# Patient Record
Sex: FEMALE | Race: WHITE | NOT HISPANIC OR LATINO | Employment: OTHER | URBAN - METROPOLITAN AREA
[De-identification: names, ages, dates, MRNs, and addresses within clinical notes are randomized per-mention and may not be internally consistent; named-entity substitution may affect disease eponyms.]

---

## 2017-03-09 ENCOUNTER — OFFICE VISIT (OUTPATIENT)
Dept: ORTHOPEDIC SURGERY | Facility: CLINIC | Age: 72
End: 2017-03-09

## 2017-03-09 VITALS — HEIGHT: 67 IN | WEIGHT: 131.8 LBS | TEMPERATURE: 98.1 F | BODY MASS INDEX: 20.69 KG/M2

## 2017-03-09 DIAGNOSIS — M54.16 RIGHT LUMBAR RADICULOPATHY: ICD-10-CM

## 2017-03-09 DIAGNOSIS — M43.16 SPONDYLOLISTHESIS AT L4-L5 LEVEL: ICD-10-CM

## 2017-03-09 DIAGNOSIS — M54.50 LUMBAR SPINE PAIN: Primary | ICD-10-CM

## 2017-03-09 PROCEDURE — 99213 OFFICE O/P EST LOW 20 MIN: CPT | Performed by: ORTHOPAEDIC SURGERY

## 2017-03-09 PROCEDURE — 72110 X-RAY EXAM L-2 SPINE 4/>VWS: CPT | Performed by: ORTHOPAEDIC SURGERY

## 2017-03-09 NOTE — PROGRESS NOTES
Patient:  Davida Anderson is a 71 y.o. female    Chief Complaint/ Reason for Visit:    Chief Complaint   Patient presents with   • Lumbar Spine - Establish Care, Pain   • Right Hip - Establish Care, Pain       HPI:  The patient presents today complaining of a 5-6 week history of progressively worsening aching, burning, and stabbing pain of severe intensity beginning in her lower back on the right side, radiating around her right hip all the way down her right leg even into her right foot.  She occasionally has burning pain in this area as well.  She has not had an injury to her back recently, but does have a history of spine surgery in the past.  She has no left lower extremity symptoms.  She has not experienced any difficulty acutely with bowel or bladder control associated directly with the onset of these symptoms.  She does have a chronic difficulty with stress incontinence.    Her pain is exacerbated by sitting, but lately, she has had trouble maintaining any position, even laying supine, for a prolonged period.  It seems that positional changes are the thing that helps her discomfort the most.  She says her pain is probably the least intense when she is walking.      PMH:    Past Medical History   Diagnosis Date   • Acid reflux    • Arthritis    • Gtz esophagus    • Bipolar 2 disorder    • Diverticulosis    • Dizziness    • Frequent UTI    • H/O gastroesophageal reflux (GERD)    • H/O Infiltrating ductal carcinoma of left breast 2002     Stage IIA, Grade 3 ER/RI +, HER2 -, treated with 5 years of tamoxifen, 5 years of Femara, completed in 2012   • High cholesterol    • History of alcoholism      40 YRS AGO   • History of Clostridium difficile colitis    • History of gastric ulcer    • History of Holter monitoring      WEARING CURRENTLY   • Hypertension    • Irregular heartbeat    • Mass of right breast on mammogram 03/17/2016     10 o'clock position, 4 cm from the nipple,   • Neuropathy    • PONV  (postoperative nausea and vomiting)    • Raynaud disease        PSH:    Past Surgical History   Procedure Laterality Date   • Ankle open reduction internal fixation Right 08/14/2015     Dr. Dandre Camacho, [facility]   • Cystoscopy N/A 05/07/2010     with TVT takedown, Dr. Simon Wall, [facility]   • Tension free vaginal taping with mini arc sling N/A 10/26/2009     Dr. Gina Castillo, [facility]   • Mastectomy Left 04/11/2002     Left Total Mastectomy and Left Axillary [place] Node Biobsy, Dr. Indu Akins, [facility]   • Breast reconstruction, breast tissue expander insertion Left 04/11/2002     [place] Contour Profile expander was placed 550 cc size, filled with 200 cc of saline, Dr. Herbert Napoles, [facility]   • Mammo us breast biopsy additional w wo device Left 02/21/2002     2:00 position left breast, Infiltrating Ductal Carcinoma, [facility]   • Colonoscopy N/A 09/16/2014     Dr. Darci Kerns, [facility]; torts, tics, stool, polyp   • Upper gastrointestinal endoscopy  09/16/2014     z-line irreg, grade a reflux, gastritis   • Back surgery       LUMBAR   • Knee surgery Left      BROKEN   • Mandible fracture surgery     • Endoscopy N/A 9/27/2016     Procedure: ESOPHAGOGASTRODUODENOSCOPY with biopsy;  Surgeon: Darci Kerns MD;  Location: [facility] ENDOSCOPY;  Service:    • Colonoscopy N/A 9/27/2016     Procedure: COLONOSCOPY into cecum and ti WITH BIOPSIES AND COLD BIOPSY polypectomIES ;  Surgeon: Darci Kerns MD;  Location: [facility] ENDOSCOPY;  Service:        Social Hx:    Social History     Social History   • Marital status:      Spouse name: N/A   • Number of children: N/A   • Years of education: N/A     Occupational History   • Not on file.     Social History Main Topics   • Smoking status: Former Smoker     Packs/day: 3.00     Types: Cigarettes     Quit date: 4/14/1983   • Smokeless tobacco: Never Used   • Alcohol use No   • Drug use: No   • Sexual activity: Defer     Other Topics Concern   • Not on file     Social History Narrative        Family Hx:    Family History   Problem Relation Age of Onset   • Breast cancer Mother 35   • Colon cancer Mother 64   • Heart disease Father    • Cancer Father      throat and lung   • Colon polyps Father        Meds:    Current Outpatient Prescriptions:   •  acetaminophen (TYLENOL) 500 MG tablet, Take 1,000 mg by mouth every night., Disp: , Rfl:   •  ARIPiprazole (ABILIFY) 10 MG tablet, Take 10 mg by mouth daily., Disp: , Rfl:   •  aspirin 81 MG tablet, Take 81 mg by mouth daily., Disp: , Rfl:   •  atorvastatin (LIPITOR) 20 MG tablet, TAKE 1 TABLET EVERY DAY, Disp: , Rfl:   •  B Complex Vitamins (VITAMIN B COMPLEX) tablet, Take 1 tablet by mouth daily., Disp: , Rfl:   •  BIOTIN PO, Take 1 tablet by mouth daily., Disp: , Rfl:   •  Calcium Carb-Cholecalciferol (CALCIUM 600 + D) 600-200 MG-UNIT tablet, Take 2 tablets by mouth daily., Disp: , Rfl:   •  celecoxib (CeleBREX) 200 MG capsule, Take 200 mg by mouth daily., Disp: , Rfl:   •  diphenhydrAMINE (BENADRYL) 12.5 MG/5ML elixir, Take 50 mg by mouth every night., Disp: , Rfl:   •  Glucosamine-Chondroitin 5359-9517 MG/30ML liquid, Take 2 tablets by mouth daily., Disp: , Rfl:   •  hydrALAZINE (APRESOLINE) 25 MG tablet, Take 25 mg by mouth 3 (three) times a day., Disp: , Rfl:   •  Hydrazine Sulfate crystals, 25 mg 3 (three) times a day., Disp: , Rfl:   •  lamoTRIgine (LaMICtal) 100 MG tablet, Take 100 mg by mouth 2 (two) times a day., Disp: , Rfl:   •  lithium carbonate 300 MG capsule, Take 300 mg by mouth daily., Disp: , Rfl:   •  LYRICA 75 MG capsule, Take 75 mg by mouth daily., Disp: , Rfl:   •  magnesium oxide (MAGOX) 400 (241.3 MG) MG tablet tablet, Take 400 mg by mouth daily., Disp: , Rfl:   •  MYRBETRIQ 50 MG tablet sustained-release 24 hour, Take 50 mg by mouth daily., Disp: , Rfl:   •  omeprazole (PriLOSEC) 20 MG capsule, Take 20 mg by mouth 2 (two) times a day., Disp: , Rfl:   •  Probiotic Product (SOLUBLE FIBER/PROBIOTICS PO), Take 1 tablet by mouth 3  "(three) times a day., Disp: , Rfl:   •  Coenzyme Q10 (CO Q-10) 400 MG capsule, Take 400 mg by mouth daily., Disp: , Rfl:   •  eszopiclone (LUNESTA) tablet, Take 3 mg by mouth at night as needed., Disp: , Rfl:     Allergies:    Allergies   Allergen Reactions   • Morphine Hives, Itching and Rash       ROS:  Review of Systems   Constitutional: Negative for chills, fever and unexpected weight change.   HENT: Positive for tinnitus. Negative for trouble swallowing and voice change.    Eyes: Positive for pain. Negative for visual disturbance.   Respiratory: Negative for cough and shortness of breath.    Cardiovascular: Negative for chest pain and leg swelling.   Gastrointestinal: Positive for nausea. Negative for abdominal pain and vomiting.   Endocrine: Negative for cold intolerance and heat intolerance.   Genitourinary: Negative for difficulty urinating, frequency and urgency.   Skin: Negative for rash and wound.   Allergic/Immunologic: Negative for immunocompromised state.   Neurological: Negative for weakness and numbness.   Hematological: Does not bruise/bleed easily.   Psychiatric/Behavioral: Positive for sleep disturbance. Negative for dysphoric mood. The patient is nervous/anxious (depression).        Vitals:    03/09/17 0907   Temp: 98.1 °F (36.7 °C)   TempSrc: Temporal Artery    Weight: 131 lb 12.8 oz (59.8 kg)   Height: 66.5\" (168.9 cm)       Physical Exam    The patient is awake, alert, and oriented ×3.  The patient is in no acute distress.  Breathing is regular and unlabored with a respiratory rate of 14/m.  Extraocular movements and pupillary responses are symmetrically intact. Sclerae are anicteric.   Hearing is within normal limits.  Speech is within normal limits.  There is no jugular venous distention.    The patient seems to have a limp antalgic from the right.  She has a positive straight leg raise on the right for reproduction of right hip, thigh, and back pain, and straight leg raising on the left " lower extremity reproduces contralateral right lower extremity pain also.  She has 1+ symmetrical patellar reflexes bilaterally, but I could not elicit Achilles reflexes.  Babinski sign is downgoing bilaterally.  She has no spasticity, cogwheeling, or clonus in either lower extremity.  Sensory exam is intact to light touch in both lower extremities.  Proprioception was intact as well.  She has brisk capillary filling all toes.  Pedal pulses were intact, regular, and symmetrically present in both feet with a current heart rate of about 78 bpm.  She has no popliteal or inguinal lymphadenopathy in either lower extremity.  She has no acute skin changes in either lower extremity.    Logroll and Stinchfield test are negative in both hips.  She has full, smooth, symmetrical ranges of motion of both hips well within a functional normal range.        Radiology: X-rays: A 4 view lumbar spine series was ordered and reviewed today to assess patient's complaints of back, right hip, and right lower extremity pain.  I was able to compare these images to previous lumbar spine images dating back to 2013.  In comparison, the patient has developed a mild L4 on L5 spondylolisthesis.  She has minor degenerative changes that may have progressed slightly.  The patient also had old MRIs that I reviewed the revealed disc bulges.          Assessment:  1. Lumbar spine pain    - XR Spine Lumbar 4+ View  - MRI Lumbar Spine With Contrast; Future  - Ambulatory Referral to Orthopedic Surgery  - Ambulatory Referral to Physical Therapy Evaluate and treat, Ortho    2. Spondylolisthesis at L4-L5 level    - MRI Lumbar Spine With Contrast; Future  - Ambulatory Referral to Orthopedic Surgery  - Ambulatory Referral to Physical Therapy Evaluate and treat, Ortho    3. Right lumbar radiculopathy    - MRI Lumbar Spine With Contrast; Future  - Ambulatory Referral to Orthopedic Surgery  - Ambulatory Referral to Physical Therapy Evaluate and treat,  Ortho        Plan:  I discussed everything with the patient at length.  Given her history of prior spine surgery, the changes on her plain x-rays, and the severity of her pain, I think an MRI of her lumbar spine is appropriate.  We will need contrast given her history of prior surgery.  We're going to start some him.  Physical therapy, but I think she would be best served to be seen by our spine specialists.  She says the physician who performed her original surgery has retired.    Neurologic precautions were reviewed with the patient in detail and she voiced understanding.  She knows that she needs to get immediate medical attention for any says deterioration.      Orders Placed This Encounter   Procedures   • XR Spine Lumbar 4+ View     Order Specific Question:   Reason for Exam:     Answer:   OPNC Lumbar Spine Pain   • MRI Lumbar Spine With Contrast     Standing Status:   Future     Standing Expiration Date:   3/9/2018     Order Specific Question:   Reason for Exam:     Answer:   The patient has severe radicular pain down the right lower extremity.  History of prior spine surgery.  Progression of L4 on L5 spondylolisthesis listhesis noted   • Ambulatory Referral to Orthopedic Surgery     Referral Priority:   Routine     Referral Type:   Consultation     Referral Reason:   Specialty Services Required     Referred to Provider:   Austin Keith MD     Requested Specialty:   Orthopedic Surgery     Number of Visits Requested:   1   • Ambulatory Referral to Physical Therapy Evaluate and treat, Ortho     Referral Priority:   Routine     Referral Type:   Therapy     Referral Reason:   Specialty Services Required     Requested Specialty:   Physical Therapy     Number of Visits Requested:   1

## 2017-03-15 ENCOUNTER — HOSPITAL ENCOUNTER (OUTPATIENT)
Dept: MRI IMAGING | Facility: HOSPITAL | Age: 72
Discharge: HOME | End: 2017-03-15
Attending: ORTHOPAEDIC SURGERY | Admitting: ORTHOPAEDIC SURGERY

## 2017-03-15 DIAGNOSIS — M54.50 LUMBAR SPINE PAIN: ICD-10-CM

## 2017-03-15 DIAGNOSIS — M54.16 RIGHT LUMBAR RADICULOPATHY: ICD-10-CM

## 2017-03-15 DIAGNOSIS — M43.16 SPONDYLOLISTHESIS AT L4-L5 LEVEL: ICD-10-CM

## 2017-03-15 PROCEDURE — 0 GADOBENATE DIMEGLUMINE 529 MG/ML SOLUTION: Performed by: ORTHOPAEDIC SURGERY

## 2017-03-15 PROCEDURE — 72158 MRI LUMBAR SPINE W/O & W/DYE: CPT

## 2017-03-15 PROCEDURE — 82565 ASSAY OF CREATININE: CPT

## 2017-03-15 PROCEDURE — A9577 INJ MULTIHANCE: HCPCS | Performed by: ORTHOPAEDIC SURGERY

## 2017-03-15 RX ADMIN — GADOBENATE DIMEGLUMINE 11 ML: 529 INJECTION, SOLUTION INTRAVENOUS at 15:00

## 2017-03-16 LAB — CREAT BLDA-MCNC: 0.7 MG/DL (ref 0.6–1.3)

## 2017-03-24 ENCOUNTER — CONSULT (OUTPATIENT)
Dept: ORTHOPEDIC SURGERY | Facility: CLINIC | Age: 72
End: 2017-03-24

## 2017-03-24 VITALS — TEMPERATURE: 98.1 F | HEIGHT: 67 IN | BODY MASS INDEX: 20.4 KG/M2 | WEIGHT: 130 LBS

## 2017-03-24 DIAGNOSIS — M54.50 LUMBAR SPINE PAIN: Primary | ICD-10-CM

## 2017-03-24 DIAGNOSIS — M43.16 SPONDYLOLISTHESIS OF LUMBAR REGION: Primary | ICD-10-CM

## 2017-03-24 DIAGNOSIS — M54.16 RADICULOPATHY OF LUMBAR REGION: ICD-10-CM

## 2017-03-24 DIAGNOSIS — M48.061 SPINAL STENOSIS OF LUMBAR REGION: ICD-10-CM

## 2017-03-24 PROCEDURE — 99214 OFFICE O/P EST MOD 30 MIN: CPT | Performed by: ORTHOPAEDIC SURGERY

## 2017-03-24 RX ORDER — CHOLECALCIFEROL (VITAMIN D3) 125 MCG
5 CAPSULE ORAL NIGHTLY PRN
COMMUNITY

## 2017-03-24 RX ORDER — CEPHALEXIN 250 MG/1
CAPSULE ORAL
COMMUNITY
Start: 2017-03-09 | End: 2018-04-17

## 2017-03-24 NOTE — PROGRESS NOTES
New patient or new problem visit    Chief Complaint   Patient presents with   • Lumbar Spine - Establish Care       HPI: She complains of back pain radiating into the hips and legs and into the left calf, but worse at night and improving his a day goes on.  Seen today request Dr. Camacho.  No numbness tingling balance difficulties.  Underwent lumbar laminectomy some years ago with good result.  It's intermittent clunking sensation in the low back    PFSH: See chart- reviewed    Review of Systems   Constitutional: Negative for chills, fever and unexpected weight change.   HENT: Positive for trouble swallowing.    Eyes: Negative.  Negative for visual disturbance.   Respiratory: Positive for cough and shortness of breath.    Cardiovascular: Negative.  Negative for chest pain and leg swelling.   Gastrointestinal: Positive for nausea.   Endocrine: Negative.  Negative for cold intolerance and heat intolerance.   Genitourinary: Positive for urgency.   Musculoskeletal: Positive for back pain and gait problem.   Skin: Negative.  Negative for rash and wound.   Allergic/Immunologic: Negative.  Negative for immunocompromised state.   Neurological: Positive for weakness, light-headedness, numbness and headaches.   Hematological: Negative.  Does not bruise/bleed easily.   Psychiatric/Behavioral: Positive for dysphoric mood. The patient is nervous/anxious.        PE:Constitutional: Vital signs above-noted.  Awake, alert and oriented    Psychiatric: Affect and insight do not appear grossly disturbed.    Pulmonary: Breathing is unlabored, color is good.    Skin: Warm, dry and normal turgor    Cardiac:  pedal pulses intact.  No edema.    Eyesight and hearing appear adequate for examination purposes      Musculoskeletal:  There is minimal tenderness to percussion and palpation of the spine. Motion appears undisturbed.  Posture is unremarkable to coronal and sagittal inspection.    The skin about the area is audible for well-healed  incision..  There is no palpable or visible deformity.  There is no local spasm.       Neurologic:   Reflexes are 2+ and symmetrical in the patellae absent in the Achilles.   Motor function is undisturbed in quadriceps, EHL, and gastrocnemius      Sensation appears symmetrically intact to light touch   .  In the bilateral lower extremities there is no evidence of atrophy.   Clonus is absent..  Gait appears undisturbed.  SLR test negative      MEDICAL DECISION MAKING    XRAY: Plain film x-rays of the lumbar spine show L4 5 degenerative spondylolisthesis grade 1 about 8 mm.  No comparison views are available.  MRI scan shows moderate to severe spinal stenosis at L4 5 and looks good otherwise.    Other: n/a    Impression: L4-5 degenerative spondylolisthesis with spinal stenosis.  Very unusual pain presentation for common problem, we need to remain vigilant for masquerading problems.    Plan: For now epidural injections which will likely get symptomatic relief as well as confirm the lumbar radicular nature of her pain.  Ultimately if she fails to improve she is  Candidate for surgery

## 2017-04-06 ENCOUNTER — TRANSCRIBE ORDERS (OUTPATIENT)
Dept: PAIN MEDICINE | Facility: HOSPITAL | Age: 72
End: 2017-04-06

## 2017-04-06 ENCOUNTER — ANESTHESIA (OUTPATIENT)
Dept: PAIN MEDICINE | Facility: HOSPITAL | Age: 72
End: 2017-04-06

## 2017-04-06 ENCOUNTER — HOSPITAL ENCOUNTER (OUTPATIENT)
Dept: PAIN MEDICINE | Facility: HOSPITAL | Age: 72
Discharge: HOME | End: 2017-04-06
Attending: ORTHOPAEDIC SURGERY | Admitting: ORTHOPAEDIC SURGERY

## 2017-04-06 ENCOUNTER — HOSPITAL ENCOUNTER (OUTPATIENT)
Dept: GENERAL RADIOLOGY | Facility: HOSPITAL | Age: 72
Discharge: HOME | End: 2017-04-06

## 2017-04-06 ENCOUNTER — ANESTHESIA EVENT (OUTPATIENT)
Dept: PAIN MEDICINE | Facility: HOSPITAL | Age: 72
End: 2017-04-06

## 2017-04-06 VITALS
WEIGHT: 130 LBS | RESPIRATION RATE: 16 BRPM | HEIGHT: 66 IN | HEART RATE: 71 BPM | DIASTOLIC BLOOD PRESSURE: 81 MMHG | SYSTOLIC BLOOD PRESSURE: 139 MMHG | OXYGEN SATURATION: 100 % | BODY MASS INDEX: 20.89 KG/M2

## 2017-04-06 DIAGNOSIS — M54.16 RADICULOPATHY OF LUMBAR REGION: ICD-10-CM

## 2017-04-06 DIAGNOSIS — M54.50 LUMBAR SPINE PAIN: Primary | ICD-10-CM

## 2017-04-06 DIAGNOSIS — M54.50 LUMBAR SPINE PAIN: ICD-10-CM

## 2017-04-06 DIAGNOSIS — R52 PAIN: ICD-10-CM

## 2017-04-06 PROCEDURE — 25010000002 METHYLPREDNISOLONE PER 80 MG: Performed by: ANESTHESIOLOGY

## 2017-04-06 PROCEDURE — 0 IOPAMIDOL 41 % SOLUTION: Performed by: ANESTHESIOLOGY

## 2017-04-06 PROCEDURE — C1755 CATHETER, INTRASPINAL: HCPCS

## 2017-04-06 PROCEDURE — 25010000002 MIDAZOLAM PER 1 MG: Performed by: ANESTHESIOLOGY

## 2017-04-06 PROCEDURE — 77003 FLUOROGUIDE FOR SPINE INJECT: CPT

## 2017-04-06 RX ORDER — SODIUM CHLORIDE 0.9 % (FLUSH) 0.9 %
1-10 SYRINGE (ML) INJECTION AS NEEDED
Status: DISCONTINUED | OUTPATIENT
Start: 2017-04-06 | End: 2017-04-07 | Stop reason: HOSPADM

## 2017-04-06 RX ORDER — LIDOCAINE HYDROCHLORIDE 10 MG/ML
1 INJECTION, SOLUTION INFILTRATION; PERINEURAL ONCE
Status: DISCONTINUED | OUTPATIENT
Start: 2017-04-06 | End: 2017-04-07 | Stop reason: HOSPADM

## 2017-04-06 RX ORDER — MIDAZOLAM HYDROCHLORIDE 1 MG/ML
1 INJECTION INTRAMUSCULAR; INTRAVENOUS
Status: DISCONTINUED | OUTPATIENT
Start: 2017-04-06 | End: 2017-04-07 | Stop reason: HOSPADM

## 2017-04-06 RX ORDER — METHYLPREDNISOLONE ACETATE 80 MG/ML
80 INJECTION, SUSPENSION INTRA-ARTICULAR; INTRALESIONAL; INTRAMUSCULAR; SOFT TISSUE ONCE
Status: COMPLETED | OUTPATIENT
Start: 2017-04-06 | End: 2017-04-06

## 2017-04-06 RX ORDER — FENTANYL CITRATE 50 UG/ML
50 INJECTION, SOLUTION INTRAMUSCULAR; INTRAVENOUS
Status: DISCONTINUED | OUTPATIENT
Start: 2017-04-06 | End: 2017-04-07 | Stop reason: HOSPADM

## 2017-04-06 RX ORDER — ACETAMINOPHEN,DIPHENHYDRAMINE HCL 500; 25 MG/1; MG/1
2 TABLET, FILM COATED ORAL NIGHTLY PRN
COMMUNITY
End: 2018-06-18

## 2017-04-06 RX ADMIN — MIDAZOLAM 1 MG: 1 INJECTION INTRAMUSCULAR; INTRAVENOUS at 08:52

## 2017-04-06 RX ADMIN — METHYLPREDNISOLONE ACETATE 80 MG: 80 INJECTION, SUSPENSION INTRA-ARTICULAR; INTRALESIONAL; INTRAMUSCULAR; SOFT TISSUE at 08:57

## 2017-04-06 RX ADMIN — IOPAMIDOL 10 ML: 408 INJECTION, SOLUTION INTRATHECAL at 08:56

## 2017-04-06 NOTE — ANESTHESIA PROCEDURE NOTES
PAIN Epidural block    Patient location during procedure: pain clinic  Indication:procedure for pain  Performed By  Anesthesiologist: NIRANJAN ANTHONY  Preanesthetic Checklist  Completed: patient identified and risks and benefits discussed  Additional Notes  Diagnosis:     Lumbar spinal stenosis without neurogenic claudication  Lumbar neuritis    Sedation:  IV Versed 1 mg    A lumbar epidural steroid injection with fluoroscopic guidance and an Isovue dye epidurogram was performed.  Under fluoroscopic guidance, the epidural space was identified and accessed, confirmed by loss of resistance to saline followed by injection of Isovue dye, which shows a good epidurogram.  The above medications were injected uneventfully.    Epidural Block Prep:  Pt Position:prone  Sterile Tech:cap, gloves, mask and sterile barrier  Monitoring:blood pressure monitoring, continuous pulse oximetry and EKG  Epidural Block Procedure:  Approach:left paramedian  Guidance: fluoroscopy  Location:lumbar  Level:4-5  Needle Type:Tuohy  Needle Gauge:20  Aspiration:negative  Medications:  Depomedrol:80  Preservative Free Saline:3mL  Isovue:2mL    Post Assessment:  Pt Tolerance:patient tolerated the procedure well with no apparent complications  Complications:no

## 2017-04-06 NOTE — H&P
CHIEF COMPLAINT: back and bilateral lower extremity pain      HISTORY OF PRESENT ILLNESS:  For several months without any known initiating event she began to experience low lumbar back pain which radiates posteriorly down bilateral lower extremities.  It's intermittent in timing, occurring after laying down or sitting for a while and then trying to stand up.  It is relieved by walking or remaining active.  While she is lying down she does not have pain it is when she tries to move after being inactive.  It is intermittent in timing.  Between a 3 and 8 out of 10 on the pain scale.  Described as aching deep pressure radiating associated with some weakness.  It affects her exercise mood and emotions and sleep.  Her MRI shows L4 5 spinal stenosis.  She has had laminectomy in the past    PAST MEDICAL HISTORY:  Allergies to morphine  Medications include Tylenol Abilify Lipitor Celebrex Benadryl hydralazine lithium Lyrica melatoninPrilosec  History of hypertension GERD bipolar Gtz's esophagus Reinard's alcoholism    SOCIAL HISTORY:  former smoker    REVIEW OF SYSTEMS:  No hematologic infectious or constitutional symptoms    PHYSICAL EXAM:  Height 5 foot 6 weight 130 pounds BMI 21  Well-developed well-nourished no acute distress  Extra ocular movements intact  Mallampati class II airway  Cardiac:  Regular rate and rhythm  Lungs:  Clear to auscultation bilaterally with good effort  Alert and oriented ×3  Deep tendon reflexes normal in the bilateral patella  Negative straight leg raise bilaterally  5 out of 5 strength bilateral upper and lower extremities  Lumbar spine without obvious deformities ecchymoses  Lumbar spine nontender to palpation      DIAGNOSIS:  Lumbar spinal stenosis without neurogenic claudication  Lumbar neuritis    PLAN:  1.  Lumbar epidural steroid injections, up to 3, spaced 1-2 weeks apart.  If pain control is acceptable after 1 or 2 injections, it was discussed with the patient that they may return  for the subsequent injections if and when their pain returns.  The risks were discussed with the patient including failure of relief, worsening pain, Headache (post dural puncture headache), bleeding (epidural hematoma) and infection (epidural abscess or skin infection).  2.  Physical therapy exercises at home as prescribed by physical therapy or from the pain clinic handout (given to the patient).  Continuation of these exercises every day, or multiple times per week, even when the patient has good pain relief, was stressed to the patient as a preventative measure to decrease the frequency and severity of future pain episodes.  3.  Continue pain medicines as already prescribed.  If patient not currently taking any, it is recommended to begin Acetaminophen 1000 mg po q 8 hours.  If other medicines containing Acetaminophen are currently prescribed, maintain daily dose at 3000 mg.    4.  If they can tolerate NSAIDS, it is recommended to take Ibuprofen 600 mg po q 6 hours for 7 days during pain exacerbations.  Alternatively, they may substitute an NSAID of their choice (e.g. Aleve).  This may be taken at the same time as Acetaminophen.  5.  Heat and ice to the affected area as tolerated for pain control.  It was discussed that heating pads can cause burns.  6.  Daily low impact exercise such as walking or water exercise was recommended to maintain overall health and aid in weight control.   7.  Follow up as needed for subsequent injections.  8.  Patient was counseled to abstain from tobacco products.

## 2017-04-17 ENCOUNTER — TELEPHONE (OUTPATIENT)
Dept: ORTHOPEDIC SURGERY | Facility: CLINIC | Age: 72
End: 2017-04-17

## 2017-05-04 ENCOUNTER — HOSPITAL ENCOUNTER (OUTPATIENT)
Dept: PAIN MEDICINE | Facility: HOSPITAL | Age: 72
Discharge: HOME | End: 2017-05-04
Admitting: ORTHOPAEDIC SURGERY

## 2017-05-04 ENCOUNTER — ANESTHESIA (OUTPATIENT)
Dept: PAIN MEDICINE | Facility: HOSPITAL | Age: 72
End: 2017-05-04

## 2017-05-04 ENCOUNTER — ANESTHESIA EVENT (OUTPATIENT)
Dept: PAIN MEDICINE | Facility: HOSPITAL | Age: 72
End: 2017-05-04

## 2017-05-04 ENCOUNTER — HOSPITAL ENCOUNTER (OUTPATIENT)
Dept: GENERAL RADIOLOGY | Facility: HOSPITAL | Age: 72
Discharge: HOME | End: 2017-05-04

## 2017-05-04 VITALS
SYSTOLIC BLOOD PRESSURE: 144 MMHG | TEMPERATURE: 98 F | RESPIRATION RATE: 16 BRPM | OXYGEN SATURATION: 99 % | HEART RATE: 71 BPM | DIASTOLIC BLOOD PRESSURE: 103 MMHG

## 2017-05-04 DIAGNOSIS — M54.50 LUMBAR SPINE PAIN: ICD-10-CM

## 2017-05-04 DIAGNOSIS — M54.16 RADICULOPATHY OF LUMBAR REGION: ICD-10-CM

## 2017-05-04 DIAGNOSIS — R52 PAIN: ICD-10-CM

## 2017-05-04 PROCEDURE — 25010000002 METHYLPREDNISOLONE PER 80 MG: Performed by: ANESTHESIOLOGY

## 2017-05-04 PROCEDURE — 77003 FLUOROGUIDE FOR SPINE INJECT: CPT

## 2017-05-04 PROCEDURE — C1755 CATHETER, INTRASPINAL: HCPCS

## 2017-05-04 PROCEDURE — 25010000002 MIDAZOLAM PER 1 MG: Performed by: ANESTHESIOLOGY

## 2017-05-04 PROCEDURE — 0 IOPAMIDOL 41 % SOLUTION: Performed by: ANESTHESIOLOGY

## 2017-05-04 RX ORDER — SODIUM CHLORIDE 0.9 % (FLUSH) 0.9 %
1-10 SYRINGE (ML) INJECTION AS NEEDED
Status: DISCONTINUED | OUTPATIENT
Start: 2017-05-04 | End: 2017-05-05 | Stop reason: HOSPADM

## 2017-05-04 RX ORDER — METHYLPREDNISOLONE ACETATE 80 MG/ML
80 INJECTION, SUSPENSION INTRA-ARTICULAR; INTRALESIONAL; INTRAMUSCULAR; SOFT TISSUE ONCE
Status: COMPLETED | OUTPATIENT
Start: 2017-05-04 | End: 2017-05-04

## 2017-05-04 RX ORDER — LIDOCAINE HYDROCHLORIDE 10 MG/ML
1 INJECTION, SOLUTION INFILTRATION; PERINEURAL ONCE
Status: DISCONTINUED | OUTPATIENT
Start: 2017-05-04 | End: 2017-05-05 | Stop reason: HOSPADM

## 2017-05-04 RX ORDER — MIDAZOLAM HYDROCHLORIDE 1 MG/ML
1 INJECTION INTRAMUSCULAR; INTRAVENOUS
Status: DISCONTINUED | OUTPATIENT
Start: 2017-05-04 | End: 2017-05-05 | Stop reason: HOSPADM

## 2017-05-04 RX ORDER — FENTANYL CITRATE 50 UG/ML
50 INJECTION, SOLUTION INTRAMUSCULAR; INTRAVENOUS
Status: DISCONTINUED | OUTPATIENT
Start: 2017-05-04 | End: 2017-05-05 | Stop reason: HOSPADM

## 2017-05-04 RX ORDER — LIDOCAINE HYDROCHLORIDE 10 MG/ML
0.5 INJECTION, SOLUTION INFILTRATION; PERINEURAL ONCE AS NEEDED
Status: CANCELLED | OUTPATIENT
Start: 2017-05-04

## 2017-05-04 RX ADMIN — MIDAZOLAM 1 MG: 1 INJECTION INTRAMUSCULAR; INTRAVENOUS at 11:11

## 2017-05-04 RX ADMIN — IOPAMIDOL 10 ML: 408 INJECTION, SOLUTION INTRATHECAL at 11:18

## 2017-05-04 RX ADMIN — METHYLPREDNISOLONE ACETATE 80 MG: 80 INJECTION, SUSPENSION INTRA-ARTICULAR; INTRALESIONAL; INTRAMUSCULAR; SOFT TISSUE at 11:18

## 2017-06-14 ENCOUNTER — HOSPITAL ENCOUNTER (OUTPATIENT)
Dept: GENERAL RADIOLOGY | Facility: HOSPITAL | Age: 72
Discharge: HOME | End: 2017-06-14

## 2017-06-14 ENCOUNTER — ANESTHESIA (OUTPATIENT)
Dept: PAIN MEDICINE | Facility: HOSPITAL | Age: 72
End: 2017-06-14

## 2017-06-14 ENCOUNTER — HOSPITAL ENCOUNTER (OUTPATIENT)
Dept: PAIN MEDICINE | Facility: HOSPITAL | Age: 72
Discharge: HOME | End: 2017-06-14
Admitting: ORTHOPAEDIC SURGERY

## 2017-06-14 ENCOUNTER — ANESTHESIA EVENT (OUTPATIENT)
Dept: PAIN MEDICINE | Facility: HOSPITAL | Age: 72
End: 2017-06-14

## 2017-06-14 VITALS
DIASTOLIC BLOOD PRESSURE: 78 MMHG | RESPIRATION RATE: 16 BRPM | TEMPERATURE: 98 F | SYSTOLIC BLOOD PRESSURE: 142 MMHG | HEART RATE: 69 BPM | OXYGEN SATURATION: 100 %

## 2017-06-14 DIAGNOSIS — R52 PAIN: ICD-10-CM

## 2017-06-14 DIAGNOSIS — M54.16 RADICULOPATHY OF LUMBAR REGION: ICD-10-CM

## 2017-06-14 DIAGNOSIS — M54.50 LUMBAR SPINE PAIN: ICD-10-CM

## 2017-06-14 PROCEDURE — C1755 CATHETER, INTRASPINAL: HCPCS

## 2017-06-14 PROCEDURE — 25010000002 MIDAZOLAM PER 1 MG: Performed by: ANESTHESIOLOGY

## 2017-06-14 PROCEDURE — 25010000002 METHYLPREDNISOLONE PER 80 MG: Performed by: ANESTHESIOLOGY

## 2017-06-14 PROCEDURE — 77003 FLUOROGUIDE FOR SPINE INJECT: CPT

## 2017-06-14 PROCEDURE — 0 IOPAMIDOL 41 % SOLUTION: Performed by: ANESTHESIOLOGY

## 2017-06-14 RX ORDER — METHYLPREDNISOLONE ACETATE 80 MG/ML
80 INJECTION, SUSPENSION INTRA-ARTICULAR; INTRALESIONAL; INTRAMUSCULAR; SOFT TISSUE ONCE
Status: COMPLETED | OUTPATIENT
Start: 2017-06-14 | End: 2017-06-14

## 2017-06-14 RX ORDER — LIDOCAINE HYDROCHLORIDE 10 MG/ML
0.5 INJECTION, SOLUTION INFILTRATION; PERINEURAL ONCE AS NEEDED
Status: CANCELLED | OUTPATIENT
Start: 2017-06-14

## 2017-06-14 RX ORDER — SODIUM CHLORIDE 0.9 % (FLUSH) 0.9 %
1-10 SYRINGE (ML) INJECTION AS NEEDED
Status: DISCONTINUED | OUTPATIENT
Start: 2017-06-14 | End: 2017-06-15 | Stop reason: HOSPADM

## 2017-06-14 RX ORDER — MIDAZOLAM HYDROCHLORIDE 1 MG/ML
1 INJECTION INTRAMUSCULAR; INTRAVENOUS
Status: DISCONTINUED | OUTPATIENT
Start: 2017-06-14 | End: 2017-06-15 | Stop reason: HOSPADM

## 2017-06-14 RX ORDER — FENTANYL CITRATE 50 UG/ML
50 INJECTION, SOLUTION INTRAMUSCULAR; INTRAVENOUS
Status: DISCONTINUED | OUTPATIENT
Start: 2017-06-14 | End: 2017-06-15 | Stop reason: HOSPADM

## 2017-06-14 RX ORDER — LIDOCAINE HYDROCHLORIDE 10 MG/ML
1 INJECTION, SOLUTION INFILTRATION; PERINEURAL ONCE
Status: DISCONTINUED | OUTPATIENT
Start: 2017-06-14 | End: 2017-06-15 | Stop reason: HOSPADM

## 2017-06-14 RX ADMIN — IOPAMIDOL 10 ML: 408 INJECTION, SOLUTION INTRATHECAL at 11:09

## 2017-06-14 RX ADMIN — MIDAZOLAM 1 MG: 1 INJECTION INTRAMUSCULAR; INTRAVENOUS at 11:07

## 2017-06-14 RX ADMIN — METHYLPREDNISOLONE ACETATE 80 MG: 80 INJECTION, SUSPENSION INTRA-ARTICULAR; INTRALESIONAL; INTRAMUSCULAR; SOFT TISSUE at 11:10

## 2017-06-14 NOTE — PLAN OF CARE
Problem: Pain, Chronic (Adult)  Goal: Identify Related Risk Factors and Signs and Symptoms  Outcome: Unable to achieve outcome(s) by discharge Date Met:  06/14/17

## 2017-06-14 NOTE — H&P
INTERVAL HISTORY:    The patient returns for another Lumbar epidural steroid injection today.  They have received 50% improvement since their last injection with a pain level of /10 at its worst recently.    Conservative measures tried in the interim     Exam:  Airway Mallampatti 2  Alert and oriented      Diagnosis:  Lumbar spinal stenosis [M48.06]         Lumbar neuritis [M54.16]         Plan:  Lumbar epidural steroid injection under fluoroscopic guidance    I have encouraged them to continue:  1.  Physical therapy exercises at home as prescribed by physical therapy or from the pain clinic handout (given to the patient).  Continuation of these exercises every day, or multiple times per week, even when the patient has good pain relief, was stressed to the patient as a preventative measure to decrease the frequency and severity of future pain episodes.  2.  Continue pain medicines as already prescribed.  If patient not currently taking any, it is recommended to begin Acetaminophen 1000 mg po q 8 hours.  If other medicines containing Acetaminophen are currently prescribed, maintain daily dose at 3000mg.    3.  If they can tolerate NSAIDS, it is recommended to take Ibuprofen 600 mg po q 6 hours for 7 days during pain exacerbations.   Alternatively, they may substitute an NSAID of their choice (e.g. Aleve)  4.  Heat and ice to the affected area as tolerated for pain control.  It was discussed that heating pads can cause burns.  5.  Low impact exercise such as walking or water exercise was recommended to maintain overall health and aid in weight control.   6.  Follow up as needed for subsequent injections.  7.  Patient was counseled to abstain from tobacco products.

## 2017-06-14 NOTE — ANESTHESIA PROCEDURE NOTES
PAIN Epidural block    Patient location during procedure: pain clinic  Indication:procedure for pain  Performed By  Anesthesiologist: MARQUES DUFFY  Preanesthetic Checklist  Completed: patient identified, surgical consent, pre-op evaluation, timeout performed, IV checked, risks and benefits discussed and monitors and equipment checked  Additional Notes  Diagnosis:  Post-Op Diagnosis Codes:     * Lumbar spinal stenosis (M48.06)     * Lumbar neuritis (M54.16)    Plan includes:  1. Epidural steroid injections, up to 3, spaced 1-2 weeks apart. If pain control is acceptable after 1 or 2 injections, it was discussed with the patient that they may return for the subsequent injections if and when their pain returns. The risks were discussed with the patient including failure of relief, worsening pain, Headache (post dural puncture headache), bleeding (epidural hematoma) and infection (epidural abscess or skin infection).  2. Physical therapy exercises at home as prescribed by physical therapy or from the pain clinic handout (given to the patient). Continuation of these exercises every day, or multiple times per week, even when the patient has good pain relief, was stressed to the patient as a preventative measure to decrease the frequency and severity of future pain episodes.  3. Continue pain medicines as already prescribed. If patient not currently taking any, it is recommended to begin Acetaminophen 1000 mg po q 8 hours. If other medicines containing Acetaminophen are currently prescribed, maintain daily dose at 3000 mg.   4. If they can tolerate NSAIDS, it is recommended to take Ibuprofen 600 mg po q 6 hours for 7 days during pain exacerbations. Alternatively, they may substitute an NSAID of their choice (e.g. Aleve). This may be taken at the same time as Acetaminophen.  5. Heat and ice to the affected area as tolerated for pain control. It was discussed that heating pads can cause burns.  6. Daily low impact  exercise such as walking or water exercise was recommended to maintain overall health and aid in weight control.   7. Follow up as needed for subsequent injections.  8. Patient was counseled to abstain from tobacco products.   Epidural Block Prep:  Pt Position:prone  Sterile Tech:gloves, mask and sterile barrier  Prep:chlorhexidine gluconate and isopropyl alcohol  Monitoring:blood pressure monitoring, continuous pulse oximetry and EKG  Epidural Block Procedure:  Approach:right paramedian  Guidance: fluoroscopy  Location:lumbar  Level:4-5  Needle Type:Tuohy  Needle Gauge:20  Aspiration:negative  Test Dose:negative  Medications:  Depomedrol:80 mg  Preservative Free Saline:3mL  Isovue:3mL    Post Assessment:  Dressing:occlusive dressing applied  Pt Tolerance:patient tolerated the procedure well with no apparent complications  Complications:no

## 2017-06-14 NOTE — PLAN OF CARE
Problem: Pain, Chronic (Adult)  Goal: Acceptable Pain Control/Comfort Level  Outcome: Unable to achieve outcome(s) by discharge Date Met:  06/14/17

## 2017-09-29 ENCOUNTER — TELEPHONE (OUTPATIENT)
Dept: GASTROENTEROLOGY | Facility: CLINIC | Age: 72
End: 2017-09-29

## 2017-09-29 NOTE — TELEPHONE ENCOUNTER
Received voice message from patient that she needs to schedule an EGD.  Attempted to return call.  Left voice message for patient to call back to speak to one of Dr Kerns's nurses regarding her current symptoms(so that they can speak to Dr Kerns for treatment).

## 2017-09-29 NOTE — TELEPHONE ENCOUNTER
Called pt and made appt for Monday at 9am with Zo Np for difficulty swallowing and chest discomfort.

## 2017-10-02 ENCOUNTER — OFFICE VISIT (OUTPATIENT)
Dept: GASTROENTEROLOGY | Facility: CLINIC | Age: 72
End: 2017-10-02

## 2017-10-02 VITALS
BODY MASS INDEX: 20.57 KG/M2 | SYSTOLIC BLOOD PRESSURE: 128 MMHG | DIASTOLIC BLOOD PRESSURE: 84 MMHG | HEIGHT: 66 IN | TEMPERATURE: 97.9 F | WEIGHT: 128 LBS

## 2017-10-02 DIAGNOSIS — R13.19 OTHER DYSPHAGIA: Primary | ICD-10-CM

## 2017-10-02 DIAGNOSIS — K63.5 POLYP OF COLON, UNSPECIFIED PART OF COLON, UNSPECIFIED TYPE: ICD-10-CM

## 2017-10-02 PROCEDURE — 99213 OFFICE O/P EST LOW 20 MIN: CPT | Performed by: NURSE PRACTITIONER

## 2017-10-02 RX ORDER — TRAMADOL HYDROCHLORIDE 50 MG/1
50 TABLET ORAL AS NEEDED
COMMUNITY
End: 2018-11-27

## 2017-10-02 NOTE — PROGRESS NOTES
Chief Complaint   Patient presents with   • Difficulty Swallowing   • Chest Pain     HPI    71-year-old female patient of Dr. Kerns's last evaluated in the office in August 2016.  Followed for history of GERD, Gtz's esophagus, personal history of colon polyps and family history of colon cancer affecting her mother.  At the time of her last office visit patient was complaining of a change in her bowel habits with looser stools.  She underwent both an EGD and colonoscopy in follow-up to that visit in September 2016.  EGD was negative with the exception of mild gastritis, no evidence of Gtz's and negative pathology.  Colonoscopy with findings of external hemorrhoids, diverticulosis, tortuous colon and polyps with pathology consistent with tubular adenoma.  She is in recall for a follow-up colonoscopy per protocol.    Patient presents today with complaints of dysphagia that have been ongoing for approximately 6 months.  Symptoms have not been progressive.  She describes a sensation of feeling like food sits in her throat, this occurs primarily with breads and meats, although can occur occasionally with liquids.  She describes midsternal chest pressure.  She also reports that the midsternal chest pressure is associated with shortness of air, dizziness and episodes of diaphoresis.  The chest discomfort is not reproduceable and does not worsen with movement or deep breathing. She does have a cardiac history to include atrial fib.  Patient wears a loop recorder and has been notified that she has been having some events.  She reports she is due for a follow-up appointment sometime this month with her cardiologist, Dr. Rai, although she has not been contacted at this time with an appointment date or time. The symptoms of midsternal chest pain and pressure occur when she is at rest and she has multiple episodes that occur in the absence of eating.  Her weight has been stable.  She denies any  odynophagia.      Past Medical History:   Diagnosis Date   • Acid reflux    • Arthritis    • Gtz esophagus    • Bipolar 2 disorder    • Diverticulosis    • Dizziness    • Frequent UTI    • H/O gastroesophageal reflux (GERD)    • H/O Infiltrating ductal carcinoma of left breast 2002    Stage IIA, Grade 3 ER/UT +, HER2 -, treated with 5 years of tamoxifen, 5 years of Femara, completed in 2012   • High cholesterol    • History of alcoholism     40 YRS AGO   • History of Clostridium difficile colitis    • History of gastric ulcer    • History of Holter monitoring     WEARING CURRENTLY   • Hypertension    • Irregular heartbeat    • Mass of right breast on mammogram 03/17/2016    10 o'clock position, 4 cm from the nipple,   • Neuropathy    • PONV (postoperative nausea and vomiting)    • Raynaud disease      Past Surgical History:   Procedure Laterality Date   • ANKLE OPEN REDUCTION INTERNAL FIXATION Right 08/14/2015    Dr. Dandre Camacho, [facility]   • BACK SURGERY      LUMBAR   • BREAST RECONSTRUCTION, BREAST TISSUE EXPANDER INSERTION Left 04/11/2002    [place] Contour Profile expander was placed 550 cc size, filled with 200 cc of saline, Dr. Herbert Napoles, [facility]   • COLONOSCOPY N/A 09/16/2014    Dr. Darci Kerns, [facility]; torts, tics, stool, polyp   • COLONOSCOPY N/A 9/27/2016    NTEH, diverticulosis, tortuous colon, Two 3-5mm polyps in the descending colon and the transverse colon, IH.  PATH: Tubular adenoma   • CYSTOSCOPY N/A 05/07/2010    with TVT takedown, Dr. Simon Wall, [facility]   • ENDOSCOPY N/A 9/27/2016    z line irreg, bilious gastric fluid- fluid aspiration preformed, gastritis.  PATH: Squamous and glandular mucosa with minimal superficial acute inflammation.    • KNEE SURGERY Left     BROKEN   • MAMMO US BREAST BIOPSY ADDITIONAL W WO DEVICE Left 02/21/2002    2:00 position left breast, Infiltrating Ductal Carcinoma, [facility]   • MANDIBLE FRACTURE SURGERY     • MASTECTOMY Left 04/11/2002    Left Total Mastectomy and Left  Axillary [place] Node Biobsy, Dr. Indu Akins, [facility]   • TENSION FREE VAGINAL TAPING WITH MINI ARC SLING N/A 10/26/2009    Dr. Gina Castillo, [facility]   • UPPER GASTROINTESTINAL ENDOSCOPY  09/16/2014    z-line irreg, grade a reflux, gastritis       Current Outpatient Prescriptions   Medication Sig Dispense Refill   • acetaminophen (TYLENOL) 500 MG tablet Take 1,000 mg by mouth every night.     • ARIPiprazole (ABILIFY) 10 MG tablet Take 10 mg by mouth daily.     • aspirin 81 MG tablet Take 81 mg by mouth daily.     • atorvastatin (LIPITOR) 20 MG tablet TAKE 1 TABLET EVERY DAY     • B Complex Vitamins (VITAMIN B COMPLEX) tablet Take 1 tablet by mouth daily.     • BIOTIN PO Take 1 tablet by mouth daily.     • Calcium Carb-Cholecalciferol (CALCIUM 600 + D) 600-200 MG-UNIT tablet Take 2 tablets by mouth daily.     • celecoxib (CeleBREX) 200 MG capsule Take 200 mg by mouth daily.     • cephalexin (KEFLEX) 250 MG capsule      • Coenzyme Q10 (CO Q-10) 400 MG capsule Take 400 mg by mouth daily.     • diphenhydrAMINE (BENADRYL) 12.5 MG/5ML elixir Take 50 mg by mouth every night.     • diphenhydrAMINE-acetaminophen (TYLENOL PM EXTRA STRENGTH)  MG tablet per tablet Take 2 tablets by mouth At Night As Needed for Sleep.     • Glucosamine-Chondroitin 3469-8269 MG/30ML liquid Take 2 tablets by mouth daily.     • hydrALAZINE (APRESOLINE) 25 MG tablet Take 25 mg by mouth 3 (three) times a day.     • Hydrazine Sulfate crystals 25 mg 3 (three) times a day.     • lamoTRIgine (LaMICtal) 100 MG tablet Take 100 mg by mouth 2 (two) times a day.     • lithium carbonate 300 MG capsule Take 300 mg by mouth daily.     • LYRICA 75 MG capsule Take 75 mg by mouth daily.     • magnesium oxide (MAGOX) 400 (241.3 MG) MG tablet tablet Take 400 mg by mouth daily.     • melatonin 5 MG tablet tablet Take 5 mg by mouth At Night As Needed.     • MYRBETRIQ 50 MG tablet sustained-release 24 hour Take 50 mg by mouth daily.     • omeprazole (PriLOSEC) 20 MG  "capsule Take 20 mg by mouth 2 (two) times a day.     • Probiotic Product (SOLUBLE FIBER/PROBIOTICS PO) Take 1 tablet by mouth 3 (three) times a day.     • traMADol (ULTRAM) 50 MG tablet Take 50 mg by mouth As Needed.       No current facility-administered medications for this visit.        PRN Meds:.    Allergies   Allergen Reactions   • Morphine Hives, Itching and Rash       Social History     Social History   • Marital status:      Spouse name: N/A   • Number of children: N/A   • Years of education: N/A     Occupational History   • Not on file.     Social History Main Topics   • Smoking status: Former Smoker     Packs/day: 3.00     Types: Cigarettes     Quit date: 4/14/1983   • Smokeless tobacco: Never Used   • Alcohol use No   • Drug use: No   • Sexual activity: Defer     Other Topics Concern   • Not on file     Social History Narrative       Family History   Problem Relation Age of Onset   • Breast cancer Mother 35   • Colon cancer Mother 64   • Heart disease Father    • Cancer Father      throat and lung   • Colon polyps Father        Review of Systems   Constitutional: Negative for activity change, appetite change and unexpected weight change.   HENT: Positive for trouble swallowing. Negative for sore throat.    Respiratory: Positive for chest tightness and shortness of breath.    Cardiovascular: Positive for chest pain and palpitations.        Intermittent diaphoresis associated with the chest pressure, soa and dizziness   Gastrointestinal: Negative for abdominal distention, abdominal pain, blood in stool, constipation, diarrhea, nausea and vomiting.   Neurological: Positive for dizziness.       Vitals:    10/02/17 0843   BP: 128/84   Temp: 97.9 °F (36.6 °C)     /84 (BP Location: Right arm, Patient Position: Sitting)  Temp 97.9 °F (36.6 °C) (Oral)   Ht 66\" (167.6 cm)  Wt 128 lb (58.1 kg)  BMI 20.66 kg/m2  Physical Exam   Constitutional: She is oriented to person, place, and time. She appears " well-developed and well-nourished. No distress.   HENT:   Head: Normocephalic and atraumatic.   Mouth/Throat: Oropharynx is clear and moist.   Eyes: No scleral icterus.   Neck: No thyromegaly present.   Cardiovascular: Normal rate and regular rhythm.    Pulmonary/Chest: Effort normal and breath sounds normal.   Abdominal: Soft. Bowel sounds are normal. She exhibits no distension. There is no tenderness.   Neurological: She is alert and oriented to person, place, and time.   Skin: Skin is warm and dry.   Psychiatric: She has a normal mood and affect.   Vitals reviewed.      ASSESSMENT AND PLAN    Davida was seen today for difficulty swallowing and chest pain.    Diagnoses and all orders for this visit:    Other dysphagia  Comments:  Onset x 6 months, no worse.  EGD 9/2016 gastritis, neg path.  No evidence of Gtz's last 2 EGD's.    Polyp of colon, unspecified part of colon, unspecified type  Comments:  c-scope 9/2016 with EH, tics, tortuous colong polyps path c/w TA.  Family hx colon cancer in mother.  In recall for scope 9/2019    Although the patient does have complaints of dysphagia they have been ongoing for 6 months and are no worse.  In light of the fact that she is describing additional chest pressure associated with shortness of air, dizziness and diaphoresis I have strongly encouraged her to contact her cardiologist today to get a follow-up appointment scheduled for evaluation of notification's of abnormal loop recorder readings as well as current symptoms.  Once she has been evaluated and cleared by cardiology she can contact our office and we will then schedule her for an EGD or esophagram pending discussion with Dr. Kerns.  Prior EGD in September 2016 was negative with the exception of mild gastritis, no evidence of Gtz's and negative pathology.  She will continue her PPI at her current dose.         Criss Gamez, DIANA   [facility], [facility]  [address] [place] [address]  [address]  [place] [address]  Office: [phone]

## 2017-10-04 ENCOUNTER — HOSPITAL ENCOUNTER (EMERGENCY)
Facility: HOSPITAL | Age: 72
Discharge: HOME | End: 2017-10-04
Attending: EMERGENCY MEDICINE | Admitting: EMERGENCY MEDICINE

## 2017-10-04 ENCOUNTER — APPOINTMENT (OUTPATIENT)
Dept: GENERAL RADIOLOGY | Facility: HOSPITAL | Age: 72
End: 2017-10-04

## 2017-10-04 VITALS
OXYGEN SATURATION: 95 % | HEART RATE: 81 BPM | BODY MASS INDEX: 20.89 KG/M2 | WEIGHT: 130 LBS | TEMPERATURE: 97.7 F | DIASTOLIC BLOOD PRESSURE: 84 MMHG | SYSTOLIC BLOOD PRESSURE: 147 MMHG | HEIGHT: 66 IN | RESPIRATION RATE: 16 BRPM

## 2017-10-04 DIAGNOSIS — K22.4 ESOPHAGEAL SPASM: Primary | ICD-10-CM

## 2017-10-04 LAB
ALBUMIN SERPL-MCNC: 4.8 G/DL (ref 3.5–5.2)
ALBUMIN/GLOB SERPL: 1.7 G/DL
ALP SERPL-CCNC: 55 U/L (ref 39–117)
ALT SERPL W P-5'-P-CCNC: 23 U/L (ref 1–33)
ANION GAP SERPL CALCULATED.3IONS-SCNC: 13.3 MMOL/L
AST SERPL-CCNC: 30 U/L (ref 1–32)
BASOPHILS # BLD AUTO: 0.05 10*3/MM3 (ref 0–0.2)
BASOPHILS NFR BLD AUTO: 0.7 % (ref 0–1.5)
BILIRUB SERPL-MCNC: 0.4 MG/DL (ref 0.1–1.2)
BUN BLD-MCNC: 14 MG/DL (ref 8–23)
BUN/CREAT SERPL: 18.7 (ref 7–25)
CALCIUM SPEC-SCNC: 11 MG/DL (ref 8.6–10.5)
CHLORIDE SERPL-SCNC: 101 MMOL/L (ref 98–107)
CO2 SERPL-SCNC: 23.7 MMOL/L (ref 22–29)
CREAT BLD-MCNC: 0.75 MG/DL (ref 0.57–1)
DEPRECATED RDW RBC AUTO: 43.3 FL (ref 37–54)
EOSINOPHIL # BLD AUTO: 0.33 10*3/MM3 (ref 0–0.7)
EOSINOPHIL NFR BLD AUTO: 4.4 % (ref 0.3–6.2)
ERYTHROCYTE [DISTWIDTH] IN BLOOD BY AUTOMATED COUNT: 13 % (ref 11.7–13)
GFR SERPL CREATININE-BSD FRML MDRD: 77 ML/MIN/1.73
GLOBULIN UR ELPH-MCNC: 2.9 GM/DL
GLUCOSE BLD-MCNC: 114 MG/DL (ref 65–99)
HCT VFR BLD AUTO: 41.3 % (ref 35.6–45.5)
HGB BLD-MCNC: 12.9 G/DL (ref 11.9–15.5)
IMM GRANULOCYTES # BLD: 0 10*3/MM3 (ref 0–0.03)
IMM GRANULOCYTES NFR BLD: 0 % (ref 0–0.5)
LYMPHOCYTES # BLD AUTO: 2.27 10*3/MM3 (ref 0.9–4.8)
LYMPHOCYTES NFR BLD AUTO: 30.2 % (ref 19.6–45.3)
MCH RBC QN AUTO: 28.5 PG (ref 26.9–32)
MCHC RBC AUTO-ENTMCNC: 31.2 G/DL (ref 32.4–36.3)
MCV RBC AUTO: 91.4 FL (ref 80.5–98.2)
MONOCYTES # BLD AUTO: 0.47 10*3/MM3 (ref 0.2–1.2)
MONOCYTES NFR BLD AUTO: 6.3 % (ref 5–12)
NEUTROPHILS # BLD AUTO: 4.4 10*3/MM3 (ref 1.9–8.1)
NEUTROPHILS NFR BLD AUTO: 58.4 % (ref 42.7–76)
PLATELET # BLD AUTO: 208 10*3/MM3 (ref 140–500)
PMV BLD AUTO: 10.1 FL (ref 6–12)
POTASSIUM BLD-SCNC: 4 MMOL/L (ref 3.5–5.2)
PROT SERPL-MCNC: 7.7 G/DL (ref 6–8.5)
RBC # BLD AUTO: 4.52 10*6/MM3 (ref 3.9–5.2)
SODIUM BLD-SCNC: 138 MMOL/L (ref 136–145)
TROPONIN T SERPL-MCNC: <0.01 NG/ML (ref 0–0.03)
WBC NRBC COR # BLD: 7.52 10*3/MM3 (ref 4.5–10.7)

## 2017-10-04 PROCEDURE — 96361 HYDRATE IV INFUSION ADD-ON: CPT

## 2017-10-04 PROCEDURE — 96375 TX/PRO/DX INJ NEW DRUG ADDON: CPT

## 2017-10-04 PROCEDURE — 96374 THER/PROPH/DIAG INJ IV PUSH: CPT

## 2017-10-04 PROCEDURE — 25010000002 METHYLPREDNISOLONE PER 125 MG: Performed by: PHYSICIAN ASSISTANT

## 2017-10-04 PROCEDURE — 84484 ASSAY OF TROPONIN QUANT: CPT | Performed by: PHYSICIAN ASSISTANT

## 2017-10-04 PROCEDURE — 80053 COMPREHEN METABOLIC PANEL: CPT | Performed by: PHYSICIAN ASSISTANT

## 2017-10-04 PROCEDURE — 71020 HC CHEST PA AND LATERAL: CPT

## 2017-10-04 PROCEDURE — 99284 EMERGENCY DEPT VISIT MOD MDM: CPT

## 2017-10-04 PROCEDURE — 93005 ELECTROCARDIOGRAM TRACING: CPT | Performed by: PHYSICIAN ASSISTANT

## 2017-10-04 PROCEDURE — 93010 ELECTROCARDIOGRAM REPORT: CPT | Performed by: INTERNAL MEDICINE

## 2017-10-04 PROCEDURE — 25010000002 DIPHENHYDRAMINE PER 50 MG: Performed by: PHYSICIAN ASSISTANT

## 2017-10-04 PROCEDURE — 85025 COMPLETE CBC W/AUTO DIFF WBC: CPT | Performed by: PHYSICIAN ASSISTANT

## 2017-10-04 RX ORDER — METHYLPREDNISOLONE SODIUM SUCCINATE 125 MG/2ML
125 INJECTION, POWDER, LYOPHILIZED, FOR SOLUTION INTRAMUSCULAR; INTRAVENOUS ONCE
Status: COMPLETED | OUTPATIENT
Start: 2017-10-04 | End: 2017-10-04

## 2017-10-04 RX ORDER — DIPHENHYDRAMINE HYDROCHLORIDE 50 MG/ML
25 INJECTION INTRAMUSCULAR; INTRAVENOUS EVERY 6 HOURS PRN
Status: DISCONTINUED | OUTPATIENT
Start: 2017-10-04 | End: 2017-10-04 | Stop reason: HOSPADM

## 2017-10-04 RX ORDER — FAMOTIDINE 10 MG/ML
20 INJECTION, SOLUTION INTRAVENOUS ONCE
Status: COMPLETED | OUTPATIENT
Start: 2017-10-04 | End: 2017-10-04

## 2017-10-04 RX ADMIN — DIPHENHYDRAMINE HYDROCHLORIDE 25 MG: 50 INJECTION, SOLUTION INTRAMUSCULAR; INTRAVENOUS at 15:08

## 2017-10-04 RX ADMIN — METHYLPREDNISOLONE SODIUM SUCCINATE 125 MG: 125 INJECTION, POWDER, FOR SOLUTION INTRAMUSCULAR; INTRAVENOUS at 15:07

## 2017-10-04 RX ADMIN — FAMOTIDINE 20 MG: 10 INJECTION, SOLUTION INTRAVENOUS at 15:08

## 2017-10-04 RX ADMIN — SODIUM CHLORIDE 1000 ML: 9 INJECTION, SOLUTION INTRAVENOUS at 15:08

## 2017-10-04 NOTE — DISCHARGE INSTRUCTIONS
PLEASE READ AND REVIEW ALL DISCHARGE INSTRUCTIONS.     Please follow up with your primary care physician for any further evaluation/treatment and further management of your blood pressure.     Recheck in emergency department for any worsening and/or concerning symptoms.     Take all prescribed medicine as written and continue chronic medication.    IF you continue to have esophageal spasm, please follow up with your GI physician.

## 2017-10-04 NOTE — ED PROVIDER NOTES
EMERGENCY DEPARTMENT ENCOUNTER    CHIEF COMPLAINT  Chief Complaint: possible allergic reaction  History given by: patient, family  History limited by: N/A  Room Number: 36/36  PMD: Marek Nguyễn MD  Cardiologist- Dr Austin Short    HPI:  Pt is a 71 y.o. female who presents with possible allergic reaction. She states that she received flu vaccine today at about 1030. About 10 minutes later, she developed central chest pain that radiated to the bilateral jaw and neck. It lasted for about 45 minutes and then resolved after she took antacid. Pt denies nausea, vomiting, sweating, trouble breathing, sore throat, trouble swallowing, facial/intraoral swelling, skin rash, weakness, dizziness, abd pain, pain and difficulty with urination, fevers, and hx of allergy to eggs/previous flu vaccines. Per family, pt's episode was characteristically similar to previous esophageal spasms but lasted longer than usual. She also has hx of atrial fibrillation (on 81mg ASA). Pt has no other complaints at this time.     Duration: started today at about 1040  Timing: constant  Location: central chest  Radiation: neck, bilateral jaw  Quality: pain  Intensity/Severity: moderate  Progression: resolved  Associated Symptoms: central chest pain  Aggravating Factors: none   Alleviating Factors: taking antacid  Previous Episodes:  Per family, pt's episode was characteristically similar to previous esophageal spasms but lasted longer than usual.   Treatment before arrival: Pt states that she has taken antacid for sx with relief.     MEDICAL RECORD REVIEW  Pt has hx of Gtz esophagus, GERD, HTN, and atrial fibrillation.    PAST MEDICAL HISTORY  Active Ambulatory Problems     Diagnosis Date Noted   • BP (high blood pressure) 04/14/2016   • History of left breast infiltrating ductal cancer 2cm s/p mastectomy with reconstruction 2002 s/p chemo 05/03/2016   • H/O ETOH abuse 05/03/2016   • Gtz's esophagus 05/03/2016   • Colon polyps  05/03/2016   • Bipolar 1 disorder 05/03/2016   • Arthritis 05/03/2016   • Raynaud's disease 05/03/2016   • Neuropathy 05/03/2016   • Lumbar spine pain 03/09/2017   • Spondylolisthesis at L4-L5 level 03/09/2017   • Right lumbar radiculopathy 03/09/2017     Resolved Ambulatory Problems     Diagnosis Date Noted   • No Resolved Ambulatory Problems     Past Medical History:   Diagnosis Date   • Acid reflux    • Arthritis    • Gtz esophagus    • Bipolar 2 disorder    • Diverticulosis    • Dizziness    • Frequent UTI    • H/O gastroesophageal reflux (GERD)    • H/O Infiltrating ductal carcinoma of left breast 2002   • High cholesterol    • History of alcoholism    • History of Clostridium difficile colitis    • History of gastric ulcer    • History of Holter monitoring    • Hypertension    • Irregular heartbeat    • Mass of right breast on mammogram 03/17/2016   • Neuropathy    • PONV (postoperative nausea and vomiting)    • Raynaud disease        PAST SURGICAL HISTORY  Past Surgical History:   Procedure Laterality Date   • ANKLE OPEN REDUCTION INTERNAL FIXATION Right 08/14/2015    Dr. Dandre Camacho, [facility]   • BACK SURGERY      LUMBAR   • BREAST RECONSTRUCTION, BREAST TISSUE EXPANDER INSERTION Left 04/11/2002    [place] Contour Profile expander was placed 550 cc size, filled with 200 cc of saline, Dr. Herbret Napoles, [facility]   • COLONOSCOPY N/A 09/16/2014    Dr. Darci Kerns, [facility]; torts, tics, stool, polyp   • COLONOSCOPY N/A 9/27/2016    NTEH, diverticulosis, tortuous colon, Two 3-5mm polyps in the descending colon and the transverse colon, [facility].  PATH: Tubular adenoma   • CYSTOSCOPY N/A 05/07/2010    with TVT takedown, Dr. Simon Wall, [facility]   • ENDOSCOPY N/A 9/27/2016    z line irreg, bilious gastric fluid- fluid aspiration preformed, gastritis.  PATH: Squamous and glandular mucosa with minimal superficial acute inflammation.    • KNEE SURGERY Left     BROKEN   • MAMMO US BREAST BIOPSY ADDITIONAL W WO DEVICE Left 02/21/2002     2:00 position left breast, Infiltrating Ductal Carcinoma, [facility]   • MANDIBLE FRACTURE SURGERY     • MASTECTOMY Left 04/11/2002    Left Total Mastectomy and Left Axillary [place] Node Biobsy, Dr. Indu Akins, [facility]   • TENSION FREE VAGINAL TAPING WITH MINI ARC SLING N/A 10/26/2009    Dr. Gina Castillo, [facility]   • UPPER GASTROINTESTINAL ENDOSCOPY  09/16/2014    z-line irreg, grade a reflux, gastritis       FAMILY HISTORY  Family History   Problem Relation Age of Onset   • Breast cancer Mother 35   • Colon cancer Mother 64   • Heart disease Father    • Cancer Father      throat and lung   • Colon polyps Father        SOCIAL HISTORY  Social History     Social History   • Marital status:      Spouse name: N/A   • Number of children: N/A   • Years of education: N/A     Occupational History   • Not on file.     Social History Main Topics   • Smoking status: Former Smoker     Packs/day: 3.00     Types: Cigarettes     Quit date: 4/14/1983   • Smokeless tobacco: Never Used   • Alcohol use No   • Drug use: No   • Sexual activity: Defer     Other Topics Concern   • Not on file     Social History Narrative       ALLERGIES  Morphine    REVIEW OF SYSTEMS  Review of Systems   Constitutional: Negative for chills and fever.   HENT: Negative for congestion, facial swelling, rhinorrhea, sore throat and trouble swallowing.    Eyes: Negative for pain.   Respiratory: Negative for cough and shortness of breath.    Cardiovascular: Positive for chest pain (central chest pain, resolved). Negative for palpitations.   Gastrointestinal: Negative for abdominal pain, diarrhea, nausea and vomiting.   Endocrine: Negative.    Genitourinary: Negative for difficulty urinating.   Musculoskeletal: Positive for neck pain (chest pain radiated to neck (resolved)). Negative for myalgias.        Chest pain radiated to jaw (resolved)   Skin: Negative.    Neurological: Negative for dizziness, speech difficulty, weakness, numbness and headaches.    Psychiatric/Behavioral: Negative.    All other systems reviewed and are negative.      PHYSICAL EXAM  ED Triage Vitals   Temp Heart Rate Resp BP SpO2   10/04/17 1140 10/04/17 1140 10/04/17 1140 10/04/17 1140 10/04/17 1140   97.7 °F (36.5 °C) 68 20 140/95 100 % WNL      Temp src Heart Rate Source Patient Position BP Location FiO2 (%)   10/04/17 1140 10/04/17 1412 -- -- --   Oral Monitor          Physical Exam   Constitutional: She is oriented to person, place, and time and well-developed, well-nourished, and in no distress. No distress.   HENT:   Head: Normocephalic and atraumatic.   Mouth/Throat: Oropharynx is clear and moist.   Eyes: EOM are normal. Pupils are equal, round, and reactive to light.   Neck: Normal range of motion. Neck supple.   Cardiovascular: Normal rate, regular rhythm and normal heart sounds.    Pulmonary/Chest: Effort normal and breath sounds normal. No stridor. No respiratory distress. She has no wheezes. She exhibits no tenderness.   Abdominal: Soft. She exhibits no distension. There is no tenderness. There is no rebound.   Musculoskeletal: Normal range of motion. She exhibits no edema.   Lymphadenopathy:     She has no cervical adenopathy.   Neurological: She is alert and oriented to person, place, and time. She has normal motor skills and normal sensation.   Skin: Skin is warm and dry. No rash noted. No pallor.   Psychiatric: Mood, memory, affect and judgment normal.   Nursing note and vitals reviewed.      LAB RESULTS  Recent Results (from the past 24 hour(s))   Comprehensive Metabolic Panel    Collection Time: 10/04/17 12:21 PM   Result Value Ref Range    Glucose 114 (H) 65 - 99 mg/dL    BUN 14 8 - 23 mg/dL    Creatinine 0.75 0.57 - 1.00 mg/dL    Sodium 138 136 - 145 mmol/L    Potassium 4.0 3.5 - 5.2 mmol/L    Chloride 101 98 - 107 mmol/L    CO2 23.7 22.0 - 29.0 mmol/L    Calcium 11.0 (H) 8.6 - 10.5 mg/dL    Total Protein 7.7 6.0 - 8.5 g/dL    Albumin 4.80 3.50 - 5.20 g/dL    ALT (SGPT)  23 1 - 33 U/L    AST (SGOT) 30 1 - 32 U/L    Alkaline Phosphatase 55 39 - 117 U/L    Total Bilirubin 0.4 0.1 - 1.2 mg/dL    eGFR Non African Amer 77 >60 mL/min/1.73    Globulin 2.9 gm/dL    A/G Ratio 1.7 g/dL    BUN/Creatinine Ratio 18.7 7.0 - 25.0    Anion Gap 13.3 mmol/L   Troponin    Collection Time: 10/04/17 12:21 PM   Result Value Ref Range    Troponin T <0.010 0.000 - 0.030 ng/mL   CBC Auto Differential    Collection Time: 10/04/17 12:21 PM   Result Value Ref Range    WBC 7.52 4.50 - 10.70 10*3/mm3    RBC 4.52 3.90 - 5.20 10*6/mm3    Hemoglobin 12.9 11.9 - 15.5 g/dL    Hematocrit 41.3 35.6 - 45.5 %    MCV 91.4 80.5 - 98.2 fL    MCH 28.5 26.9 - 32.0 pg    MCHC 31.2 (L) 32.4 - 36.3 g/dL    RDW 13.0 11.7 - 13.0 %    RDW-SD 43.3 37.0 - 54.0 fl    MPV 10.1 6.0 - 12.0 fL    Platelets 208 140 - 500 10*3/mm3    Neutrophil % 58.4 42.7 - 76.0 %    Lymphocyte % 30.2 19.6 - 45.3 %    Monocyte % 6.3 5.0 - 12.0 %    Eosinophil % 4.4 0.3 - 6.2 %    Basophil % 0.7 0.0 - 1.5 %    Immature Grans % 0.0 0.0 - 0.5 %    Neutrophils, Absolute 4.40 1.90 - 8.10 10*3/mm3    Lymphocytes, Absolute 2.27 0.90 - 4.80 10*3/mm3    Monocytes, Absolute 0.47 0.20 - 1.20 10*3/mm3    Eosinophils, Absolute 0.33 0.00 - 0.70 10*3/mm3    Basophils, Absolute 0.05 0.00 - 0.20 10*3/mm3    Immature Grans, Absolute 0.00 0.00 - 0.03 10*3/mm3       I ordered the above labs and reviewed the results      RADIOLOGY  XR Chest 2 View    (Results Pending)     CXR- no acute process    I ordered the above noted radiological studies and reviewed the images on the PACS system.       EKG    EKG           EKG time: 1158  Rhythm/Rate: sinus rhythm, rate= 70  P waves and MA: normal, normal  QRS, axis: Q waves in V1 and V2   ST and T waves: normal     EKG was interpreted by Dr. Anderson, independently viewed  No previous EKG for comparison        PROCEDURES      COURSE & MEDICAL DECISION MAKING  Pertinent Labs and Imaging studies that were ordered and reviewed are noted  above.  Results were reviewed/discussed with the patient and they were also made aware of online assess.  Pt also made aware that some labs, such as cultures, will not be resulted during ER visit and follow up with PMD is necessary.       PROGRESS AND CONSULTS    Progress Notes:    1520- Prior to my evaluation of pt, RN informed me that pt's spouse was wondering why nothing had been started yet for pt's possible allergic reaction. I consequently ordered appropriate allergic reaction medications including solumedrol, pepcid, benadryl, and IV fluids. However, on exam, pt's sx appear more similar to esophageal spasms.     1558- Ordered blood work, troponin, CXR, and EKG for further evaluation.     1630- Reviewed pt's history and workup with Dr. Anderson.      1635- Rechecked pt. She is resting comfortably and is in no acute distress. Her sx remain resolved. Discussed with pt and family about all pertinent results including nothing acute indicated on CXR, negative troponin, and about EKG findings. Informed pt of dx and plan for discharge. Instructed to f/u with PMD for recheck. RTER warnings given. Pt understands and agrees with plan. Addressed all questions.    The patient's history, physical exam, and lab findings were discussed with the physician, who also performed a face to face history and physical exam.  I discussed all results and noted any abnormalities with patient.  Discussed absoute need to recheck abnormalities with their family physician.  I answered any of the patient's questions.  Discussed plan for discharge, as there is no emergent indication for admission.  Pt is agreeable and understands need for follow up and repeat testing.  Pt is aware that discharge does not mean that nothing is wrong but it indicates no emergency is present and they must continue care with their family physician.  Pt is discharged with instructions to follow up with primary care doctor to have their blood pressure rechecked.  "      MEDICATIONS GIVEN IN ER  Medications   diphenhydrAMINE (BENADRYL) injection 25 mg (25 mg Intravenous Given 10/4/17 1508)   methylPREDNISolone sodium succinate (SOLU-Medrol) injection 125 mg (125 mg Intravenous Given 10/4/17 1507)   famotidine (PEPCID) injection 20 mg (20 mg Intravenous Given 10/4/17 1508)   sodium chloride 0.9 % bolus 1,000 mL (1,000 mL Intravenous New Bag 10/4/17 1508)       /84  Pulse 64  Temp 97.7 °F (36.5 °C) (Oral)   Resp 16  Ht 66\" (167.6 cm)  Wt 130 lb (59 kg)  SpO2 100%  BMI 20.98 kg/m2      DIAGNOSIS  Final diagnoses:   Esophageal spasm       FOLLOW UP   Marek Nguyễn MD  [address] [place], [place] [id number]  [phone]              Documentation assistance provided by hannah Vyas for Lynne Gutierrez PA-C.  Information recorded by the scribe was done at my direction and has been verified and validated by me.  Electronically signed by Junior Vyas on 10/4/2017 at time 4:58 PM       Junior Vyas  10/04/17 3713       Lynne Gutierrez PA-C  10/04/17 [id number]    "

## 2017-10-04 NOTE — ED TRIAGE NOTES
Pt received a Flu vaccination at 10:30 this morning. While walking out to the car started to develop right jaw pain with lightheadedness. Pt denies pain,symptoms at this time.

## 2017-10-06 NOTE — TELEPHONE ENCOUNTER
Spoke with patient and she said that she is having chest pain that is not cardiac related.  Patient has a history of Gtz's Esophagus and just had an EGD last year.  Informed patient that rather than filling out OA paperwork she probably needs to visit Dr Kerns in the office.  Message sent to Cody to schedule office visit.   negative

## 2017-11-09 ENCOUNTER — TRANSCRIBE ORDERS (OUTPATIENT)
Dept: GASTROENTEROLOGY | Facility: CLINIC | Age: 72
End: 2017-11-09

## 2017-11-09 DIAGNOSIS — K22.70 BARRETT'S ESOPHAGUS WITHOUT DYSPLASIA: Primary | ICD-10-CM

## 2017-11-09 DIAGNOSIS — Z86.010 HX OF ADENOMATOUS COLONIC POLYPS: ICD-10-CM

## 2017-11-09 DIAGNOSIS — R19.7 DIARRHEA, UNSPECIFIED TYPE: ICD-10-CM

## 2017-11-09 DIAGNOSIS — R13.10 DYSPHAGIA, UNSPECIFIED TYPE: ICD-10-CM

## 2017-11-29 PROBLEM — Z86.010 HX OF ADENOMATOUS COLONIC POLYPS: Status: ACTIVE | Noted: 2017-11-29

## 2017-11-29 PROBLEM — Z86.0101 HX OF ADENOMATOUS COLONIC POLYPS: Status: ACTIVE | Noted: 2017-11-29

## 2017-11-29 PROBLEM — R19.7 DIARRHEA: Status: ACTIVE | Noted: 2017-11-29

## 2017-11-29 PROBLEM — K22.70 BARRETT'S ESOPHAGUS WITHOUT DYSPLASIA: Status: ACTIVE | Noted: 2017-11-29

## 2017-11-29 PROBLEM — R13.10 DYSPHAGIA: Status: ACTIVE | Noted: 2017-11-29

## 2017-12-12 ENCOUNTER — HOSPITAL ENCOUNTER (OUTPATIENT)
Facility: HOSPITAL | Age: 72
Setting detail: HOSPITAL OUTPATIENT SURGERY
Discharge: HOME | End: 2017-12-12
Attending: INTERNAL MEDICINE | Admitting: INTERNAL MEDICINE

## 2017-12-12 ENCOUNTER — ANESTHESIA (OUTPATIENT)
Dept: GASTROENTEROLOGY | Facility: HOSPITAL | Age: 72
End: 2017-12-12

## 2017-12-12 ENCOUNTER — ANESTHESIA EVENT (OUTPATIENT)
Dept: GASTROENTEROLOGY | Facility: HOSPITAL | Age: 72
End: 2017-12-12

## 2017-12-12 ENCOUNTER — TELEPHONE (OUTPATIENT)
Dept: GASTROENTEROLOGY | Facility: CLINIC | Age: 72
End: 2017-12-12

## 2017-12-12 VITALS
DIASTOLIC BLOOD PRESSURE: 72 MMHG | TEMPERATURE: 97.6 F | HEART RATE: 74 BPM | HEIGHT: 64 IN | SYSTOLIC BLOOD PRESSURE: 134 MMHG | WEIGHT: 131.69 LBS | BODY MASS INDEX: 22.48 KG/M2 | RESPIRATION RATE: 14 BRPM | OXYGEN SATURATION: 99 %

## 2017-12-12 DIAGNOSIS — R19.7 DIARRHEA, UNSPECIFIED TYPE: ICD-10-CM

## 2017-12-12 DIAGNOSIS — K22.70 BARRETT'S ESOPHAGUS WITHOUT DYSPLASIA: ICD-10-CM

## 2017-12-12 DIAGNOSIS — R13.10 DYSPHAGIA, UNSPECIFIED TYPE: ICD-10-CM

## 2017-12-12 DIAGNOSIS — Z86.010 HX OF ADENOMATOUS COLONIC POLYPS: ICD-10-CM

## 2017-12-12 PROCEDURE — 45380 COLONOSCOPY AND BIOPSY: CPT | Performed by: INTERNAL MEDICINE

## 2017-12-12 PROCEDURE — S0260 H&P FOR SURGERY: HCPCS | Performed by: INTERNAL MEDICINE

## 2017-12-12 PROCEDURE — 88305 TISSUE EXAM BY PATHOLOGIST: CPT | Performed by: INTERNAL MEDICINE

## 2017-12-12 PROCEDURE — 25010000002 PROPOFOL 10 MG/ML EMULSION: Performed by: ANESTHESIOLOGY

## 2017-12-12 PROCEDURE — 87081 CULTURE SCREEN ONLY: CPT | Performed by: INTERNAL MEDICINE

## 2017-12-12 PROCEDURE — 88312 SPECIAL STAINS GROUP 1: CPT | Performed by: INTERNAL MEDICINE

## 2017-12-12 PROCEDURE — 43239 EGD BIOPSY SINGLE/MULTIPLE: CPT | Performed by: INTERNAL MEDICINE

## 2017-12-12 RX ORDER — PROPOFOL 10 MG/ML
VIAL (ML) INTRAVENOUS AS NEEDED
Status: DISCONTINUED | OUTPATIENT
Start: 2017-12-12 | End: 2017-12-12 | Stop reason: SURG

## 2017-12-12 RX ORDER — PROPOFOL 10 MG/ML
VIAL (ML) INTRAVENOUS CONTINUOUS PRN
Status: DISCONTINUED | OUTPATIENT
Start: 2017-12-12 | End: 2017-12-12 | Stop reason: SURG

## 2017-12-12 RX ORDER — SODIUM CHLORIDE, SODIUM LACTATE, POTASSIUM CHLORIDE, CALCIUM CHLORIDE 600; 310; 30; 20 MG/100ML; MG/100ML; MG/100ML; MG/100ML
30 INJECTION, SOLUTION INTRAVENOUS CONTINUOUS PRN
Status: DISCONTINUED | OUTPATIENT
Start: 2017-12-12 | End: 2017-12-12 | Stop reason: HOSPADM

## 2017-12-12 RX ORDER — SODIUM CHLORIDE 0.9 % (FLUSH) 0.9 %
1-10 SYRINGE (ML) INJECTION AS NEEDED
Status: DISCONTINUED | OUTPATIENT
Start: 2017-12-12 | End: 2017-12-12 | Stop reason: HOSPADM

## 2017-12-12 RX ORDER — LIDOCAINE HYDROCHLORIDE 20 MG/ML
INJECTION, SOLUTION INFILTRATION; PERINEURAL AS NEEDED
Status: DISCONTINUED | OUTPATIENT
Start: 2017-12-12 | End: 2017-12-12 | Stop reason: SURG

## 2017-12-12 RX ADMIN — PROPOFOL 150 MG: 10 INJECTION, EMULSION INTRAVENOUS at 15:38

## 2017-12-12 RX ADMIN — LIDOCAINE HYDROCHLORIDE 100 MG: 20 INJECTION, SOLUTION INFILTRATION; PERINEURAL at 15:37

## 2017-12-12 RX ADMIN — SODIUM CHLORIDE, POTASSIUM CHLORIDE, SODIUM LACTATE AND CALCIUM CHLORIDE: 600; 310; 30; 20 INJECTION, SOLUTION INTRAVENOUS at 15:33

## 2017-12-12 RX ADMIN — PROPOFOL 100 MCG/KG/MIN: 10 INJECTION, EMULSION INTRAVENOUS at 15:35

## 2017-12-12 RX ADMIN — SODIUM CHLORIDE, POTASSIUM CHLORIDE, SODIUM LACTATE AND CALCIUM CHLORIDE 30 ML/HR: 600; 310; 30; 20 INJECTION, SOLUTION INTRAVENOUS at 15:12

## 2017-12-12 NOTE — TELEPHONE ENCOUNTER
Call from pt.  States is scheduled for c/s today @ 0677.  Will need to leave house at 1400 - concerned because still on toilet.  Had episode of stool incontinence - asking what to do.  Advise to heavily pad for transport.  Call ASAP if unable to keep appt.      Pt verb understanding and states will keep appt.    Update to DR Kerns.

## 2017-12-12 NOTE — ANESTHESIA PREPROCEDURE EVALUATION
Anesthesia Evaluation     Patient summary reviewed and Nursing notes reviewed   history of anesthetic complications: PONV  NPO Solid Status: > 8 hours  NPO Liquid Status: > 2 hours     Airway   Mallampati: II  Neck ROM: full  no difficulty expected  Dental - normal exam     Pulmonary     breath sounds clear to auscultation  Cardiovascular     Rhythm: regular    (+) hypertension, dysrhythmias (currently wearing a holter monitor), PVD, hyperlipidemia      Neuro/Psych  (+) dizziness/light headedness, numbness, psychiatric history,    GI/Hepatic/Renal/Endo    (+)  GERD,     Musculoskeletal     Abdominal    Substance History      OB/GYN          Other   (+) arthritis   history of cancer                                    Anesthesia Plan    ASA 3     MAC     intravenous induction   Anesthetic plan and risks discussed with patient.

## 2017-12-12 NOTE — PLAN OF CARE
Problem: Patient Care Overview (Adult)  Goal: Plan of Care Review  Outcome: Ongoing (interventions implemented as appropriate)    12/12/17 1440   Coping/Psychosocial Response Interventions   Plan Of Care Reviewed With patient   Patient Care Overview   Progress no change       Goal: Adult Individualization and Mutuality  Outcome: Ongoing (interventions implemented as appropriate)  Goal: Discharge Needs Assessment  Outcome: Ongoing (interventions implemented as appropriate)    12/12/17 1440   Discharge Needs Assessment   Concerns To Be Addressed no discharge needs identified   Discharge Disposition home or self-care   Living Environment   Transportation Available car;family or friend will provide         Problem: GI Endoscopy (Adult)  Goal: Signs and Symptoms of Listed Potential Problems Will be Absent or Manageable (GI Endoscopy)  Outcome: Ongoing (interventions implemented as appropriate)    12/12/17 1440   GI Endoscopy   Problems Assessed (GI Endoscopy) pain   Problems Present (GI Endoscopy) none

## 2017-12-12 NOTE — H&P
[facility]  Pre Procedure History & Physical    Chief Complaint:   GERD, dysphagia, history of Gtz's, polyps, family history    Subjective     HPI:   This 72-year-old female presents to the endoscopy suite for upper and lower endoscopic evaluation.  She has a history of reflux and prior history of Gtz's esophagus as well as dysphagia.  She also has a personal history of polyps and a family history of colon cancer.  Last upper endoscopy performed September 2014 last colonoscopy performed September 2014 with adenomatous polyp.    Past Medical History:   Past Medical History:   Diagnosis Date   • Acid reflux    • Arthritis    • Gtz esophagus    • Bipolar 2 disorder    • Diverticulosis    • Dizziness    • Frequent UTI    • H/O gastroesophageal reflux (GERD)    • H/O Infiltrating ductal carcinoma of left breast 2002    Stage IIA, Grade 3 ER/VT +, HER2 -, treated with 5 years of tamoxifen, 5 years of Femara, completed in 2012   • High cholesterol    • History of alcoholism     40 YRS AGO   • History of Clostridium difficile colitis 2014   • History of gastric ulcer    • History of Holter monitoring     WEARING CURRENTLY   • Hypertension    • Irregular heartbeat    • Mass of right breast on mammogram 03/17/2016    10 o'clock position, 4 cm from the nipple,   • Neuropathy    • PONV (postoperative nausea and vomiting)    • Raynaud disease        Past Surgical History:  Past Surgical History:   Procedure Laterality Date   • ANKLE OPEN REDUCTION INTERNAL FIXATION Right 08/14/2015    Dr. Dandre Camacho, [facility]   • BACK SURGERY      LUMBAR   • BREAST RECONSTRUCTION, BREAST TISSUE EXPANDER INSERTION Left 04/11/2002    [place] Contour Profile expander was placed 550 cc size, filled with 200 cc of saline, Dr. Herbert Napoles, [facility]   • COLONOSCOPY N/A 09/16/2014    Dr. Darci Kerns, [facility]; torts, tics, stool, polyp   • COLONOSCOPY N/A 9/27/2016    NTEH, diverticulosis, tortuous colon, Two 3-5mm polyps in  the descending colon and the transverse colon, IH.  PATH: Tubular adenoma   • CYSTOSCOPY N/A 05/07/2010    with TVT takedown, Dr. Simon Wall, [facility]   • ENDOSCOPY N/A 9/27/2016    z line irreg, bilious gastric fluid- fluid aspiration preformed, gastritis.  PATH: Squamous and glandular mucosa with minimal superficial acute inflammation.    • KNEE SURGERY Left     BROKEN   • MAMMO US BREAST BIOPSY ADDITIONAL W WO DEVICE Left 02/21/2002    2:00 position left breast, Infiltrating Ductal Carcinoma, [facility]   • MANDIBLE FRACTURE SURGERY     • MASTECTOMY Left 04/11/2002    Left Total Mastectomy and Left Axillary [place] Node Biobsy, Dr. Indu Akins, [facility]   • TENSION FREE VAGINAL TAPING WITH MINI ARC SLING N/A 10/26/2009    Dr. Gina Castillo, [facility]   • UPPER GASTROINTESTINAL ENDOSCOPY  09/16/2014    z-line irreg, grade a reflux, gastritis       Family History:  Family History   Problem Relation Age of Onset   • Breast cancer Mother 35   • Colon cancer Mother 64   • Heart disease Father    • Cancer Father      throat and lung   • Colon polyps Father        Social History:   reports that she quit smoking about 34 years ago. Her smoking use included Cigarettes. She smoked 3.00 packs per day. She has never used smokeless tobacco. She reports that she does not drink alcohol or use illicit drugs.    Medications:   Prescriptions Prior to Admission   Medication Sig Dispense Refill Last Dose   • acetaminophen (TYLENOL) 500 MG tablet Take 1,000 mg by mouth every night.   12/11/2017 at Unknown time   • ARIPiprazole (ABILIFY) 10 MG tablet Take 10 mg by mouth daily.   12/12/2017 at Unknown time   • atorvastatin (LIPITOR) 20 MG tablet TAKE 1 TABLET EVERY DAY   12/11/2017 at Unknown time   • B Complex Vitamins (VITAMIN B COMPLEX) tablet Take 1 tablet by mouth daily.   Past Week at Unknown time   • BIOTIN PO Take 1 tablet by mouth daily.   Past Week at Unknown time   • Calcium Carb-Cholecalciferol (CALCIUM 600 + D) 600-200 MG-UNIT tablet Take 2  tablets by mouth daily.   Past Week at Unknown time   • celecoxib (CeleBREX) 200 MG capsule Take 200 mg by mouth daily.   12/5/2017 at Unknown time   • cephalexin (KEFLEX) 250 MG capsule    12/11/2017 at Unknown time   • Coenzyme Q10 (CO Q-10) 400 MG capsule Take 400 mg by mouth daily.   Past Week at Unknown time   • diphenhydrAMINE (BENADRYL) 12.5 MG/5ML elixir Take 50 mg by mouth every night.   12/11/2017 at Unknown time   • diphenhydrAMINE-acetaminophen (TYLENOL PM EXTRA STRENGTH)  MG tablet per tablet Take 2 tablets by mouth At Night As Needed for Sleep.   Past Month at Unknown time   • Glucosamine-Chondroitin 6972-1611 MG/30ML liquid Take 2 tablets by mouth daily.   Past Week at Unknown time   • hydrALAZINE (APRESOLINE) 25 MG tablet Take 25 mg by mouth 3 (three) times a day.   12/12/2017 at Unknown time   • Hydrazine Sulfate crystals 25 mg 3 (three) times a day.   12/11/2017 at Unknown time   • lamoTRIgine (LaMICtal) 100 MG tablet Take 100 mg by mouth 2 (two) times a day.   12/12/2017 at Unknown time   • lithium carbonate 300 MG capsule Take 300 mg by mouth daily.   12/12/2017 at Unknown time   • LYRICA 75 MG capsule Take 75 mg by mouth daily.   12/11/2017 at Unknown time   • magnesium oxide (MAGOX) 400 (241.3 MG) MG tablet tablet Take 400 mg by mouth daily.   Past Week at Unknown time   • melatonin 5 MG tablet tablet Take 5 mg by mouth At Night As Needed.   12/11/2017 at Unknown time   • MYRBETRIQ 50 MG tablet sustained-release 24 hour Take 50 mg by mouth daily.   12/11/2017 at Unknown time   • omeprazole (PriLOSEC) 20 MG capsule Take 20 mg by mouth 2 (two) times a day.   12/11/2017 at Unknown time   • Probiotic Product (SOLUBLE FIBER/PROBIOTICS PO) Take 1 tablet by mouth 3 (three) times a day.   12/11/2017 at Unknown time   • traMADol (ULTRAM) 50 MG tablet Take 50 mg by mouth As Needed.   Past Month at Unknown time   • aspirin 81 MG tablet Take 81 mg by mouth daily.   12/5/2017  "      Allergies:  Morphine    ROS:    Pertinent items are noted in HPI, all other systems reviewed and negative     Objective     Blood pressure 142/80, pulse 76, temperature 97.8 °F (36.6 °C), temperature source Oral, resp. rate 16, height 162.6 cm (64\"), weight 131 kg (289 lb 11 oz), SpO2 97 %.    Physical Exam   Constitutional: Pt is oriented to person, place, and time and well-developed, well-nourished, and in no distress.   Mouth/Throat: Oropharynx is clear and moist.   Neck: Normal range of motion.   Cardiovascular: Normal rate, regular rhythm and normal heart sounds.    Pulmonary/Chest: Effort normal and breath sounds normal.   Abdominal: Soft. Nontender  Skin: Skin is warm and dry.   Psychiatric: Mood, memory, affect and judgment normal.     Assessment/Plan     Diagnosis:  GERD  Gtz's esophagus  Dysphagia  Polyps  Family History    Anticipated Surgical Procedure:  EGD, colonoscopy    The risks, benefits, and alternatives of this procedure have been discussed with the patient or the responsible party- the patient understands and agrees to proceed.                                                          "

## 2017-12-12 NOTE — ANESTHESIA POSTPROCEDURE EVALUATION
"Patient: Davida Anderson    Procedure Summary     Date Anesthesia Start Anesthesia Stop Room / Location    12/12/17 [id number]  [facility] /  [facility]       Procedure Diagnosis Surgeon Provider    COLONOSCOPY TO CECUM AND INTO TERMINAL ILEUM WITH COLD BIOPSY POLYPECTOMY (N/A ); ESOPHAGOGASTRODUODENOSCOPY WITH COLD BIOPSIES (N/A Esophagus) Esophagitis; Gastritis; Duodenitis; Diverticulosis; Tortuous colon; Hemorrhoids  (Gtz's esophagus without dysplasia [K22.70]; Hx of adenomatous colonic polyps [Z86.010]; Dysphagia, unspecified type [R13.10]; Diarrhea, unspecified type [R19.7]) MD Dequan Vincent MD          Anesthesia Type: MAC  Last vitals  BP   134/72 (12/12/17 1643)   Temp   36.4 °C (97.6 °F) (12/12/17 1643)   Pulse   74 (12/12/17 1643)   Resp   14 (12/12/17 1643)     SpO2   99 % (12/12/17 1643)     Post Anesthesia Care and Evaluation    Patient location during evaluation: bedside  Patient participation: complete - patient participated  Level of consciousness: awake and alert  Pain management: adequate  Airway patency: patent  Anesthetic complications: No anesthetic complications    Cardiovascular status: acceptable  Respiratory status: acceptable  Hydration status: acceptable    Comments: /72 (BP Location: Right arm, Patient Position: Sitting)  Pulse 74  Temp 36.4 °C (97.6 °F) (Oral)   Resp 14  Ht 162.6 cm (64\")  Wt 59.7 kg (131 lb 11 oz)  SpO2 99%  BMI 22.6 kg/m2          "

## 2017-12-13 LAB — UREASE TISS QL: NEGATIVE

## 2017-12-14 LAB
CYTO UR: NORMAL
LAB AP CASE REPORT: NORMAL
Lab: NORMAL
PATH REPORT.FINAL DX SPEC: NORMAL
PATH REPORT.GROSS SPEC: NORMAL

## 2017-12-19 ENCOUNTER — TELEPHONE (OUTPATIENT)
Dept: GASTROENTEROLOGY | Facility: CLINIC | Age: 72
End: 2017-12-19

## 2017-12-19 NOTE — TELEPHONE ENCOUNTER
----- Message from Asha Kruse sent at 12/19/2017  9:54 AM EST -----  Regarding: PT CALLED - PATH RESULTS  Contact: [phone]  PLEASE CALL.

## 2017-12-19 NOTE — TELEPHONE ENCOUNTER
----- Message from Darci ANAYA MD sent at 12/14/2017  2:54 PM EST -----  Regarding: Biopsy results  Okay to call results, recommend follow-up colonoscopy in 3-5 years, office follow-up annually or call sooner as needed.  ----- Message -----     From: Lab, Background User     Sent: 12/13/2017   8:04 PM       To: Darci ANAYA MD

## 2017-12-19 NOTE — TELEPHONE ENCOUNTER
Call to pt.  Advise per path report that duodenal bx unremarkable, with no evidence of celiac sprue.  Stomach body with minimal chronic inflammation, no H pylori.  GE junction with mild chronic inflammation.  Cecal polyp that was removed was not cancerous, but precancerous.    Advise per DR Kerns that recommend f/u c/s in 3-5 yrs, office f/u annually or call sooner as needed.  Pt verb understanding.    C/s for 12/12/20, and o/v for 12/12/18 placed in recall.

## 2018-01-23 ENCOUNTER — OFFICE VISIT (OUTPATIENT)
Dept: ORTHOPEDIC SURGERY | Facility: CLINIC | Age: 73
End: 2018-01-23

## 2018-01-23 DIAGNOSIS — M48.062 SPINAL STENOSIS OF LUMBAR REGION WITH NEUROGENIC CLAUDICATION: Primary | ICD-10-CM

## 2018-01-23 PROCEDURE — 99213 OFFICE O/P EST LOW 20 MIN: CPT | Performed by: ORTHOPAEDIC SURGERY

## 2018-01-23 NOTE — PROGRESS NOTES
She complains of moderate back pain which radiates into the lower extremities predominantly the latter.  I saw her 10 months ago at which time she went for epidural injections which had excellent relief for many months.  Some imbalance.  She has a history of intermittent bowel and bladder incontinence but this is been traced to pelvic origin rather than neurologic.  On exam good strength and intact patellar reflexes in the lower extremities.  Mild lumbar tenderness.  Reviewed her MRI she has severe stenosis at 47 but she did very well with epidurals.  We'll repeat these and I will see her back as needed.  I warned her to contact me for worsening bowel or bladder symptoms, balance difficulties or simply intractable pain

## 2018-02-05 ENCOUNTER — OFFICE VISIT (OUTPATIENT)
Dept: ORTHOPEDIC SURGERY | Facility: CLINIC | Age: 73
End: 2018-02-05

## 2018-02-05 VITALS — TEMPERATURE: 97.9 F | WEIGHT: 132 LBS | BODY MASS INDEX: 22.53 KG/M2 | HEIGHT: 64 IN

## 2018-02-05 DIAGNOSIS — M17.12 PRIMARY OSTEOARTHRITIS OF LEFT KNEE: Primary | ICD-10-CM

## 2018-02-05 DIAGNOSIS — G89.29 CHRONIC PAIN OF LEFT KNEE: ICD-10-CM

## 2018-02-05 DIAGNOSIS — M17.11 PRIMARY LOCALIZED OSTEOARTHROSIS OF THE KNEE, RIGHT: ICD-10-CM

## 2018-02-05 DIAGNOSIS — M25.562 CHRONIC PAIN OF LEFT KNEE: ICD-10-CM

## 2018-02-05 PROCEDURE — 73564 X-RAY EXAM KNEE 4 OR MORE: CPT | Performed by: ORTHOPAEDIC SURGERY

## 2018-02-05 PROCEDURE — 99214 OFFICE O/P EST MOD 30 MIN: CPT | Performed by: ORTHOPAEDIC SURGERY
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
## 2018-02-08 ENCOUNTER — HOSPITAL ENCOUNTER (OUTPATIENT)
Dept: PAIN MEDICINE | Facility: HOSPITAL | Age: 73
Discharge: HOME | End: 2018-02-08
Attending: ORTHOPAEDIC SURGERY | Admitting: ORTHOPAEDIC SURGERY

## 2018-02-08 ENCOUNTER — ANESTHESIA EVENT (OUTPATIENT)
Dept: PAIN MEDICINE | Facility: HOSPITAL | Age: 73
End: 2018-02-08

## 2018-02-08 ENCOUNTER — HOSPITAL ENCOUNTER (OUTPATIENT)
Dept: GENERAL RADIOLOGY | Facility: HOSPITAL | Age: 73
Discharge: HOME | End: 2018-02-08

## 2018-02-08 ENCOUNTER — ANESTHESIA (OUTPATIENT)
Dept: PAIN MEDICINE | Facility: HOSPITAL | Age: 73
End: 2018-02-08

## 2018-02-08 VITALS
TEMPERATURE: 98 F | SYSTOLIC BLOOD PRESSURE: 146 MMHG | DIASTOLIC BLOOD PRESSURE: 83 MMHG | HEART RATE: 76 BPM | OXYGEN SATURATION: 100 % | RESPIRATION RATE: 16 BRPM

## 2018-02-08 DIAGNOSIS — M48.062 SPINAL STENOSIS OF LUMBAR REGION WITH NEUROGENIC CLAUDICATION: ICD-10-CM

## 2018-02-08 DIAGNOSIS — R52 PAIN: ICD-10-CM

## 2018-02-08 PROCEDURE — 77003 FLUOROGUIDE FOR SPINE INJECT: CPT

## 2018-02-08 PROCEDURE — 25010000002 MIDAZOLAM PER 1 MG: Performed by: ANESTHESIOLOGY

## 2018-02-08 PROCEDURE — 0 IOPAMIDOL 41 % SOLUTION: Performed by: ANESTHESIOLOGY

## 2018-02-08 PROCEDURE — C1755 CATHETER, INTRASPINAL: HCPCS

## 2018-02-08 PROCEDURE — 25010000002 METHYLPREDNISOLONE PER 80 MG: Performed by: ANESTHESIOLOGY

## 2018-02-08 RX ORDER — MIDAZOLAM HYDROCHLORIDE 1 MG/ML
1 INJECTION INTRAMUSCULAR; INTRAVENOUS AS NEEDED
Status: DISCONTINUED | OUTPATIENT
Start: 2018-02-08 | End: 2018-02-09 | Stop reason: HOSPADM

## 2018-02-08 RX ORDER — SODIUM CHLORIDE 0.9 % (FLUSH) 0.9 %
1-10 SYRINGE (ML) INJECTION AS NEEDED
Status: DISCONTINUED | OUTPATIENT
Start: 2018-02-08 | End: 2018-02-09 | Stop reason: HOSPADM

## 2018-02-08 RX ORDER — FENTANYL CITRATE 50 UG/ML
50 INJECTION, SOLUTION INTRAMUSCULAR; INTRAVENOUS AS NEEDED
Status: DISCONTINUED | OUTPATIENT
Start: 2018-02-08 | End: 2018-02-09 | Stop reason: HOSPADM

## 2018-02-08 RX ORDER — METHYLPREDNISOLONE ACETATE 80 MG/ML
80 INJECTION, SUSPENSION INTRA-ARTICULAR; INTRALESIONAL; INTRAMUSCULAR; SOFT TISSUE ONCE
Status: COMPLETED | OUTPATIENT
Start: 2018-02-08 | End: 2018-02-08

## 2018-02-08 RX ORDER — LIDOCAINE HYDROCHLORIDE 10 MG/ML
1 INJECTION, SOLUTION INFILTRATION; PERINEURAL ONCE AS NEEDED
Status: DISCONTINUED | OUTPATIENT
Start: 2018-02-08 | End: 2018-02-09 | Stop reason: HOSPADM

## 2018-02-08 RX ADMIN — METHYLPREDNISOLONE ACETATE 80 MG: 80 INJECTION, SUSPENSION INTRA-ARTICULAR; INTRALESIONAL; INTRAMUSCULAR; SOFT TISSUE at 10:48

## 2018-02-08 RX ADMIN — MIDAZOLAM 1 MG: 1 INJECTION INTRAMUSCULAR; INTRAVENOUS at 10:42

## 2018-02-08 RX ADMIN — IOPAMIDOL 10 ML: 408 INJECTION, SOLUTION INTRATHECAL at 10:47

## 2018-02-08 NOTE — H&P
[facility]    History and Physical    Patient Name: Davida Anderson  :  1945  MRN:  [id number]  Date of Admission: 2018    Subjective     Patient is a 72 y.o. female presents with chief complaint of chronic low back, hips: bilateral and buttock pain.  Onset of symptoms was gradual starting several years ago.  Symptoms are associated/aggravated by nothing in particular. Symptoms improve with injection  She had a previous epidural steroid injection done in 2017.  She reported excellent relief of her discomfort.  He was done at L4 5 at that time.  She currently is having low back pain with bilateral radicular symptoms with the right being worse than the left.  He denies any bowel or bladder incontinence associated with this.  She has no pain with Valsalva.    She has an MRI report which shows multiple levels of degeneration as well as lumbar spinal stenosis at L4-5.    He reports a history of Gtz's esophagitis which has been problematic for her today however that is improving      The following portions of the patients history were reviewed and updated as appropriate: current medications, allergies, past medical history, past surgical history, past family history, past social history and problem list                Objective     Past Medical History:   Past Medical History:   Diagnosis Date   • Acid reflux    • Arthritis    • Gtz esophagus    • Bipolar 2 disorder    • Diverticulosis    • Dizziness    • Frequent UTI    • H/O gastroesophageal reflux (GERD)    • H/O Infiltrating ductal carcinoma of left breast     Stage IIA, Grade 3 ER/[place] +, HER2 -, treated with 5 years of tamoxifen, 5 years of Femara, completed in    • High cholesterol    • History of alcoholism     40 YRS AGO   • History of Clostridium difficile colitis    • History of gastric ulcer    • History of Holter monitoring     WEARING CURRENTLY   • Hypertension    • Irregular heartbeat    • Mass of right breast on  mammogram 03/17/2016    10 o'clock position, 4 cm from the nipple,   • Neuropathy    • PONV (postoperative nausea and vomiting)    • Raynaud disease      Past Surgical History:   Past Surgical History:   Procedure Laterality Date   • ANKLE OPEN REDUCTION INTERNAL FIXATION Right 08/14/2015    Dr. Dandre Camacho, [facility]   • BACK SURGERY      LUMBAR   • BREAST RECONSTRUCTION, BREAST TISSUE EXPANDER INSERTION Left 04/11/2002    Sapphire Contour Profile expander was placed 550 cc size, filled with 200 cc of saline, Dr. Herbert Napoles, [facility]   • COLONOSCOPY N/A 09/16/2014    Dr. Darci Kerns, [facility]; torts, tics, stool, polyp   • COLONOSCOPY N/A 9/27/2016    NTEH, diverticulosis, tortuous colon, Two 3-5mm polyps in the descending colon and the transverse colon, IH.  PATH: Tubular adenoma   • COLONOSCOPY N/A 12/12/2017    Procedure: COLONOSCOPY TO CECUM AND INTO TERMINAL ILEUM WITH COLD BIOPSY POLYPECTOMY;  Surgeon: Darci ANAYA MD;  Location: [facility] ENDOSCOPY;  Service:    • CYSTOSCOPY N/A 05/07/2010    with TVT takedown, Dr. Simon Wall, [facility]   • ENDOSCOPY N/A 9/27/2016    z line irreg, bilious gastric fluid- fluid aspiration preformed, gastritis.  PATH: Squamous and glandular mucosa with minimal superficial acute inflammation.    • ENDOSCOPY N/A 12/12/2017    Procedure: ESOPHAGOGASTRODUODENOSCOPY WITH COLD BIOPSIES;  Surgeon: Darci ANAYA MD;  Location:  [facility];  Service:    • KNEE SURGERY Left     BROKEN   • MAMMO US BREAST BIOPSY ADDITIONAL W WO DEVICE Left 02/21/2002    2:00 position left breast, Infiltrating Ductal Carcinoma, [facility]   • MANDIBLE FRACTURE SURGERY     • MASTECTOMY Left 04/11/2002    Left Total Mastectomy and Left Axillary [place] Node Biobsy, Dr. Indu Akins, [facility]   • TENSION FREE VAGINAL TAPING WITH MINI ARC SLING N/A 10/26/2009    Dr. Gina Castillo, [facility]   • UPPER GASTROINTESTINAL ENDOSCOPY  09/16/2014    z-line irreg, grade a reflux, gastritis     Family History:   Family History    Problem Relation Age of Onset   • Breast cancer Mother 35   • Colon cancer Mother 64   • Heart disease Father    • Cancer Father      throat and lung   • Colon polyps Father      Social History:   Social History   Substance Use Topics   • Smoking status: Former Smoker     Packs/day: 3.00     Types: Cigarettes     Quit date: 4/14/1983   • Smokeless tobacco: Never Used   • Alcohol use No       Vital Signs Range for the last 24 hours  Temperature: Temp:  [36.7 °C (98 °F)] 36.7 °C (98 °F)   Temp Source: Temp src: Oral   BP: BP: (156)/(110) 156/110   Pulse: Heart Rate:  [73] 73   Respirations: Resp:  [16] 16   SPO2: SpO2:  [98 %] 98 %   O2 Amount (l/min):     O2 Devices O2 Device: room air   Weight:           --------------------------------------------------------------------------------    Current Outpatient Prescriptions   Medication Sig Dispense Refill   • acetaminophen (TYLENOL) 500 MG tablet Take 1,000 mg by mouth every night.     • ARIPiprazole (ABILIFY) 10 MG tablet Take 10 mg by mouth daily.     • atorvastatin (LIPITOR) 20 MG tablet TAKE 1 TABLET EVERY DAY     • B Complex Vitamins (VITAMIN B COMPLEX) tablet Take 1 tablet by mouth daily.     • BIOTIN PO Take 1 tablet by mouth daily.     • Calcium Carb-Cholecalciferol (CALCIUM 600 + D) 600-200 MG-UNIT tablet Take 2 tablets by mouth daily.     • cephalexin (KEFLEX) 250 MG capsule      • Coenzyme Q10 (CO Q-10) 400 MG capsule Take 400 mg by mouth daily.     • diphenhydrAMINE (BENADRYL) 12.5 MG/5ML elixir Take 50 mg by mouth every night.     • diphenhydrAMINE-acetaminophen (TYLENOL PM EXTRA STRENGTH)  MG tablet per tablet Take 2 tablets by mouth At Night As Needed for Sleep.     • Glucosamine-Chondroitin 5336-0605 MG/30ML liquid Take 2 tablets by mouth daily.     • hydrALAZINE (APRESOLINE) 25 MG tablet Take 25 mg by mouth 4 (Four) Times a Day.     • lamoTRIgine (LaMICtal) 100 MG tablet Take 100 mg by mouth 2 (two) times a day.     • lithium carbonate 300 MG  capsule Take 300 mg by mouth daily.     • LYRICA 75 MG capsule Take 75 mg by mouth daily.     • magnesium oxide (MAGOX) 400 (241.3 MG) MG tablet tablet Take 400 mg by mouth daily.     • melatonin 5 MG tablet tablet Take 5 mg by mouth At Night As Needed.     • MYRBETRIQ 50 MG tablet sustained-release 24 hour Take 50 mg by mouth daily.     • omeprazole (PriLOSEC) 20 MG capsule Take 20 mg by mouth 2 (two) times a day.     • Probiotic Product (SOLUBLE FIBER/PROBIOTICS PO) Take 1 tablet by mouth 3 (three) times a day.     • traMADol (ULTRAM) 50 MG tablet Take 50 mg by mouth As Needed.     • aspirin 81 MG tablet Take 81 mg by mouth daily.     • celecoxib (CeleBREX) 200 MG capsule Take 200 mg by mouth daily.       Current Facility-Administered Medications   Medication Dose Route Frequency Provider Last Rate Last Dose   • fentaNYL citrate (PF) (SUBLIMAZE) injection 50 mcg  50 mcg Intravenous PRN Phan Cristina MD       • iopamidol (ISOVUE-M 200) injection 41%  12 mL Epidural Once PRN Phan Cristina MD       • lidocaine (XYLOCAINE) 1 % injection 1 mL  1 mL Intradermal Once PRN Phan Cristina MD       • methylPREDNISolone acetate (DEPO-medrol) injection 80 mg  80 mg Intra-articular Once Phan Cristina MD       • midazolam (VERSED) injection 1 mg  1 mg Intravenous PRN Phan Cristina MD       • sodium chloride 0.9 % flush 1-10 mL  1-10 mL Intravenous PRN Austin Keith MD       • sodium chloride 0.9 % flush 1-10 mL  1-10 mL Intravenous PRN Phan Cristina MD           --------------------------------------------------------------------------------  Assessment/Plan      Anesthesia Evaluation     history of anesthetic complications: PONV               Airway   Mallampati: II  TM distance: >3 FB  Dental      Pulmonary - negative pulmonary ROS and normal exam   Cardiovascular     Rhythm: regular    (+) hypertension, hyperlipidemia  (-) murmur, carotid bruits      Neuro/Psych  (+)   right straight leg raise test,     GI/Hepatic/Renal/Endo    (+)  GERD,     Musculoskeletal   normal range of motion    (-)  LYRIC test  Abdominal    Substance History      OB/GYN          Other                   Diagnosis and Plan    Treatment Plan  ASA 3   Patient has had previous injection/procedure with 25-50% improvement.   Procedures: Lumbar Epidural Steroid Injection(LESI), With fluoroscopy,               Diagnosis     * Lumbar neuritis [M54.16]     * Lumbar degenerative disc disease [M51.36]     * Lumbar spinal stenosis [M48.061]     * Lumbago [M54.5]

## 2018-02-08 NOTE — ANESTHESIA PROCEDURE NOTES
PAIN Epidural block    Patient location during procedure: pain clinic  Start Time: 2/8/2018 10:39 AM  Stop Time: 2/8/2018 10:50 AM  Indication:at surgeon's request and procedure for pain  Performed By  Anesthesiologist: SUE, BUDDY RAY  Preanesthetic Checklist  Completed: patient identified, surgical consent, pre-op evaluation, timeout performed, risks and benefits discussed and monitors and equipment checked  Additional Notes  Post-Op Diagnosis Codes:     * Lumbar neuritis (M54.16)     * Lumbar degenerative disc disease (M51.36)     * Lumbar spinal stenosis (M48.061)     * Lumbago (M54.5)    Prep:  Pt Position:prone  Sterile Tech:sterile barrier, mask and gloves  Prep:chlorhexidine gluconate and isopropyl alcohol  Monitoring:blood pressure monitoring, continuous pulse oximetry and EKG  Procedure:  Sedation: yes   Approach:right paramedian  Guidance: fluoroscopy  Location:lumbar  Level:4-5  Needle Gauge:20 G  Aspiration:negative  Test Dose:negative  Medications:  Depomedrol:80 mg  Preservative Free Saline:1mL  Isovue:2mL  Comments:Lumbar epidural steroid injections performed at the L4 5 on the right.  Right paramedian approach was utilized.  Initially some difficulty entering the epidural space at the L4 5 level.  C-arm images were moved such sciatic craniocaudal tilt as well as a slight right tilt of the C-arm.  She did experience a rather abrupt electrical paresthesia in the right leg upon entering the epidural space.  The needle tip was noted to be in the midline at this time.  2 mL of Isovue were injected demonstrate cranial caudal spread without paresthesia.  There was no CSF noted in the needle hub nor with aspiration.  80 mg of Depo-Medrol were then slowly injected without exacerbation of her pain.  The needle was withdrawn and the pain did subside.  Post Assessment:  Pt Tolerance:patient tolerated the procedure well with no apparent complications  Complications:no

## 2018-02-14 ENCOUNTER — TELEPHONE (OUTPATIENT)
Dept: ORTHOPEDIC SURGERY | Facility: CLINIC | Age: 73
End: 2018-02-14

## 2018-02-14 DIAGNOSIS — M48.062 SPINAL STENOSIS OF LUMBAR REGION WITH NEUROGENIC CLAUDICATION: Primary | ICD-10-CM

## 2018-02-16 ENCOUNTER — TELEPHONE (OUTPATIENT)
Dept: ORTHOPEDIC SURGERY | Facility: CLINIC | Age: 73
End: 2018-02-16

## 2018-03-01 ENCOUNTER — HOSPITAL ENCOUNTER (OUTPATIENT)
Dept: PAIN MEDICINE | Facility: HOSPITAL | Age: 73
End: 2018-03-01

## 2018-03-27 ENCOUNTER — OFFICE VISIT (OUTPATIENT)
Dept: GASTROENTEROLOGY | Facility: CLINIC | Age: 73
End: 2018-03-27

## 2018-03-27 VITALS
HEIGHT: 66 IN | TEMPERATURE: 98.2 F | WEIGHT: 136.4 LBS | SYSTOLIC BLOOD PRESSURE: 128 MMHG | DIASTOLIC BLOOD PRESSURE: 74 MMHG | BODY MASS INDEX: 21.92 KG/M2

## 2018-03-27 DIAGNOSIS — K22.70 BARRETT'S ESOPHAGUS WITHOUT DYSPLASIA: Primary | ICD-10-CM

## 2018-03-27 DIAGNOSIS — R13.10 DYSPHAGIA, UNSPECIFIED TYPE: ICD-10-CM

## 2018-03-27 DIAGNOSIS — K21.9 GASTROESOPHAGEAL REFLUX DISEASE, ESOPHAGITIS PRESENCE NOT SPECIFIED: ICD-10-CM

## 2018-03-27 DIAGNOSIS — K58.2 IRRITABLE BOWEL SYNDROME WITH BOTH CONSTIPATION AND DIARRHEA: ICD-10-CM

## 2018-03-27 PROCEDURE — 99214 OFFICE O/P EST MOD 30 MIN: CPT | Performed by: NURSE PRACTITIONER

## 2018-03-27 RX ORDER — PANTOPRAZOLE SODIUM 40 MG/1
40 TABLET, DELAYED RELEASE ORAL 2 TIMES DAILY
Qty: 30 TABLET | Refills: 0 | Status: SHIPPED | OUTPATIENT
Start: 2018-03-27 | End: 2018-04-17 | Stop reason: SDUPTHER

## 2018-04-04 ENCOUNTER — OFFICE VISIT (OUTPATIENT)
Dept: ORTHOPEDIC SURGERY | Facility: CLINIC | Age: 73
End: 2018-04-04

## 2018-04-04 VITALS — WEIGHT: 137.2 LBS | TEMPERATURE: 95.9 F | HEIGHT: 66 IN | BODY MASS INDEX: 22.05 KG/M2

## 2018-04-04 DIAGNOSIS — M25.562 CHRONIC PAIN OF LEFT KNEE: ICD-10-CM

## 2018-04-04 DIAGNOSIS — M17.12 PRIMARY OSTEOARTHRITIS OF LEFT KNEE: Primary | ICD-10-CM

## 2018-04-04 DIAGNOSIS — G89.29 CHRONIC PAIN OF LEFT KNEE: ICD-10-CM

## 2018-04-04 PROCEDURE — 99213 OFFICE O/P EST LOW 20 MIN: CPT | Performed by: ORTHOPAEDIC SURGERY

## 2018-04-04 PROCEDURE — 73564 X-RAY EXAM KNEE 4 OR MORE: CPT | Performed by: ORTHOPAEDIC SURGERY

## 2018-04-04 RX ORDER — FESOTERODINE FUMARATE 8 MG/1
TABLET, EXTENDED RELEASE ORAL
COMMUNITY
End: 2018-11-27

## 2018-04-04 RX ORDER — PREGABALIN 50 MG/1
150 CAPSULE ORAL NIGHTLY
COMMUNITY

## 2018-04-04 NOTE — PROGRESS NOTES
Patient:  Davida Anderson is a 72 y.o. female    Chief Complaint/ Reason for Visit:    Chief Complaint   Patient presents with   • Left Knee - Follow-up, Pain       HPI:  This pleasant lady returns today and is having mild to moderate activity related aching pain primarily in the anterior but also in the medial and occasionally a posterior aspects of her left knee.  She sustained a patellar fracture couple of years ago that we treated nonsurgically.  It healed, but I think that it has accelerated the arthritis in her knee, some of which was present prior to the injury.    She has been doing physical therapy, we have used soft bracing, and she has used oral anti-inflammatories in the past, and more recently, I prescribed a topical analgesics/anti-inflammatory ointment that she has used.  Unfortunately, the relief achieved from this is starting to diminish.    She says that she can only walk about a quarter mile at any one time now and has discomfort during the entire walk.  It sounds like in discussing with her that she has pain virtually with every step whenever she is up on her left knee.  She has exacerbation of the pain when going up and down stairs.  Rest and elevation are the only things that seem to consistently reduce the discomfort.  She does say that she ices her knee 3 times a day nearly every day, but that is seeming to become less and less effective as time goes on The patient is a very active person and wants to maintain high activity level.  The knee is really compromising her ability to do that lately.      PMH:    Past Medical History:   Diagnosis Date   • Acid reflux    • Arthritis    • Gtz esophagus    • Bipolar 2 disorder    • Diverticulosis    • Dizziness    • Frequent UTI    • H/O gastroesophageal reflux (GERD)    • H/O Infiltrating ductal carcinoma of left breast 2002    Stage IIA, Grade 3 ER/[place] +, HER2 -, treated with 5 years of tamoxifen, 5 years of Femara, completed in 2012   • High  cholesterol    • History of alcoholism     40 YRS AGO   • History of Clostridium difficile colitis 2014   • History of gastric ulcer    • History of Holter monitoring     WEARING CURRENTLY   • Hypertension    • Irregular heartbeat    • Mass of right breast on mammogram 03/17/2016    10 o'clock position, 4 cm from the nipple,   • Neuropathy    • PONV (postoperative nausea and vomiting)    • Raynaud disease        PSH:    Past Surgical History:   Procedure Laterality Date   • ANKLE OPEN REDUCTION INTERNAL FIXATION Right 08/14/2015    Dr. Dandre Camacho, [facility]   • BACK SURGERY      LUMBAR   • BREAST RECONSTRUCTION, BREAST TISSUE EXPANDER INSERTION Left 04/11/2002    [place] Contour Profile expander was placed 550 cc size, filled with 200 cc of saline, Dr. Herbert Napoles, [facility]   • COLONOSCOPY N/A 09/16/2014    Dr. Darci Kerns, [facility]; torts, tics, stool, polyp   • COLONOSCOPY N/A 9/27/2016    NTEH, diverticulosis, tortuous colon, Two 3-5mm polyps in the descending colon and the transverse colon, IH.  PATH: Tubular adenoma   • COLONOSCOPY N/A 12/12/2017    NTEH, tics, torts, IH, TA w/low grade dysplasia   • CYSTOSCOPY N/A 05/07/2010    with TVT takedown, Dr. Simon Wall, [facility]   • ENDOSCOPY N/A 9/27/2016    z line irreg, bilious gastric fluid- fluid aspiration preformed, gastritis.  PATH: Squamous and glandular mucosa with minimal superficial acute inflammation.    • ENDOSCOPY N/A 12/12/2017    Z line irregular, gastritis, duodenitis, chronic inflammation   • KNEE SURGERY Left     BROKEN   • MAMMO US BREAST BIOPSY ADDITIONAL W WO DEVICE Left 02/21/2002    2:00 position left breast, Infiltrating Ductal Carcinoma, [facility]   • MANDIBLE FRACTURE SURGERY     • MASTECTOMY Left 04/11/2002    Left Total Mastectomy and Left Axillary [place] Node Biobsy, Dr. Indu Akins, [facility]   • TENSION FREE VAGINAL TAPING WITH MINI ARC SLING N/A 10/26/2009    Dr. Gina Castillo, [facility]   • UPPER GASTROINTESTINAL ENDOSCOPY  09/16/2014    z-line irreg, grade a  reflux, gastritis       Social Hx:    Social History     Social History   • Marital status:      Spouse name: N/A   • Number of children: N/A   • Years of education: N/A     Occupational History   • Not on file.     Social History Main Topics   • Smoking status: Former Smoker     Packs/day: 3.00     Types: Cigarettes     Quit date: 4/14/1983   • Smokeless tobacco: Never Used   • Alcohol use No   • Drug use: No   • Sexual activity: Defer     Other Topics Concern   • Not on file     Social History Narrative   • No narrative on file       Family Hx:    Family History   Problem Relation Age of Onset   • Breast cancer Mother 35   • Colon cancer Mother 64   • Heart disease Father    • Cancer Father      throat and lung   • Colon polyps Father        Meds:    Current Outpatient Prescriptions:   •  acetaminophen (TYLENOL) 500 MG tablet, Take 1,000 mg by mouth every night., Disp: , Rfl:   •  ARIPiprazole (ABILIFY) 10 MG tablet, Take 10 mg by mouth daily., Disp: , Rfl:   •  aspirin 81 MG tablet, Take 81 mg by mouth daily., Disp: , Rfl:   •  atorvastatin (LIPITOR) 20 MG tablet, TAKE 1 TABLET EVERY DAY, Disp: , Rfl:   •  B Complex Vitamins (VITAMIN B COMPLEX) tablet, Take 1 tablet by mouth daily., Disp: , Rfl:   •  BIOTIN PO, Take 1 tablet by mouth daily., Disp: , Rfl:   •  Calcium Carb-Cholecalciferol (CALCIUM 600 + D) 600-200 MG-UNIT tablet, Take 2 tablets by mouth daily., Disp: , Rfl:   •  celecoxib (CeleBREX) 200 MG capsule, Take 200 mg by mouth daily., Disp: , Rfl:   •  cephalexin (KEFLEX) 250 MG capsule, , Disp: , Rfl:   •  Coenzyme Q10 (CO Q-10) 400 MG capsule, Take 400 mg by mouth daily., Disp: , Rfl:   •  diphenhydrAMINE (BENADRYL) 12.5 MG/5ML elixir, Take 50 mg by mouth every night., Disp: , Rfl:   •  diphenhydrAMINE-acetaminophen (TYLENOL PM EXTRA STRENGTH)  MG tablet per tablet, Take 2 tablets by mouth At Night As Needed for Sleep., Disp: , Rfl:   •  fesoterodine fumarate (TOVIAZ ER) 8 MG tablet  "sustained-release 24 hour tablet, Take  by mouth., Disp: , Rfl:   •  Glucosamine-Chondroitin 7190-5707 MG/30ML liquid, Take 2 tablets by mouth daily., Disp: , Rfl:   •  hydrALAZINE (APRESOLINE) 25 MG tablet, Take 25 mg by mouth 4 (Four) Times a Day., Disp: , Rfl:   •  lamoTRIgine (LaMICtal) 100 MG tablet, Take 100 mg by mouth 2 (two) times a day., Disp: , Rfl:   •  lithium carbonate 300 MG capsule, Take 300 mg by mouth daily., Disp: , Rfl:   •  magnesium oxide (MAGOX) 400 (241.3 MG) MG tablet tablet, Take 400 mg by mouth daily., Disp: , Rfl:   •  melatonin 5 MG tablet tablet, Take 5 mg by mouth At Night As Needed., Disp: , Rfl:   •  pantoprazole (PROTONIX) 40 MG EC tablet, Take 1 tablet by mouth 2 (Two) Times a Day., Disp: 30 tablet, Rfl: 0  •  pregabalin (LYRICA) 50 MG capsule, Take 50 mg by mouth 2 (Two) Times a Day., Disp: , Rfl:   •  Probiotic Product (SOLUBLE FIBER/PROBIOTICS PO), Take 1 tablet by mouth 3 (three) times a day., Disp: , Rfl:   •  Sofosbuvir 400 MG tablet, Take  by mouth., Disp: , Rfl:   •  traMADol (ULTRAM) 50 MG tablet, Take 50 mg by mouth As Needed., Disp: , Rfl:     Allergies:    Allergies   Allergen Reactions   • Morphine Hives, Itching and Rash       ROS:  Review of Systems   Musculoskeletal: Positive for arthralgias, gait problem and joint swelling.   All other systems reviewed and are negative.      Vitals:    04/04/18 1304   Temp: 95.9 °F (35.5 °C)   TempSrc: Temporal Artery    Weight: 62.2 kg (137 lb 3.2 oz)   Height: 167.6 cm (66\")     Body mass index is 22.14 kg/m².    Physical Exam    The patient is awake, alert, and oriented ×3.  The patient is in no acute distress.  Breathing is regular and unlabored with a respiratory rate of 12/m.  Extraocular movements and pupillary responses are symmetrically intact. Sclerae are anicteric.   Hearing is within normal limits.  Speech is within normal limits.  There is no jugular venous distention.    The patient has a limp antalgic from the left.  "     Left knee:  She has a mild effusion on the left knee, but no sign of infection.  Range of motion is 12° to 123° of flexion.  She has medial instability on valgus stressing.  She has tenderness along the medial joint line, and has palpable osteophyte ridging to at least a mild degree around all 3 compartments.  There is palpable crepitus on active and passive ranges of motion.  There is no redness, bruising, or abnormal warmth.  There is a mild effusion.  Her left calf is soft and nontender with no venous cord.    Distally, the patient has palpable regular pedal pulses with normal amplitude and a current heart rate of about 72 bpm.  Sensory exams intact light touch.  Skin and nails are unremarkable.          Radiology: X-rays: A 4 view arthritis series of the left knee, with incidental views the right knee, was ordered and reviewed today to assess patient's complaints of worsening right knee pain and stiffness.  I did review these, and I did compare them to images we had here in the office from 2015.  Today's images reveal the patient has advanced to end-stage osteoarthritis of the left knee with varus malalignment and bone-on-bone changes in the medial compartment.  She has osteophyte formation around all 3 compartments indicating tricompartmental disease.  In comparison to the previous images, the patient's medial joint line narrowing has progressed, and the degenerative changes in all compartments can be seen to progress as well.          Assessment:     Diagnosis Plan   1. Primary osteoarthritis of left knee     2. Chronic pain of left knee  XR Knee 4+ View Left           Plan:  The patient had discussed everything at length.  She says that she feels like she's going to schedule a total knee replacement later this year.  However, and the short-term, she and I discussed other options.  She's going to continue her home physical therapy, continue her icing.  She has tried soft bracing but it doesn't really seem  to help.    We discussed an injection.  We are going to hold off on that for a couple of weeks, as she has an upcoming trip.  She would like to come in a few days before the trip to have the injection so that at least there will be a fairly high likelihood she will have a comfortable time while vacationing.  We did discuss and she does understand that she may not have a knee replacement until 3 months have passed after the last knee injection.    She's been continue her home physical therapy and I'll see her back in a couple weeks as discussed.    Orders Placed This Encounter   Procedures   • XR Knee 4+ View Left     Order Specific Question:   Reason for Exam:     Answer:   KNEE

## 2018-04-05 ENCOUNTER — HOSPITAL ENCOUNTER (OUTPATIENT)
Dept: GENERAL RADIOLOGY | Facility: HOSPITAL | Age: 73
Discharge: HOME | End: 2018-04-05
Admitting: NURSE PRACTITIONER

## 2018-04-05 DIAGNOSIS — K21.9 GASTROESOPHAGEAL REFLUX DISEASE, ESOPHAGITIS PRESENCE NOT SPECIFIED: ICD-10-CM

## 2018-04-05 DIAGNOSIS — R13.10 DYSPHAGIA, UNSPECIFIED TYPE: ICD-10-CM

## 2018-04-05 DIAGNOSIS — K22.70 BARRETT'S ESOPHAGUS WITHOUT DYSPLASIA: ICD-10-CM

## 2018-04-05 PROCEDURE — 92611 MOTION FLUOROSCOPY/SWALLOW: CPT

## 2018-04-05 PROCEDURE — 74230 X-RAY XM SWLNG FUNCJ C+: CPT

## 2018-04-05 PROCEDURE — G8998 SWALLOW D/C STATUS: HCPCS

## 2018-04-05 PROCEDURE — A9270 NON-COVERED ITEM OR SERVICE: HCPCS | Performed by: NURSE PRACTITIONER

## 2018-04-05 PROCEDURE — G8997 SWALLOW GOAL STATUS: HCPCS

## 2018-04-05 PROCEDURE — 63710000001 BARIUM SULFATE 40 % SUSPENSION: Performed by: NURSE PRACTITIONER

## 2018-04-05 PROCEDURE — 63710000001 BARIUM SULFATE 96 % RECONSTITUTED SUSPENSION: Performed by: NURSE PRACTITIONER

## 2018-04-05 PROCEDURE — 63710000001 BARIUM SULFATE 98 % RECONSTITUTED SUSPENSION: Performed by: NURSE PRACTITIONER

## 2018-04-05 PROCEDURE — G8996 SWALLOW CURRENT STATUS: HCPCS

## 2018-04-05 RX ADMIN — BARIUM SULFATE 65 ML: 960 POWDER, FOR SUSPENSION ORAL at 13:45

## 2018-04-05 RX ADMIN — BARIUM SULFATE 8 ML: 980 POWDER, FOR SUSPENSION ORAL at 13:46

## 2018-04-05 RX ADMIN — BARIUM SULFATE 50 ML: 400 SUSPENSION ORAL at 13:46

## 2018-04-05 NOTE — MBS/VFSS/FEES
Outpatient Speech Language Pathology   Adult Swallow Initial Evaluation  [facility]     Patient Name: Davida Anderson  : 1945  MRN: [id number]  Today's Date: 2018         Visit Date: 2018   Patient Active Problem List   Diagnosis   • BP (high blood pressure)   • History of left breast infiltrating ductal cancer 2cm s/p mastectomy with reconstruction  s/p chemo   • H/O ETOH abuse   • Gtz's esophagus   • Colon polyps   • Bipolar 1 disorder   • Arthritis   • Raynaud's disease   • Neuropathy   • Lumbar spine pain   • Spondylolisthesis at L4-L5 level   • Right lumbar radiculopathy   • Gtz's esophagus without dysplasia   • Hx of adenomatous colonic polyps   • Dysphagia   • Diarrhea   • Primary osteoarthritis of left knee   • Primary localized osteoarthrosis of the knee, right   • Chronic pain of left knee        Past Medical History:   Diagnosis Date   • Acid reflux    • Arthritis    • Gtz esophagus    • Bipolar 2 disorder    • Diverticulosis    • Dizziness    • Frequent UTI    • H/O gastroesophageal reflux (GERD)    • H/O Infiltrating ductal carcinoma of left breast     Stage IIA, Grade 3 ER/[place] +, HER2 -, treated with 5 years of tamoxifen, 5 years of Femara, completed in    • High cholesterol    • History of alcoholism     40 YRS AGO   • History of Clostridium difficile colitis    • History of gastric ulcer    • History of Holter monitoring     WEARING CURRENTLY   • Hypertension    • Irregular heartbeat    • Mass of right breast on mammogram 2016    10 o'clock position, 4 cm from the nipple,   • Neuropathy    • PONV (postoperative nausea and vomiting)    • Raynaud disease         Past Surgical History:   Procedure Laterality Date   • ANKLE OPEN REDUCTION INTERNAL FIXATION Right 2015    Dr. Dandre Camacho, [facility]   • BACK SURGERY      LUMBAR   • BREAST RECONSTRUCTION, BREAST TISSUE EXPANDER INSERTION Left 2002    [place] Contour Profile expander was placed 550 cc  size, filled with 200 cc of saline, Dr. Herbert Napoles, [facility]   • COLONOSCOPY N/A 09/16/2014    Dr. Darci Kerns, [facility]; torts, tics, stool, polyp   • COLONOSCOPY N/A 9/27/2016    NTEH, diverticulosis, tortuous colon, Two 3-5mm polyps in the descending colon and the transverse colon, IH.  PATH: Tubular adenoma   • COLONOSCOPY N/A 12/12/2017    NTEH, tics, torts, IH, TA w/low grade dysplasia   • CYSTOSCOPY N/A 05/07/2010    with TVT takedown, Dr. Simon Wall, [facility]   • ENDOSCOPY N/A 9/27/2016    z line irreg, bilious gastric fluid- fluid aspiration preformed, gastritis.  PATH: Squamous and glandular mucosa with minimal superficial acute inflammation.    • ENDOSCOPY N/A 12/12/2017    Z line irregular, gastritis, duodenitis, chronic inflammation   • KNEE SURGERY Left     BROKEN   • MAMMO US BREAST BIOPSY ADDITIONAL W WO DEVICE Left 02/21/2002    2:00 position left breast, Infiltrating Ductal Carcinoma, [facility]   • MANDIBLE FRACTURE SURGERY     • MASTECTOMY Left 04/11/2002    Left Total Mastectomy and Left Axillary [place] Node Biobsy, Dr. Indu Akins, [facility]   • TENSION FREE VAGINAL TAPING WITH MINI ARC SLING N/A 10/26/2009    Dr. Gina Castillo, [facility]   • UPPER GASTROINTESTINAL ENDOSCOPY  09/16/2014    z-line irreg, grade a reflux, gastritis         Visit Dx:     ICD-10-CM ICD-9-CM   1. Gtz's esophagus without dysplasia K22.70 530.85   2. Dysphagia, unspecified type R13.10 787.20   3. Gastroesophageal reflux disease, esophagitis presence not specified K21.9 530.81       SPEECH-LANGUAGE PATHOLOGY EVALUTION - VFSS  Subjective: The patient was seen on this date for a VFSS(Videofluoroscopic Swallowing Study).  Patient was alert and cooperative.    Objective: Risks/benefits were reviewed with the patient, and consent was obtained. The study was completed with SLP present and Radiologist review. The patient was seen in lateral view(s). Textures given included thin liquid, nectar thick liquid, puree consistency, mechanical soft  consistency and regular consistency.  Assessment: Pt presents with functional oropharyngeal swallow. Pt with slow upper esophageal clearance puree- liquid wash aided in clearance. Pt with inconsistent mistiming and transient posterior penetration thin and nectar thick liquids.  No penetration or aspiraiton thin, nectar thick, puree, mech soft, and regular. Esophageal scan with nectar thick liquid appeared WFL.   SLP Findings: Patient presents with functional swallow.   Recommendations: Diet Textures: thin liquid, regular consistency food. Medications should be taken whole with thin liquids. Recommended Strategies: Upright for PO, small bites and sips, may use straw and alternate liquids and solids. Oral care before breakfast, after all meals and PRN.  Other Recommended Evaluations:     Dysphagia therapy is not recommended. Rationale: Not warranted at this time.                SLP Adult Swallow Evaluation - 04/05/18 1415        Rehab Evaluation    Document Type evaluation  -OC    Subjective Information complains of   food refluxing to oral cavity  -OC    Patient Observations alert;cooperative;agree to therapy  -OC    Patient Effort good  -OC    Symptoms Noted During/After Treatment none  -OC       General Information    Patient Profile Reviewed yes  -OC    Pertinent History Of Current Problem barretts esophagus  -OC    Current Method of Nutrition regular textures;thin liquids  -OC    Precautions/Limitations, Vision WFL  -OC    Precautions/Limitations, Hearing WFL  -OC    Prior Level of Function-Communication WFL  -OC    Prior Level of Function-Swallowing no diet consistency restrictions  -OC    Plans/Goals Discussed with patient;agreed upon  -OC    Barriers to Rehab none identified  -OC       Pain Assessment    Additional Documentation Pain Scale: Numbers Pre/Post-Treatment (Group)  -OC       Pain Scale: Numbers Pre/Post-Treatment    Pain Scale: Numbers, Pretreatment 0/10 - no pain  -OC    Pain Scale: Numbers,  Post-Treatment 0/10 - no pain  -OC       MBS/VFSS    Utensils Used spoon;cup;straw  -OC    Consistencies Trialed regular textures;soft textures;pureed;thin liquids;nectar/syrup-thick liquids  -OC       MBS/VFSS Interpretation    Oral Prep Phase WFL  -OC    Oral Transit Phase WFL  -OC    Oral Residue WFL  -OC    VFSS Summary Pt presents with functional oropharyngeal swallow. Pt with slow upper esophageal clearance puree. Pt with inconsistent mistiming and transient posterior penetration thin and nectar thick liquids.  No penetration or aspiraiton thin, nectar thick, puree, mech soft, and regular. Esophageal scan with nectar thick liquid appeared WFL.  -OC       Initiation of Pharyngeal Swallow    Initiation of Pharyngeal Swallow WFL  -OC       Esophageal Phase    Esophageal Phase other (see comments)   slow clearance puree  -OC       SLP Communication to Radiology    Summary Statement Pt presents with functional oropharyngeal swallow. Pt with slow upper esophageal clearance puree. Pt with inconsistent mistiming and transient posterior penetration thin and nectar thick liquids.  No penetration or aspiraiton thin, nectar thick, puree, mech soft, and regular. Esophageal scan with nectar thick liquid appeared WFL.  -OC      User Key  (r) = Recorded By, (t) = Taken By, (c) = Cosigned By    Initials Name Provider Type    OC La Obregon MA,CCC-SLP Speech and Language Pathologist                  OP SLP Education     Row Name 04/05/18 1538       Education    Barriers to Learning No barriers identified  -OC    Education Provided Described results of evaluation;Patient expressed understanding of evaluation  -OC    Assessed Learning motivation;Learning needs;Learning preferences;Learning readiness  -OC    Learning Motivation Strong  -OC    Learning Method Explanation  -OC    Teaching Response Verbalized understanding  -OC    Education Comments Alternate liquids and solids.  -OC      User Key  (r) = Recorded By, (t) = Taken By,  (c) = Cosigned By    Initials Name Effective Dates    OC La Obregon MA,SHERIN-SLP 04/05/17 -                     OP SLP Assessment/Plan - 04/05/18 1415        SLP Assessment    Functional Problems Swallowing  -OC    Impact on Function: Swallowing Risk of aspiration;Risk of pneumonia  -OC    Clinical Impression: Swallowing WNL  -OC    Please refer to paper survey for additional self-reported information No  -OC    Please refer to items scanned into chart for additional diagnostic informaiton and handouts as provided by clinician No  -OC    Prognosis Good (comment)  -OC    Patient/caregiver participated in establishment of treatment plan and goals Yes  -OC    Patient would benefit from skilled therapy intervention No  -OC       SLP Plan    Frequency N/A  -OC    Duration N/A  -OC    Planned CPT's? SLP MBS: 65469  -OC    Expected Duration Therapy Session - minutes 45-60 minutes  -OC    Plan Comments Disucssed alternating liquids and solids. Pt reports no liquids throughout meals currently. SLP provided education on need for liquid wash throughout meal.   -OC      User Key  (r) = Recorded By, (t) = Taken By, (c) = Cosigned By    Initials Name Provider Type    OC La Obregon MA,SHERIN-SLP Speech and Language Pathologist              SLP Outcome Measures (last 72 hours)      SLP Outcome Measures     Row Name 04/05/18 1415             SLP Outcome Measures    Outcome Measure Used? Adult NOMS  -OC         FCM Scores    FCM Chosen Swallowing  -OC      Swallowing FCM Score 6  -OC        User Key  (r) = Recorded By, (t) = Taken By, (c) = Cosigned By    Initials Name Effective Dates    OC La Obregon MA, CCC-SLP 04/05/17 -                Time Calculation:   SLP Start Time: 1315  SLP Stop Time: 1415  SLP Time Calculation (min): 60 min    Therapy Charges for Today     Code Description Service Date Service Provider Modifiers Qty    90184547949 HC ST SWALLOWING CURRENT STATUS 4/5/2018 La Obregon MA,SHERIN-SLP GN, CI 1    [id number]   ST SWALLOWING PROJECTED 4/5/2018 La Obregon MA,CCC-SLP GN, CI 1    88556315102 HC ST SWALLOWING DISCHARGE 4/5/2018 La Obregon MA,CCC-SLP GN, CI 1    92954534112 HC ST MOTION FLUORO EVAL SWALLOW 4 4/5/2018 La Obregon MA,CCC-SLP GN 1          SLP G-Codes  SLP NOMS Used?: Yes  Functional Limitations: Swallowing  Swallow Current Status (): At least 1 percent but less than 20 percent impaired, limited or restricted  Swallow Goal Status (): At least 1 percent but less than 20 percent impaired, limited or restricted  Swallow Discharge Status (): At least 1 percent but less than 20 percent impaired, limited or restricted        La Obregon MA,SHERIN-SLP  4/5/2018

## 2018-04-06 ENCOUNTER — TELEPHONE (OUTPATIENT)
Dept: ORTHOPEDIC SURGERY | Facility: CLINIC | Age: 73
End: 2018-04-06

## 2018-04-09 ENCOUNTER — HOSPITAL ENCOUNTER (OUTPATIENT)
Dept: GENERAL RADIOLOGY | Facility: HOSPITAL | Age: 73
Discharge: HOME | End: 2018-04-09

## 2018-04-09 ENCOUNTER — HOSPITAL ENCOUNTER (OUTPATIENT)
Dept: PAIN MEDICINE | Facility: HOSPITAL | Age: 73
Discharge: HOME | End: 2018-04-09
Admitting: ORTHOPAEDIC SURGERY

## 2018-04-09 ENCOUNTER — ANESTHESIA (OUTPATIENT)
Dept: PAIN MEDICINE | Facility: HOSPITAL | Age: 73
End: 2018-04-09

## 2018-04-09 ENCOUNTER — ANESTHESIA EVENT (OUTPATIENT)
Dept: PAIN MEDICINE | Facility: HOSPITAL | Age: 73
End: 2018-04-09

## 2018-04-09 ENCOUNTER — TELEPHONE (OUTPATIENT)
Dept: GASTROENTEROLOGY | Facility: CLINIC | Age: 73
End: 2018-04-09

## 2018-04-09 ENCOUNTER — HOSPITAL ENCOUNTER (OUTPATIENT)
Dept: PAIN MEDICINE | Facility: HOSPITAL | Age: 73
Discharge: HOME | End: 2018-04-09

## 2018-04-09 ENCOUNTER — TELEPHONE (OUTPATIENT)
Dept: ORTHOPEDIC SURGERY | Facility: CLINIC | Age: 73
End: 2018-04-09

## 2018-04-09 ENCOUNTER — TRANSCRIBE ORDERS (OUTPATIENT)
Dept: PAIN MEDICINE | Facility: HOSPITAL | Age: 73
End: 2018-04-09

## 2018-04-09 VITALS
RESPIRATION RATE: 16 BRPM | OXYGEN SATURATION: 98 % | SYSTOLIC BLOOD PRESSURE: 141 MMHG | DIASTOLIC BLOOD PRESSURE: 88 MMHG | HEART RATE: 80 BPM | TEMPERATURE: 97.7 F

## 2018-04-09 DIAGNOSIS — M48.061 SPINAL STENOSIS OF LUMBAR REGION, UNSPECIFIED WHETHER NEUROGENIC CLAUDICATION PRESENT: Primary | ICD-10-CM

## 2018-04-09 DIAGNOSIS — R52 PAIN: ICD-10-CM

## 2018-04-09 PROCEDURE — 77003 FLUOROGUIDE FOR SPINE INJECT: CPT

## 2018-04-09 PROCEDURE — 0 IOPAMIDOL 41 % SOLUTION: Performed by: ANESTHESIOLOGY

## 2018-04-09 PROCEDURE — 25010000002 MIDAZOLAM PER 1 MG: Performed by: ANESTHESIOLOGY

## 2018-04-09 PROCEDURE — 25010000002 METHYLPREDNISOLONE PER 80 MG: Performed by: ANESTHESIOLOGY

## 2018-04-09 PROCEDURE — C1755 CATHETER, INTRASPINAL: HCPCS

## 2018-04-09 RX ORDER — MIDAZOLAM HYDROCHLORIDE 1 MG/ML
1 INJECTION INTRAMUSCULAR; INTRAVENOUS AS NEEDED
Status: DISCONTINUED | OUTPATIENT
Start: 2018-04-09 | End: 2018-04-10 | Stop reason: HOSPADM

## 2018-04-09 RX ORDER — METHYLPREDNISOLONE ACETATE 80 MG/ML
80 INJECTION, SUSPENSION INTRA-ARTICULAR; INTRALESIONAL; INTRAMUSCULAR; SOFT TISSUE ONCE
Status: COMPLETED | OUTPATIENT
Start: 2018-04-09 | End: 2018-04-09

## 2018-04-09 RX ORDER — LIDOCAINE HYDROCHLORIDE 10 MG/ML
0.5 INJECTION, SOLUTION INFILTRATION; PERINEURAL ONCE AS NEEDED
Status: CANCELLED | OUTPATIENT
Start: 2018-05-01

## 2018-04-09 RX ORDER — SODIUM CHLORIDE 0.9 % (FLUSH) 0.9 %
1-10 SYRINGE (ML) INJECTION AS NEEDED
Status: DISCONTINUED | OUTPATIENT
Start: 2018-04-09 | End: 2018-04-10 | Stop reason: HOSPADM

## 2018-04-09 RX ORDER — LIDOCAINE HYDROCHLORIDE 10 MG/ML
1 INJECTION, SOLUTION INFILTRATION; PERINEURAL ONCE AS NEEDED
Status: DISCONTINUED | OUTPATIENT
Start: 2018-04-09 | End: 2018-04-10 | Stop reason: HOSPADM

## 2018-04-09 RX ADMIN — MIDAZOLAM 1 MG: 1 INJECTION INTRAMUSCULAR; INTRAVENOUS at 10:28

## 2018-04-09 RX ADMIN — IOPAMIDOL 10 ML: 408 INJECTION, SOLUTION INTRATHECAL at 10:32

## 2018-04-09 RX ADMIN — METHYLPREDNISOLONE ACETATE 80 MG: 80 INJECTION, SUSPENSION INTRA-ARTICULAR; INTRALESIONAL; INTRAMUSCULAR; SOFT TISSUE at 10:32

## 2018-04-09 NOTE — H&P
[facility]    History and Physical    Patient Name: Davida Anderson  :  1945  MRN:  [id number]  Date of Admission: 2018    Subjective     Patient is a 72 y.o. female presents with chief complaint of chronic low back pain.  Onset of symptoms was several years ago.  Symptoms are associated/aggravated by activity. Symptoms improve with injection. Pain worse on the right, 5/10.  Previous epidurals helped >50% for several weeks at a time.    The following portions of the patients history were reviewed and updated as appropriate: current medications, allergies, past medical history, past surgical history, past family history, past social history and problem list                Objective     Past Medical History:   Past Medical History:   Diagnosis Date   • Acid reflux    • Arthritis    • Gtz esophagus    • Bipolar 2 disorder    • Diverticulosis    • Dizziness    • Frequent UTI    • H/O gastroesophageal reflux (GERD)    • H/O Infiltrating ductal carcinoma of left breast     Stage IIA, Grade 3 ER/OK +, HER2 -, treated with 5 years of tamoxifen, 5 years of Femara, completed in    • High cholesterol    • History of alcoholism     40 YRS AGO   • History of Clostridium difficile colitis    • History of gastric ulcer    • History of Holter monitoring     WEARING CURRENTLY   • Hypertension    • Irregular heartbeat    • Low back pain    • Mass of right breast on mammogram 2016    10 o'clock position, 4 cm from the nipple,   • Neuropathy    • PONV (postoperative nausea and vomiting)    • Raynaud disease      Past Surgical History:   Past Surgical History:   Procedure Laterality Date   • ANKLE OPEN REDUCTION INTERNAL FIXATION Right 2015    Dr. Dandre Camacho, [facility]   • BACK SURGERY      LUMBAR   • BREAST RECONSTRUCTION, BREAST TISSUE EXPANDER INSERTION Left 2002    [place] Contour Profile expander was placed 550 cc size, filled with 200 cc of saline, Dr. Herbert Napoles, [facility]   • COLONOSCOPY  N/A 09/16/2014    Dr. Darci Kerns, [facility]; torts, tics, stool, polyp   • COLONOSCOPY N/A 9/27/2016    NTEH, diverticulosis, tortuous colon, Two 3-5mm polyps in the descending colon and the transverse colon, IH.  PATH: Tubular adenoma   • COLONOSCOPY N/A 12/12/2017    NTEH, tics, torts, IH, TA w/low grade dysplasia   • CYSTOSCOPY N/A 05/07/2010    with TVT takedown, Dr. Simon Wall, [facility]   • ENDOSCOPY N/A 9/27/2016    z line irreg, bilious gastric fluid- fluid aspiration preformed, gastritis.  PATH: Squamous and glandular mucosa with minimal superficial acute inflammation.    • ENDOSCOPY N/A 12/12/2017    Z line irregular, gastritis, duodenitis, chronic inflammation   • EPIDURAL BLOCK     • KNEE SURGERY Left     BROKEN   • MAMMO US BREAST BIOPSY ADDITIONAL W WO DEVICE Left 02/21/2002    2:00 position left breast, Infiltrating Ductal Carcinoma, [facility]   • MANDIBLE FRACTURE SURGERY     • MASTECTOMY Left 04/11/2002    Left Total Mastectomy and Left Axillary [place] Node Biobsy, Dr. Indu Akins, [facility]   • TENSION FREE VAGINAL TAPING WITH MINI ARC SLING N/A 10/26/2009    Dr. Gina Castillo, [facility]   • UPPER GASTROINTESTINAL ENDOSCOPY  09/16/2014    z-line irreg, grade a reflux, gastritis     Family History:   Family History   Problem Relation Age of Onset   • Breast cancer Mother 35   • Colon cancer Mother 64   • Heart disease Father    • Cancer Father      throat and lung   • Colon polyps Father      Social History:   Social History   Substance Use Topics   • Smoking status: Former Smoker     Packs/day: 3.00     Types: Cigarettes     Quit date: 4/14/1983   • Smokeless tobacco: Never Used   • Alcohol use No       Vital Signs Range for the last 24 hours  Temperature: Temp:  [36.5 °C (97.7 °F)] 36.5 °C (97.7 °F)   Temp Source: Temp src: Oral   BP: BP: (126)/(81) 126/81   Pulse: Heart Rate:  [84] 84   Respirations: Resp:  [16] 16   SPO2: SpO2:  [93 %] 93 %   O2 Amount (l/min):     O2 Devices     Weight:            --------------------------------------------------------------------------------    Current Outpatient Prescriptions   Medication Sig Dispense Refill   • acetaminophen (TYLENOL) 500 MG tablet Take 1,000 mg by mouth every night.     • ARIPiprazole (ABILIFY) 10 MG tablet Take 10 mg by mouth daily.     • atorvastatin (LIPITOR) 20 MG tablet TAKE 1 TABLET EVERY DAY     • B Complex Vitamins (VITAMIN B COMPLEX) tablet Take 1 tablet by mouth daily.     • BIOTIN PO Take 1 tablet by mouth daily.     • Calcium Carb-Cholecalciferol (CALCIUM 600 + D) 600-200 MG-UNIT tablet Take 2 tablets by mouth daily.     • celecoxib (CeleBREX) 200 MG capsule Take 200 mg by mouth daily.     • cephalexin (KEFLEX) 250 MG capsule      • Coenzyme Q10 (CO Q-10) 400 MG capsule Take 400 mg by mouth daily.     • diphenhydrAMINE (BENADRYL) 12.5 MG/5ML elixir Take 50 mg by mouth every night.     • diphenhydrAMINE-acetaminophen (TYLENOL PM EXTRA STRENGTH)  MG tablet per tablet Take 2 tablets by mouth At Night As Needed for Sleep.     • fesoterodine fumarate (TOVIAZ ER) 8 MG tablet sustained-release 24 hour tablet Take  by mouth.     • Glucosamine-Chondroitin 3360-8920 MG/30ML liquid Take 2 tablets by mouth daily.     • hydrALAZINE (APRESOLINE) 25 MG tablet Take 25 mg by mouth 4 (Four) Times a Day.     • lamoTRIgine (LaMICtal) 100 MG tablet Take 100 mg by mouth 2 (two) times a day.     • lithium carbonate 300 MG capsule Take 300 mg by mouth daily.     • magnesium oxide (MAGOX) 400 (241.3 MG) MG tablet tablet Take 400 mg by mouth daily.     • melatonin 5 MG tablet tablet Take 5 mg by mouth At Night As Needed.     • pantoprazole (PROTONIX) 40 MG EC tablet Take 1 tablet by mouth 2 (Two) Times a Day. 30 tablet 0   • pregabalin (LYRICA) 50 MG capsule Take 50 mg by mouth 2 (Two) Times a Day.     • Probiotic Product (SOLUBLE FIBER/PROBIOTICS PO) Take 1 tablet by mouth 3 (three) times a day.     • Sofosbuvir 400 MG tablet Take  by mouth.     •  traMADol (ULTRAM) 50 MG tablet Take 50 mg by mouth As Needed.     • aspirin 81 MG tablet Take 81 mg by mouth daily.       Current Facility-Administered Medications   Medication Dose Route Frequency Provider Last Rate Last Dose   • sodium chloride 0.9 % flush 1-10 mL  1-10 mL Intravenous PRN Janna Jama MD           --------------------------------------------------------------------------------  Assessment/Plan      Anesthesia Evaluation     Patient summary reviewed and Nursing notes reviewed   history of anesthetic complications: PONV               Airway   Mallampati: II  TM distance: >3 FB  Neck ROM: full  no difficulty expected  Dental - normal exam     Pulmonary - negative pulmonary ROS    breath sounds clear to auscultation  Cardiovascular - normal exam  Exercise tolerance: good (4-7 METS)    Rhythm: regular  Rate: normal    (+) hypertension, PVD, hyperlipidemia,       Neuro/Psych- neuro exam normal  (+) dizziness/light headedness, numbness, psychiatric history Bipolar,     GI/Hepatic/Renal/Endo    (+)  GERD,      Musculoskeletal (-) normal exam    (+) back pain,   Abdominal  - normal exam   Substance History - negative use     OB/GYN          Other   (+) arthritis   history of cancer               Diagnosis and Plan    Treatment Plan  ASA 3   Patient has had previous injection/procedure with 50-75% improvement.   Procedures: Lumbar Epidural Steroid Injection(LESI), With fluoroscopy,       Anesthetic plan and risks discussed with patient.          Diagnosis     * Lumbar spinal stenosis [M48.061]     * Lumbar disc disease with radiculopathy [M51.16]

## 2018-04-09 NOTE — TELEPHONE ENCOUNTER
----- Message from Mary Kate Armstrong sent at 4/9/2018  1:22 PM EDT -----  Regarding: update   Contact: [phone]  Pt called with an update on the medication Protonix. Pt thinks the meds are working but she is still sick to her stomach

## 2018-04-09 NOTE — TELEPHONE ENCOUNTER
What symptoms are still a problem? Reflux, dysphagia, constipation?? If its reflux or dysphagia I would consider having her add carafate 1 gram QID to see if that helps. Thanks.

## 2018-04-09 NOTE — TELEPHONE ENCOUNTER
"Call to pt  States completed VFSS today and was advised that \"everything fine\".  States has been taking Pantoprazole as instructed with o/v of 3/27.  Not seeing any improvement.  Continues with am nausea.  Asking what else she can do to manage this symptoms.    Question to FERNIE Santos.  "

## 2018-04-09 NOTE — ANESTHESIA PROCEDURE NOTES
PAIN Epidural block    Patient location during procedure: pain clinic  Start Time: 4/9/2018 10:26 AM  Stop Time: 4/9/2018 10:33 AM  Indication:procedure for pain  Performed By  Anesthesiologist: TAMERA GARZA  Preanesthetic Checklist  Completed: patient identified, site marked, surgical consent, pre-op evaluation, timeout performed, IV checked, risks and benefits discussed and monitors and equipment checked  Additional Notes  LSS/LDD  Prep:  Pt Position:prone  Sterile Tech:cap, gloves, mask and sterile barrier  Prep:chlorhexidine gluconate and isopropyl alcohol  Monitoring:blood pressure monitoring, continuous pulse oximetry and EKG  Procedure:  Sedation: yes   Approach:right paramedian  Guidance: fluoroscopy  Location:lumbar  Level:4-5  Needle Type:Tuohy  Needle Gauge:20  Aspiration:negative  Medications:  Depomedrol:80  Preservative Free Saline:3mL  Isovue:2mL    Post Assessment:  Dressing:secured with tape  Pt Tolerance:patient tolerated the procedure well with no apparent complications  Complications:no

## 2018-04-10 RX ORDER — SUCRALFATE 1 G/1
1 TABLET ORAL 4 TIMES DAILY
Qty: 120 TABLET | Refills: 3 | Status: SHIPPED | OUTPATIENT
Start: 2018-04-10 | End: 2018-06-18

## 2018-04-10 NOTE — TELEPHONE ENCOUNTER
Call from pt.  States does continue to experience reflux; dysphagia with sweets/meats/bread; constipation. Goes about every 2-3 days - difficult to pass.    Advise per M Danielle that is reflux or dysphagia still a problem, would consider adding Carafate to see if that helps.  Pt verb understanding.    [facility] pharmacy verified and escribe completed for Carafate 1 gm - 1 tab po four times a day ac meals and hs, @120, R3.    Update to M Danielle re: constipation.

## 2018-04-10 NOTE — TELEPHONE ENCOUNTER
I would have patient take 1-2 stool softeners daily to help with her constipation. If that does not work then I would add miralax daily or every other day to her routine. Have her call in 2 weeks with update. Thanks.

## 2018-04-10 NOTE — TELEPHONE ENCOUNTER
Call to pt.  Advise per M Danielle to take 1-2 stool softeners daily to help with constipation.  If that does not work, then would add miralax daily or every other day to routine.  Call in 2 weeks with update.    Pt verb understanding.  States taking 2 stool softeners daily.  Will add miralax and update with response.

## 2018-04-17 ENCOUNTER — TELEPHONE (OUTPATIENT)
Dept: GASTROENTEROLOGY | Facility: CLINIC | Age: 73
End: 2018-04-17

## 2018-04-17 ENCOUNTER — CLINICAL SUPPORT (OUTPATIENT)
Dept: ORTHOPEDIC SURGERY | Facility: CLINIC | Age: 73
End: 2018-04-17

## 2018-04-17 VITALS — WEIGHT: 134.2 LBS | TEMPERATURE: 98.5 F | HEIGHT: 66 IN | BODY MASS INDEX: 21.57 KG/M2

## 2018-04-17 DIAGNOSIS — M17.12 PRIMARY OSTEOARTHRITIS OF LEFT KNEE: Primary | ICD-10-CM

## 2018-04-17 DIAGNOSIS — K21.9 GASTROESOPHAGEAL REFLUX DISEASE, ESOPHAGITIS PRESENCE NOT SPECIFIED: ICD-10-CM

## 2018-04-17 DIAGNOSIS — K22.70 BARRETT'S ESOPHAGUS WITHOUT DYSPLASIA: ICD-10-CM

## 2018-04-17 DIAGNOSIS — G89.29 CHRONIC PAIN OF LEFT KNEE: ICD-10-CM

## 2018-04-17 DIAGNOSIS — M25.562 CHRONIC PAIN OF LEFT KNEE: ICD-10-CM

## 2018-04-17 DIAGNOSIS — M17.11 PRIMARY LOCALIZED OSTEOARTHROSIS OF THE KNEE, RIGHT: ICD-10-CM

## 2018-04-17 PROCEDURE — 20610 DRAIN/INJ JOINT/BURSA W/O US: CPT | Performed by: ORTHOPAEDIC SURGERY

## 2018-04-17 RX ORDER — LIDOCAINE HYDROCHLORIDE 20 MG/ML
2 INJECTION, SOLUTION EPIDURAL; INFILTRATION; INTRACAUDAL; PERINEURAL
Status: COMPLETED | OUTPATIENT
Start: 2018-04-17 | End: 2018-04-17

## 2018-04-17 RX ORDER — METHYLPREDNISOLONE ACETATE 80 MG/ML
80 INJECTION, SUSPENSION INTRA-ARTICULAR; INTRALESIONAL; INTRAMUSCULAR; SOFT TISSUE
Status: COMPLETED | OUTPATIENT
Start: 2018-04-17 | End: 2018-04-17

## 2018-04-17 RX ADMIN — LIDOCAINE HYDROCHLORIDE 2 ML: 20 INJECTION, SOLUTION EPIDURAL; INFILTRATION; INTRACAUDAL; PERINEURAL at 08:20

## 2018-04-17 RX ADMIN — METHYLPREDNISOLONE ACETATE 80 MG: 80 INJECTION, SUSPENSION INTRA-ARTICULAR; INTRALESIONAL; INTRAMUSCULAR; SOFT TISSUE at 08:20

## 2018-04-17 NOTE — TELEPHONE ENCOUNTER
Nausea could be from the continued constipation. I would have her try Linzess 72 daily or amitza 8 BID along with the other meds. She can take some zofran 4 mg ODT Q8PRN or Phenergan 25 mg Q4PRN. Thanks.

## 2018-04-17 NOTE — PROGRESS NOTES
Davida Anderson  1945    Chief Complaint   Patient presents with   • Left Knee - Follow-up, Pain, Injections       HPI:  The patient presents today for left knee injection.  The visit was scheduled specifically for this purpose.  The patient has essentially end-stage osteoarthritis, but is not quite ready to schedule her left total knee replacement, though she believe she's going to want to do it later this year.  I did advise her that she could not do it within 3 months of today if she wanted to proceed with an injection.  We discussed that I felt that was the safe window such that we would not have an elevated risk of infection posed by the injection.  She understands and agrees.    Past Medical History:   Diagnosis Date   • Acid reflux    • Arthritis    • Gtz esophagus    • Bipolar 2 disorder    • Diverticulosis    • Dizziness    • Frequent UTI    • H/O gastroesophageal reflux (GERD)    • H/O Infiltrating ductal carcinoma of left breast 2002    Stage IIA, Grade 3 ER/TN +, HER2 -, treated with 5 years of tamoxifen, 5 years of Femara, completed in 2012   • High cholesterol    • History of alcoholism     40 YRS AGO   • History of Clostridium difficile colitis 2014   • History of gastric ulcer    • History of Holter monitoring     WEARING CURRENTLY   • Hypertension    • Irregular heartbeat    • Low back pain    • Mass of right breast on mammogram 03/17/2016    10 o'clock position, 4 cm from the nipple,   • Neuropathy    • PONV (postoperative nausea and vomiting)    • Raynaud disease        Past Surgical History:   Procedure Laterality Date   • ANKLE OPEN REDUCTION INTERNAL FIXATION Right 08/14/2015    Dr. Dandre Camacho, [facility]   • BACK SURGERY      LUMBAR   • BREAST RECONSTRUCTION, BREAST TISSUE EXPANDER INSERTION Left 04/11/2002    [place] Contour Profile expander was placed 550 cc size, filled with 200 cc of saline, Dr. Herbert Napoles, [facility]   • COLONOSCOPY N/A 09/16/2014    Dr. Darci Kerns, [facility]; melanie summers,  stool, polyp   • COLONOSCOPY N/A 9/27/2016    NTEH, diverticulosis, tortuous colon, Two 3-5mm polyps in the descending colon and the transverse colon, IH.  PATH: Tubular adenoma   • COLONOSCOPY N/A 12/12/2017    NTEH, tics, torts, IH, TA w/low grade dysplasia   • CYSTOSCOPY N/A 05/07/2010    with TVT takedown, Dr. Simon Wall, [facility]   • ENDOSCOPY N/A 9/27/2016    z line irreg, bilious gastric fluid- fluid aspiration preformed, gastritis.  PATH: Squamous and glandular mucosa with minimal superficial acute inflammation.    • ENDOSCOPY N/A 12/12/2017    Z line irregular, gastritis, duodenitis, chronic inflammation   • EPIDURAL BLOCK     • KNEE SURGERY Left     BROKEN   • MAMMO US BREAST BIOPSY ADDITIONAL W WO DEVICE Left 02/21/2002    2:00 position left breast, Infiltrating Ductal Carcinoma, [facility]   • MANDIBLE FRACTURE SURGERY     • MASTECTOMY Left 04/11/2002    Left Total Mastectomy and Left Axillary [place] Node Biobsy, Dr. Indu Akins, [facility]   • TENSION FREE VAGINAL TAPING WITH MINI ARC SLING N/A 10/26/2009    Dr. Gina Castillo, [facility]   • UPPER GASTROINTESTINAL ENDOSCOPY  09/16/2014    z-line irreg, grade a reflux, gastritis         Current Outpatient Prescriptions:   •  ampicillin (PRINCIPEN) 250 MG capsule, Take 250 mg by mouth 4 (Four) Times a Day., Disp: , Rfl:   •  ARIPiprazole (ABILIFY) 10 MG tablet, Take 10 mg by mouth daily., Disp: , Rfl:   •  aspirin 81 MG tablet, Take 81 mg by mouth daily., Disp: , Rfl:   •  atorvastatin (LIPITOR) 20 MG tablet, TAKE 1 TABLET EVERY DAY, Disp: , Rfl:   •  B Complex Vitamins (VITAMIN B COMPLEX) tablet, Take 1 tablet by mouth daily., Disp: , Rfl:   •  BIOTIN PO, Take 1 tablet by mouth daily., Disp: , Rfl:   •  Calcium Carb-Cholecalciferol (CALCIUM 600 + D) 600-200 MG-UNIT tablet, Take 2 tablets by mouth daily., Disp: , Rfl:   •  celecoxib (CeleBREX) 200 MG capsule, Take 200 mg by mouth daily., Disp: , Rfl:   •  Coenzyme Q10 (CO Q-10) 400 MG capsule, Take 400 mg by mouth daily.,  Disp: , Rfl:   •  fesoterodine fumarate (TOVIAZ ER) 8 MG tablet sustained-release 24 hour tablet, Take  by mouth., Disp: , Rfl:   •  hydrALAZINE (APRESOLINE) 25 MG tablet, Take 25 mg by mouth 4 (Four) Times a Day., Disp: , Rfl:   •  lamoTRIgine (LaMICtal) 100 MG tablet, Take 100 mg by mouth 2 (two) times a day., Disp: , Rfl:   •  lithium carbonate 300 MG capsule, Take 300 mg by mouth daily., Disp: , Rfl:   •  magnesium oxide (MAGOX) 400 (241.3 MG) MG tablet tablet, Take 400 mg by mouth daily., Disp: , Rfl:   •  melatonin 5 MG tablet tablet, Take 5 mg by mouth At Night As Needed., Disp: , Rfl:   •  pantoprazole (PROTONIX) 40 MG EC tablet, Take 1 tablet by mouth 2 (Two) Times a Day., Disp: 30 tablet, Rfl: 0  •  pregabalin (LYRICA) 50 MG capsule, Take 50 mg by mouth 2 (Two) Times a Day., Disp: , Rfl:   •  Sofosbuvir 400 MG tablet, Take  by mouth., Disp: , Rfl:   •  sucralfate (CARAFATE) 1 g tablet, Take 1 tablet by mouth 4 (Four) Times a Day. May crush and mix with 10 ml water, Disp: 120 tablet, Rfl: 3  •  traMADol (ULTRAM) 50 MG tablet, Take 50 mg by mouth As Needed., Disp: , Rfl:   •  acetaminophen (TYLENOL) 500 MG tablet, Take 1,000 mg by mouth every night., Disp: , Rfl:   •  diphenhydrAMINE-acetaminophen (TYLENOL PM EXTRA STRENGTH)  MG tablet per tablet, Take 2 tablets by mouth At Night As Needed for Sleep., Disp: , Rfl:   •  Probiotic Product (SOLUBLE FIBER/PROBIOTICS PO), Take 1 tablet by mouth 3 (three) times a day., Disp: , Rfl:     Allergies   Allergen Reactions   • Morphine Hives, Itching and Rash       Physical exam: The patient is awake, alert, and oriented ×3.  The patient is in no acute distress.  Breathing is regular and unlabored.    The patient's knees are inspected.  There is no sign of infection.  Neurovascular exam is intact and stable distally.    Assessment:     Diagnosis Plan   1. Primary osteoarthritis of left knee  Large Joint Arthrocentesis   2. Chronic pain of left knee         Plan:   Injection was performed as documented below after informed consent was obtained from the patient.    Large Joint Arthrocentesis  Date/Time: 4/17/2018 8:20 AM  Consent given by: patient  Site marked: site marked  Timeout: Immediately prior to procedure a time out was called to verify the correct patient, procedure, equipment, support staff and site/side marked as required   Supporting Documentation  Indications: pain   Procedure Details  Location: knee - L knee  Preparation: Patient was prepped and draped in the usual sterile fashion  Needle size: 22 G  Approach: anterolateral  Medications administered: 2 mL lidocaine PF 2% 2 %; 80 mg methylPREDNISolone acetate 80 MG/ML  Patient tolerance: patient tolerated the procedure well with no immediate complications            Dandre Camacho MD  4/17/2018

## 2018-04-17 NOTE — TELEPHONE ENCOUNTER
----- Message from Asha Kruse sent at 4/17/2018 11:58 AM EDT -----  Regarding: PT CALLED - SULCRAFATE IS NOT WORKING  Contact: [phone]  PT STATED KEVIN STARTED HER ON THIS MED. IT 'S NOT HELPING. WHAT ELSE CAN SHE TRY?

## 2018-04-17 NOTE — TELEPHONE ENCOUNTER
Call to pt.  States is taking Pantoprazole, Sucralfate, stool softener and Miralax as instructed with call of 4/9.  Having BM about every 3 days - somewhat difficult to pass.    Main concern is that continues to experience nausea in am lasting until around lunch time.  Dissipates during afternoon, and then worsens again in the evening.  Denies abd pain or cramping.  Not skipping meals.      Asking what else she may try to manage nausea.    Question to FERNIE Santos.

## 2018-04-18 NOTE — TELEPHONE ENCOUNTER
Call from pt.  Advise per M Ter that nausea could be from the continued constipation.  Would try Linzess daily or Amitiza twice a day along with the other meds.  Can take Zofran or Phenergan as needed.    Pt verb understanding.  States would like to start with Linzess daily, and then if rx needed for nausea - will call back.    Jamaal completed for Linzess 72 mcg 1 tab po daily, #30, R3.

## 2018-04-20 RX ORDER — PANTOPRAZOLE SODIUM 40 MG/1
TABLET, DELAYED RELEASE ORAL
Qty: 180 TABLET | Refills: 0 | Status: SHIPPED | OUTPATIENT
Start: 2018-04-20 | End: 2018-11-27

## 2018-04-30 ENCOUNTER — TRANSCRIBE ORDERS (OUTPATIENT)
Dept: PAIN MEDICINE | Facility: HOSPITAL | Age: 73
End: 2018-04-30

## 2018-04-30 DIAGNOSIS — M48.061 SPINAL STENOSIS OF LUMBAR REGION, UNSPECIFIED WHETHER NEUROGENIC CLAUDICATION PRESENT: Primary | ICD-10-CM

## 2018-05-01 ENCOUNTER — HOSPITAL ENCOUNTER (OUTPATIENT)
Dept: PHYSICAL THERAPY | Facility: HOSPITAL | Age: 73
Setting detail: THERAPIES SERIES
Discharge: HOME | End: 2018-05-01
Attending: ORTHOPAEDIC SURGERY

## 2018-05-01 DIAGNOSIS — G89.29 CHRONIC PAIN OF LEFT KNEE: Primary | ICD-10-CM

## 2018-05-01 DIAGNOSIS — M17.12 OSTEOARTHRITIS OF LEFT KNEE, UNSPECIFIED OSTEOARTHRITIS TYPE: ICD-10-CM

## 2018-05-01 DIAGNOSIS — M25.562 CHRONIC PAIN OF LEFT KNEE: Primary | ICD-10-CM

## 2018-05-01 PROCEDURE — G8979 MOBILITY GOAL STATUS: HCPCS

## 2018-05-01 PROCEDURE — G8978 MOBILITY CURRENT STATUS: HCPCS

## 2018-05-01 PROCEDURE — 97110 THERAPEUTIC EXERCISES: CPT

## 2018-05-01 PROCEDURE — 97161 PT EVAL LOW COMPLEX 20 MIN: CPT

## 2018-05-01 NOTE — THERAPY EVALUATION
Outpatient Physical Therapy Ortho Initial Evaluation   [facility]     Patient Name: Davida Anderson  : 1945  MRN: [id number]  Today's Date: 2018      Visit Date: 2018    Patient Active Problem List   Diagnosis   • BP (high blood pressure)   • History of left breast infiltrating ductal cancer 2cm s/p mastectomy with reconstruction  s/p chemo   • H/O ETOH abuse   • Gtz's esophagus   • Colon polyps   • Bipolar 1 disorder   • Arthritis   • Raynaud's disease   • Neuropathy   • Lumbar spine pain   • Spondylolisthesis at L4-L5 level   • Right lumbar radiculopathy   • Gtz's esophagus without dysplasia   • Hx of adenomatous colonic polyps   • Dysphagia   • Diarrhea   • Primary osteoarthritis of left knee   • Primary localized osteoarthrosis of the knee, right   • Chronic pain of left knee        Past Medical History:   Diagnosis Date   • Acid reflux    • Arthritis    • Gtz esophagus    • Bipolar 2 disorder    • Diverticulosis    • Dizziness    • Frequent UTI    • H/O gastroesophageal reflux (GERD)    • H/O Infiltrating ductal carcinoma of left breast     Stage IIA, Grade 3 ER/UT +, HER2 -, treated with 5 years of tamoxifen, 5 years of Femara, completed in    • High cholesterol    • History of alcoholism     40 YRS AGO   • History of Clostridium difficile colitis    • History of gastric ulcer    • History of Holter monitoring     WEARING CURRENTLY   • Hypertension    • Irregular heartbeat    • Low back pain    • Mass of right breast on mammogram 2016    10 o'clock position, 4 cm from the nipple,   • Neuropathy    • PONV (postoperative nausea and vomiting)    • Raynaud disease         Past Surgical History:   Procedure Laterality Date   • ANKLE OPEN REDUCTION INTERNAL FIXATION Right 2015    Dr. Dandre Camacho, [facility]   • BACK SURGERY      LUMBAR   • BREAST RECONSTRUCTION, BREAST TISSUE EXPANDER INSERTION Left 2002    [place] Contour Profile expander was placed 550  cc size, filled with 200 cc of saline, Dr. Herbert Napoles, [facility]   • COLONOSCOPY N/A 09/16/2014    Dr. Darci Kerns, [facility]; torts, tics, stool, polyp   • COLONOSCOPY N/A 9/27/2016    NTEH, diverticulosis, tortuous colon, Two 3-5mm polyps in the descending colon and the transverse colon, IH.  PATH: Tubular adenoma   • COLONOSCOPY N/A 12/12/2017    NTEH, tics, torts, IH, TA w/low grade dysplasia   • CYSTOSCOPY N/A 05/07/2010    with TVT takedown, Dr. Simon Wall, [facility]   • ENDOSCOPY N/A 9/27/2016    z line irreg, bilious gastric fluid- fluid aspiration preformed, gastritis.  PATH: Squamous and glandular mucosa with minimal superficial acute inflammation.    • ENDOSCOPY N/A 12/12/2017    Z line irregular, gastritis, duodenitis, chronic inflammation   • EPIDURAL BLOCK     • KNEE SURGERY Left     BROKEN   • MAMMO US BREAST BIOPSY ADDITIONAL W WO DEVICE Left 02/21/2002    2:00 position left breast, Infiltrating Ductal Carcinoma, [facility]   • MANDIBLE FRACTURE SURGERY     • MASTECTOMY Left 04/11/2002    Left Total Mastectomy and Left Axillary [place] Node Biobsy, Dr. Indu Akins, [facility]   • TENSION FREE VAGINAL TAPING WITH MINI ARC SLING N/A 10/26/2009    Dr. Gina Castillo, [facility]   • UPPER GASTROINTESTINAL ENDOSCOPY  09/16/2014    z-line irreg, grade a reflux, gastritis       Visit Dx:     ICD-10-CM ICD-9-CM   1. Chronic pain of left knee M25.562 719.46    G89.29 338.29   2. Osteoarthritis of left knee, unspecified osteoarthritis type M17.12 715.96             Patient History     Row Name 05/01/18 1100             History    Chief Complaint Pain;Difficulty Walking;Difficulty with daily activities  -CN      Type of Pain Knee pain  -CN      Date Current Problem(s) Began 05/01/17  -CN      Brief Description of Current Complaint Pt reports L knee pain due to OA which has been worsening over time. Pt reports history of fracture to L knee 50 years ago as well as fall last spring resulting in fractured patella. Pt states that she began PT  several months ago, however was experiencing symptoms such as dizziness, nausea, flushing and sweating with exercise and returned to primary MD for evaluation due to symptoms. Pt has appointment with cardiologist next week to follow up on symptoms. Pt had injection in L knee at last appointment and reports slight improvement in pain in posterior knee, however no significant change overall. Pt planning to have TKA later this year if needed. Would like to prolong as long as possible.  -CN      Previous treatment for THIS PROBLEM Injections  -CN      Patient/Caregiver Goals Relieve pain;Return to prior level of function;Know what to do to help the symptoms  -CN      Occupation/sports/leisure activities Walks 1 mile daily  -CN      Patient seeing anyone else for problem(s)? Yes  -CN      What clinical tests have you had for this problem? X-ray  -CN         Pain     Pain Location Knee  -CN      Pain at Present 3  -CN      Pain at Best 0  -CN      Pain at Worst 8  -CN      Pain Frequency Constant/continuous;Intermittent  -CN      Pain Description Sharp  -CN      What Performance Factors Make the Current Problem(s) WORSE? Descending stairs, gives out, squatting, sit to stand, initiating walking  -CN      What Performance Factors Make the Current Problem(s) BETTER? Ice, tramadol  -CN      Is your sleep disturbed? Yes   occasionally wakes up  -CN      Difficulties at work? Retired  -CN      Difficulties with ADL's? Yes, descending stairs, putting out food for cats, changing litter box, getting in tub, standing on L LE to shave R LE, leaning forward to put on makeup  -CN      Difficulties with recreational activities? Yes, walking every day at least a mile (used to walk 3), gardening (squatting)  -CN         Fall Risk Assessment    Any falls in the past year: Yes  -CN      Number of falls reported in the last 12 months 2  -CN      Factors that contributed to the fall: Tripped   descending stairs  -CN         Daily Activities     Primary Language English  -CN      Are you able to read Yes  -CN      Are you able to write Yes  -CN      How does patient learn best? Listening;Reading  -CN      Teaching needs identified Home Exercise Program;Management of Condition  -CN      Pt Participated in POC and Goals Yes  -CN         Safety    Are you being hurt, hit, or frightened by anyone at home or in your life? No  -CN      Are you being neglected by a caregiver No  -CN        User Key  (r) = Recorded By, (t) = Taken By, (c) = Cosigned By    Initials Name Provider Type    MARYSE Steele, PT Physical Therapist                PT Ortho     Row Name 05/01/18 1100       Posture/Observations    Alignment Options Genu varus  -CN    Genu varus Left:;Mild  -CN       Knee Palpation    Knee Palpation? --   No gross tenderness about the knee  -CN       Right Lower Ext    Rt Knee Extension/Flexion AROM +4/150  -CN    Rt Knee Extension/Flexion PROM +6/157  -CN       Left Lower Ext    Lt Knee Extension/Flexion AROM -5/128  -CN    Lt Knee Extension/Flexion PROM +2/135  -CN       Left Hip (Manual Muscle Testing)    MMT, Left Hip: Manual Muscle Testing (MMT) flexion=4+  -CN       Right Hip (Manual Muscle Testing)    MMT, Right Hip: Manual Muscle Testing (MMT) flexion=4  -CN       Left Knee (Manual Muscle Testing)    Comment, Left Knee: Manual Muscle Testing (MMT) flex=4+, ext=4+  -CN       Right Knee (Manual Muscle Testing)    Comment, Right Knee: Manual Muscle Testing (MMT) flex=5, ext=4+  -CN       Sensation    Additional Comments Slight sensory deficits noted in L LE due to history of LBP per pt  -CN       Lower Extremity Flexibility    Hamstrings Bilateral:;Mildly limited;Moderately limited  -CN       Gait/Stairs Assessment/Training    Comment (Gait/Stairs) Good step length, mild quad whip on L, good hip extension and trunk rotation  -CN      User Key  (r) = Recorded By, (t) = Taken By, (c) = Cosigned By    Initials Name Provider Type    MARYSE Matos  Rita Steele, PT Physical Therapist                      Therapy Education  Education Details: Anatomy, goals of PT, eval findings  Given: HEP, Symptoms/condition management, Pain management, Posture/body mechanics  Program: New  How Provided: Verbal, Demonstration, Written  Provided to: Patient  Level of Understanding: Teach back education performed, Verbalized, Demonstrated           PT OP Goals     Row Name 05/01/18 1600          PT Short Term Goals    STG Date to Achieve 05/22/18  -CN     STG 1 Pt will report subjective pain at 3/10 or less to demonstrate symptom management with ADLs and all household mobility.   -CN     STG 1 Progress New  -CN     STG 2 Pt will be independent and compliant with HEP, symptom management and joint protection in order to minimize stress on affected tissues.    -CN     STG 2 Progress New  -CN        Long Term Goals    LTG Date to Achieve 06/12/18  -CN     LTG 1 Pt will improve gross knee strength to 5/5 on left side in order to improve functional mobility.   -CN     LTG 1 Progress New  -CN     LTG 2 Pt will improve score on Knee Outcome Survey  to 80% for improved functional mobility.   -CN     LTG 2 Progress New  -CN     LTG 3 Pt will report 50% improvement in symptoms with squatting tasks in order to decrease pain with ADLs.   -CN     LTG 3 Progress New  -CN        Time Calculation    PT Goal Re-Cert Due Date 06/01/18  -CN       User Key  (r) = Recorded By, (t) = Taken By, (c) = Cosigned By    Initials Name Provider Type    CN Shawna Steele, STEPHEN Physical Therapist                PT Assessment/Plan     Row Name 05/01/18 1205          PT Assessment    Functional Limitations Limitations in functional capacity and performance;Impaired gait;Performance in leisure activities;Limitation in home management  -CN     Impairments Range of motion;Gait;Muscle strength;Pain  -CN     Assessment Comments Pt is a 72 year old female in stable clinical condition with c/o L knee pain  secondary to OA. Pt went to MD on 4/17 and had injection which decreased posterior knee pain slightly, however most symptoms remain. Pt has history of patellar fracture on L as well as low back pain with radiation into R LE. Pt had xray showing mild varus malalignment as well as tricompartmental OA and discussed possible TKA with MD. Pt would like to prolong TKA as long as possible with injections and exercises as able. Pt demonstrates slight decreased ROM into L extension compared to R and mild strength deficits noted. Pt with mild genu varus and no tenderness about the knee with palpation. Disucssed findings with pt and educated on strengthening and flexibility prior to TKA for optimal outcome. Pt scored 64% on the KOS where 100% is no disability and is currently limited in ability to descend stairs, squat to clean cat litter box and getting in/out of bathtub. Pt would benefit from skilled PT to address the deficits and prepare for possible TKA.   -CN     Please refer to paper survey for additional self-reported information Yes  -CN     Rehab Potential Good  -CN     Patient/caregiver participated in establishment of treatment plan and goals Yes  -CN     Patient would benefit from skilled therapy intervention Yes  -CN        PT Plan    PT Frequency 2x/week  -CN     Predicted Duration of Therapy Intervention (OT Eval) 6 weeks  -CN     Planned CPT's? PT EVAL LOW COMPLEXITY: 32868;PT RE-EVAL: 72092;PT THER PROC EA 15 MIN: 97391;PT THER ACT EA 15 MIN: 02018;PT MANUAL THERAPY EA 15 MIN: 58076;PT NEUROMUSC RE-EDUCATION EA 15 MIN: 11495;PT GAIT TRAINING EA 15 MIN: 99704;PT AQUATIC THERAPY EA 15 MIN: 51706;PT HOT OR COLD PACK TREAT MCARE;PT ELECTRICAL STIM UNATTEND: ;PT ULTRASOUND EA 15 MIN: 43328  -CN     Physical Therapy Interventions (Optional Details) manual therapy techniques;modalities;ROM (Range of Motion);stair training;strengthening;stretching;home exercise program;patient/family education;gait  training;neuromuscular re-education  -CN     PT Plan Comments PT to treat 2x/week for 6 weeks consisting of ROM, strengthening and flexibility exercises. Consider recumbent bike warm up, SAQ, calf stretch and standing HS curls next visit. Light weights until cleared by cardiology and monitor symptoms closely.   -CN       User Key  (r) = Recorded By, (t) = Taken By, (c) = Cosigned By    Initials Name Provider Type    MARYSE Steele PT Physical Therapist                  Exercises     Row Name 05/01/18 1200             Exercise 1    Exercise Name 1 Quad set  -CN      Cueing 1 Verbal;Tactile  -CN      Reps 1 5  -CN      Time 1 5 sec  -CN         Exercise 2    Exercise Name 2 SLR with quad set  -CN      Cueing 2 Verbal  -CN      Reps 2 5  -CN         Exercise 3    Exercise Name 3 SL abduction  -CN      Cueing 3 Verbal  -CN      Reps 3 5  -CN         Exercise 4    Exercise Name 4 Supine HS 90/90 stretch  -CN      Cueing 4 Verbal  -CN      Reps 4 3  -CN      Time 4 20 sec  -CN        User Key  (r) = Recorded By, (t) = Taken By, (c) = Cosigned By    Initials Name Provider Type    MARYSE Steele PT Physical Therapist                        Outcome Measure Options: Knee Outcome Score- ADL  Knee Outcome Score  Knee Outcome Score Comments: 64% where 100% is no disability      Time Calculation:   Start Time: 1130  Stop Time: 1215  Time Calculation (min): 45 min  Total Timed Code Minutes- PT: 15 minute(s)     Therapy Charges for Today     Code Description Service Date Service Provider Modifiers Qty    97748426771 HC PT MOBILITY CURRENT 5/1/2018 Shawna Steele, PT GP, CJ 1    51898932912 HC PT MOBILITY PROJECTED 5/1/2018 Shawna Steele, PT GP, CI 1    46147347361 HC PT THER PROC EA 15 MIN 5/1/2018 Shawna Steele, PT GP 1    33924946493 HC PT EVAL LOW COMPLEXITY 2 5/1/2018 Shawna Steele, PT GP 1          PT G-Codes  PT Professional Judgement Used?: Yes  Outcome Measure  Options: Knee Outcome Score- ADL  Score: 64% where 100% is no disability  Functional Limitation: Mobility: Walking and moving around  Mobility: Walking and Moving Around Current Status (): At least 20 percent but less than 40 percent impaired, limited or restricted  Mobility: Walking and Moving Around Goal Status (): At least 1 percent but less than 20 percent impaired, limited or restricted         Shawna Steele, PT  5/1/2018

## 2018-05-03 ENCOUNTER — APPOINTMENT (OUTPATIENT)
Dept: PAIN MEDICINE | Facility: HOSPITAL | Age: 73
End: 2018-05-03

## 2018-05-09 ENCOUNTER — APPOINTMENT (OUTPATIENT)
Dept: PHYSICAL THERAPY | Facility: HOSPITAL | Age: 73
End: 2018-05-09

## 2018-05-11 ENCOUNTER — HOSPITAL ENCOUNTER (OUTPATIENT)
Dept: PHYSICAL THERAPY | Facility: HOSPITAL | Age: 73
Setting detail: THERAPIES SERIES
Discharge: HOME | End: 2018-05-11

## 2018-05-11 DIAGNOSIS — G89.29 CHRONIC PAIN OF LEFT KNEE: Primary | ICD-10-CM

## 2018-05-11 DIAGNOSIS — M17.12 OSTEOARTHRITIS OF LEFT KNEE, UNSPECIFIED OSTEOARTHRITIS TYPE: ICD-10-CM

## 2018-05-11 DIAGNOSIS — M25.562 CHRONIC PAIN OF LEFT KNEE: Primary | ICD-10-CM

## 2018-05-11 PROCEDURE — 97110 THERAPEUTIC EXERCISES: CPT

## 2018-05-11 NOTE — THERAPY TREATMENT NOTE
Outpatient Physical Therapy Ortho Treatment Note   [facility]     Patient Name: Davida Anderson  : 1945  MRN: [id number]  Today's Date: 2018      Visit Date: 2018    Visit Dx:    ICD-10-CM ICD-9-CM   1. Chronic pain of left knee M25.562 719.46    G89.29 338.29   2. Osteoarthritis of left knee, unspecified osteoarthritis type M17.12 715.96       Patient Active Problem List   Diagnosis   • BP (high blood pressure)   • History of left breast infiltrating ductal cancer 2cm s/p mastectomy with reconstruction  s/p chemo   • H/O ETOH abuse   • Gtz's esophagus   • Colon polyps   • Bipolar 1 disorder   • Arthritis   • Raynaud's disease   • Neuropathy   • Lumbar spine pain   • Spondylolisthesis at L4-L5 level   • Right lumbar radiculopathy   • Gtz's esophagus without dysplasia   • Hx of adenomatous colonic polyps   • Dysphagia   • Diarrhea   • Primary osteoarthritis of left knee   • Primary localized osteoarthrosis of the knee, right   • Chronic pain of left knee        Past Medical History:   Diagnosis Date   • Acid reflux    • Arthritis    • Gtz esophagus    • Bipolar 2 disorder    • Diverticulosis    • Dizziness    • Frequent UTI    • H/O gastroesophageal reflux (GERD)    • H/O Infiltrating ductal carcinoma of left breast     Stage IIA, Grade 3 ER/HI +, HER2 -, treated with 5 years of tamoxifen, 5 years of Femara, completed in    • High cholesterol    • History of alcoholism     40 YRS AGO   • History of Clostridium difficile colitis    • History of gastric ulcer    • History of Holter monitoring     WEARING CURRENTLY   • Hypertension    • Irregular heartbeat    • Low back pain    • Mass of right breast on mammogram 2016    10 o'clock position, 4 cm from the nipple,   • Neuropathy    • PONV (postoperative nausea and vomiting)    • Raynaud disease         Past Surgical History:   Procedure Laterality Date   • ANKLE OPEN REDUCTION INTERNAL FIXATION Right 2015     Dr. Dandre Camacho, [facility]   • BACK SURGERY      LUMBAR   • BREAST RECONSTRUCTION, BREAST TISSUE EXPANDER INSERTION Left 04/11/2002    [place] Contour Profile expander was placed 550 cc size, filled with 200 cc of saline, Dr. Herbert Napoles, [facility]   • COLONOSCOPY N/A 09/16/2014    Dr. Darci Kerns, [facility]; torts, tics, stool, polyp   • COLONOSCOPY N/A 9/27/2016    NTEH, diverticulosis, tortuous colon, Two 3-5mm polyps in the descending colon and the transverse colon, IH.  PATH: Tubular adenoma   • COLONOSCOPY N/A 12/12/2017    NTEH, tics, torts, IH, TA w/low grade dysplasia   • CYSTOSCOPY N/A 05/07/2010    with TVT takedown, Dr. Simon Wall, [facility]   • ENDOSCOPY N/A 9/27/2016    z line irreg, bilious gastric fluid- fluid aspiration preformed, gastritis.  PATH: Squamous and glandular mucosa with minimal superficial acute inflammation.    • ENDOSCOPY N/A 12/12/2017    Z line irregular, gastritis, duodenitis, chronic inflammation   • EPIDURAL BLOCK     • KNEE SURGERY Left     BROKEN   • MAMMO US BREAST BIOPSY ADDITIONAL W WO DEVICE Left 02/21/2002    2:00 position left breast, Infiltrating Ductal Carcinoma, [facility]   • MANDIBLE FRACTURE SURGERY     • MASTECTOMY Left 04/11/2002    Left Total Mastectomy and Left Axillary [place] Node Biobsy, Dr. Indu Akins, [facility]   • TENSION FREE VAGINAL TAPING WITH MINI ARC SLING N/A 10/26/2009    Dr. Gina Castillo, [facility]   • UPPER GASTROINTESTINAL ENDOSCOPY  09/16/2014    z-line irreg, grade a reflux, gastritis                             PT Assessment/Plan     Row Name 05/11/18 6200          PT Assessment    Assessment Comments Verbal and tactie cues needed for proper form with S/L hip abd. D/C'd SLR as patient has back issues. Consider adding weight to several exercises next visit  -       User Key  (r) = Recorded By, (t) = Taken By, (c) = Cosigned By    Initials Name Provider Type    WS Panda Peterson PTA Physical Therapy Assistant                    Exercises     Row Name 05/11/18 4448              Subjective Comments    Subjective Comments @ of the exercises hurt my back  -WS         Subjective Pain    Able to rate subjective pain? yes  -WS      Pre-Treatment Pain Level 4  -WS      Subjective Pain Comment Use ice at home for knee pain  -WS         Exercise 1    Exercise Name 1 Quad set  -WS      Cueing 1 Verbal;Tactile  -WS      Reps 1 15  -WS      Time 1 5 sec  -WS         Exercise 2    Exercise Name 2 calf raise  -WS      Reps 2 15  -WS         Exercise 3    Exercise Name 3 SL abduction  -WS      Reps 3 15  -WS         Exercise 4    Exercise Name 4 Supine HS 90/90 stretch  -WS      Reps 4 3  -WS      Time 4 20 sec  -WS         Exercise 5    Exercise Name 5 Bike seat 3  -WS      Time 5 5 min  -WS         Exercise 6    Exercise Name 6 HS curl L  -WS      Reps 6 15  -WS         Exercise 7    Exercise Name 7 SAQ L  -WS      Reps 7 15  -WS         Exercise 8    Exercise Name 8 PPT  -WS      Reps 8 15  -WS        User Key  (r) = Recorded By, (t) = Taken By, (c) = Cosigned By    Initials Name Provider Type    MELE Peterson PTA Physical Therapy Assistant                               PT OP Goals     Row Name 05/11/18 1500          PT Short Term Goals    STG Date to Achieve 05/22/18  -     STG 1 Pt will report subjective pain at 3/10 or less to demonstrate symptom management with ADLs and all household mobility.   -     STG 1 Progress Ongoing  -     STG 2 Pt will be independent and compliant with HEP, symptom management and joint protection in order to minimize stress on affected tissues.    -     STG 2 Progress Ongoing  -        Long Term Goals    LTG Date to Achieve 06/12/18  -     LTG 1 Pt will improve gross knee strength to 5/5 on left side in order to improve functional mobility.   -     LTG 1 Progress Ongoing  -     LTG 2 Pt will improve score on Knee Outcome Survey  to 80% for improved functional mobility.   -     LTG 2 Progress New  -     LTG 3 Pt will report 50%  improvement in symptoms with squatting tasks in order to decrease pain with ADLs.   -WS     LTG 3 Progress New  -WS       User Key  (r) = Recorded By, (t) = Taken By, (c) = Cosigned By    Initials Name Provider Type    MELE Peterson PTA Physical Therapy Assistant          Therapy Education  Given: HEP, Symptoms/condition management, Pain management, Posture/body mechanics  Program: Reinforced  How Provided: Verbal  Provided to: Patient              Time Calculation:   Start Time: 1510  Stop Time: 1540  Time Calculation (min): 30 min    Therapy Charges for Today     Code Description Service Date Service Provider Modifiers Qty    17963873998 HC PT THER PROC EA 15 MIN 5/11/2018 Panda Peterson PTA GP 2                    Panda Peterson PTA  5/11/2018

## 2018-05-14 ENCOUNTER — HOSPITAL ENCOUNTER (OUTPATIENT)
Dept: PHYSICAL THERAPY | Facility: HOSPITAL | Age: 73
Setting detail: THERAPIES SERIES
Discharge: HOME | End: 2018-05-14

## 2018-05-14 DIAGNOSIS — G89.29 CHRONIC PAIN OF LEFT KNEE: Primary | ICD-10-CM

## 2018-05-14 DIAGNOSIS — M25.562 CHRONIC PAIN OF LEFT KNEE: Primary | ICD-10-CM

## 2018-05-14 DIAGNOSIS — M17.12 OSTEOARTHRITIS OF LEFT KNEE, UNSPECIFIED OSTEOARTHRITIS TYPE: ICD-10-CM

## 2018-05-14 PROCEDURE — 97110 THERAPEUTIC EXERCISES: CPT

## 2018-05-14 NOTE — THERAPY TREATMENT NOTE
Outpatient Physical Therapy Ortho Treatment Note   [facility]     Patient Name: Davida Anderson  : 1945  MRN: [id number]  Today's Date: 2018      Visit Date: 2018    Visit Dx:    ICD-10-CM ICD-9-CM   1. Chronic pain of left knee M25.562 719.46    G89.29 338.29   2. Osteoarthritis of left knee, unspecified osteoarthritis type M17.12 715.96       Patient Active Problem List   Diagnosis   • BP (high blood pressure)   • History of left breast infiltrating ductal cancer 2cm s/p mastectomy with reconstruction  s/p chemo   • H/O ETOH abuse   • Gtz's esophagus   • Colon polyps   • Bipolar 1 disorder   • Arthritis   • Raynaud's disease   • Neuropathy   • Lumbar spine pain   • Spondylolisthesis at L4-L5 level   • Right lumbar radiculopathy   • Gtz's esophagus without dysplasia   • Hx of adenomatous colonic polyps   • Dysphagia   • Diarrhea   • Primary osteoarthritis of left knee   • Primary localized osteoarthrosis of the knee, right   • Chronic pain of left knee        Past Medical History:   Diagnosis Date   • Acid reflux    • Arthritis    • Gtz esophagus    • Bipolar 2 disorder    • Diverticulosis    • Dizziness    • Frequent UTI    • H/O gastroesophageal reflux (GERD)    • H/O Infiltrating ductal carcinoma of left breast     Stage IIA, Grade 3 ER/PA +, HER2 -, treated with 5 years of tamoxifen, 5 years of Femara, completed in    • High cholesterol    • History of alcoholism     40 YRS AGO   • History of Clostridium difficile colitis    • History of gastric ulcer    • History of Holter monitoring     WEARING CURRENTLY   • Hypertension    • Irregular heartbeat    • Low back pain    • Mass of right breast on mammogram 2016    10 o'clock position, 4 cm from the nipple,   • Neuropathy    • PONV (postoperative nausea and vomiting)    • Raynaud disease         Past Surgical History:   Procedure Laterality Date   • ANKLE OPEN REDUCTION INTERNAL FIXATION Right 2015     Dr. Dandre Camacho, [facility]   • BACK SURGERY      LUMBAR   • BREAST RECONSTRUCTION, BREAST TISSUE EXPANDER INSERTION Left 04/11/2002    [place] Contour Profile expander was placed 550 cc size, filled with 200 cc of saline, Dr. Herbert Napoles, [facility]   • COLONOSCOPY N/A 09/16/2014    Dr. Darci Kerns, [facility]; torts, tics, stool, polyp   • COLONOSCOPY N/A 9/27/2016    NTEH, diverticulosis, tortuous colon, Two 3-5mm polyps in the descending colon and the transverse colon, IH.  PATH: Tubular adenoma   • COLONOSCOPY N/A 12/12/2017    NTEH, tics, torts, IH, TA w/low grade dysplasia   • CYSTOSCOPY N/A 05/07/2010    with TVT takedown, Dr. Simon Wall, [facility]   • ENDOSCOPY N/A 9/27/2016    z line irreg, bilious gastric fluid- fluid aspiration preformed, gastritis.  PATH: Squamous and glandular mucosa with minimal superficial acute inflammation.    • ENDOSCOPY N/A 12/12/2017    Z line irregular, gastritis, duodenitis, chronic inflammation   • EPIDURAL BLOCK     • KNEE SURGERY Left     BROKEN   • MAMMO US BREAST BIOPSY ADDITIONAL W WO DEVICE Left 02/21/2002    2:00 position left breast, Infiltrating Ductal Carcinoma, [facility]   • MANDIBLE FRACTURE SURGERY     • MASTECTOMY Left 04/11/2002    Left Total Mastectomy and Left Axillary [place] Node Biobsy, Dr. Indu Akins, [facility]   • TENSION FREE VAGINAL TAPING WITH MINI ARC SLING N/A 10/26/2009    Dr. Gina Castillo, [facility]   • UPPER GASTROINTESTINAL ENDOSCOPY  09/16/2014    z-line irreg, grade a reflux, gastritis                             PT Assessment/Plan     Row Name 05/14/18 8316          PT Assessment    Assessment Comments Pt reports improved low back pain with exercises today and added weights to several exercises. Pt able to tolerate exercises well, however pain with standing abduction and sidestepping.   -CN        PT Plan    PT Plan Comments Assess response to added weights and progress as tolerated.   -CN       User Key  (r) = Recorded By, (t) = Taken By, (c) = Cosigned By    Initials  Name Provider Type    CN Shawna Steele, PT Physical Therapist                    Exercises     Row Name 05/14/18 1400             Subjective Comments    Subjective Comments It had been feeling pretty good, but then I overdid it yesterday walking.   -CN         Subjective Pain    Able to rate subjective pain? yes  -CN      Pre-Treatment Pain Level 5  -CN      Subjective Pain Comment Was a 7/10 this morning  -CN         Exercise 1    Exercise Name 1 Quad set  -CN      Cueing 1 Verbal;Tactile  -CN      Reps 1 15  -CN      Time 1 5 sec  -CN         Exercise 2    Exercise Name 2 calf raise  -CN      Reps 2 15  -CN         Exercise 3    Exercise Name 3 SL abduction  -CN      Sets 3 2  -CN      Reps 3 10  -CN      Additional Comments 2#  -CN         Exercise 4    Exercise Name 4 Supine HS 90/90 stretch  -CN      Reps 4 3  -CN      Time 4 20 sec  -CN         Exercise 5    Exercise Name 5 Bike seat 3  -CN      Time 5 5 min  -CN         Exercise 6    Exercise Name 6 HS curl B  -CN      Sets 6 2  -CN      Reps 6 10  -CN      Additional Comments 2#  -CN         Exercise 7    Exercise Name 7 SAQ L  -CN      Sets 7 2  -CN      Reps 7 10  -CN      Additional Comments 2#  -CN         Exercise 8    Exercise Name 8 PPT  -CN      Reps 8 15  -CN      Time 8 5 sec  -CN         Exercise 9    Exercise Name 9 Stair calf stretch  -CN      Cueing 9 Demo  -CN      Reps 9 3  -CN      Time 9 20 sec  -CN         Exercise 10    Exercise Name 10 Bridges with PPT  -CN      Cueing 10 Verbal  -CN      Sets 10 2  -CN      Reps 10 10  -CN         Exercise 11    Exercise Name 11 LAQ  -CN      Cueing 11 Demo  -CN      Sets 11 2  -CN      Reps 11 10  -CN      Additional Comments 2#  -CN         Exercise 12    Exercise Name 12 Standing hip abduction  -CN      Cueing 12 Demo  -CN      Additional Comments Stopped due to low back pain  -CN         Exercise 13    Exercise Name 13 Sidestepping   -CN      Additional Comments stopped due to low back pain   -CN         Exercise 14    Exercise Name 14 MS  -CN      Cueing 14 Demo  -CN      Sets 14 2  -CN      Reps 14 10  -CN        User Key  (r) = Recorded By, (t) = Taken By, (c) = Cosigned By    Initials Name Provider Type    MARYSE Steele, PT Physical Therapist                               PT OP Goals     Row Name 05/14/18 1400          PT Short Term Goals    STG Date to Achieve 05/22/18  -CN     STG 1 Pt will report subjective pain at 3/10 or less to demonstrate symptom management with ADLs and all household mobility.   -CN     STG 1 Progress Ongoing  -CN     STG 1 Progress Comments Pt reports increased pain today due to much ambulation yesterday.   -CN     STG 2 Pt will be independent and compliant with HEP, symptom management and joint protection in order to minimize stress on affected tissues.    -CN     STG 2 Progress Progressing  -CN     STG 2 Progress Comments Pt progressing well with exercises and incresed weights today.   -CN        Long Term Goals    LTG Date to Achieve 06/12/18  -CN     LTG 1 Pt will improve gross knee strength to 5/5 on left side in order to improve functional mobility.   -CN     LTG 1 Progress Ongoing  -CN     LTG 2 Pt will improve score on Knee Outcome Survey  to 80% for improved functional mobility.   -CN     LTG 2 Progress Ongoing  -CN     LTG 3 Pt will report 50% improvement in symptoms with squatting tasks in order to decrease pain with ADLs.   -CN     LTG 3 Progress Ongoing  -CN       User Key  (r) = Recorded By, (t) = Taken By, (c) = Cosigned By    Initials Name Provider Type    MARYSE Steele, STEPHEN Physical Therapist          Therapy Education  Given: HEP, Symptoms/condition management, Pain management, Posture/body mechanics  Program: Reinforced  How Provided: Verbal  Provided to: Patient  Level of Understanding: Teach back education performed, Verbalized, Demonstrated              Time Calculation:   Start Time: 1415  Stop Time: 1449  Time Calculation  (min): 34 min  Total Timed Code Minutes- PT: 34 minute(s)    Therapy Charges for Today     Code Description Service Date Service Provider Modifiers Qty    34145848402 HC PT THER PROC EA 15 MIN 5/14/2018 Shawna Steele, PT GP 2                    Shawna Steele, PT  5/14/2018

## 2018-05-16 ENCOUNTER — HOSPITAL ENCOUNTER (OUTPATIENT)
Dept: PHYSICAL THERAPY | Facility: HOSPITAL | Age: 73
Setting detail: THERAPIES SERIES
Discharge: HOME | End: 2018-05-16

## 2018-05-16 DIAGNOSIS — M17.12 OSTEOARTHRITIS OF LEFT KNEE, UNSPECIFIED OSTEOARTHRITIS TYPE: ICD-10-CM

## 2018-05-16 DIAGNOSIS — M25.562 CHRONIC PAIN OF LEFT KNEE: Primary | ICD-10-CM

## 2018-05-16 DIAGNOSIS — G89.29 CHRONIC PAIN OF LEFT KNEE: Primary | ICD-10-CM

## 2018-05-16 PROCEDURE — 97110 THERAPEUTIC EXERCISES: CPT

## 2018-05-16 NOTE — THERAPY TREATMENT NOTE
Outpatient Physical Therapy Ortho Treatment Note   [facility]     Patient Name: Davida Anderson  : 1945  MRN: [id number]  Today's Date: 2018      Visit Date: 2018    Visit Dx:    ICD-10-CM ICD-9-CM   1. Chronic pain of left knee M25.562 719.46    G89.29 338.29   2. Osteoarthritis of left knee, unspecified osteoarthritis type M17.12 715.96       Patient Active Problem List   Diagnosis   • BP (high blood pressure)   • History of left breast infiltrating ductal cancer 2cm s/p mastectomy with reconstruction  s/p chemo   • H/O ETOH abuse   • Gtz's esophagus   • Colon polyps   • Bipolar 1 disorder   • Arthritis   • Raynaud's disease   • Neuropathy   • Lumbar spine pain   • Spondylolisthesis at L4-L5 level   • Right lumbar radiculopathy   • Gtz's esophagus without dysplasia   • Hx of adenomatous colonic polyps   • Dysphagia   • Diarrhea   • Primary osteoarthritis of left knee   • Primary localized osteoarthrosis of the knee, right   • Chronic pain of left knee        Past Medical History:   Diagnosis Date   • Acid reflux    • Arthritis    • Gtz esophagus    • Bipolar 2 disorder    • Diverticulosis    • Dizziness    • Frequent UTI    • H/O gastroesophageal reflux (GERD)    • H/O Infiltrating ductal carcinoma of left breast     Stage IIA, Grade 3 ER/AR +, HER2 -, treated with 5 years of tamoxifen, 5 years of Femara, completed in    • High cholesterol    • History of alcoholism     40 YRS AGO   • History of Clostridium difficile colitis    • History of gastric ulcer    • History of Holter monitoring     WEARING CURRENTLY   • Hypertension    • Irregular heartbeat    • Low back pain    • Mass of right breast on mammogram 2016    10 o'clock position, 4 cm from the nipple,   • Neuropathy    • PONV (postoperative nausea and vomiting)    • Raynaud disease         Past Surgical History:   Procedure Laterality Date   • ANKLE OPEN REDUCTION INTERNAL FIXATION Right 2015     Dr. Dandre Camacho, [facility]   • BACK SURGERY      LUMBAR   • BREAST RECONSTRUCTION, BREAST TISSUE EXPANDER INSERTION Left 04/11/2002    [place] Contour Profile expander was placed 550 cc size, filled with 200 cc of saline, Dr. Herbert Napoles, [facility]   • COLONOSCOPY N/A 09/16/2014    Dr. Darci Kerns, [facility]; torts, tics, stool, polyp   • COLONOSCOPY N/A 9/27/2016    NTEH, diverticulosis, tortuous colon, Two 3-5mm polyps in the descending colon and the transverse colon, IH.  PATH: Tubular adenoma   • COLONOSCOPY N/A 12/12/2017    NTEH, tics, torts, IH, TA w/low grade dysplasia   • CYSTOSCOPY N/A 05/07/2010    with TVT takedown, Dr. Simon Wall, [facility]   • ENDOSCOPY N/A 9/27/2016    z line irreg, bilious gastric fluid- fluid aspiration preformed, gastritis.  PATH: Squamous and glandular mucosa with minimal superficial acute inflammation.    • ENDOSCOPY N/A 12/12/2017    Z line irregular, gastritis, duodenitis, chronic inflammation   • EPIDURAL BLOCK     • KNEE SURGERY Left     BROKEN   • MAMMO US BREAST BIOPSY ADDITIONAL W WO DEVICE Left 02/21/2002    2:00 position left breast, Infiltrating Ductal Carcinoma, [facility]   • MANDIBLE FRACTURE SURGERY     • MASTECTOMY Left 04/11/2002    Left Total Mastectomy and Left Axillary [place] Node Biobsy, Dr. Indu Akins, [facility]   • TENSION FREE VAGINAL TAPING WITH MINI ARC SLING N/A 10/26/2009    Dr. Gina Castillo, [facility]   • UPPER GASTROINTESTINAL ENDOSCOPY  09/16/2014    z-line irreg, grade a reflux, gastritis                             PT Assessment/Plan     Row Name 05/16/18 7443          PT Assessment    Assessment Comments Pt reports no increased pain after last visit and continued with hip and knee strengthening exercises. Added HL hip abduction and adduction to HEP and will likley progress weights next visit.   -CN        PT Plan    PT Plan Comments Assess response to added exercises and progress weights as tolerated. Consider supine TA with marches and swiss ball DKTC next visit.  -CN        User Key  (r) = Recorded By, (t) = Taken By, (c) = Cosigned By    Initials Name Provider Type    CN Shawna Steele PT Physical Therapist                    Exercises     Row Name 05/16/18 1300             Subjective Comments    Subjective Comments It is good today. It felt pretty good yesterday, too.   -CN         Subjective Pain    Able to rate subjective pain? yes  -CN      Pre-Treatment Pain Level 4  -CN         Exercise 1    Exercise Name 1 Quad set  -CN      Cueing 1 Verbal;Tactile  -CN      Reps 1 15  -CN      Time 1 5 sec  -CN         Exercise 2    Exercise Name 2 calf raise  -CN      Reps 2 15  -CN         Exercise 3    Exercise Name 3 SL abduction  -CN      Sets 3 2  -CN      Reps 3 10  -CN      Additional Comments 2#  -CN         Exercise 4    Exercise Name 4 Supine HS 90/90 stretch  -CN      Reps 4 3  -CN      Time 4 20 sec  -CN         Exercise 5    Exercise Name 5 Nustep, L5  -CN      Time 5 5 min  -CN         Exercise 6    Exercise Name 6 HS curl B  -CN      Sets 6 2  -CN      Reps 6 10  -CN      Additional Comments 2#, slight pain standing on L  -CN         Exercise 7    Exercise Name 7 SAQ L  -CN      Sets 7 2  -CN      Reps 7 10  -CN      Additional Comments 2#  -CN         Exercise 8    Exercise Name 8 PPT  -CN      Reps 8 15  -CN      Time 8 5 sec  -CN         Exercise 9    Exercise Name 9 Stair calf stretch  -CN      Cueing 9 Demo  -CN      Reps 9 3  -CN      Time 9 20 sec  -CN         Exercise 10    Exercise Name 10 Bridges with PPT  -CN      Cueing 10 Verbal  -CN      Sets 10 2  -CN      Reps 10 10  -CN         Exercise 11    Exercise Name 11 LAQ  -CN      Cueing 11 Demo  -CN      Sets 11 2  -CN      Reps 11 10  -CN      Additional Comments 2#  -CN         Exercise 14    Exercise Name 14 MS  -CN      Cueing 14 Demo  -CN      Sets 14 2  -CN      Reps 14 10  -CN         Exercise 15    Exercise Name 15 Supine adduction with TA  -CN      Cueing 15 Verbal  -CN      Reps 15 10  -CN       Time 15 5  -CN         Exercise 16    Exercise Name 16 HL hip abduction  -CN      Cueing 16 Verbal  -CN      Sets 16 2  -CN      Reps 16 10  -CN      Additional Comments RTB, B and alt  -CN        User Key  (r) = Recorded By, (t) = Taken By, (c) = Cosigned By    Initials Name Provider Type    CN Shawna Steele, PT Physical Therapist                             Therapy Education  Given: HEP, Symptoms/condition management, Pain management, Posture/body mechanics  Program: Reinforced  How Provided: Verbal  Provided to: Patient  Level of Understanding: Teach back education performed, Verbalized, Demonstrated              Time Calculation:   Start Time: 1330  Stop Time: 1415  Time Calculation (min): 45 min  Total Timed Code Minutes- PT: 45 minute(s)    Therapy Charges for Today     Code Description Service Date Service Provider Modifiers Qty    07214014454 HC PT THER PROC EA 15 MIN 5/16/2018 Shawna Steele, PT GP 3                    Shawna Steele, PT  5/16/2018

## 2018-05-21 ENCOUNTER — HOSPITAL ENCOUNTER (OUTPATIENT)
Dept: PHYSICAL THERAPY | Facility: HOSPITAL | Age: 73
Setting detail: THERAPIES SERIES
Discharge: HOME | End: 2018-05-21

## 2018-05-21 ENCOUNTER — TELEPHONE (OUTPATIENT)
Dept: GASTROENTEROLOGY | Facility: CLINIC | Age: 73
End: 2018-05-21

## 2018-05-21 DIAGNOSIS — M25.562 CHRONIC PAIN OF LEFT KNEE: Primary | ICD-10-CM

## 2018-05-21 DIAGNOSIS — M17.12 OSTEOARTHRITIS OF LEFT KNEE, UNSPECIFIED OSTEOARTHRITIS TYPE: ICD-10-CM

## 2018-05-21 DIAGNOSIS — G89.29 CHRONIC PAIN OF LEFT KNEE: Primary | ICD-10-CM

## 2018-05-21 PROCEDURE — 97110 THERAPEUTIC EXERCISES: CPT

## 2018-05-21 NOTE — TELEPHONE ENCOUNTER
----- Message from Asha Kruse sent at 5/21/2018 11:32 AM EDT -----  Regarding: PT CALLED - REFILL ON [address]  Contact: [phone]  PT ASK IF THIS COULD BE SENT TO [facility]

## 2018-05-21 NOTE — THERAPY TREATMENT NOTE
Outpatient Physical Therapy Ortho Treatment Note   [facility]     Patient Name: Davida Anderson  : 1945  MRN: [id number]  Today's Date: 2018      Visit Date: 2018    Visit Dx:    ICD-10-CM ICD-9-CM   1. Chronic pain of left knee M25.562 719.46    G89.29 338.29   2. Osteoarthritis of left knee, unspecified osteoarthritis type M17.12 715.96       Patient Active Problem List   Diagnosis   • BP (high blood pressure)   • History of left breast infiltrating ductal cancer 2cm s/p mastectomy with reconstruction  s/p chemo   • H/O ETOH abuse   • Gtz's esophagus   • Colon polyps   • Bipolar 1 disorder   • Arthritis   • Raynaud's disease   • Neuropathy   • Lumbar spine pain   • Spondylolisthesis at L4-L5 level   • Right lumbar radiculopathy   • Gtz's esophagus without dysplasia   • Hx of adenomatous colonic polyps   • Dysphagia   • Diarrhea   • Primary osteoarthritis of left knee   • Primary localized osteoarthrosis of the knee, right   • Chronic pain of left knee        Past Medical History:   Diagnosis Date   • Acid reflux    • Arthritis    • Gtz esophagus    • Bipolar 2 disorder    • Diverticulosis    • Dizziness    • Frequent UTI    • H/O gastroesophageal reflux (GERD)    • H/O Infiltrating ductal carcinoma of left breast     Stage IIA, Grade 3 ER/PA +, HER2 -, treated with 5 years of tamoxifen, 5 years of Femara, completed in    • High cholesterol    • History of alcoholism     40 YRS AGO   • History of Clostridium difficile colitis    • History of gastric ulcer    • History of Holter monitoring     WEARING CURRENTLY   • Hypertension    • Irregular heartbeat    • Low back pain    • Mass of right breast on mammogram 2016    10 o'clock position, 4 cm from the nipple,   • Neuropathy    • PONV (postoperative nausea and vomiting)    • Raynaud disease         Past Surgical History:   Procedure Laterality Date   • ANKLE OPEN REDUCTION INTERNAL FIXATION Right 2015     Dr. Dandre Camacho, [facility]   • BACK SURGERY      LUMBAR   • BREAST RECONSTRUCTION, BREAST TISSUE EXPANDER INSERTION Left 04/11/2002    [place] Contour Profile expander was placed 550 cc size, filled with 200 cc of saline, Dr. Herbert Napoles, [facility]   • COLONOSCOPY N/A 09/16/2014    Dr. Darci Kerns, [facility]; torts, tics, stool, polyp   • COLONOSCOPY N/A 9/27/2016    NTEH, diverticulosis, tortuous colon, Two 3-5mm polyps in the descending colon and the transverse colon, IH.  PATH: Tubular adenoma   • COLONOSCOPY N/A 12/12/2017    NTEH, tics, torts, IH, TA w/low grade dysplasia   • CYSTOSCOPY N/A 05/07/2010    with TVT takedown, Dr. Simon Wall, [facility]   • ENDOSCOPY N/A 9/27/2016    z line irreg, bilious gastric fluid- fluid aspiration preformed, gastritis.  PATH: Squamous and glandular mucosa with minimal superficial acute inflammation.    • ENDOSCOPY N/A 12/12/2017    Z line irregular, gastritis, duodenitis, chronic inflammation   • EPIDURAL BLOCK     • KNEE SURGERY Left     BROKEN   • MAMMO US BREAST BIOPSY ADDITIONAL W WO DEVICE Left 02/21/2002    2:00 position left breast, Infiltrating Ductal Carcinoma, [facility]   • MANDIBLE FRACTURE SURGERY     • MASTECTOMY Left 04/11/2002    Left Total Mastectomy and Left Axillary [place] Node Biobsy, Dr. Indu Akins, [facility]   • TENSION FREE VAGINAL TAPING WITH MINI ARC SLING N/A 10/26/2009    Dr. Gina Castillo, [facility]   • UPPER GASTROINTESTINAL ENDOSCOPY  09/16/2014    z-line irreg, grade a reflux, gastritis                             PT Assessment/Plan     Row Name 05/21/18 1522          PT Assessment    Assessment Comments Pt reports slight increase in pain last night due to yard work and exercises, however low pain levels today. Progressed weights with exercises and added TKE and leg press with only slight report of pain with first two reps on leg press.   -CN        PT Plan    PT Plan Comments Assess response to added exercises and continue to progress as tolerated.   -CN       User Key   (r) = Recorded By, (t) = Taken By, (c) = Cosigned By    Initials Name Provider Type    CN Shawna Steele, STEPHEN Physical Therapist                    Exercises     Row Name 05/21/18 1400             Subjective Comments    Subjective Comments It is a little sore today. I did exercises yesterday and worked in the yard. It was hurting a little bit after last time, but it didn't last very long.   -CN         Subjective Pain    Able to rate subjective pain? yes  -CN      Pre-Treatment Pain Level 3  -CN         Exercise 1    Exercise Name 1 Quad set  -CN      Cueing 1 Verbal;Tactile  -CN      Reps 1 15  -CN      Time 1 5 sec  -CN         Exercise 2    Exercise Name 2 calf raise  -CN      Reps 2 15  -CN         Exercise 3    Exercise Name 3 SL abduction  -CN      Sets 3 2  -CN      Reps 3 10  -CN      Additional Comments 3#  -CN         Exercise 4    Exercise Name 4 Supine HS 90/90 stretch  -CN      Reps 4 3  -CN      Time 4 20 sec  -CN         Exercise 5    Exercise Name 5 Recumbent bike  -CN      Time 5 5 min  -CN         Exercise 6    Exercise Name 6 HS curl B  -CN      Sets 6 2  -CN      Reps 6 10  -CN      Additional Comments 3#  -CN         Exercise 7    Exercise Name 7 SAQ B  -CN      Sets 7 2  -CN      Reps 7 10  -CN      Additional Comments 3#  -CN         Exercise 8    Exercise Name 8 PPT  -CN      Reps 8 15  -CN      Time 8 5 sec  -CN         Exercise 9    Exercise Name 9 Stair calf stretch  -CN      Cueing 9 Demo  -CN      Reps 9 3  -CN      Time 9 20 sec  -CN         Exercise 10    Exercise Name 10 Bridges with PPT  -CN      Cueing 10 Verbal  -CN      Sets 10 2  -CN      Reps 10 10  -CN         Exercise 11    Exercise Name 11 LAQ  -CN      Cueing 11 Demo  -CN      Sets 11 2  -CN      Reps 11 10  -CN      Additional Comments 3#  -CN         Exercise 14    Exercise Name 14 MS  -CN      Cueing 14 Demo  -CN      Sets 14 2  -CN      Reps 14 10  -CN         Exercise 15    Exercise Name 15 Supine adduction with  TA  -CN      Cueing 15 Verbal  -CN      Reps 15 15  -CN      Time 15 5  -CN         Exercise 16    Exercise Name 16 HL hip abduction  -CN      Cueing 16 Verbal  -CN      Sets 16 2  -CN      Reps 16 10  -CN      Additional Comments RTB, B and alt  -CN         Exercise 17    Exercise Name 17 TKE  -CN      Cueing 17 Demo  -CN      Reps 17 15  -CN      Additional Comments GTB  -CN         Exercise 18    Exercise Name 18 SL leg press  -CN      Cueing 18 Verbal  -CN      Reps 18 10  -CN      Additional Comments 40#  -CN        User Key  (r) = Recorded By, (t) = Taken By, (c) = Cosigned By    Initials Name Provider Type    MARYSE Steele PT Physical Therapist                               PT OP Goals     Row Name 05/21/18 1500          PT Short Term Goals    STG Date to Achieve 05/22/18  -CN     STG 1 Pt will report subjective pain at 3/10 or less to demonstrate symptom management with ADLs and all household mobility.   -CN     STG 1 Progress Ongoing  -CN     STG 2 Pt will be independent and compliant with HEP, symptom management and joint protection in order to minimize stress on affected tissues.    -CN     STG 2 Progress Met  -CN        Long Term Goals    LTG Date to Achieve 06/12/18  -CN     LTG 1 Pt will improve gross knee strength to 5/5 on left side in order to improve functional mobility.   -CN     LTG 1 Progress Ongoing  -CN     LTG 1 Progress Comments Pt able to tolerate increased weight today due to improved LE strength.   -CN     LTG 2 Pt will improve score on Knee Outcome Survey  to 80% for improved functional mobility.   -CN     LTG 2 Progress Ongoing  -CN     LTG 3 Pt will report 50% improvement in symptoms with squatting tasks in order to decrease pain with ADLs.   -CN     LTG 3 Progress Ongoing  -CN       User Key  (r) = Recorded By, (t) = Taken By, (c) = Cosigned By    Initials Name Provider Type    MARYSE Steele PT Physical Therapist          Therapy Education  Given: HEP,  Symptoms/condition management, Pain management, Posture/body mechanics  Program: Reinforced  How Provided: Verbal  Provided to: Patient  Level of Understanding: Teach back education performed, Verbalized, Demonstrated              Time Calculation:   Start Time: 1415  Stop Time: 1500  Time Calculation (min): 45 min  Total Timed Code Minutes- PT: 45 minute(s)    Therapy Charges for Today     Code Description Service Date Service Provider Modifiers Qty    70409786406 HC PT THER PROC EA 15 MIN 5/21/2018 Shawna Steele, PT GP 3                    Shawna Steele, PT  5/21/2018

## 2018-05-21 NOTE — TELEPHONE ENCOUNTER
Called pt and advised that we have refilled her linzess 72mcg po daily thru [employer] mail order.  Pt verb understanding.

## 2018-05-22 ENCOUNTER — TELEPHONE (OUTPATIENT)
Dept: GASTROENTEROLOGY | Facility: CLINIC | Age: 73
End: 2018-05-22

## 2018-05-22 RX ORDER — ONDANSETRON 4 MG/1
4 TABLET, FILM COATED ORAL EVERY 8 HOURS PRN
Qty: 30 TABLET | Refills: 0 | Status: SHIPPED | OUTPATIENT
Start: 2018-05-22 | End: 2019-03-08

## 2018-05-22 NOTE — TELEPHONE ENCOUNTER
----- Message from Abhijeet Aldridge sent at 5/22/2018 11:29 AM EDT -----  Regarding: NOT FEELING WELL   Contact: [phone]  PT CALLED COMPLAIM OF STOMACH PAIN, AND N/V

## 2018-05-22 NOTE — TELEPHONE ENCOUNTER
Called pt and reports nausea for months.  Today she developed vomiting.  Pt states she does not have anything medication for nausea.  Pt denies a fever and chills.  Pt reports she is slightly constipated but overall she is doing better with her bowels.  She is asking if she can have something for her nausea .  Advised pt would send message to Dr Kerns and in the meantime if symptoms worsen to seek medical attn. Pt verb understanding.

## 2018-05-22 NOTE — TELEPHONE ENCOUNTER
Called pt and advised per Dr Kerns that we have escribed zofran 4mg every 8 hrs prn n and v #30 with no refills to her [facility].  Advised pt f/u as scheduled. Pt verb understanding.

## 2018-05-22 NOTE — TELEPHONE ENCOUNTER
Okay to try Zofran 4 mg every 8 hours as needed for nausea, #30 with no refill.  Needs office follow-up as scheduled

## 2018-05-23 ENCOUNTER — APPOINTMENT (OUTPATIENT)
Dept: PHYSICAL THERAPY | Facility: HOSPITAL | Age: 73
End: 2018-05-23

## 2018-05-29 ENCOUNTER — TELEPHONE (OUTPATIENT)
Dept: GASTROENTEROLOGY | Facility: CLINIC | Age: 73
End: 2018-05-29

## 2018-05-29 NOTE — TELEPHONE ENCOUNTER
Call from pt.  States continues with N&V as discussed with call of 5/22.  Zofran manages vomiting, but requesting f/u appt 2nd persistence of symptoms.     Scheduled with Dr Kerns for 6/18 @ 2pm.  Advise if problems in the meantime - contact office.  Verb understanding.

## 2018-05-29 NOTE — TELEPHONE ENCOUNTER
----- Message from Mary Kate Armstrong sent at 5/29/2018  2:06 PM EDT -----  Regarding: pt calling   Contact: [phone]  Pt is calling stating her nausea has turned into her vomiting

## 2018-05-30 ENCOUNTER — HOSPITAL ENCOUNTER (OUTPATIENT)
Dept: PHYSICAL THERAPY | Facility: HOSPITAL | Age: 73
Setting detail: THERAPIES SERIES
Discharge: HOME | End: 2018-05-30

## 2018-05-30 DIAGNOSIS — G89.29 CHRONIC PAIN OF LEFT KNEE: Primary | ICD-10-CM

## 2018-05-30 DIAGNOSIS — M25.562 CHRONIC PAIN OF LEFT KNEE: Primary | ICD-10-CM

## 2018-05-30 DIAGNOSIS — M17.12 OSTEOARTHRITIS OF LEFT KNEE, UNSPECIFIED OSTEOARTHRITIS TYPE: ICD-10-CM

## 2018-05-30 PROCEDURE — G8979 MOBILITY GOAL STATUS: HCPCS | Performed by: PHYSICAL THERAPIST

## 2018-05-30 PROCEDURE — 97110 THERAPEUTIC EXERCISES: CPT | Performed by: PHYSICAL THERAPIST

## 2018-05-30 PROCEDURE — G8978 MOBILITY CURRENT STATUS: HCPCS | Performed by: PHYSICAL THERAPIST

## 2018-05-30 NOTE — THERAPY RE-EVALUATION
Outpatient Physical Therapy Ortho Re-Assessment   [facility]     Patient Name: Davida Anderson  : 1945  MRN: [id number]  Today's Date: 2018      Visit Date: 2018    Patient Active Problem List   Diagnosis   • BP (high blood pressure)   • History of left breast infiltrating ductal cancer 2cm s/p mastectomy with reconstruction  s/p chemo   • H/O ETOH abuse   • Gtz's esophagus   • Colon polyps   • Bipolar 1 disorder   • Arthritis   • Raynaud's disease   • Neuropathy   • Lumbar spine pain   • Spondylolisthesis at L4-L5 level   • Right lumbar radiculopathy   • Gtz's esophagus without dysplasia   • Hx of adenomatous colonic polyps   • Dysphagia   • Diarrhea   • Primary osteoarthritis of left knee   • Primary localized osteoarthrosis of the knee, right   • Chronic pain of left knee        Past Medical History:   Diagnosis Date   • Acid reflux    • Arthritis    • Gtz esophagus    • Bipolar 2 disorder    • Diverticulosis    • Dizziness    • Frequent UTI    • H/O gastroesophageal reflux (GERD)    • H/O Infiltrating ductal carcinoma of left breast     Stage IIA, Grade 3 ER/MD +, HER2 -, treated with 5 years of tamoxifen, 5 years of Femara, completed in    • High cholesterol    • History of alcoholism     40 YRS AGO   • History of Clostridium difficile colitis    • History of gastric ulcer    • History of Holter monitoring     WEARING CURRENTLY   • Hypertension    • Irregular heartbeat    • Low back pain    • Mass of right breast on mammogram 2016    10 o'clock position, 4 cm from the nipple,   • Neuropathy    • PONV (postoperative nausea and vomiting)    • Raynaud disease         Past Surgical History:   Procedure Laterality Date   • ANKLE OPEN REDUCTION INTERNAL FIXATION Right 2015    Dr. Dandre Camacho, [facility]   • BACK SURGERY      LUMBAR   • BREAST RECONSTRUCTION, BREAST TISSUE EXPANDER INSERTION Left 2002    Iuka Contour Profile expander was placed 550 cc  size, filled with 200 cc of saline, Dr. Herbert Napoles, [facility]   • COLONOSCOPY N/A 09/16/2014    Dr. Darci Kerns, [facility]; torts, tics, stool, polyp   • COLONOSCOPY N/A 9/27/2016    NTEH, diverticulosis, tortuous colon, Two 3-5mm polyps in the descending colon and the transverse colon, IH.  PATH: Tubular adenoma   • COLONOSCOPY N/A 12/12/2017    NTEH, tics, torts, IH, TA w/low grade dysplasia   • CYSTOSCOPY N/A 05/07/2010    with TVT takedown, Dr. Simon Wall, [facility]   • ENDOSCOPY N/A 9/27/2016    z line irreg, bilious gastric fluid- fluid aspiration preformed, gastritis.  PATH: Squamous and glandular mucosa with minimal superficial acute inflammation.    • ENDOSCOPY N/A 12/12/2017    Z line irregular, gastritis, duodenitis, chronic inflammation   • EPIDURAL BLOCK     • KNEE SURGERY Left     BROKEN   • MAMMO US BREAST BIOPSY ADDITIONAL W WO DEVICE Left 02/21/2002    2:00 position left breast, Infiltrating Ductal Carcinoma, [facility]   • MANDIBLE FRACTURE SURGERY     • MASTECTOMY Left 04/11/2002    Left Total Mastectomy and Left Axillary Cantil Node Biobsy, Dr. Indu kAins, [facility]   • TENSION FREE VAGINAL TAPING WITH MINI ARC SLING N/A 10/26/2009    Dr. Gina Castillo, [facility]   • UPPER GASTROINTESTINAL ENDOSCOPY  09/16/2014    z-line irreg, grade a reflux, gastritis       Visit Dx:     ICD-10-CM ICD-9-CM   1. Chronic pain of left knee M25.562 719.46    G89.29 338.29   2. Osteoarthritis of left knee, unspecified osteoarthritis type M17.12 715.96                 PT Ortho     Row Name 05/30/18 1300       Left Lower Ext    Lt Knee Extension/Flexion AROM 0/140  -GR       Left Hip (Manual Muscle Testing)    MMT, Left Hip: Manual Muscle Testing (MMT) hip flex 5, hip abd 4-  -GR       Right Hip (Manual Muscle Testing)    MMT, Right Hip: Manual Muscle Testing (MMT) hip flex 5, hip abd 4 -   -GR       Left Knee (Manual Muscle Testing)    Comment, Left Knee: Manual Muscle Testing (MMT) flex/ext 5  -GR       Right Knee (Manual Muscle Testing)     Comment, Right Knee: Manual Muscle Testing (MMT) flex/ext 5  -GR      User Key  (r) = Recorded By, (t) = Taken By, (c) = Cosigned By    Initials Name Provider Type    ABHIJEET Goodwin PT Physical Therapist                      Therapy Education  Education Details: POC - follow up in 2 weeks PRN with evaluating PT  Given: HEP  Program: Reinforced  How Provided: Verbal  Provided to: Patient  Level of Understanding: Teach back education performed, Verbalized, Demonstrated           PT OP Goals     Row Name 05/30/18 1300          PT Short Term Goals    STG Date to Achieve 05/22/18  -GR     STG 1 Pt will report subjective pain at 3/10 or less to demonstrate symptom management with ADLs and all household mobility.   -GR     STG 1 Progress Ongoing  -GR     STG 1 Progress Comments 6/10 with exacerbation  -GR     STG 2 Pt will be independent and compliant with HEP, symptom management and joint protection in order to minimize stress on affected tissues.    -GR     STG 2 Progress Met  -GR        Long Term Goals    LTG Date to Achieve 06/12/18  -GR     LTG 1 Pt will improve gross knee strength to 5/5 on left side in order to improve functional mobility.   -GR     LTG 1 Progress Ongoing  -GR     LTG 2 Pt will improve score on Knee Outcome Survey  to 80% for improved functional mobility.   -GR     LTG 2 Progress Ongoing  -GR     LTG 3 Pt will report 50% improvement in symptoms with squatting tasks in order to decrease pain with ADLs.   -GR     LTG 3 Progress Met  -GR     LTG 3 Progress Comments states she cannot kneel but she can squat  -GR       User Key  (r) = Recorded By, (t) = Taken By, (c) = Cosigned By    Initials Name Provider Type    ABHIJEET Goodwin PT Physical Therapist                PT Assessment/Plan     Row Name 05/30/18 1359          PT Assessment    Assessment Comments Ms. Anderson has attended 5 sessions of skilled PT for treatment of L knee pain. She demonstrates improved knee AROM 0-0-140. Quad and  hamstring strength are now 5/5 per MMT, weakness of glut med remains 4 to 4-/5 bilaterally.  Perceived disability as per the KOS-ADL has yet to improve, now 52% ability where 100% = no disability.  Patient is nearing independence with home program.  Plan is to return in 2 weeks for any needed adjustments to her current program; if managing well on her own she may cancel.  Thank you for this referral.  -GR        PT Plan    PT Plan Comments Return in 2 weeks for any modifications to HEP; trial independently until that time per her gains.  -GR       User Key  (r) = Recorded By, (t) = Taken By, (c) = Cosigned By    Initials Name Provider Type    GR Jaswinder BLACK Christa, PT Physical Therapist                  Exercises     Row Name 05/30/18 1300             Subjective Comments    Subjective Comments Reports she thinks she is stronger since starting PT; still stiff and sore in the AM. Pain at worst is 6/10.  -GR         Subjective Pain    Able to rate subjective pain? yes  -GR      Pre-Treatment Pain Level 0  -GR         Exercise 1    Exercise Name 1 Quad set  -GR      Cueing 1 Verbal;Tactile  -GR      Reps 1 15  -GR      Time 1 5 sec  -GR         Exercise 2    Exercise Name 2 calf raise  -GR      Reps 2 20  -GR      Additional Comments as part of PF MMT   -GR         Exercise 3    Exercise Name 3 SL abduction  -GR      Sets 3 2  -GR      Reps 3 10  -GR      Additional Comments 3#  -GR         Exercise 4    Exercise Name 4 Supine HS 90/90 stretch  -GR      Reps 4 3  -GR      Time 4 20 sec  -GR         Exercise 5    Exercise Name 5 Nustep, L5  -GR      Time 5 5 min  -GR         Exercise 6    Exercise Name 6 HS curl B  -GR      Sets 6 2  -GR      Reps 6 10  -GR      Additional Comments 3#  -GR         Exercise 7    Exercise Name 7 SAQ B  -GR      Sets 7 2  -GR      Reps 7 10  -GR      Additional Comments 3#  -GR         Exercise 8    Exercise Name 8 PPT  -GR      Reps 8 15  -GR      Time 8 5 sec  -GR         Exercise 9     Exercise Name 9 Stair calf stretch  -GR      Cueing 9 Demo  -GR      Reps 9 3  -GR      Time 9 20 sec  -GR         Exercise 10    Exercise Name 10 Bridges with PPT  -GR      Cueing 10 Verbal  -GR      Sets 10 2  -GR      Reps 10 10  -GR         Exercise 11    Exercise Name 11 LAQ  -GR      Cueing 11 Demo  -GR      Sets 11 2  -GR      Reps 11 10  -GR      Additional Comments 3#  -GR         Exercise 14    Exercise Name 14 MS  -GR      Cueing 14 Verbal  -GR      Sets 14 2  -GR      Reps 14 10  -GR         Exercise 17    Exercise Name 17 TKE  -GR      Cueing 17 Verbal  -GR      Sets 17 2  -GR      Reps 17 10  -GR      Additional Comments GTB  -GR         Exercise 18    Exercise Name 18 DL leg press  -GR      Cueing 18 Verbal  -GR      Sets 18 2  -GR      Reps 18 10  -GR      Additional Comments 60#  -GR        User Key  (r) = Recorded By, (t) = Taken By, (c) = Cosigned By    Initials Name Provider Type    GR Jaswinder Goodwin, PT Physical Therapist                        Outcome Measure Options: 10 Meter Walk  Knee Outcome Score  Knee Outcome Score Comments: 52.5% where 100% = no disability      Time Calculation:   Start Time: 1330  Stop Time: 1415  Time Calculation (min): 45 min  Total Timed Code Minutes- PT: 45 minute(s)     Therapy Charges for Today     Code Description Service Date Service Provider Modifiers Qty    27841599104 HC PT MOBILITY CURRENT 5/30/2018 Jaswinder Goodwin, PT GP, CJ 1    11843486213 HC PT MOBILITY PROJECTED 5/30/2018 Jaswinder Goodwin PT GP, CI 1    15620577741 HC PT THER PROC EA 15 MIN 5/30/2018 Jaswinder Goodwin PT GP 3          PT G-Codes  PT Professional Judgement Used?: Yes  Outcome Measure Options: 10 Meter Walk  Score: 52%  Functional Limitation: Mobility: Walking and moving around  Mobility: Walking and Moving Around Current Status (): At least 20 percent but less than 40 percent impaired, limited or restricted  Mobility: Walking and Moving Around Goal Status (): At least 1  percent but less than 20 percent impaired, limited or restricted         Jaswinder Goodwin, PT  5/30/2018

## 2018-06-01 ENCOUNTER — APPOINTMENT (OUTPATIENT)
Dept: PHYSICAL THERAPY | Facility: HOSPITAL | Age: 73
End: 2018-06-01

## 2018-06-06 ENCOUNTER — APPOINTMENT (OUTPATIENT)
Dept: PHYSICAL THERAPY | Facility: HOSPITAL | Age: 73
End: 2018-06-06

## 2018-06-11 ENCOUNTER — APPOINTMENT (OUTPATIENT)
Dept: PHYSICAL THERAPY | Facility: HOSPITAL | Age: 73
End: 2018-06-11

## 2018-06-11 ENCOUNTER — APPOINTMENT (OUTPATIENT)
Dept: PAIN MEDICINE | Facility: HOSPITAL | Age: 73
End: 2018-06-11
Attending: ORTHOPAEDIC SURGERY

## 2018-06-18 ENCOUNTER — OFFICE VISIT (OUTPATIENT)
Dept: GASTROENTEROLOGY | Facility: CLINIC | Age: 73
End: 2018-06-18

## 2018-06-18 VITALS
DIASTOLIC BLOOD PRESSURE: 84 MMHG | WEIGHT: 132 LBS | TEMPERATURE: 98.6 F | BODY MASS INDEX: 21.21 KG/M2 | SYSTOLIC BLOOD PRESSURE: 142 MMHG | HEIGHT: 66 IN

## 2018-06-18 DIAGNOSIS — R10.11 RIGHT UPPER QUADRANT ABDOMINAL PAIN: ICD-10-CM

## 2018-06-18 DIAGNOSIS — K22.70 BARRETT'S ESOPHAGUS WITHOUT DYSPLASIA: ICD-10-CM

## 2018-06-18 DIAGNOSIS — R11.2 NAUSEA AND VOMITING, INTRACTABILITY OF VOMITING NOT SPECIFIED, UNSPECIFIED VOMITING TYPE: Primary | ICD-10-CM

## 2018-06-18 DIAGNOSIS — K21.00 GASTROESOPHAGEAL REFLUX DISEASE WITH ESOPHAGITIS: ICD-10-CM

## 2018-06-18 DIAGNOSIS — K58.1 IRRITABLE BOWEL SYNDROME WITH CONSTIPATION: ICD-10-CM

## 2018-06-18 PROCEDURE — 99214 OFFICE O/P EST MOD 30 MIN: CPT | Performed by: INTERNAL MEDICINE

## 2018-06-18 RX ORDER — ACETAMINOPHEN,DIPHENHYDRAMINE HCL 500; 25 MG/1; MG/1
1 TABLET, FILM COATED ORAL NIGHTLY
COMMUNITY
End: 2021-09-02

## 2018-06-18 RX ORDER — SOLIFENACIN SUCCINATE 10 MG/1
10 TABLET, FILM COATED ORAL EVERY MORNING
COMMUNITY
End: 2021-09-02

## 2018-06-18 NOTE — PROGRESS NOTES
Chief Complaint   Patient presents with   • Nausea   • Vomiting   • Constipation        Davida Anderson is a  72 y.o. female here for a follow up visit for Nausea with vomiting, GERD, history of Gtz's esophagus, dysphagia, IBS    HPI this 72-year-old white female patient of Dr. Efren Nguyễn returns in follow-up since her last visit of March 27 this year.  She was seen at that time because of bouts of nausea as well as reflux and dysphagia.  She underwent a video fluoroscopy swallow study with functional findings.  She did follow some speech pathology recommendations could have helped improve her swallowing ability.  However, she now complains of nausea with associated vomiting.  This usually occurs in the morning although the nausea can occur throughout the course of the day.  She describes some pain in the right costal margin and flank area.  Her symptoms are not specific to dietary intake.  Nonetheless I offered gallbladder evaluation to start with ultrasonic assessment.  If this is negative then a HIDA scan would be the next appropriate step.  We did talk about possible influence of medications and she is going to stop the pantoprazole which was initiated in March.  Given that she had both upper and lower endoscopies performed in December 2017 we will defer endoscopic examination for the present.    Past Medical History:   Diagnosis Date   • Acid reflux    • Arthritis    • Gtz esophagus    • Bipolar 2 disorder    • Diverticulosis    • Dizziness    • Frequent UTI    • H/O gastroesophageal reflux (GERD)    • H/O Infiltrating ductal carcinoma of left breast 2002    Stage IIA, Grade 3 ER/OK +, HER2 -, treated with 5 years of tamoxifen, 5 years of Femara, completed in 2012   • High cholesterol    • History of alcoholism     40 YRS AGO   • History of Clostridium difficile colitis 2014   • History of gastric ulcer    • History of Holter monitoring     WEARING CURRENTLY   • Hypertension    • Irregular  heartbeat    • Low back pain    • Mass of right breast on mammogram 03/17/2016    10 o'clock position, 4 cm from the nipple,   • Neuropathy    • PONV (postoperative nausea and vomiting)    • Raynaud disease        Current Outpatient Prescriptions   Medication Sig Dispense Refill   • acetaminophen (TYLENOL) 500 MG tablet Take 1,000 mg by mouth every night.     • ARIPiprazole (ABILIFY) 10 MG tablet Take 10 mg by mouth daily.     • aspirin 81 MG tablet Take 81 mg by mouth daily.     • atorvastatin (LIPITOR) 20 MG tablet TAKE 1 TABLET EVERY DAY     • B Complex Vitamins (VITAMIN B COMPLEX) tablet Take 1 tablet by mouth daily.     • BIOTIN PO Take 1 tablet by mouth daily.     • Calcium Carb-Cholecalciferol (CALCIUM 600 + D) 600-200 MG-UNIT tablet Take 2 tablets by mouth daily.     • celecoxib (CeleBREX) 200 MG capsule Take 200 mg by mouth daily.     • Coenzyme Q10 (CO Q-10) 400 MG capsule Take 400 mg by mouth daily.     • diphenhydrAMINE-acetaminophen (TYLENOL PM)  MG tablet per tablet Take 1 tablet by mouth.     • Docusate Calcium (STOOL SOFTENER PO) Take  by mouth.     • fesoterodine fumarate (TOVIAZ ER) 8 MG tablet sustained-release 24 hour tablet Take  by mouth.     • hydrALAZINE (APRESOLINE) 25 MG tablet Take 25 mg by mouth 4 (Four) Times a Day.     • lamoTRIgine (LaMICtal) 100 MG tablet Take 100 mg by mouth 2 (two) times a day.     • linaclotide (LINZESS) 72 MCG capsule capsule Take 1 capsule by mouth Every Morning Before Breakfast. 90 capsule 1   • lithium carbonate 300 MG capsule Take 300 mg by mouth daily.     • magnesium oxide (MAGOX) 400 (241.3 MG) MG tablet tablet Take 400 mg by mouth daily.     • melatonin 5 MG tablet tablet Take 5 mg by mouth At Night As Needed.     • ondansetron (ZOFRAN) 4 MG tablet Take 1 tablet by mouth Every 8 (Eight) Hours As Needed for Nausea or Vomiting. 30 tablet 0   • pantoprazole (PROTONIX) 40 MG EC tablet TAKE 1 TABLET BY MOUTH TWICE DAILY 180 tablet 0   • pregabalin  (LYRICA) 50 MG capsule Take 50 mg by mouth 2 (Two) Times a Day.     • Probiotic Product (SOLUBLE FIBER/PROBIOTICS PO) Take 1 tablet by mouth 3 (three) times a day.     • Sofosbuvir 400 MG tablet Take  by mouth.     • solifenacin (VESICARE) 10 MG tablet Take 10 mg by mouth.     • traMADol (ULTRAM) 50 MG tablet Take 50 mg by mouth As Needed.       No current facility-administered medications for this visit.        PRN Meds:.    Allergies   Allergen Reactions   • Morphine Hives, Itching and Rash       Social History     Social History   • Marital status:      Spouse name: N/A   • Number of children: N/A   • Years of education: N/A     Occupational History   • Not on file.     Social History Main Topics   • Smoking status: Former Smoker     Packs/day: 3.00     Types: Cigarettes     Quit date: 4/14/1983   • Smokeless tobacco: Never Used   • Alcohol use No   • Drug use: No   • Sexual activity: Defer     Other Topics Concern   • Not on file     Social History Narrative   • No narrative on file       Family History   Problem Relation Age of Onset   • Breast cancer Mother 35   • Colon cancer Mother 64   • Heart disease Father    • Cancer Father         throat and lung   • Colon polyps Father        Review of Systems   Constitutional: Negative for activity change, appetite change, fatigue and unexpected weight change.   HENT: Negative for congestion, facial swelling, sore throat, trouble swallowing and voice change.    Eyes: Negative for photophobia and visual disturbance.   Respiratory: Negative for cough and choking.    Cardiovascular: Negative for chest pain.   Gastrointestinal: Positive for abdominal pain, nausea and vomiting. Negative for abdominal distention, anal bleeding, blood in stool, constipation, diarrhea and rectal pain.   Endocrine: Negative for polyphagia.   Musculoskeletal: Negative for arthralgias, gait problem and joint swelling.   Skin: Negative for color change, pallor and rash.    Allergic/Immunologic: Negative for food allergies.   Neurological: Negative for speech difficulty and headaches.   Hematological: Does not bruise/bleed easily.   Psychiatric/Behavioral: Negative for agitation, confusion and sleep disturbance.       Vitals:    06/18/18 1406   BP: 142/84   Temp: 98.6 °F (37 °C)       Physical Exam   Constitutional: She is oriented to person, place, and time. She appears well-developed and well-nourished.   HENT:   Head: Normocephalic.   Mouth/Throat: Oropharynx is clear and moist.   Eyes: Conjunctivae and EOM are normal.   Neck: Normal range of motion.   Cardiovascular: Normal rate and regular rhythm.    Pulmonary/Chest: Breath sounds normal.   Abdominal: Soft. Bowel sounds are normal.   Musculoskeletal: Normal range of motion.   Neurological: She is alert and oriented to person, place, and time.   Skin: Skin is warm and dry.   Psychiatric: She has a normal mood and affect. Her behavior is normal.       ASSESSMENT   #1 nausea with vomiting  #2 right upper quadrant and right flank pain  #3 GERD  #4 Gtz's esophagus  #5 IBS      PLAN  Schedule gallbladder ultrasound  D/C pantoprazole  Pending ultrasound results may need HIDA scan      ICD-10-CM ICD-9-CM   1. Nausea and vomiting, intractability of vomiting not specified, unspecified vomiting type R11.2 787.01   2. Gastroesophageal reflux disease with esophagitis K21.0 530.11   3. Gtz's esophagus without dysplasia K22.70 530.85   4. Irritable bowel syndrome with constipation K58.1 564.1

## 2018-06-20 ENCOUNTER — HOSPITAL ENCOUNTER (OUTPATIENT)
Dept: GENERAL RADIOLOGY | Facility: HOSPITAL | Age: 73
Discharge: HOME | End: 2018-06-20

## 2018-06-20 ENCOUNTER — ANESTHESIA (OUTPATIENT)
Dept: PAIN MEDICINE | Facility: HOSPITAL | Age: 73
End: 2018-06-20

## 2018-06-20 ENCOUNTER — HOSPITAL ENCOUNTER (OUTPATIENT)
Dept: PAIN MEDICINE | Facility: HOSPITAL | Age: 73
Discharge: HOME | End: 2018-06-20
Attending: ORTHOPAEDIC SURGERY | Admitting: ORTHOPAEDIC SURGERY

## 2018-06-20 ENCOUNTER — ANESTHESIA EVENT (OUTPATIENT)
Dept: PAIN MEDICINE | Facility: HOSPITAL | Age: 73
End: 2018-06-20

## 2018-06-20 VITALS
HEIGHT: 66 IN | SYSTOLIC BLOOD PRESSURE: 150 MMHG | HEART RATE: 72 BPM | DIASTOLIC BLOOD PRESSURE: 90 MMHG | OXYGEN SATURATION: 95 % | RESPIRATION RATE: 16 BRPM | TEMPERATURE: 97.5 F | BODY MASS INDEX: 20.41 KG/M2 | WEIGHT: 127 LBS

## 2018-06-20 DIAGNOSIS — R52 PAIN: ICD-10-CM

## 2018-06-20 PROCEDURE — 0 IOPAMIDOL 41 % SOLUTION: Performed by: ANESTHESIOLOGY

## 2018-06-20 PROCEDURE — 77003 FLUOROGUIDE FOR SPINE INJECT: CPT

## 2018-06-20 PROCEDURE — C1755 CATHETER, INTRASPINAL: HCPCS

## 2018-06-20 PROCEDURE — 25010000002 MIDAZOLAM PER 1 MG: Performed by: ANESTHESIOLOGY

## 2018-06-20 PROCEDURE — 25010000002 METHYLPREDNISOLONE PER 80 MG: Performed by: ANESTHESIOLOGY

## 2018-06-20 RX ORDER — SODIUM CHLORIDE 0.9 % (FLUSH) 0.9 %
1-10 SYRINGE (ML) INJECTION AS NEEDED
Status: DISCONTINUED | OUTPATIENT
Start: 2018-06-20 | End: 2018-06-21 | Stop reason: HOSPADM

## 2018-06-20 RX ORDER — METHYLPREDNISOLONE ACETATE 80 MG/ML
80 INJECTION, SUSPENSION INTRA-ARTICULAR; INTRALESIONAL; INTRAMUSCULAR; SOFT TISSUE ONCE
Status: COMPLETED | OUTPATIENT
Start: 2018-06-20 | End: 2018-06-20

## 2018-06-20 RX ORDER — MIDAZOLAM HYDROCHLORIDE 1 MG/ML
1 INJECTION INTRAMUSCULAR; INTRAVENOUS AS NEEDED
Status: DISCONTINUED | OUTPATIENT
Start: 2018-06-20 | End: 2018-06-21 | Stop reason: HOSPADM

## 2018-06-20 RX ORDER — LIDOCAINE HYDROCHLORIDE 10 MG/ML
0.5 INJECTION, SOLUTION INFILTRATION; PERINEURAL ONCE AS NEEDED
Status: DISCONTINUED | OUTPATIENT
Start: 2018-06-20 | End: 2018-06-21 | Stop reason: HOSPADM

## 2018-06-20 RX ORDER — LIDOCAINE HYDROCHLORIDE 10 MG/ML
1 INJECTION, SOLUTION INFILTRATION; PERINEURAL ONCE AS NEEDED
Status: DISCONTINUED | OUTPATIENT
Start: 2018-06-20 | End: 2018-06-21 | Stop reason: HOSPADM

## 2018-06-20 RX ORDER — FENTANYL CITRATE 50 UG/ML
50 INJECTION, SOLUTION INTRAMUSCULAR; INTRAVENOUS AS NEEDED
Status: DISCONTINUED | OUTPATIENT
Start: 2018-06-20 | End: 2018-06-21 | Stop reason: HOSPADM

## 2018-06-20 RX ADMIN — METHYLPREDNISOLONE ACETATE 80 MG: 80 INJECTION, SUSPENSION INTRA-ARTICULAR; INTRALESIONAL; INTRAMUSCULAR; SOFT TISSUE at 15:12

## 2018-06-20 RX ADMIN — IOPAMIDOL 10 ML: 408 INJECTION, SOLUTION INTRATHECAL at 15:12

## 2018-06-20 RX ADMIN — MIDAZOLAM 2 MG: 1 INJECTION INTRAMUSCULAR; INTRAVENOUS at 15:02

## 2018-06-20 NOTE — ANESTHESIA PROCEDURE NOTES
PAIN Epidural block    Patient location during procedure: pain clinic  Start Time: 6/20/2018 2:57 PM  Stop Time: 6/20/2018 3:15 PM  Indication:procedure for pain  Performed By  Anesthesiologist: GLORIA DAVIS  Preanesthetic Checklist  Completed: patient identified, site marked, surgical consent, pre-op evaluation, timeout performed, risks and benefits discussed and monitors and equipment checked  Additional Notes  Interlaminar epidural was performed under fluoroscopic guidance.      Post-Op Diagnosis Codes:     * Displacement of lumbar intervertebral disc without myelopathy (M51.26)     * Spinal stenosis, lumbar region without neurogenic claudication (M48.061)     * Previous lumbar decompression  Prep:  Pt Position:prone  Sterile Tech:cap, gloves, mask and sterile barrier  Prep:chlorhexidine gluconate and isopropyl alcohol  Monitoring:blood pressure monitoring, continuous pulse oximetry and EKG  Procedure:  Sedation: no   Approach:right paramedian  Guidance: fluoroscopy  Location:lumbar  Level:4-5  Needle Type:Tuohy  Needle Gauge:20 G  Aspiration:negative  Medications:  Depomedrol:80 mg  Preservative Free Saline:3mL  Isovue:2mL  Comments:Epidural spread of contrast.  Post Assessment:  Dressing:occlusive dressing applied  Pt Tolerance:patient tolerated the procedure well with no apparent complications  Complications:no

## 2018-06-20 NOTE — H&P
[facility]    History and Physical    Patient Name: Davida Anderson  :  1945  MRN:  [id number]  Date of Admission: 2018    Subjective     Patient is a 72-year-old female with pain in her lower back which radiates to her right lower extremity.  She reports approximate 90% relief for 2 months following her last lumbar epidural steroid injection.  Her pain level today ranges between a 0 and an 8.  She is here for lumbar epidural steroid injection #2.    The following portions of the patients history were reviewed and updated as appropriate: current medications, allergies, past medical history, past surgical history, past family history, past social history and problem list                Objective     Past Medical History:   Past Medical History:   Diagnosis Date   • Acid reflux    • Arthritis    • Gtz esophagus    • Bipolar 2 disorder    • Diverticulosis    • Dizziness    • Frequent UTI    • H/O gastroesophageal reflux (GERD)    • H/O Infiltrating ductal carcinoma of left breast     Stage IIA, Grade 3 ER/[place] +, HER2 -, treated with 5 years of tamoxifen, 5 years of Femara, completed in    • High cholesterol    • History of alcoholism     40 YRS AGO   • History of Clostridium difficile colitis    • History of gastric ulcer    • History of Holter monitoring     WEARING CURRENTLY   • Hypertension    • Irregular heartbeat    • Low back pain    • Mass of right breast on mammogram 2016    10 o'clock position, 4 cm from the nipple,   • Neuropathy    • PONV (postoperative nausea and vomiting)    • Raynaud disease      Past Surgical History:   Past Surgical History:   Procedure Laterality Date   • ANKLE OPEN REDUCTION INTERNAL FIXATION Right 2015    Dr. Dandre Camacho, [facility]   • BACK SURGERY      LUMBAR   • BREAST RECONSTRUCTION, BREAST TISSUE EXPANDER INSERTION Left 2002    [place] Contour Profile expander was placed 550 cc size, filled with 200 cc of saline, Dr. Herbert Napoles, [facility]    • COLONOSCOPY N/A 09/16/2014    Dr. Darci Kerns, [facility]; torts, tics, stool, polyp   • COLONOSCOPY N/A 9/27/2016    NTEH, diverticulosis, tortuous colon, Two 3-5mm polyps in the descending colon and the transverse colon, IH.  PATH: Tubular adenoma   • COLONOSCOPY N/A 12/12/2017    NTEH, tics, torts, IH, TA w/low grade dysplasia   • CYSTOSCOPY N/A 05/07/2010    with TVT takedown, Dr. Simon Wall, [facility]   • ENDOSCOPY N/A 9/27/2016    z line irreg, bilious gastric fluid- fluid aspiration preformed, gastritis.  PATH: Squamous and glandular mucosa with minimal superficial acute inflammation.    • ENDOSCOPY N/A 12/12/2017    Z line irregular, gastritis, duodenitis, chronic inflammation   • EPIDURAL BLOCK     • KNEE SURGERY Left     BROKEN   • MAMMO US BREAST BIOPSY ADDITIONAL W WO DEVICE Left 02/21/2002    2:00 position left breast, Infiltrating Ductal Carcinoma, [facility]   • MANDIBLE FRACTURE SURGERY     • MASTECTOMY Left 04/11/2002    Left Total Mastectomy and Left Axillary [place] Node Biobsy, Dr. Indu Akins, [facility]   • TENSION FREE VAGINAL TAPING WITH MINI ARC SLING N/A 10/26/2009    Dr. Gina Castillo, [facility]   • UPPER GASTROINTESTINAL ENDOSCOPY  09/16/2014    z-line irreg, grade a reflux, gastritis     Family History:   Family History   Problem Relation Age of Onset   • Breast cancer Mother 35   • Colon cancer Mother 64   • Heart disease Father    • Cancer Father         throat and lung   • Colon polyps Father      Social History:   Social History   Substance Use Topics   • Smoking status: Former Smoker     Packs/day: 3.00     Types: Cigarettes     Quit date: 4/14/1983   • Smokeless tobacco: Never Used   • Alcohol use No       Vital Signs Range for the last 24 hours  Temperature: Temp:  [36.4 °C (97.5 °F)] 36.4 °C (97.5 °F)   Temp Source: Temp src: Oral   BP: BP: (139)/(84) 139/84   Pulse: Heart Rate:  [68] 68   Respirations: Resp:  [16] 16   SPO2: SpO2:  [94 %] 94 %   O2 Amount (l/min):     O2 Devices Device (Oxygen  "Therapy): room air   Weight: Weight:  [57.6 kg (127 lb)] 57.6 kg (127 lb)     Flowsheet Rows      First Filed Value   Admission Height  167.6 cm (66\") Documented at 06/20/2018 1410   Admission Weight  57.6 kg (127 lb) Documented at 06/20/2018 1410          --------------------------------------------------------------------------------    Current Outpatient Prescriptions   Medication Sig Dispense Refill   • acetaminophen (TYLENOL) 500 MG tablet Take 1,000 mg by mouth every night.     • ARIPiprazole (ABILIFY) 10 MG tablet Take 10 mg by mouth daily.     • atorvastatin (LIPITOR) 20 MG tablet TAKE 1 TABLET EVERY DAY     • B Complex Vitamins (VITAMIN B COMPLEX) tablet Take 1 tablet by mouth daily.     • BIOTIN PO Take 1 tablet by mouth daily.     • Calcium Carb-Cholecalciferol (CALCIUM 600 + D) 600-200 MG-UNIT tablet Take 2 tablets by mouth daily.     • celecoxib (CeleBREX) 200 MG capsule Take 200 mg by mouth daily.     • Coenzyme Q10 (CO Q-10) 400 MG capsule Take 400 mg by mouth daily.     • diphenhydrAMINE-acetaminophen (TYLENOL PM)  MG tablet per tablet Take 1 tablet by mouth.     • Docusate Calcium (STOOL SOFTENER PO) Take  by mouth.     • fesoterodine fumarate (TOVIAZ ER) 8 MG tablet sustained-release 24 hour tablet Take  by mouth.     • hydrALAZINE (APRESOLINE) 25 MG tablet Take 25 mg by mouth 4 (Four) Times a Day.     • lamoTRIgine (LaMICtal) 100 MG tablet Take 100 mg by mouth 2 (two) times a day.     • linaclotide (LINZESS) 72 MCG capsule capsule Take 1 capsule by mouth Every Morning Before Breakfast. 90 capsule 1   • lithium carbonate 300 MG capsule Take 300 mg by mouth daily.     • magnesium oxide (MAGOX) 400 (241.3 MG) MG tablet tablet Take 400 mg by mouth daily.     • melatonin 5 MG tablet tablet Take 5 mg by mouth At Night As Needed.     • ondansetron (ZOFRAN) 4 MG tablet Take 1 tablet by mouth Every 8 (Eight) Hours As Needed for Nausea or Vomiting. 30 tablet 0   • pantoprazole (PROTONIX) 40 MG EC " tablet TAKE 1 TABLET BY MOUTH TWICE DAILY 180 tablet 0   • pregabalin (LYRICA) 50 MG capsule Take 50 mg by mouth 2 (Two) Times a Day.     • Probiotic Product (SOLUBLE FIBER/PROBIOTICS PO) Take 1 tablet by mouth 3 (three) times a day.     • Sofosbuvir 400 MG tablet Take  by mouth.     • solifenacin (VESICARE) 10 MG tablet Take 10 mg by mouth.     • traMADol (ULTRAM) 50 MG tablet Take 50 mg by mouth As Needed.     • aspirin 81 MG tablet Take 81 mg by mouth daily.       Current Facility-Administered Medications   Medication Dose Route Frequency Provider Last Rate Last Dose   • lidocaine (XYLOCAINE) 1 % injection 0.5 mL  0.5 mL Intradermal Once PRN Hank Pompa MD       • sodium chloride 0.9 % flush 1-10 mL  1-10 mL Intravenous PRN Hank Pompa MD           --------------------------------------------------------------------------------  Assessment/Plan      Anesthesia Evaluation     Patient summary reviewed and Nursing notes reviewed   history of anesthetic complications: PONV  NPO Solid Status: > 8 hours  NPO Liquid Status: > 2 hours           Airway   Mallampati: II  TM distance: >3 FB  Neck ROM: full  Dental - normal exam     Pulmonary - negative pulmonary ROS and normal exam    breath sounds clear to auscultation  Cardiovascular - normal exam    Rhythm: regular  Rate: normal    (+) hypertension, PVD, hyperlipidemia,   (-) angina, orthopnea, PND, URBAN      Neuro/Psych  (+) dizziness/light headedness, numbness, psychiatric history Bipolar,     GI/Hepatic/Renal/Endo - negative ROS     Musculoskeletal     Abdominal  - normal exam    Abdomen: soft.  Bowel sounds: normal.   Substance History - negative use     OB/GYN negative ob/gyn ROS         Other   (+) arthritis   history of cancer               Diagnosis and Plan    Treatment Plan  ASA 3      Procedures: Lumbar Epidural Steroid Injection(LESI), With fluoroscopy,       Anesthetic plan and risks discussed with patient.          Diagnosis     * Displacement of  lumbar intervertebral disc without myelopathy [M51.26]     * Spinal stenosis, lumbar region without neurogenic claudication [M48.061]

## 2018-06-22 ENCOUNTER — HOSPITAL ENCOUNTER (OUTPATIENT)
Dept: ULTRASOUND IMAGING | Facility: HOSPITAL | Age: 73
Discharge: HOME | End: 2018-06-22
Attending: INTERNAL MEDICINE | Admitting: INTERNAL MEDICINE

## 2018-06-22 DIAGNOSIS — R11.2 NAUSEA AND VOMITING, INTRACTABILITY OF VOMITING NOT SPECIFIED, UNSPECIFIED VOMITING TYPE: ICD-10-CM

## 2018-06-22 DIAGNOSIS — R10.11 RIGHT UPPER QUADRANT ABDOMINAL PAIN: ICD-10-CM

## 2018-06-22 PROCEDURE — 76700 US EXAM ABDOM COMPLETE: CPT

## 2018-06-29 ENCOUNTER — TELEPHONE (OUTPATIENT)
Dept: GASTROENTEROLOGY | Facility: CLINIC | Age: 73
End: 2018-06-29

## 2018-06-29 DIAGNOSIS — R11.2 NAUSEA AND VOMITING, INTRACTABILITY OF VOMITING NOT SPECIFIED, UNSPECIFIED VOMITING TYPE: Primary | ICD-10-CM

## 2018-06-29 NOTE — TELEPHONE ENCOUNTER
----- Message from Darci ANAYA MD sent at 6/25/2018 11:14 AM EDT -----  Regarding: Ultrasound results  Okay to call results, if still symptomatic would offer HIDA scan.  ----- Message -----  From: Interface, Rad Results [place]  Sent: 6/25/2018   9:41 AM  To: Darci ANAYA MD

## 2018-06-29 NOTE — TELEPHONE ENCOUNTER
----- Message from Mary Kate Armstrong sent at 6/29/2018  8:12 AM EDT -----  Regarding: PT CALLING FOR HER U/S REPORT   Contact: [phone]  ...

## 2018-06-29 NOTE — TELEPHONE ENCOUNTER
Called pt and advised per Dr Kerns that her us was negative and if she is still having sypmtoms, he recommends a hida scan.      Pt verb understanding and reports she is still having symptoms.  Advised pt we will order the hida scan and someone from [facility] will be calling her to arrange. Pt verb understanding.

## 2018-07-09 ENCOUNTER — TELEPHONE (OUTPATIENT)
Dept: GASTROENTEROLOGY | Facility: CLINIC | Age: 73
End: 2018-07-09

## 2018-07-09 NOTE — TELEPHONE ENCOUNTER
Call to pt.  States cancelled HIDA on 7/5 because threw back out day prior.  States no longer vomiting, but continues with occasional nausea.  Asking if should proceed with HIDA.    Advise pt that purpose of HIDA is to dx any GB problems - will send question to DR Kerns.  Verb understanding.

## 2018-07-09 NOTE — TELEPHONE ENCOUNTER
She can defer the HIDA scan if she wishes, however if she has more symptoms especially in the right quadrant that would be the next logical evaluation.

## 2018-07-09 NOTE — TELEPHONE ENCOUNTER
----- Message from Cody Walker sent at 7/9/2018 11:28 AM EDT -----  Regarding: Patient questions  Contact: [phone]  Pt has some questions regarding her scope she canceled due to her back. Please call.

## 2018-07-11 NOTE — TELEPHONE ENCOUNTER
Call to pt.  Advise per Dr Kerns that can defer the HIDA if wishes.  However, if has more symptoms, especially in the R quadrant - that would be the next logical eval.  Pt verb understanding.

## 2018-07-20 ENCOUNTER — OFFICE VISIT (OUTPATIENT)
Dept: ORTHOPEDIC SURGERY | Facility: CLINIC | Age: 73
End: 2018-07-20

## 2018-07-20 VITALS — HEIGHT: 66 IN | WEIGHT: 128 LBS | BODY MASS INDEX: 20.57 KG/M2 | TEMPERATURE: 98.8 F

## 2018-07-20 DIAGNOSIS — G89.29 CHRONIC PAIN OF LEFT KNEE: ICD-10-CM

## 2018-07-20 DIAGNOSIS — M25.562 CHRONIC PAIN OF LEFT KNEE: ICD-10-CM

## 2018-07-20 DIAGNOSIS — M17.12 PRIMARY OSTEOARTHRITIS OF LEFT KNEE: Primary | ICD-10-CM

## 2018-07-20 PROCEDURE — 99212 OFFICE O/P EST SF 10 MIN: CPT | Performed by: ORTHOPAEDIC SURGERY

## 2018-07-20 RX ORDER — CEPHALEXIN 250 MG/1
CAPSULE ORAL
COMMUNITY
Start: 2018-07-18 | End: 2018-11-27

## 2018-07-20 RX ORDER — NITROFURANTOIN 25; 75 MG/1; MG/1
CAPSULE ORAL
COMMUNITY
Start: 2018-07-16 | End: 2018-11-27

## 2018-07-20 NOTE — PROGRESS NOTES
Patient:  Davida Anderson is a 72 y.o. female    Chief Complaint/ Reason for Visit:    Chief Complaint   Patient presents with   • Left Knee - Follow-up, Pain       HPI:  The patient presents today and tells me she wants to discuss options for the treatment of her osteoarthritic left knee.  We do know that she has advanced to end-stage osteoarthritis left knee with varus malalignment and bone-on-bone changes in the medial compartment, with evidence of degenerative changes radiographically in all 3 compartments.  She has completed a comprehensive course of nonsurgical management and has been considering knee replacement.  However, she feels like the injection she received a little more than 3 months ago may still be giving her some relief.  She says she is able walk on some days up to a mild.  She says she has a trip to [place] to go walking and hiking October and was wondering if she could get her knee replaced before that trip.  I advised her that we could certainly do that, but explained that she would not be able to go on the trip thereafter.        PMH:    Past Medical History:   Diagnosis Date   • Acid reflux    • Arthritis    • Gtz esophagus    • Bipolar 2 disorder (CMS/HCC)    • Diverticulosis    • Dizziness    • Frequent UTI    • H/O gastroesophageal reflux (GERD)    • H/O Infiltrating ductal carcinoma of left breast 2002    Stage IIA, Grade 3 ER/[place] +, HER2 -, treated with 5 years of tamoxifen, 5 years of Femara, completed in 2012   • High cholesterol    • History of alcoholism (CMS/HCC)     40 YRS AGO   • History of Clostridium difficile colitis 2014   • History of gastric ulcer    • History of Holter monitoring     WEARING CURRENTLY   • Hypertension    • Irregular heartbeat    • Low back pain    • Mass of right breast on mammogram 03/17/2016    10 o'clock position, 4 cm from the nipple,   • Neuropathy    • PONV (postoperative nausea and vomiting)    • Raynaud disease        PSH:    Past Surgical  History:   Procedure Laterality Date   • ANKLE OPEN REDUCTION INTERNAL FIXATION Right 08/14/2015    Dr. Dandre Camacho, [facility]   • BACK SURGERY      LUMBAR   • BREAST RECONSTRUCTION, BREAST TISSUE EXPANDER INSERTION Left 04/11/2002    [place] Contour Profile expander was placed 550 cc size, filled with 200 cc of saline, Dr. Herbert Napoles, [facility]   • COLONOSCOPY N/A 09/16/2014    Dr. Darci Kerns, [facility]; torts, tics, stool, polyp   • COLONOSCOPY N/A 9/27/2016    NTEH, diverticulosis, tortuous colon, Two 3-5mm polyps in the descending colon and the transverse colon, IH.  PATH: Tubular adenoma   • COLONOSCOPY N/A 12/12/2017    NTEH, tics, torts, IH, TA w/low grade dysplasia   • CYSTOSCOPY N/A 05/07/2010    with TVT takedown, Dr. Simon Wall, [facility]   • ENDOSCOPY N/A 9/27/2016    z line irreg, bilious gastric fluid- fluid aspiration preformed, gastritis.  PATH: Squamous and glandular mucosa with minimal superficial acute inflammation.    • ENDOSCOPY N/A 12/12/2017    Z line irregular, gastritis, duodenitis, chronic inflammation   • EPIDURAL BLOCK     • KNEE SURGERY Left     BROKEN   • MAMMO US BREAST BIOPSY ADDITIONAL W WO DEVICE Left 02/21/2002    2:00 position left breast, Infiltrating Ductal Carcinoma, [facility]   • MANDIBLE FRACTURE SURGERY     • MASTECTOMY Left 04/11/2002    Left Total Mastectomy and Left Axillary [place] Node Biobsy, Dr. Indu Akins, [facility]   • TENSION FREE VAGINAL TAPING WITH MINI ARC SLING N/A 10/26/2009    Dr. Gina Castillo, [facility]   • UPPER GASTROINTESTINAL ENDOSCOPY  09/16/2014    z-line irreg, grade a reflux, gastritis       Social Hx:    Social History     Social History   • Marital status:      Spouse name: N/A   • Number of children: N/A   • Years of education: N/A     Occupational History   • Not on file.     Social History Main Topics   • Smoking status: Former Smoker     Packs/day: 3.00     Types: Cigarettes     Quit date: 4/14/1983   • Smokeless tobacco: Never Used   • Alcohol use No   • Drug  use: No   • Sexual activity: Defer     Other Topics Concern   • Not on file     Social History Narrative   • No narrative on file       Family Hx:    Family History   Problem Relation Age of Onset   • Breast cancer Mother 35   • Colon cancer Mother 64   • Heart disease Father    • Cancer Father         throat and lung   • Colon polyps Father        Meds:    Current Outpatient Prescriptions:   •  acetaminophen (TYLENOL) 500 MG tablet, Take 1,000 mg by mouth every night., Disp: , Rfl:   •  ARIPiprazole (ABILIFY) 10 MG tablet, Take 10 mg by mouth daily., Disp: , Rfl:   •  aspirin 81 MG tablet, Take 81 mg by mouth daily., Disp: , Rfl:   •  atorvastatin (LIPITOR) 20 MG tablet, TAKE 1 TABLET EVERY DAY, Disp: , Rfl:   •  B Complex Vitamins (VITAMIN B COMPLEX) tablet, Take 1 tablet by mouth daily., Disp: , Rfl:   •  BIOTIN PO, Take 1 tablet by mouth daily., Disp: , Rfl:   •  Calcium Carb-Cholecalciferol (CALCIUM 600 + D) 600-200 MG-UNIT tablet, Take 2 tablets by mouth daily., Disp: , Rfl:   •  celecoxib (CeleBREX) 200 MG capsule, Take 200 mg by mouth daily., Disp: , Rfl:   •  cephalexin (KEFLEX) 250 MG capsule, , Disp: , Rfl:   •  Coenzyme Q10 (CO Q-10) 400 MG capsule, Take 400 mg by mouth daily., Disp: , Rfl:   •  diphenhydrAMINE-acetaminophen (TYLENOL PM)  MG tablet per tablet, Take 1 tablet by mouth., Disp: , Rfl:   •  Docusate Calcium (STOOL SOFTENER PO), Take  by mouth., Disp: , Rfl:   •  fesoterodine fumarate (TOVIAZ ER) 8 MG tablet sustained-release 24 hour tablet, Take  by mouth., Disp: , Rfl:   •  hydrALAZINE (APRESOLINE) 25 MG tablet, Take 25 mg by mouth 4 (Four) Times a Day., Disp: , Rfl:   •  lamoTRIgine (LaMICtal) 100 MG tablet, Take 100 mg by mouth 2 (two) times a day., Disp: , Rfl:   •  linaclotide (LINZESS) 72 MCG capsule capsule, Take 1 capsule by mouth Every Morning Before Breakfast., Disp: 90 capsule, Rfl: 1  •  lithium carbonate 300 MG capsule, Take 300 mg by mouth daily., Disp: , Rfl:   •   "magnesium oxide (MAGOX) 400 (241.3 MG) MG tablet tablet, Take 400 mg by mouth daily., Disp: , Rfl:   •  melatonin 5 MG tablet tablet, Take 5 mg by mouth At Night As Needed., Disp: , Rfl:   •  nitrofurantoin, macrocrystal-monohydrate, (MACROBID) 100 MG capsule, , Disp: , Rfl:   •  ondansetron (ZOFRAN) 4 MG tablet, Take 1 tablet by mouth Every 8 (Eight) Hours As Needed for Nausea or Vomiting., Disp: 30 tablet, Rfl: 0  •  pantoprazole (PROTONIX) 40 MG EC tablet, TAKE 1 TABLET BY MOUTH TWICE DAILY, Disp: 180 tablet, Rfl: 0  •  pregabalin (LYRICA) 50 MG capsule, Take 50 mg by mouth 2 (Two) Times a Day., Disp: , Rfl:   •  Probiotic Product (SOLUBLE FIBER/PROBIOTICS PO), Take 1 tablet by mouth 3 (three) times a day., Disp: , Rfl:   •  Sofosbuvir 400 MG tablet, Take  by mouth., Disp: , Rfl:   •  solifenacin (VESICARE) 10 MG tablet, Take 10 mg by mouth., Disp: , Rfl:   •  traMADol (ULTRAM) 50 MG tablet, Take 50 mg by mouth As Needed., Disp: , Rfl:     Allergies:    Allergies   Allergen Reactions   • Morphine Hives, Itching and Rash       ROS:  Review of Systems    Vitals:    07/20/18 1048   Temp: 98.8 °F (37.1 °C)   Weight: 58.1 kg (128 lb)   Height: 166.4 cm (65.5\")     Body mass index is 20.98 kg/m².    Physical Exam    The patient is awake, alert, and oriented ×3.  The patient is in no acute distress.  Breathing is regular and unlabored with a respiratory rate of 12/m.  Extraocular movements and pupillary responses are symmetrically intact. Sclerae are anicteric.   Hearing is within normal limits.  Speech is within normal limits.  There is no jugular venous distention.    The patient has a limp antalgic from the left.       Left knee:  She has a mild effusion on the left knee, but no sign of infection.  Range of motion is 12° to 121° of flexion.  She has medial instability on valgus stressing.  She has tenderness along the medial joint line, and has palpable osteophyte ridging to at least a mild degree around all 3 " compartments.  There is palpable crepitus on active and passive ranges of motion.  There is no redness, bruising, or abnormal warmth.  There is a mild effusion.  Her left calf is soft and nontender with no venous cord.            Assessment:     Diagnosis Plan   1. Primary osteoarthritis of left knee     2. Chronic pain of left knee             Plan:  Patient had discussed her upcoming plans.  I explained there is no way that she would be a on a vacation of the type she describes after having a knee replacement.  Also advised her that the earliest we could do would be near the end of August and that being 6-8 weeks postop would not be the time that she was going on a hiking trip in the mountains.  She says she understands.  However, she seems to be getting along fairly well.  I recommended that she consider coming back before her trip in having another cortisone shot and continuing her physical therapy in the meantime.  She voiced understanding and agreement.  I did remind her, however, that it would need to be 3 months from the time of any injection before we could perform a knee replacement.  She says she understands.  She's been continue her therapy, continue her bracing, and may well return the first week of October for an injection just prior to her planned hiking trip.

## 2018-07-31 ENCOUNTER — DOCUMENTATION (OUTPATIENT)
Dept: PHYSICAL THERAPY | Facility: HOSPITAL | Age: 73
End: 2018-07-31

## 2018-07-31 DIAGNOSIS — M17.12 OSTEOARTHRITIS OF LEFT KNEE, UNSPECIFIED OSTEOARTHRITIS TYPE: ICD-10-CM

## 2018-07-31 DIAGNOSIS — G89.29 CHRONIC PAIN OF LEFT KNEE: Primary | ICD-10-CM

## 2018-07-31 DIAGNOSIS — M25.562 CHRONIC PAIN OF LEFT KNEE: Primary | ICD-10-CM

## 2018-07-31 NOTE — THERAPY DISCHARGE NOTE
Outpatient Physical Therapy Discharge Summary         Patient Name: Davida Anderson  : 1945  MRN: [id number]    Today's Date: 2018    Visit Dx:    ICD-10-CM ICD-9-CM   1. Chronic pain of left knee M25.562 719.46    G89.29 338.29   2. Osteoarthritis of left knee, unspecified osteoarthritis type M17.12 715.96             PT OP Goals     Row Name 18 1600          PT Short Term Goals    STG Date to Achieve 18  -CN     STG 1 Pt will report subjective pain at 3/10 or less to demonstrate symptom management with ADLs and all household mobility.   -CN     STG 1 Progress Not Met  -CN     STG 2 Pt will be independent and compliant with HEP, symptom management and joint protection in order to minimize stress on affected tissues.    -CN     STG 2 Progress Met  -CN        Long Term Goals    LTG Date to Achieve 18  -CN     LTG 1 Pt will improve gross knee strength to 5/5 on left side in order to improve functional mobility.   -CN     LTG 1 Progress Not Met  -CN     LTG 2 Pt will improve score on Knee Outcome Survey  to 80% for improved functional mobility.   -CN     LTG 2 Progress Not Met  -CN     LTG 3 Pt will report 50% improvement in symptoms with squatting tasks in order to decrease pain with ADLs.   -CN     LTG 3 Progress Met  -CN       User Key  (r) = Recorded By, (t) = Taken By, (c) = Cosigned By    Initials Name Provider Type    MARYSE Steele, PT Physical Therapist          OP PT Discharge Summary  Date of Discharge: 18  Reason for Discharge: Independent  Outcomes Achieved: Patient able to partially acheive established goals  Discharge Destination: Home with home program  Discharge Instructions/Additional Comments: Pt attended 5 skilled therapy sessions for L knee pain and reported improvement with HEP. Pt continues with high pain levels when exacerbated, however decreased symptoms overall. Pt D/C to independent management of symptoms.       Time Calculation:         Therapy Suggested Charges     Code   Minutes Charges    None                       Shawna Steele, PT  7/31/2018

## 2018-08-01 ENCOUNTER — TELEPHONE (OUTPATIENT)
Dept: ORTHOPEDIC SURGERY | Facility: CLINIC | Age: 73
End: 2018-08-01

## 2018-08-01 DIAGNOSIS — M48.061 SPINAL STENOSIS OF LUMBAR REGION, UNSPECIFIED WHETHER NEUROGENIC CLAUDICATION PRESENT: Primary | ICD-10-CM

## 2018-08-10 ENCOUNTER — OFFICE VISIT (OUTPATIENT)
Dept: ORTHOPEDIC SURGERY | Facility: CLINIC | Age: 73
End: 2018-08-10

## 2018-08-10 VITALS — WEIGHT: 130.4 LBS | TEMPERATURE: 98.1 F | BODY MASS INDEX: 20.47 KG/M2 | HEIGHT: 67 IN

## 2018-08-10 DIAGNOSIS — M54.16 RIGHT LUMBAR RADICULOPATHY: ICD-10-CM

## 2018-08-10 DIAGNOSIS — M25.551 HIP PAIN, ACUTE, RIGHT: Primary | ICD-10-CM

## 2018-08-10 PROCEDURE — 73502 X-RAY EXAM HIP UNI 2-3 VIEWS: CPT | Performed by: ORTHOPAEDIC SURGERY

## 2018-08-10 PROCEDURE — 99213 OFFICE O/P EST LOW 20 MIN: CPT | Performed by: ORTHOPAEDIC SURGERY

## 2018-08-10 NOTE — PROGRESS NOTES
Patient:  Davida Anderson is a 72 y.o. female    Chief Complaint/ Reason for Visit:    Chief Complaint   Patient presents with   • Right Hip - Establish Care, Pain       HPI:  This pleasant lady, who is well known to me, presents today complaining of moderate to severe intermittent aching sometimes stabbing crushing or grinding pain in the posterior lateral aspect of her right hip.  She does not have any groin pain.  She says the pain radiates down her right leg even in the lateral aspect of her lower right leg.  She does have a known history of lumbar spine issues and has actually been involved in the lumbar epidural blocks as well as treatment by Dr. uDane Keith.    Standing walking and lying down all seem to make her discomfort worse.  Ice, heat, and anti-inflammatories help the discomfort.  She has no complaints in her left hip.  She has not had any acute changes with bowel or bladder control and denies any acute weakness.    PMH:    Past Medical History:   Diagnosis Date   • Acid reflux    • Arthritis    • Gtz esophagus    • Bipolar 2 disorder (CMS/HCC)    • Diverticulosis    • Dizziness    • Frequent UTI    • H/O gastroesophageal reflux (GERD)    • H/O Infiltrating ductal carcinoma of left breast 2002    Stage IIA, Grade 3 ER/AK +, HER2 -, treated with 5 years of tamoxifen, 5 years of Femara, completed in 2012   • High cholesterol    • History of alcoholism (CMS/HCC)     40 YRS AGO   • History of Clostridium difficile colitis 2014   • History of gastric ulcer    • History of Holter monitoring     WEARING CURRENTLY   • Hypertension    • Irregular heartbeat    • Low back pain    • Mass of right breast on mammogram 03/17/2016    10 o'clock position, 4 cm from the nipple,   • Neuropathy    • PONV (postoperative nausea and vomiting)    • Raynaud disease        PSH:    Past Surgical History:   Procedure Laterality Date   • ANKLE OPEN REDUCTION INTERNAL FIXATION Right 08/14/2015    Dr. Dandre Camacho, [facility]   • BACK  SURGERY      LUMBAR   • BREAST RECONSTRUCTION, BREAST TISSUE EXPANDER INSERTION Left 04/11/2002    [place] Contour Profile expander was placed 550 cc size, filled with 200 cc of saline, Dr. Herbert Npaoles, [facility]   • COLONOSCOPY N/A 09/16/2014    Dr. Darci Kerns, [facility]; torts, tics, stool, polyp   • COLONOSCOPY N/A 9/27/2016    NTEH, diverticulosis, tortuous colon, Two 3-5mm polyps in the descending colon and the transverse colon, IH.  PATH: Tubular adenoma   • COLONOSCOPY N/A 12/12/2017    NTEH, tics, torts, IH, TA w/low grade dysplasia   • CYSTOSCOPY N/A 05/07/2010    with TVT takedown, Dr. Simon Wall, [facility]   • ENDOSCOPY N/A 9/27/2016    z line irreg, bilious gastric fluid- fluid aspiration preformed, gastritis.  PATH: Squamous and glandular mucosa with minimal superficial acute inflammation.    • ENDOSCOPY N/A 12/12/2017    Z line irregular, gastritis, duodenitis, chronic inflammation   • EPIDURAL BLOCK     • KNEE SURGERY Left     BROKEN   • MAMMO US BREAST BIOPSY ADDITIONAL W WO DEVICE Left 02/21/2002    2:00 position left breast, Infiltrating Ductal Carcinoma, [facility]   • MANDIBLE FRACTURE SURGERY     • MASTECTOMY Left 04/11/2002    Left Total Mastectomy and Left Axillary [place] Node Biobsy, Dr. Indu Akins, [facility]   • TENSION FREE VAGINAL TAPING WITH MINI ARC SLING N/A 10/26/2009    Dr. Gina Castillo, [facility]   • UPPER GASTROINTESTINAL ENDOSCOPY  09/16/2014    z-line irreg, grade a reflux, gastritis       Social Hx:    Social History     Social History   • Marital status:      Spouse name: N/A   • Number of children: N/A   • Years of education: N/A     Occupational History   • Not on file.     Social History Main Topics   • Smoking status: Former Smoker     Packs/day: 3.00     Types: Cigarettes     Quit date: 4/14/1983   • Smokeless tobacco: Never Used   • Alcohol use No   • Drug use: No   • Sexual activity: Defer     Other Topics Concern   • Not on file     Social History Narrative   • No narrative on file        Family Hx:    Family History   Problem Relation Age of Onset   • Breast cancer Mother 35   • Colon cancer Mother 64   • Heart disease Father    • Cancer Father         throat and lung   • Colon polyps Father        Meds:    Current Outpatient Prescriptions:   •  acetaminophen (TYLENOL) 500 MG tablet, Take 1,000 mg by mouth every night., Disp: , Rfl:   •  ARIPiprazole (ABILIFY) 10 MG tablet, Take 10 mg by mouth daily., Disp: , Rfl:   •  aspirin 81 MG tablet, Take 81 mg by mouth daily., Disp: , Rfl:   •  atorvastatin (LIPITOR) 20 MG tablet, TAKE 1 TABLET EVERY DAY, Disp: , Rfl:   •  B Complex Vitamins (VITAMIN B COMPLEX) tablet, Take 1 tablet by mouth daily., Disp: , Rfl:   •  BIOTIN PO, Take 1 tablet by mouth daily., Disp: , Rfl:   •  Calcium Carb-Cholecalciferol (CALCIUM 600 + D) 600-200 MG-UNIT tablet, Take 2 tablets by mouth daily., Disp: , Rfl:   •  celecoxib (CeleBREX) 200 MG capsule, Take 200 mg by mouth daily., Disp: , Rfl:   •  cephalexin (KEFLEX) 250 MG capsule, , Disp: , Rfl:   •  Coenzyme Q10 (CO Q-10) 400 MG capsule, Take 400 mg by mouth daily., Disp: , Rfl:   •  Docusate Calcium (STOOL SOFTENER PO), Take  by mouth., Disp: , Rfl:   •  fesoterodine fumarate (TOVIAZ ER) 8 MG tablet sustained-release 24 hour tablet, Take  by mouth., Disp: , Rfl:   •  hydrALAZINE (APRESOLINE) 25 MG tablet, Take 25 mg by mouth 4 (Four) Times a Day., Disp: , Rfl:   •  lamoTRIgine (LaMICtal) 100 MG tablet, Take 100 mg by mouth 2 (two) times a day., Disp: , Rfl:   •  linaclotide (LINZESS) 72 MCG capsule capsule, Take 1 capsule by mouth Every Morning Before Breakfast., Disp: 90 capsule, Rfl: 1  •  lithium carbonate 300 MG capsule, Take 300 mg by mouth daily., Disp: , Rfl:   •  magnesium oxide (MAGOX) 400 (241.3 MG) MG tablet tablet, Take 400 mg by mouth daily., Disp: , Rfl:   •  melatonin 5 MG tablet tablet, Take 5 mg by mouth At Night As Needed., Disp: , Rfl:   •  ondansetron (ZOFRAN) 4 MG tablet, Take 1 tablet by mouth  "Every 8 (Eight) Hours As Needed for Nausea or Vomiting., Disp: 30 tablet, Rfl: 0  •  pantoprazole (PROTONIX) 40 MG EC tablet, TAKE 1 TABLET BY MOUTH TWICE DAILY, Disp: 180 tablet, Rfl: 0  •  pregabalin (LYRICA) 50 MG capsule, Take 50 mg by mouth 2 (Two) Times a Day., Disp: , Rfl:   •  Probiotic Product (SOLUBLE FIBER/PROBIOTICS PO), Take 1 tablet by mouth 3 (three) times a day., Disp: , Rfl:   •  solifenacin (VESICARE) 10 MG tablet, Take 10 mg by mouth., Disp: , Rfl:   •  traMADol (ULTRAM) 50 MG tablet, Take 50 mg by mouth As Needed., Disp: , Rfl:   •  diphenhydrAMINE-acetaminophen (TYLENOL PM)  MG tablet per tablet, Take 1 tablet by mouth., Disp: , Rfl:   •  nitrofurantoin, macrocrystal-monohydrate, (MACROBID) 100 MG capsule, , Disp: , Rfl:   •  Sofosbuvir 400 MG tablet, Take  by mouth., Disp: , Rfl:     Allergies:    Allergies   Allergen Reactions   • Morphine Hives, Itching and Rash       ROS:  Review of Systems   Respiratory: Positive for apnea.    Cardiovascular: Positive for palpitations.   Gastrointestinal: Positive for nausea and vomiting.   Endocrine: Positive for cold intolerance and heat intolerance.   Musculoskeletal: Positive for myalgias.   Psychiatric/Behavioral: Positive for sleep disturbance.   All other systems reviewed and are negative.      Vitals:    08/10/18 0941   Temp: 98.1 °F (36.7 °C)   TempSrc: Temporal Artery    Weight: 59.1 kg (130 lb 6.4 oz)   Height: 168.9 cm (66.5\")     Body mass index is 20.73 kg/m².    Physical Exam    The patient is awake, alert, and oriented ×3.  The patient is in no acute distress.  Breathing is regular and unlabored with a respiratory rate of 14/m.  Extraocular movements and pupillary responses are symmetrically intact. Sclerae are anicteric.   Hearing is within normal limits.  Speech is within normal limits.  There is no jugular venous distention.    Lower extremities are inspected and found to be symmetrical in appearance.  The patient does have a " "positive straight leg raise on the right, negative on the left.    Right hip: Logroll and Stinchfield test are negative.  She has a full active functional range of motion of the right hip.  There is no popliteal or inguinal lymphadenopathy.  There is no crepitus.  She has no tenderness laterally over the greater trochanter.  Distally, her pedal pulses are symmetrically intact and regular with satisfactory amplitude and a current heart rate about 78 beats are minute.  She has no cyanosis, clubbing, or edema.  Motor strength is presently 4+ to 5 over 5 in all motor groups of both lower extremities.  She does not presently have any spasticity, cogwheeling, or clonus.        Radiology: X-rays: AP pelvis, AP and lateral of the right hip were ordered and reviewed to assess patient's complaints of \"right hip pain.\"  Comparison images were not immediately available.  These images do not reveal any significant degenerative change in either the patient's hips.  There is no evidence of fracture or stress fracture.    Labs:      Assessment:     Diagnosis Plan   1. Hip pain, acute, right  XR Hip With or Without Pelvis 2 - 3 View Right   2. Right lumbar radiculopathy             Plan:  I discussed everything with the patient length.  I explained that she did not have any appreciable hip arthritis, and explained that I felt probably her hip pain was coming entirely from her lumbar spine problems.  She voiced understanding and agreement.  She will continue to treat with Dr. Finch for these problems.      Orders Placed This Encounter   Procedures   • XR Hip With or Without Pelvis 2 - 3 View Right     Order Specific Question:   Reason for Exam:     Answer:   opnc right hip\     "

## 2018-08-22 ENCOUNTER — ANESTHESIA EVENT (OUTPATIENT)
Dept: PAIN MEDICINE | Facility: HOSPITAL | Age: 73
End: 2018-08-22

## 2018-08-22 ENCOUNTER — ANESTHESIA (OUTPATIENT)
Dept: PAIN MEDICINE | Facility: HOSPITAL | Age: 73
End: 2018-08-22

## 2018-08-22 ENCOUNTER — HOSPITAL ENCOUNTER (OUTPATIENT)
Dept: PAIN MEDICINE | Facility: HOSPITAL | Age: 73
Discharge: HOME | End: 2018-08-22
Admitting: ANESTHESIOLOGY

## 2018-08-22 ENCOUNTER — HOSPITAL ENCOUNTER (OUTPATIENT)
Dept: GENERAL RADIOLOGY | Facility: HOSPITAL | Age: 73
Discharge: HOME | End: 2018-08-22

## 2018-08-22 VITALS
HEART RATE: 73 BPM | RESPIRATION RATE: 16 BRPM | DIASTOLIC BLOOD PRESSURE: 80 MMHG | BODY MASS INDEX: 20.4 KG/M2 | TEMPERATURE: 98 F | HEIGHT: 67 IN | SYSTOLIC BLOOD PRESSURE: 132 MMHG | WEIGHT: 130 LBS | OXYGEN SATURATION: 98 %

## 2018-08-22 DIAGNOSIS — M48.061 SPINAL STENOSIS OF LUMBAR REGION, UNSPECIFIED WHETHER NEUROGENIC CLAUDICATION PRESENT: ICD-10-CM

## 2018-08-22 DIAGNOSIS — R52 PAIN: ICD-10-CM

## 2018-08-22 PROCEDURE — 25010000002 MIDAZOLAM PER 1 MG: Performed by: ANESTHESIOLOGY

## 2018-08-22 PROCEDURE — 25010000002 METHYLPREDNISOLONE PER 80 MG: Performed by: ANESTHESIOLOGY

## 2018-08-22 PROCEDURE — 77003 FLUOROGUIDE FOR SPINE INJECT: CPT

## 2018-08-22 PROCEDURE — 0 IOPAMIDOL 41 % SOLUTION: Performed by: ANESTHESIOLOGY

## 2018-08-22 PROCEDURE — C1755 CATHETER, INTRASPINAL: HCPCS

## 2018-08-22 RX ORDER — SODIUM CHLORIDE 0.9 % (FLUSH) 0.9 %
1-10 SYRINGE (ML) INJECTION AS NEEDED
Status: DISCONTINUED | OUTPATIENT
Start: 2018-08-22 | End: 2018-08-23 | Stop reason: HOSPADM

## 2018-08-22 RX ORDER — MIDAZOLAM HYDROCHLORIDE 1 MG/ML
1 INJECTION INTRAMUSCULAR; INTRAVENOUS AS NEEDED
Status: DISCONTINUED | OUTPATIENT
Start: 2018-08-22 | End: 2018-08-23 | Stop reason: HOSPADM

## 2018-08-22 RX ORDER — FENTANYL CITRATE 50 UG/ML
50 INJECTION, SOLUTION INTRAMUSCULAR; INTRAVENOUS AS NEEDED
Status: DISCONTINUED | OUTPATIENT
Start: 2018-08-22 | End: 2018-08-23 | Stop reason: HOSPADM

## 2018-08-22 RX ORDER — LIDOCAINE HYDROCHLORIDE 10 MG/ML
1 INJECTION, SOLUTION INFILTRATION; PERINEURAL ONCE AS NEEDED
Status: DISCONTINUED | OUTPATIENT
Start: 2018-08-22 | End: 2018-08-23 | Stop reason: HOSPADM

## 2018-08-22 RX ORDER — METHYLPREDNISOLONE ACETATE 80 MG/ML
80 INJECTION, SUSPENSION INTRA-ARTICULAR; INTRALESIONAL; INTRAMUSCULAR; SOFT TISSUE ONCE
Status: COMPLETED | OUTPATIENT
Start: 2018-08-22 | End: 2018-08-22

## 2018-08-22 RX ADMIN — METHYLPREDNISOLONE ACETATE 80 MG: 80 INJECTION, SUSPENSION INTRA-ARTICULAR; INTRALESIONAL; INTRAMUSCULAR; SOFT TISSUE at 10:16

## 2018-08-22 RX ADMIN — IOPAMIDOL 10 ML: 408 INJECTION, SOLUTION INTRATHECAL at 10:15

## 2018-08-22 RX ADMIN — MIDAZOLAM 2 MG: 1 INJECTION INTRAMUSCULAR; INTRAVENOUS at 10:12

## 2018-08-22 NOTE — ANESTHESIA PROCEDURE NOTES
PAIN Epidural block    Patient location during procedure: pain clinic  Start Time: 8/22/2018 10:09 AM  Stop Time: 8/22/2018 10:20 AM  Indication:procedure for pain  Performed By  Anesthesiologist: NIRANJAN ANTHONY  Preanesthetic Checklist  Completed: patient identified and risks and benefits discussed  Additional Notes  Diagnosis:     Post-Op Diagnosis Codes:     * Spinal stenosis of lumbar region without neurogenic claudication (M48.061)     * Lumbar degenerative disc disease (M51.36)     * Lumbar neuritis (M54.16)    Sedation:  Versed 2 mg    A lumbar epidural steroid injection with fluoroscopic guidance and an Isovue dye epidurogram was performed.  Under fluoroscopic guidance, the epidural space was identified and accessed, confirmed by loss of resistance to saline followed by injection of Isovue dye, which shows a good epidurogram.  The above medications were injected uneventfully.    Prep:  Pt Position:prone  Sterile Tech:cap, gloves, mask and sterile barrier  Prep:chlorhexidine gluconate and isopropyl alcohol  Monitoring:blood pressure monitoring, continuous pulse oximetry and EKG  Procedure:  Sedation: yes   Approach:right paramedian  Guidance: fluoroscopy  Location:lumbar  Level:4-5  Needle Type:Tuohy  Needle Gauge:20  Aspiration:negative  Medications:  Depomedrol:80  Preservative Free Saline:1mL  Isovue:1mL    Post Assessment:  Pt Tolerance:patient tolerated the procedure well with no apparent complications  Complications:no

## 2018-08-22 NOTE — H&P
INTERVAL HISTORY:    The patient returns for another Lumbar epidural steroid injection today.  Received over 50% relief for greater than 8 weeks.  Pain level is 4 out of 10 recently.  She is taking Tylenol Lyrica without sufficient relief.  She did over 4 weeks of physical therapy and continues those exercises at home.  Her MRI shows stenosis   Current Outpatient Prescriptions on File Prior to Encounter   Medication Sig Dispense Refill   • acetaminophen (TYLENOL) 500 MG tablet Take 1,000 mg by mouth every night.     • ARIPiprazole (ABILIFY) 10 MG tablet Take 10 mg by mouth daily.     • atorvastatin (LIPITOR) 20 MG tablet TAKE 1 TABLET EVERY DAY     • B Complex Vitamins (VITAMIN B COMPLEX) tablet Take 1 tablet by mouth daily.     • BIOTIN PO Take 1 tablet by mouth daily.     • Calcium Carb-Cholecalciferol (CALCIUM 600 + D) 600-200 MG-UNIT tablet Take 2 tablets by mouth daily.     • celecoxib (CeleBREX) 200 MG capsule Take 200 mg by mouth daily.     • cephalexin (KEFLEX) 250 MG capsule      • Coenzyme Q10 (CO Q-10) 400 MG capsule Take 400 mg by mouth daily.     • diphenhydrAMINE-acetaminophen (TYLENOL PM)  MG tablet per tablet Take 1 tablet by mouth.     • Docusate Calcium (STOOL SOFTENER PO) Take  by mouth.     • fesoterodine fumarate (TOVIAZ ER) 8 MG tablet sustained-release 24 hour tablet Take  by mouth.     • hydrALAZINE (APRESOLINE) 25 MG tablet Take 25 mg by mouth 4 (Four) Times a Day.     • lamoTRIgine (LaMICtal) 100 MG tablet Take 100 mg by mouth 2 (two) times a day.     • linaclotide (LINZESS) 72 MCG capsule capsule Take 1 capsule by mouth Every Morning Before Breakfast. 90 capsule 1   • lithium carbonate 300 MG capsule Take 300 mg by mouth daily.     • magnesium oxide (MAGOX) 400 (241.3 MG) MG tablet tablet Take 400 mg by mouth daily.     • melatonin 5 MG tablet tablet Take 5 mg by mouth At Night As Needed.     • ondansetron (ZOFRAN) 4 MG tablet Take 1 tablet by mouth Every 8 (Eight) Hours As Needed  "for Nausea or Vomiting. 30 tablet 0   • pantoprazole (PROTONIX) 40 MG EC tablet TAKE 1 TABLET BY MOUTH TWICE DAILY 180 tablet 0   • pregabalin (LYRICA) 50 MG capsule Take 50 mg by mouth 2 (Two) Times a Day.     • Probiotic Product (SOLUBLE FIBER/PROBIOTICS PO) Take 1 tablet by mouth 3 (three) times a day.     • solifenacin (VESICARE) 10 MG tablet Take 10 mg by mouth.     • traMADol (ULTRAM) 50 MG tablet Take 50 mg by mouth As Needed.     • aspirin 81 MG tablet Take 81 mg by mouth daily.     • nitrofurantoin, macrocrystal-monohydrate, (MACROBID) 100 MG capsule      • Sofosbuvir 400 MG tablet Take  by mouth.       No current facility-administered medications on file prior to encounter.        Past Medical History:   Diagnosis Date   • Acid reflux    • Arthritis    • Gtz esophagus    • Bipolar 2 disorder (CMS/HCC)    • Diverticulosis    • Dizziness    • Frequent UTI    • H/O gastroesophageal reflux (GERD)    • H/O Infiltrating ductal carcinoma of left breast 2002    Stage IIA, Grade 3 ER/NC +, HER2 -, treated with 5 years of tamoxifen, 5 years of Femara, completed in 2012   • High cholesterol    • History of alcoholism (CMS/HCC)     40 YRS AGO   • History of Clostridium difficile colitis 2014   • History of gastric ulcer    • History of Holter monitoring     WEARING CURRENTLY   • Hypertension    • Irregular heartbeat    • Low back pain    • Mass of right breast on mammogram 03/17/2016    10 o'clock position, 4 cm from the nipple,   • Neuropathy    • PONV (postoperative nausea and vomiting)    • Raynaud disease        No hematologic infectious or constitutional symptoms    Exam:  /85 (BP Location: Left arm, Patient Position: Sitting)   Pulse 76   Temp 36.7 °C (98 °F) (Oral)   Resp 16   Ht 168.9 cm (66.5\")   Wt 59 kg (130 lb)   SpO2 96%   BMI 20.67 kg/m²   Airway Mallampatti 2  Alert and oriented      Diagnosis:  Post-Op Diagnosis Codes:     * Spinal stenosis of lumbar region without neurogenic " claudication [M48.061]     * Lumbar degenerative disc disease [M51.36]     * Lumbar neuritis [M54.16]    Plan:  Lumbar epidural steroid injection under fluoroscopic guidance, L4    I have encouraged them to continue:  1.  Physical therapy exercises at home as prescribed by physical therapy or from the pain clinic handout (given to the patient).  Continuation of these exercises every day, or multiple times per week, even when the patient has good pain relief, was stressed to the patient as a preventative measure to decrease the frequency and severity of future pain episodes.  2.  Continue pain medicines as already prescribed.  If patient not currently taking any, it is recommended to begin Acetaminophen 1000 mg po q 8 hours.  If other medicines containing Acetaminophen are currently prescribed, maintain daily dose at 3000mg.    3.  If they can tolerate NSAIDS, it is recommended to take Ibuprofen 600 mg po q 6 hours for 7 days during pain exacerbations.   Alternatively, they may substitute an NSAID of their choice (e.g. Aleve)  4.  Heat and ice to the affected area as tolerated for pain control.  It was discussed that heating pads can cause burns.  5.  Low impact exercise such as walking or water exercise was recommended to maintain overall health and aid in weight control.   6.  Follow up as needed for subsequent injections.  7.  Patient was counseled to abstain from tobacco products.

## 2018-09-12 ENCOUNTER — TELEPHONE (OUTPATIENT)
Dept: GASTROENTEROLOGY | Facility: CLINIC | Age: 73
End: 2018-09-12

## 2018-09-12 NOTE — TELEPHONE ENCOUNTER
Called pt and pt reports that she has been having difficulty with vomiting.  She states the vomiting returned yesterday. Pt denies any new foods in her diet .  She report that she started a new supplement last Wednesday but has been doing fine with it.  She had to take zofran 3x yesterday and still vomited. Pt states today she has been nauseated but has not vomited.  She did take a dose of zofran today and it did help. Pt denies a fever and chills. Pt does have a hx of constipation.  Pt reports that her last bm was 4 days ago. Pt takes a stool softener and miralax. Pt states it has been a couple of days since she has taken the miralax.  Advised pt to take the miralax and advised that she may need to take this 2x a day for a couple of day.  Pt also reports that Dr Kerns was going to do testing for her gallbladder.  ADvised would send message to Dr Kerns and in the meantime if symptoms worsen to seek medical attn.  Pt verb understanding.

## 2018-09-12 NOTE — TELEPHONE ENCOUNTER
Patient had ultrasound in June, was scheduled for HIDA scan but never completed this.  Can reschedule this HIDA scan if she continues to be symptomatic.

## 2018-09-12 NOTE — TELEPHONE ENCOUNTER
----- Message from Mary Kate Armstrong sent at 9/12/2018 10:45 AM EDT -----  Regarding: PT IS CALLING   Contact: [phone]  ABOUT HER SYMPTOMS, SHE IS VOMITING & WOULD LIKE TO SPEAK WITH A NURSE.

## 2018-09-13 NOTE — TELEPHONE ENCOUNTER
Called pt and advised per DR Kerns that she had us in June and was scheduled for hida but did not have it.  Advised per Dr Kerns, that if still symptomatic he recommends getting the hida scan. Pt verb understanding.

## 2018-09-18 ENCOUNTER — TELEPHONE (OUTPATIENT)
Dept: GASTROENTEROLOGY | Facility: CLINIC | Age: 73
End: 2018-09-18

## 2018-09-18 DIAGNOSIS — R11.2 NAUSEA AND VOMITING, INTRACTABILITY OF VOMITING NOT SPECIFIED, UNSPECIFIED VOMITING TYPE: Primary | ICD-10-CM

## 2018-09-18 NOTE — TELEPHONE ENCOUNTER
"Call received from Erick in [facility] @ [phone].  Pt for HIDA tomorrow.  Still out of CCK - request reorder without.  Include \"Ok to use  Ensure\".    Order placed - message to Dr Kerns.  "

## 2018-09-19 ENCOUNTER — HOSPITAL ENCOUNTER (OUTPATIENT)
Dept: NUCLEAR MEDICINE | Facility: HOSPITAL | Age: 73
Discharge: HOME | End: 2018-09-19
Attending: INTERNAL MEDICINE

## 2018-09-19 DIAGNOSIS — R11.2 NAUSEA AND VOMITING, INTRACTABILITY OF VOMITING NOT SPECIFIED, UNSPECIFIED VOMITING TYPE: ICD-10-CM

## 2018-09-19 PROCEDURE — 78226 HEPATOBILIARY SYSTEM IMAGING: CPT

## 2018-09-19 PROCEDURE — A9537 TC99M MEBROFENIN: HCPCS | Performed by: INTERNAL MEDICINE

## 2018-09-19 PROCEDURE — 0 TECHNETIUM TC 99M MEBROFENIN KIT: Performed by: INTERNAL MEDICINE

## 2018-09-19 RX ORDER — KIT FOR THE PREPARATION OF TECHNETIUM TC 99M MEBROFENIN 45 MG/10ML
1 INJECTION, POWDER, LYOPHILIZED, FOR SOLUTION INTRAVENOUS
Status: COMPLETED | OUTPATIENT
Start: 2018-09-19 | End: 2018-09-19

## 2018-09-19 RX ADMIN — MEBROFENIN 1 DOSE: 45 INJECTION, POWDER, LYOPHILIZED, FOR SOLUTION INTRAVENOUS at 08:25

## 2018-09-20 ENCOUNTER — TELEPHONE (OUTPATIENT)
Dept: GASTROENTEROLOGY | Facility: CLINIC | Age: 73
End: 2018-09-20

## 2018-09-20 NOTE — TELEPHONE ENCOUNTER
Call to pt.  Advise per DR Kerns that HIDA essentially normal.    Pt verb understanding.  States continues to have problems with severe constipation.  Uses miralax, stool softener, and probiotic - BM Q4-5 days.  Must strain to pass.   Experiences vomiting when constipation worsening.  States will continue with above measures to manage constipation and see how does.    Advise will also update Dr Kerns for recommendations.  Verb understanding.

## 2018-09-20 NOTE — TELEPHONE ENCOUNTER
----- Message from Darci ANAYA MD sent at 9/19/2018  5:20 PM EDT -----  Regarding: HIDA scan results  Okay to call results, essentially normal.  ----- Message -----  From: Interface, Rad Results [place] In  Sent: 9/19/2018   1:57 PM  To: Darci ANAYA MD

## 2018-09-20 NOTE — TELEPHONE ENCOUNTER
With persistent constipation issues despite use of laxative, stool softener and probiotic, would consider Amitiza 8 µg twice daily or Linzess 290 µg daily.  Patient can follow-up in office to discuss these options if she wishes.

## 2018-09-21 NOTE — TELEPHONE ENCOUNTER
Call to pt.  Advise per DR Kerns that with persistent constipation issues despite use of laxative, stool softener and probiotic, would consider amitiza or linzess.  Can f/u in office to discuss if wishes.    Pt verb understanding.  States is on linzess.  Has had more normal BM today.  States will call back if needed.

## 2018-10-04 RX ORDER — LINACLOTIDE 72 UG/1
72 CAPSULE, GELATIN COATED ORAL
Qty: 90 CAPSULE | Refills: 0 | Status: SHIPPED | OUTPATIENT
Start: 2018-10-04 | End: 2019-02-28 | Stop reason: SDUPTHER

## 2018-10-25 ENCOUNTER — TRANSCRIBE ORDERS (OUTPATIENT)
Dept: ADMINISTRATIVE | Facility: HOSPITAL | Age: 73
End: 2018-10-25

## 2018-10-25 DIAGNOSIS — M54.16 LUMBAR RADICULOPATHY: Primary | ICD-10-CM

## 2018-10-31 ENCOUNTER — HOSPITAL ENCOUNTER (OUTPATIENT)
Dept: CT IMAGING | Facility: HOSPITAL | Age: 73
Discharge: HOME | End: 2018-10-31
Admitting: PSYCHIATRY & NEUROLOGY

## 2018-10-31 DIAGNOSIS — M54.16 LUMBAR RADICULOPATHY: ICD-10-CM

## 2018-10-31 PROCEDURE — 72131 CT LUMBAR SPINE W/O DYE: CPT

## 2018-11-06 ENCOUNTER — OFFICE VISIT (OUTPATIENT)
Dept: ORTHOPEDIC SURGERY | Facility: CLINIC | Age: 73
End: 2018-11-06

## 2018-11-06 VITALS — WEIGHT: 136 LBS | TEMPERATURE: 97.9 F | HEIGHT: 67 IN | BODY MASS INDEX: 21.35 KG/M2

## 2018-11-06 DIAGNOSIS — M48.062 SPINAL STENOSIS OF LUMBAR REGION WITH NEUROGENIC CLAUDICATION: ICD-10-CM

## 2018-11-06 DIAGNOSIS — M43.16 SPONDYLOLISTHESIS OF LUMBAR REGION: Primary | ICD-10-CM

## 2018-11-06 PROCEDURE — 99214 OFFICE O/P EST MOD 30 MIN: CPT | Performed by: ORTHOPAEDIC SURGERY

## 2018-11-06 RX ORDER — HYDROCODONE BITARTRATE AND ACETAMINOPHEN 7.5; 325 MG/1; MG/1
1 TABLET ORAL EVERY 8 HOURS PRN
Qty: 40 TABLET | Refills: 0 | Status: SHIPPED | OUTPATIENT
Start: 2018-11-06 | End: 2018-11-16 | Stop reason: SDUPTHER

## 2018-11-06 RX ORDER — BACLOFEN 10 MG/1
10 TABLET ORAL 3 TIMES DAILY
Status: ON HOLD | COMMUNITY
Start: 2018-10-25 | End: 2023-03-15

## 2018-11-06 NOTE — PROGRESS NOTES
New patient or new problem visit    CC: Right hip and leg pain    HPI: He has right hip and leg pain in the L4 and L5 dermatomal distribution which is failed to improve with recent conservative care including epidural injections.  For a long time she did well with said epidurals.  No  bowel or bladder complaints, perhaps some imbalance.  Her pain is primarily leg but there is a substantial component of back pain as well.    PFSH: See attached.  She is a nonsmoker.  She does have a history of the atrial fibrillation and has some implanted pacemaker and/or monitor.  Not on anticoagulants.    ROS: See attached    PE: Constitutional: Vital signs above-noted.  Awake, alert and oriented    Psychiatric: Affect and insight do not appear grossly disturbed.    Pulmonary: Breathing is unlabored, color is good.    Skin: Warm, dry and normal turgor    Cardiac:  pedal pulses intact.  No edema.    Eyesight and hearing appear adequate for examination purposes      Musculoskeletal:  There is mild tenderness to percussion and palpation of the spine. Motion appears undisturbed.  Posture is unremarkable to coronal and sagittal inspection.    The skin about the area is intact.  There is no palpable or visible deformity.  There is no local spasm.       Neurologic:   Reflexes are 2+ and symmetrical in the patellae absent in the Achilles.   Motor function is undisturbed in quadriceps, EHL, and gastrocnemius      Sensation appears symmetrically intact to light touch   .  In the bilateral lower extremities there is no evidence of atrophy.   Clonus is absent..  Gait appears undisturbed.  SLR test negative    XRAY: Plain film x-rays show a 1 cm spondylolisthesis increases from minimal spondylosis and 2013.  MRI from 2017 demonstrated severe stenosis at L4 5 and degenerative change in the disc at 5 one.  The facets appear well maintained at 5 one and other level disc looked fairly good.  Marketed disc degeneration at L4 5.  Recent CT scan shows  very similar findings to the MRI there is no new evidence of stenosis.    Other: n/a    Impression: 4-5 spondylolisthesis with severe spinal stenosis and lumbosacral disc degeneration.    Plan: I think we should do the minimal intervention which in this case would be an L4 5 laminectomy and fusion with instrumentation.  Some of the back pain could be from the lumbosacral disc flattening but my guess is this is gone on for decades whereas the spondylolisthesis has increased dramatically in the last 5 years.  I plan L4 5 laminectomy and fusion with instrumentation.I discussed the risks and benefits of posterior spinal fusion, including where applicable, laminectomy.  Risks include adverse anesthetic events such as death, stroke and myocardial infarction.  More specific surgical risks include infection, nonunion, hardware failure, spinal fluid leakage, nerve root injury or paralysis, visceral or vascular injury, persistent pain, deep venous thrombosis, and pulmonary embolism among others. There is a 70 to 90 % chance of success.   Alternatives have been discussed.  After careful consideration the patient wishes to proceed with surgery.  I have given her some hydrocodone for the meantime.

## 2018-11-09 PROBLEM — M43.16 SPONDYLOLISTHESIS OF LUMBAR REGION: Status: ACTIVE | Noted: 2018-11-09

## 2018-11-09 PROBLEM — M48.062 SPINAL STENOSIS OF LUMBAR REGION WITH NEUROGENIC CLAUDICATION: Status: ACTIVE | Noted: 2018-11-09

## 2018-11-16 RX ORDER — HYDROCODONE BITARTRATE AND ACETAMINOPHEN 7.5; 325 MG/1; MG/1
1 TABLET ORAL EVERY 8 HOURS PRN
Qty: 40 TABLET | Refills: 0 | Status: SHIPPED | OUTPATIENT
Start: 2018-11-16 | End: 2018-12-06 | Stop reason: HOSPADM

## 2018-11-27 ENCOUNTER — APPOINTMENT (OUTPATIENT)
Dept: PREADMISSION TESTING | Facility: HOSPITAL | Age: 73
End: 2018-11-27

## 2018-11-27 VITALS
TEMPERATURE: 97.1 F | HEART RATE: 96 BPM | SYSTOLIC BLOOD PRESSURE: 132 MMHG | WEIGHT: 130 LBS | OXYGEN SATURATION: 97 % | BODY MASS INDEX: 20.4 KG/M2 | DIASTOLIC BLOOD PRESSURE: 81 MMHG | RESPIRATION RATE: 20 BRPM | HEIGHT: 67 IN

## 2018-11-27 LAB
ANION GAP SERPL CALCULATED.3IONS-SCNC: 12.1 MMOL/L
BUN BLD-MCNC: 12 MG/DL (ref 8–23)
BUN/CREAT SERPL: 15.8 (ref 7–25)
CALCIUM SPEC-SCNC: 10.4 MG/DL (ref 8.6–10.5)
CHLORIDE SERPL-SCNC: 102 MMOL/L (ref 98–107)
CO2 SERPL-SCNC: 25.9 MMOL/L (ref 22–29)
CREAT BLD-MCNC: 0.76 MG/DL (ref 0.57–1)
DEPRECATED RDW RBC AUTO: 41.9 FL (ref 37–54)
ERYTHROCYTE [DISTWIDTH] IN BLOOD BY AUTOMATED COUNT: 13 % (ref 11.7–13)
GFR SERPL CREATININE-BSD FRML MDRD: 75 ML/MIN/1.73
GLUCOSE BLD-MCNC: 171 MG/DL (ref 65–99)
HCT VFR BLD AUTO: 40.5 % (ref 35.6–45.5)
HGB BLD-MCNC: 13.1 G/DL (ref 11.9–15.5)
MCH RBC QN AUTO: 28.9 PG (ref 26.9–32)
MCHC RBC AUTO-ENTMCNC: 32.3 G/DL (ref 32.4–36.3)
MCV RBC AUTO: 89.4 FL (ref 80.5–98.2)
PLATELET # BLD AUTO: 240 10*3/MM3 (ref 140–500)
PMV BLD AUTO: 10.1 FL (ref 6–12)
POTASSIUM BLD-SCNC: 3.8 MMOL/L (ref 3.5–5.2)
RBC # BLD AUTO: 4.53 10*6/MM3 (ref 3.9–5.2)
SODIUM BLD-SCNC: 140 MMOL/L (ref 136–145)
WBC NRBC COR # BLD: 6.47 10*3/MM3 (ref 4.5–10.7)

## 2018-11-27 PROCEDURE — 85027 COMPLETE CBC AUTOMATED: CPT | Performed by: ORTHOPAEDIC SURGERY

## 2018-11-27 PROCEDURE — 36415 COLL VENOUS BLD VENIPUNCTURE: CPT

## 2018-11-27 PROCEDURE — 93005 ELECTROCARDIOGRAM TRACING: CPT

## 2018-11-27 PROCEDURE — 80048 BASIC METABOLIC PNL TOTAL CA: CPT | Performed by: ORTHOPAEDIC SURGERY

## 2018-11-27 PROCEDURE — 93010 ELECTROCARDIOGRAM REPORT: CPT | Performed by: INTERNAL MEDICINE

## 2018-11-27 RX ORDER — PANTOPRAZOLE SODIUM 40 MG/1
40 TABLET, DELAYED RELEASE ORAL 2 TIMES DAILY
COMMUNITY
End: 2021-09-02

## 2018-11-27 NOTE — DISCHARGE INSTRUCTIONS
Take the following medications the morning of surgery with a small sip of water:    HYDRALAZINE, PROTONIX, ABILIFY, LITHIUM, LAMICTAL    ARRIVE TO MAIN OR AT 5:30 AM ON 12/3/18    General Instructions:  • Do not eat solid food after midnight the night before surgery.  • You may drink clear liquids day of surgery but must stop at least one hour before your hospital arrival time.  • It is beneficial for you to have a clear drink that contains carbohydrates the day of surgery.  We suggest a 12 to 20 ounce bottle of Gatorade or Powerade for non-diabetic patients or a 12 to 20 ounce bottle of G2 or Powerade Zero for diabetic patients. (Pediatric patients, are not advised to drink a 12 to 20 ounce carbohydrate drink)    Clear liquids are liquids you can see through.  Nothing red in color.     Plain water                               Sports drinks  Sodas                                   Gelatin (Jell-O)  Fruit juices without pulp such as white grape juice and apple juice  Popsicles that contain no fruit or yogurt  Tea or coffee (no cream or milk added)  Gatorade / Powerade  G2 / Powerade Zero    • Infants may have breast milk up to four hours before surgery.  • Infants drinking formula may drink formula up to six hours before surgery.   • Patients who avoid smoking, chewing tobacco and alcohol for 4 weeks prior to surgery have a reduced risk of post-operative complications.  Quit smoking as many days before surgery as you can.  • Do not smoke, use chewing tobacco or drink alcohol the day of surgery.   • If applicable bring your C-PAP/ BI-PAP machine.  • Bring any papers given to you in the doctor’s office.  • Wear clean comfortable clothes and socks.  • Do not wear contact lenses or make-up.  Bring a case for your glasses.   • Bring crutches or walker if applicable.  • Remove all piercings.  Leave jewelry and any other valuables at home.  • Hair extensions with metal clips must be removed prior to surgery.  • The  Pre-Admission Testing nurse will instruct you to bring medications if unable to obtain an accurate list in Pre-Admission Testing.        Preventing a Surgical Site Infection:  • For 2 to 3 days before surgery, avoid shaving with a razor because the razor can irritate skin and make it easier to develop an infection.    • Any areas of open skin can increase the risk of a post-operative wound infection by allowing bacteria to enter and travel throughout the body.  Notify your surgeon if you have any skin wounds / rashes even if it is not near the expected surgical site.  The area will need assessed to determine if surgery should be delayed until it is healed.  • The night prior to surgery sleep in a clean bed with clean clothing.  Do not allow pets to sleep with you.  • Shower on the morning of surgery using a fresh bar of anti-bacterial soap (such as Dial) and clean washcloth.  Dry with a clean towel and dress in clean clothing.  • Ask your surgeon if you will be receiving antibiotics prior to surgery.  • Make sure you, your family, and all healthcare providers clean their hands with soap and water or an alcohol based hand  before caring for you or your wound.    Day of surgery:  Upon arrival, a Pre-op nurse and Anesthesiologist will review your health history, obtain vital signs, and answer questions you may have.  The only belongings needed at this time will be your home medications and if applicable your C-PAP/BI-PAP machine.  If you are staying overnight your family can leave the rest of your belongings in the car and bring them to your room later.  A Pre-op nurse will start an IV and you may receive medication in preparation for surgery, including something to help you relax.  Your family will be able to see you in the Pre-op area.  While you are in surgery your family should notify the waiting room  if they leave the waiting room area and provide a contact phone number.    Please be aware  that surgery does come with discomfort.  We want to make every effort to control your discomfort so please discuss any uncontrolled symptoms with your nurse.   Your doctor will most likely have prescribed pain medications.      If you are going home after surgery you will receive individualized written care instructions before being discharged.  A responsible adult must drive you to and from the hospital on the day of your surgery and stay with you for 24 hours.    If you are staying overnight following surgery, you will be transported to your hospital room following the recovery period.  [facility] has all private rooms.    You have received a list of surgical assistants for your reference.  If you have any questions please call Pre-Admission Testing at [phone].  Deductibles and co-payments are collected on the day of service. Please be prepared to pay the required co-pay, deductible or deposit on the day of service as defined by your plan.

## 2018-12-03 ENCOUNTER — APPOINTMENT (OUTPATIENT)
Dept: GENERAL RADIOLOGY | Facility: HOSPITAL | Age: 73
End: 2018-12-03

## 2018-12-03 ENCOUNTER — HOSPITAL ENCOUNTER (INPATIENT)
Facility: HOSPITAL | Age: 73
LOS: 3 days | Discharge: HOME | End: 2018-12-06
Attending: ORTHOPAEDIC SURGERY | Admitting: ORTHOPAEDIC SURGERY

## 2018-12-03 ENCOUNTER — ANESTHESIA EVENT (OUTPATIENT)
Dept: PERIOP | Facility: HOSPITAL | Age: 73
End: 2018-12-03

## 2018-12-03 ENCOUNTER — ANESTHESIA (OUTPATIENT)
Dept: PERIOP | Facility: HOSPITAL | Age: 73
End: 2018-12-03

## 2018-12-03 DIAGNOSIS — M48.062 SPINAL STENOSIS OF LUMBAR REGION WITH NEUROGENIC CLAUDICATION: ICD-10-CM

## 2018-12-03 DIAGNOSIS — M43.16 SPONDYLOLISTHESIS OF LUMBAR REGION: ICD-10-CM

## 2018-12-03 LAB
HCT VFR BLD AUTO: 39.6 % (ref 35.6–45.5)
HGB BLD-MCNC: 12 G/DL (ref 11.9–15.5)

## 2018-12-03 PROCEDURE — 25010000002 HYDROMORPHONE PER 4 MG: Performed by: NURSE ANESTHETIST, CERTIFIED REGISTERED

## 2018-12-03 PROCEDURE — 25010000002 FENTANYL CITRATE (PF) 100 MCG/2ML SOLUTION: Performed by: NURSE ANESTHETIST, CERTIFIED REGISTERED

## 2018-12-03 PROCEDURE — 25010000002 PROPOFOL 10 MG/ML EMULSION: Performed by: NURSE ANESTHETIST, CERTIFIED REGISTERED

## 2018-12-03 PROCEDURE — 20939 BONE MARROW ASPIR BONE GRFG: CPT | Performed by: ORTHOPAEDIC SURGERY

## 2018-12-03 PROCEDURE — 25010000002 PHENYLEPHRINE PER 1 ML: Performed by: NURSE ANESTHETIST, CERTIFIED REGISTERED

## 2018-12-03 PROCEDURE — C1713 ANCHOR/SCREW BN/BN,TIS/BN: HCPCS | Performed by: ORTHOPAEDIC SURGERY

## 2018-12-03 PROCEDURE — 25010000002 DIPHENHYDRAMINE PER 50 MG: Performed by: NURSE ANESTHETIST, CERTIFIED REGISTERED

## 2018-12-03 PROCEDURE — 25010000002 DEXAMETHASONE PER 1 MG: Performed by: NURSE ANESTHETIST, CERTIFIED REGISTERED

## 2018-12-03 PROCEDURE — 25010000003 CEFAZOLIN IN DEXTROSE 2-4 GM/100ML-% SOLUTION: Performed by: ORTHOPAEDIC SURGERY

## 2018-12-03 PROCEDURE — 25010000003 CEFAZOLIN PER 500 MG: Performed by: ORTHOPAEDIC SURGERY

## 2018-12-03 PROCEDURE — 0SG0071 FUSION OF LUMBAR VERTEBRAL JOINT WITH AUTOLOGOUS TISSUE SUBSTITUTE, POSTERIOR APPROACH, POSTERIOR COLUMN, OPEN APPROACH: ICD-10-PCS | Performed by: ORTHOPAEDIC SURGERY

## 2018-12-03 PROCEDURE — 72100 X-RAY EXAM L-S SPINE 2/3 VWS: CPT

## 2018-12-03 PROCEDURE — 07DR3ZZ EXTRACTION OF ILIAC BONE MARROW, PERCUTANEOUS APPROACH: ICD-10-PCS | Performed by: ORTHOPAEDIC SURGERY

## 2018-12-03 PROCEDURE — 25010000002 MIDAZOLAM PER 1 MG: Performed by: ANESTHESIOLOGY

## 2018-12-03 PROCEDURE — 85018 HEMOGLOBIN: CPT | Performed by: ORTHOPAEDIC SURGERY

## 2018-12-03 PROCEDURE — 63047 LAM FACETEC & FORAMOT LUMBAR: CPT | Performed by: ORTHOPAEDIC SURGERY

## 2018-12-03 PROCEDURE — 76000 FLUOROSCOPY <1 HR PHYS/QHP: CPT

## 2018-12-03 PROCEDURE — 85014 HEMATOCRIT: CPT | Performed by: ORTHOPAEDIC SURGERY

## 2018-12-03 PROCEDURE — 22612 ARTHRD PST TQ 1NTRSPC LUMBAR: CPT | Performed by: ORTHOPAEDIC SURGERY

## 2018-12-03 PROCEDURE — 25010000002 ONDANSETRON PER 1 MG: Performed by: NURSE ANESTHETIST, CERTIFIED REGISTERED

## 2018-12-03 PROCEDURE — 22840 INSERT SPINE FIXATION DEVICE: CPT | Performed by: ORTHOPAEDIC SURGERY

## 2018-12-03 DEVICE — SCRW EXPEDIUM PA TI 7X40MM: Type: IMPLANTABLE DEVICE | Site: SPINE LUMBAR | Status: FUNCTIONAL

## 2018-12-03 DEVICE — SCRW INNR EXPEDIUM: Type: IMPLANTABLE DEVICE | Site: SPINE LUMBAR | Status: FUNCTIONAL

## 2018-12-03 DEVICE — STRIP 7800310 MASTERGRAFT STRIP 10CM
Type: IMPLANTABLE DEVICE | Site: SPINE LUMBAR | Status: FUNCTIONAL
Brand: MASTERGRAFT® STRIP

## 2018-12-03 DEVICE — ROD PREBNT SPINE EXPEDIUM TI 5.5X45MM: Type: IMPLANTABLE DEVICE | Site: SPINE LUMBAR | Status: FUNCTIONAL

## 2018-12-03 RX ORDER — DOCUSATE SODIUM 100 MG/1
100 CAPSULE, LIQUID FILLED ORAL 2 TIMES DAILY
Status: DISCONTINUED | OUTPATIENT
Start: 2018-12-03 | End: 2018-12-06 | Stop reason: HOSPADM

## 2018-12-03 RX ORDER — ROCURONIUM BROMIDE 10 MG/ML
INJECTION, SOLUTION INTRAVENOUS AS NEEDED
Status: DISCONTINUED | OUTPATIENT
Start: 2018-12-03 | End: 2018-12-03 | Stop reason: SURG

## 2018-12-03 RX ORDER — HYDROCODONE BITARTRATE AND ACETAMINOPHEN 7.5; 325 MG/1; MG/1
1 TABLET ORAL ONCE AS NEEDED
Status: DISCONTINUED | OUTPATIENT
Start: 2018-12-03 | End: 2018-12-03 | Stop reason: HOSPADM

## 2018-12-03 RX ORDER — ONDANSETRON 2 MG/ML
4 INJECTION INTRAMUSCULAR; INTRAVENOUS EVERY 6 HOURS PRN
Status: DISCONTINUED | OUTPATIENT
Start: 2018-12-03 | End: 2018-12-06 | Stop reason: HOSPADM

## 2018-12-03 RX ORDER — NALOXONE HCL 0.4 MG/ML
0.2 VIAL (ML) INJECTION AS NEEDED
Status: DISCONTINUED | OUTPATIENT
Start: 2018-12-03 | End: 2018-12-03 | Stop reason: HOSPADM

## 2018-12-03 RX ORDER — IPRATROPIUM BROMIDE AND ALBUTEROL SULFATE 2.5; .5 MG/3ML; MG/3ML
3 SOLUTION RESPIRATORY (INHALATION) EVERY 4 HOURS PRN
Status: DISCONTINUED | OUTPATIENT
Start: 2018-12-03 | End: 2018-12-06 | Stop reason: HOSPADM

## 2018-12-03 RX ORDER — PROMETHAZINE HYDROCHLORIDE 25 MG/ML
12.5 INJECTION, SOLUTION INTRAMUSCULAR; INTRAVENOUS ONCE AS NEEDED
Status: DISCONTINUED | OUTPATIENT
Start: 2018-12-03 | End: 2018-12-03 | Stop reason: HOSPADM

## 2018-12-03 RX ORDER — CEFAZOLIN SODIUM 2 G/100ML
2 INJECTION, SOLUTION INTRAVENOUS ONCE
Status: COMPLETED | OUTPATIENT
Start: 2018-12-03 | End: 2018-12-03

## 2018-12-03 RX ORDER — OXYBUTYNIN CHLORIDE 10 MG/1
10 TABLET, EXTENDED RELEASE ORAL DAILY
Status: DISCONTINUED | OUTPATIENT
Start: 2018-12-03 | End: 2018-12-06 | Stop reason: HOSPADM

## 2018-12-03 RX ORDER — SODIUM CHLORIDE, SODIUM LACTATE, POTASSIUM CHLORIDE, CALCIUM CHLORIDE 600; 310; 30; 20 MG/100ML; MG/100ML; MG/100ML; MG/100ML
9 INJECTION, SOLUTION INTRAVENOUS CONTINUOUS
Status: DISCONTINUED | OUTPATIENT
Start: 2018-12-03 | End: 2018-12-06 | Stop reason: HOSPADM

## 2018-12-03 RX ORDER — ARIPIPRAZOLE 10 MG/1
10 TABLET ORAL DAILY
Status: DISCONTINUED | OUTPATIENT
Start: 2018-12-03 | End: 2018-12-06 | Stop reason: HOSPADM

## 2018-12-03 RX ORDER — HYDROMORPHONE HCL IN 0.9% NACL 10 MG/50ML
PATIENT CONTROLLED ANALGESIA SYRINGE INTRAVENOUS CONTINUOUS
Status: DISCONTINUED | OUTPATIENT
Start: 2018-12-03 | End: 2018-12-05

## 2018-12-03 RX ORDER — LITHIUM CARBONATE 300 MG/1
300 CAPSULE ORAL DAILY
Status: DISCONTINUED | OUTPATIENT
Start: 2018-12-04 | End: 2018-12-06 | Stop reason: HOSPADM

## 2018-12-03 RX ORDER — ONDANSETRON 2 MG/ML
INJECTION INTRAMUSCULAR; INTRAVENOUS AS NEEDED
Status: DISCONTINUED | OUTPATIENT
Start: 2018-12-03 | End: 2018-12-03 | Stop reason: SURG

## 2018-12-03 RX ORDER — LAMOTRIGINE 100 MG/1
100 TABLET ORAL EVERY 12 HOURS SCHEDULED
Status: DISCONTINUED | OUTPATIENT
Start: 2018-12-03 | End: 2018-12-06 | Stop reason: HOSPADM

## 2018-12-03 RX ORDER — NALOXONE HCL 0.4 MG/ML
0.1 VIAL (ML) INJECTION
Status: DISCONTINUED | OUTPATIENT
Start: 2018-12-03 | End: 2018-12-06 | Stop reason: HOSPADM

## 2018-12-03 RX ORDER — SODIUM CHLORIDE 0.9 % (FLUSH) 0.9 %
1-10 SYRINGE (ML) INJECTION AS NEEDED
Status: DISCONTINUED | OUTPATIENT
Start: 2018-12-03 | End: 2018-12-06 | Stop reason: HOSPADM

## 2018-12-03 RX ORDER — CYCLOBENZAPRINE HCL 10 MG
10 TABLET ORAL 3 TIMES DAILY PRN
Status: DISCONTINUED | OUTPATIENT
Start: 2018-12-03 | End: 2018-12-06 | Stop reason: HOSPADM

## 2018-12-03 RX ORDER — MIDAZOLAM HYDROCHLORIDE 1 MG/ML
0.5 INJECTION INTRAMUSCULAR; INTRAVENOUS
Status: DISCONTINUED | OUTPATIENT
Start: 2018-12-03 | End: 2018-12-03 | Stop reason: HOSPADM

## 2018-12-03 RX ORDER — CHOLECALCIFEROL (VITAMIN D3) 125 MCG
5 CAPSULE ORAL NIGHTLY PRN
Status: DISCONTINUED | OUTPATIENT
Start: 2018-12-03 | End: 2018-12-06 | Stop reason: HOSPADM

## 2018-12-03 RX ORDER — FAMOTIDINE 10 MG/ML
20 INJECTION, SOLUTION INTRAVENOUS ONCE
Status: COMPLETED | OUTPATIENT
Start: 2018-12-03 | End: 2018-12-03

## 2018-12-03 RX ORDER — ATORVASTATIN CALCIUM 20 MG/1
20 TABLET, FILM COATED ORAL DAILY
Status: DISCONTINUED | OUTPATIENT
Start: 2018-12-03 | End: 2018-12-06 | Stop reason: HOSPADM

## 2018-12-03 RX ORDER — SENNA AND DOCUSATE SODIUM 50; 8.6 MG/1; MG/1
1 TABLET, FILM COATED ORAL 2 TIMES DAILY
Status: DISCONTINUED | OUTPATIENT
Start: 2018-12-03 | End: 2018-12-06 | Stop reason: HOSPADM

## 2018-12-03 RX ORDER — HYDRALAZINE HYDROCHLORIDE 25 MG/1
25 TABLET, FILM COATED ORAL 3 TIMES DAILY
Status: DISCONTINUED | OUTPATIENT
Start: 2018-12-03 | End: 2018-12-04

## 2018-12-03 RX ORDER — CEFAZOLIN SODIUM 2 G/100ML
2 INJECTION, SOLUTION INTRAVENOUS EVERY 8 HOURS
Status: DISPENSED | OUTPATIENT
Start: 2018-12-03 | End: 2018-12-03

## 2018-12-03 RX ORDER — OXYCODONE HYDROCHLORIDE AND ACETAMINOPHEN 5; 325 MG/1; MG/1
2 TABLET ORAL EVERY 4 HOURS PRN
Status: DISCONTINUED | OUTPATIENT
Start: 2018-12-03 | End: 2018-12-06 | Stop reason: HOSPADM

## 2018-12-03 RX ORDER — HYDROMORPHONE HYDROCHLORIDE 1 MG/ML
0.5 INJECTION, SOLUTION INTRAMUSCULAR; INTRAVENOUS; SUBCUTANEOUS
Status: DISCONTINUED | OUTPATIENT
Start: 2018-12-03 | End: 2018-12-03 | Stop reason: HOSPADM

## 2018-12-03 RX ORDER — ATORVASTATIN CALCIUM 20 MG/1
20 TABLET, FILM COATED ORAL EVERY MORNING
COMMUNITY

## 2018-12-03 RX ORDER — PROMETHAZINE HYDROCHLORIDE 25 MG/1
25 TABLET ORAL ONCE AS NEEDED
Status: DISCONTINUED | OUTPATIENT
Start: 2018-12-03 | End: 2018-12-03 | Stop reason: HOSPADM

## 2018-12-03 RX ORDER — DEXAMETHASONE SODIUM PHOSPHATE 10 MG/ML
INJECTION INTRAMUSCULAR; INTRAVENOUS AS NEEDED
Status: DISCONTINUED | OUTPATIENT
Start: 2018-12-03 | End: 2018-12-03 | Stop reason: SURG

## 2018-12-03 RX ORDER — LABETALOL HYDROCHLORIDE 5 MG/ML
5 INJECTION, SOLUTION INTRAVENOUS
Status: DISCONTINUED | OUTPATIENT
Start: 2018-12-03 | End: 2018-12-03 | Stop reason: HOSPADM

## 2018-12-03 RX ORDER — SODIUM CHLORIDE 0.9 % (FLUSH) 0.9 %
1-10 SYRINGE (ML) INJECTION AS NEEDED
Status: DISCONTINUED | OUTPATIENT
Start: 2018-12-03 | End: 2018-12-03 | Stop reason: HOSPADM

## 2018-12-03 RX ORDER — BISACODYL 10 MG
10 SUPPOSITORY, RECTAL RECTAL DAILY PRN
Status: DISCONTINUED | OUTPATIENT
Start: 2018-12-03 | End: 2018-12-06 | Stop reason: HOSPADM

## 2018-12-03 RX ORDER — LIDOCAINE HYDROCHLORIDE 10 MG/ML
0.5 INJECTION, SOLUTION EPIDURAL; INFILTRATION; INTRACAUDAL; PERINEURAL ONCE AS NEEDED
Status: DISCONTINUED | OUTPATIENT
Start: 2018-12-03 | End: 2018-12-03 | Stop reason: HOSPADM

## 2018-12-03 RX ORDER — SODIUM CHLORIDE 0.9 % (FLUSH) 0.9 %
3 SYRINGE (ML) INJECTION EVERY 12 HOURS SCHEDULED
Status: DISCONTINUED | OUTPATIENT
Start: 2018-12-03 | End: 2018-12-06 | Stop reason: HOSPADM

## 2018-12-03 RX ORDER — PROPOFOL 10 MG/ML
VIAL (ML) INTRAVENOUS AS NEEDED
Status: DISCONTINUED | OUTPATIENT
Start: 2018-12-03 | End: 2018-12-03 | Stop reason: SURG

## 2018-12-03 RX ORDER — PROMETHAZINE HYDROCHLORIDE 25 MG/1
12.5 TABLET ORAL ONCE AS NEEDED
Status: DISCONTINUED | OUTPATIENT
Start: 2018-12-03 | End: 2018-12-03 | Stop reason: HOSPADM

## 2018-12-03 RX ORDER — PROMETHAZINE HYDROCHLORIDE 25 MG/1
25 SUPPOSITORY RECTAL ONCE AS NEEDED
Status: DISCONTINUED | OUTPATIENT
Start: 2018-12-03 | End: 2018-12-03 | Stop reason: HOSPADM

## 2018-12-03 RX ORDER — PANTOPRAZOLE SODIUM 40 MG/1
40 TABLET, DELAYED RELEASE ORAL DAILY
Status: DISCONTINUED | OUTPATIENT
Start: 2018-12-04 | End: 2018-12-06 | Stop reason: HOSPADM

## 2018-12-03 RX ORDER — EPHEDRINE SULFATE 50 MG/ML
5 INJECTION, SOLUTION INTRAVENOUS ONCE AS NEEDED
Status: DISCONTINUED | OUTPATIENT
Start: 2018-12-03 | End: 2018-12-03 | Stop reason: HOSPADM

## 2018-12-03 RX ORDER — ONDANSETRON 4 MG/1
4 TABLET, FILM COATED ORAL EVERY 6 HOURS PRN
Status: DISCONTINUED | OUTPATIENT
Start: 2018-12-03 | End: 2018-12-06 | Stop reason: HOSPADM

## 2018-12-03 RX ORDER — HYDROMORPHONE HCL 110MG/55ML
PATIENT CONTROLLED ANALGESIA SYRINGE INTRAVENOUS AS NEEDED
Status: DISCONTINUED | OUTPATIENT
Start: 2018-12-03 | End: 2018-12-03 | Stop reason: SURG

## 2018-12-03 RX ORDER — FENTANYL CITRATE 50 UG/ML
50 INJECTION, SOLUTION INTRAMUSCULAR; INTRAVENOUS
Status: DISCONTINUED | OUTPATIENT
Start: 2018-12-03 | End: 2018-12-03 | Stop reason: HOSPADM

## 2018-12-03 RX ORDER — DIPHENHYDRAMINE HYDROCHLORIDE 50 MG/ML
12.5 INJECTION INTRAMUSCULAR; INTRAVENOUS
Status: DISCONTINUED | OUTPATIENT
Start: 2018-12-03 | End: 2018-12-03 | Stop reason: HOSPADM

## 2018-12-03 RX ORDER — ONDANSETRON 2 MG/ML
4 INJECTION INTRAMUSCULAR; INTRAVENOUS ONCE AS NEEDED
Status: DISCONTINUED | OUTPATIENT
Start: 2018-12-03 | End: 2018-12-03 | Stop reason: HOSPADM

## 2018-12-03 RX ORDER — SODIUM CHLORIDE AND POTASSIUM CHLORIDE 150; 450 MG/100ML; MG/100ML
100 INJECTION, SOLUTION INTRAVENOUS CONTINUOUS
Status: DISCONTINUED | OUTPATIENT
Start: 2018-12-03 | End: 2018-12-06 | Stop reason: HOSPADM

## 2018-12-03 RX ORDER — FENTANYL CITRATE 50 UG/ML
INJECTION, SOLUTION INTRAMUSCULAR; INTRAVENOUS AS NEEDED
Status: DISCONTINUED | OUTPATIENT
Start: 2018-12-03 | End: 2018-12-03 | Stop reason: SURG

## 2018-12-03 RX ORDER — FLUMAZENIL 0.1 MG/ML
0.2 INJECTION INTRAVENOUS AS NEEDED
Status: DISCONTINUED | OUTPATIENT
Start: 2018-12-03 | End: 2018-12-03 | Stop reason: HOSPADM

## 2018-12-03 RX ORDER — ONDANSETRON 4 MG/1
4 TABLET, ORALLY DISINTEGRATING ORAL EVERY 6 HOURS PRN
Status: DISCONTINUED | OUTPATIENT
Start: 2018-12-03 | End: 2018-12-06 | Stop reason: HOSPADM

## 2018-12-03 RX ORDER — OXYCODONE HYDROCHLORIDE AND ACETAMINOPHEN 5; 325 MG/1; MG/1
TABLET ORAL
Status: COMPLETED
Start: 2018-12-03 | End: 2018-12-03

## 2018-12-03 RX ORDER — TEMAZEPAM 15 MG/1
15 CAPSULE ORAL NIGHTLY PRN
Status: DISCONTINUED | OUTPATIENT
Start: 2018-12-03 | End: 2018-12-06 | Stop reason: HOSPADM

## 2018-12-03 RX ORDER — OXYCODONE AND ACETAMINOPHEN 7.5; 325 MG/1; MG/1
1 TABLET ORAL ONCE AS NEEDED
Status: DISCONTINUED | OUTPATIENT
Start: 2018-12-03 | End: 2018-12-03 | Stop reason: HOSPADM

## 2018-12-03 RX ORDER — LIDOCAINE HYDROCHLORIDE 20 MG/ML
INJECTION, SOLUTION INFILTRATION; PERINEURAL AS NEEDED
Status: DISCONTINUED | OUTPATIENT
Start: 2018-12-03 | End: 2018-12-03 | Stop reason: SURG

## 2018-12-03 RX ADMIN — HYDROMORPHONE HYDROCHLORIDE 0.5 MG: 1 INJECTION, SOLUTION INTRAMUSCULAR; INTRAVENOUS; SUBCUTANEOUS at 10:24

## 2018-12-03 RX ADMIN — SENNOSIDES AND DOCUSATE SODIUM 1 TABLET: 8.6; 5 TABLET ORAL at 20:33

## 2018-12-03 RX ADMIN — OXYCODONE AND ACETAMINOPHEN 2 TABLET: 5; 325 TABLET ORAL at 18:25

## 2018-12-03 RX ADMIN — HYDROMORPHONE HYDROCHLORIDE 1 MG: 2 INJECTION INTRAMUSCULAR; INTRAVENOUS; SUBCUTANEOUS at 08:13

## 2018-12-03 RX ADMIN — OXYCODONE AND ACETAMINOPHEN 2 TABLET: 5; 325 TABLET ORAL at 10:50

## 2018-12-03 RX ADMIN — PHENYLEPHRINE HYDROCHLORIDE 100 MCG: 10 INJECTION INTRAVENOUS at 08:45

## 2018-12-03 RX ADMIN — LAMOTRIGINE 100 MG: 100 TABLET ORAL at 20:33

## 2018-12-03 RX ADMIN — FAMOTIDINE 20 MG: 10 INJECTION, SOLUTION INTRAVENOUS at 06:40

## 2018-12-03 RX ADMIN — PHENYLEPHRINE HYDROCHLORIDE 100 MCG: 10 INJECTION INTRAVENOUS at 07:56

## 2018-12-03 RX ADMIN — POTASSIUM CHLORIDE AND SODIUM CHLORIDE 100 ML/HR: 450; 150 INJECTION, SOLUTION INTRAVENOUS at 13:56

## 2018-12-03 RX ADMIN — PHENYLEPHRINE HYDROCHLORIDE 100 MCG: 10 INJECTION INTRAVENOUS at 08:06

## 2018-12-03 RX ADMIN — PHENYLEPHRINE HYDROCHLORIDE 100 MCG: 10 INJECTION INTRAVENOUS at 08:23

## 2018-12-03 RX ADMIN — ROCURONIUM BROMIDE 10 MG: 10 INJECTION INTRAVENOUS at 08:42

## 2018-12-03 RX ADMIN — HYDROMORPHONE HYDROCHLORIDE 0.5 MG: 1 INJECTION, SOLUTION INTRAMUSCULAR; INTRAVENOUS; SUBCUTANEOUS at 10:38

## 2018-12-03 RX ADMIN — FENTANYL CITRATE 50 MCG: 50 INJECTION, SOLUTION INTRAMUSCULAR; INTRAVENOUS at 10:16

## 2018-12-03 RX ADMIN — FENTANYL CITRATE 50 MCG: 50 INJECTION, SOLUTION INTRAMUSCULAR; INTRAVENOUS at 10:43

## 2018-12-03 RX ADMIN — OXYBUTYNIN CHLORIDE 10 MG: 10 TABLET, FILM COATED, EXTENDED RELEASE ORAL at 14:55

## 2018-12-03 RX ADMIN — FENTANYL CITRATE 150 MCG: 50 INJECTION INTRAMUSCULAR; INTRAVENOUS at 07:33

## 2018-12-03 RX ADMIN — CEFAZOLIN SODIUM 2 G: 2 INJECTION, SOLUTION INTRAVENOUS at 14:54

## 2018-12-03 RX ADMIN — PHENYLEPHRINE HYDROCHLORIDE 100 MCG: 10 INJECTION INTRAVENOUS at 09:20

## 2018-12-03 RX ADMIN — CEFAZOLIN SODIUM 2 G: 2 INJECTION, SOLUTION INTRAVENOUS at 07:43

## 2018-12-03 RX ADMIN — SODIUM CHLORIDE, POTASSIUM CHLORIDE, SODIUM LACTATE AND CALCIUM CHLORIDE 9 ML/HR: 600; 310; 30; 20 INJECTION, SOLUTION INTRAVENOUS at 06:26

## 2018-12-03 RX ADMIN — PHENYLEPHRINE HYDROCHLORIDE 100 MCG: 10 INJECTION INTRAVENOUS at 08:36

## 2018-12-03 RX ADMIN — HYDROMORPHONE HYDROCHLORIDE 0.5 MG: 1 INJECTION, SOLUTION INTRAMUSCULAR; INTRAVENOUS; SUBCUTANEOUS at 10:10

## 2018-12-03 RX ADMIN — PHENYLEPHRINE HYDROCHLORIDE 100 MCG: 10 INJECTION INTRAVENOUS at 09:19

## 2018-12-03 RX ADMIN — ATORVASTATIN CALCIUM 20 MG: 20 TABLET, FILM COATED ORAL at 14:54

## 2018-12-03 RX ADMIN — FENTANYL CITRATE 50 MCG: 50 INJECTION, SOLUTION INTRAMUSCULAR; INTRAVENOUS at 10:04

## 2018-12-03 RX ADMIN — LAMOTRIGINE 100 MG: 100 TABLET ORAL at 14:54

## 2018-12-03 RX ADMIN — OXYCODONE HYDROCHLORIDE AND ACETAMINOPHEN 2 TABLET: 5; 325 TABLET ORAL at 10:50

## 2018-12-03 RX ADMIN — ARIPIPRAZOLE 10 MG: 10 TABLET ORAL at 14:55

## 2018-12-03 RX ADMIN — FENTANYL CITRATE 50 MCG: 50 INJECTION, SOLUTION INTRAMUSCULAR; INTRAVENOUS at 10:32

## 2018-12-03 RX ADMIN — PHENYLEPHRINE HYDROCHLORIDE 100 MCG: 10 INJECTION INTRAVENOUS at 09:34

## 2018-12-03 RX ADMIN — DOCUSATE SODIUM 100 MG: 100 CAPSULE, LIQUID FILLED ORAL at 20:33

## 2018-12-03 RX ADMIN — PROPOFOL 150 MG: 10 INJECTION, EMULSION INTRAVENOUS at 07:33

## 2018-12-03 RX ADMIN — ONDANSETRON 4 MG: 2 INJECTION INTRAMUSCULAR; INTRAVENOUS at 09:35

## 2018-12-03 RX ADMIN — HYDROMORPHONE HYDROCHLORIDE 0.5 MG: 1 INJECTION, SOLUTION INTRAMUSCULAR; INTRAVENOUS; SUBCUTANEOUS at 10:57

## 2018-12-03 RX ADMIN — SUGAMMADEX 200 MG: 100 INJECTION, SOLUTION INTRAVENOUS at 09:45

## 2018-12-03 RX ADMIN — ROCURONIUM BROMIDE 50 MG: 10 INJECTION INTRAVENOUS at 07:33

## 2018-12-03 RX ADMIN — LIDOCAINE HYDROCHLORIDE 75 MG: 20 INJECTION, SOLUTION INFILTRATION; PERINEURAL at 07:33

## 2018-12-03 RX ADMIN — CYCLOBENZAPRINE 10 MG: 10 TABLET, FILM COATED ORAL at 10:49

## 2018-12-03 RX ADMIN — DIPHENHYDRAMINE HYDROCHLORIDE 12.5 MG: 50 INJECTION, SOLUTION INTRAMUSCULAR; INTRAVENOUS at 10:50

## 2018-12-03 RX ADMIN — DEXAMETHASONE SODIUM PHOSPHATE 8 MG: 10 INJECTION INTRAMUSCULAR; INTRAVENOUS at 08:08

## 2018-12-03 RX ADMIN — HYDROMORPHONE HYDROCHLORIDE: 10 INJECTION INTRAMUSCULAR; INTRAVENOUS; SUBCUTANEOUS at 10:09

## 2018-12-03 RX ADMIN — MIDAZOLAM HYDROCHLORIDE 0.5 MG: 1 INJECTION, SOLUTION INTRAMUSCULAR; INTRAVENOUS at 06:40

## 2018-12-03 NOTE — PLAN OF CARE
Problem: Patient Care Overview  Goal: Plan of Care Review  Outcome: Ongoing (interventions implemented as appropriate)   12/03/18 1525   Coping/Psychosocial   Plan of Care Reviewed With patient;family   Plan of Care Review   Progress improving   OTHER   Outcome Summary Patient alert and verbal with needs. Ambulated with assistance and the walker. PCA  in use. Family at the bedside. FC in place. Pain controlled at this time.        Problem: Fall Risk (Adult)  Goal: Identify Related Risk Factors and Signs and Symptoms  Outcome: Ongoing (interventions implemented as appropriate)   12/03/18 1525   Fall Risk (Adult)   Related Risk Factors (Fall Risk) neuro disease/injury;polypharmacy;gait/mobility problems;environment unfamiliar   Signs and Symptoms (Fall Risk) presence of risk factors     Goal: Absence of Fall  Outcome: Ongoing (interventions implemented as appropriate)      Problem: Pain, Acute (Adult)  Goal: Identify Related Risk Factors and Signs and Symptoms  Outcome: Ongoing (interventions implemented as appropriate)   12/03/18 1525   Pain, Acute (Adult)   Related Risk Factors (Acute Pain) surgery   Signs and Symptoms (Acute Pain) verbalization of pain descriptors     Goal: Acceptable Pain Control/Comfort Level  Outcome: Ongoing (interventions implemented as appropriate)

## 2018-12-03 NOTE — ANESTHESIA POSTPROCEDURE EVALUATION
"Patient: Davida Anderson    Procedure Summary     Date:  12/03/18 Room / Location:  [facility] OR [address] [place] [address] MAIN OR    Anesthesia Start:  0728 Anesthesia Stop:  1003    Procedure:  L4-5 laminectomy and fusion with instrumentation (N/A Spine Lumbar) Diagnosis:       Spinal stenosis of lumbar region with neurogenic claudication      Spondylolisthesis of lumbar region      (Spinal stenosis of lumbar region with neurogenic claudication [M48.062])      (Spondylolisthesis of lumbar region [M43.16])    Surgeon:  Austin Keith MD Provider:  Rolanda Hernandez MD    Anesthesia Type:  general ASA Status:  3          Anesthesia Type: general  Last vitals  BP   122/66 (12/03/18 1050)   Temp   36.4 °C (97.6 °F) (12/03/18 0959)   Pulse   112 (12/03/18 1050)   Resp   16 (12/03/18 1050)     SpO2   99 % (12/03/18 1050)     Post Anesthesia Care and Evaluation    Patient location during evaluation: PACU  Patient participation: complete - patient participated  Level of consciousness: awake and alert  Pain management: adequate  Airway patency: patent  Anesthetic complications: No anesthetic complications    Cardiovascular status: acceptable  Respiratory status: acceptable  Hydration status: acceptable    Comments: /66 (BP Location: Right arm, Patient Position: Lying)   Pulse 112   Temp 36.4 °C (97.6 °F) (Oral)   Resp 16   Ht 168.9 cm (66.5\")   Wt 59.4 kg (131 lb)   SpO2 99%   BMI 20.83 kg/m²         "

## 2018-12-03 NOTE — OP NOTE
Operative note    Pre-op diagnosis: L4-5 spondylolisthesis with spinal stenosis    Postoperative diagnosis: Same    Procedure: L4-5 laminectomy medial facetectomy foraminotomy and bilateral posterior lateral fusion with AcroMed expedient instrumentation with local bone graft, right iliac marrow aspiration, and Mastergraft bone graft substitute.    Surgeon: Austin Keith Jr, M.D.    Asst.: Carey Velasquez    EBL: 200 cc    Anesthetic: Gen.    Condition: Satisfactory      Description of procedure: Patient was anesthetized and positioned prone.  Bony prominences were well padded.  The was prepped and draped in sterile fashion.  Incision was made down to lumbodorsal fascia.  The muscle was stripped subperiosteally to the tips of transverse processes.  Curettes and rongeurs removed adherent soft tissue.  Packs were placed for hemostasis.  The graft was decorticated.  A Steffee probe was inserted at the intersection of the anatomic landmarks.  A flexible probe was inserted to check the integrity of the pedicle.  Screws were placed at L4 and 5 bilaterally.  Contoured rods were later added, nuts were tightened and torqued.  Biplanar image intensification showed appropriate screw position and anatomic level and satisfactory posture.      I performed a laminectomy for decompression.   The interspinous ligament was excised.  The upper lamina was removed completely out to within 8-10 mm of the pars intra-articularis.  A small portion of the cephalad aspect of the caudal lamina was excised with a Kerrison rongeur.  Medial facetectomies were performed bilaterally using Kerrison rongeur and osteotomes.  A high-speed idalia was used as well.  Foraminotomies were accomplished with a foraminotomy rongeur.   The disc was determined to be not impinging and stable and was not excised.   Upon completion of the decompression I could pass a ball probe through the affected foramina, and easily retract  the nerve roots  toward the midline.   Bleeding was controlled with bipolar cautery,and Gelfoam with thrombin and/ or FloSeal hemostatic matrix.  No dural trauma was sustained, and no CSF leakage was noted.     Bone marrow was obtained from the right iliac crest.  The marrow was applied to the master graft sponges.  Laminectomy bone, and bone from decortication of the graft bed was cleaned at the table throughout the case and available for bone graft.  The morcellized bone was placed in the decorticated lateral gutter bilaterally.  Master graft sponges were then placed.  Hemostasis was assured.  The wound was then closed with running and interrupted #1 Vicryl for the dorsal fascia, 2-0 Vicryl subcutaneously, then 4-0 Monocryl and Dermabond for the skin.  A sterile dressing was applied.  The patient is about to be recovered.

## 2018-12-03 NOTE — CONSULTS
[facility] Consult H&P    Patient Care Team:  Marek Nguyễn MD as PCP - General  Marek Nguyễn MD as PCP - Family Medicine    Requesting Physician: Dr. Keith    Reason for Consult: Postoperative management of hypertension, COPD    History of Present Illness    This is a very pleasant 73-year-old female who underwent L4-5 laminectomy.  I've been asked to see her for postoperative medical management, specifically management of her hypertension and COPD.  She denies any recent medication changes.  She also has hyperlipidemia, GERD with Gtz's esophagus, bipolar disorder, and bilateral lower extremity peripheral neuropathy.  At present she has no complaints and has just arrived back up on the floor from surgery.  She is somewhat drowsy and drifts off to sleep easily.  Most information comes from her  at the bedside.  The patient is able to wake up and tell me she has no complaints of chest pain, shortness breath, nausea, vomiting.  Family states that her blood pressure and heart rate can run low after surgery as an done in the past.  She denies any dizziness or lightheadedness.    Past Medical History:   Diagnosis Date   • Acid reflux    • Arthritis    • Gtz esophagus    • Bipolar 2 disorder (CMS/HCC)    • Bradycardia     WITH SURGERY   • COPD (chronic obstructive pulmonary disease) (CMS/HCC)     MILD, USES AINHALER PRN   • DDD (degenerative disc disease), lumbar    • Diverticulosis    • Dizziness    • Frequent UTI     RECENT UTI FINISHED COURSE OF ANTIBX   • H/O Infiltrating ductal carcinoma of left breast 2002    Stage IIA, Grade 3 ER/OK +, HER2 -, treated with 5 years of tamoxifen, 5 years of Femara, completed in 2012   • High cholesterol    • History of alcoholism (CMS/HCC)     40 YRS AGO   • History of Clostridium difficile colitis 2014   • History of gastric ulcer    • History of loop recorder    • Hypertension    • Irregular heartbeat     a fib   • Low back pain    • Lumbar herniated  disc    • Mass of right breast on mammogram 2016    10 o'clock position, 4 cm from the nipple,   • Neuropathy    • PONV (postoperative nausea and vomiting)    • Raynaud disease    • Sleep apnea     MILD, NO NEED FOR CPAP   • Urinary incontinence      Past Surgical History:   Procedure Laterality Date   • ANKLE OPEN REDUCTION INTERNAL FIXATION Right 2015    Dr. Dandre Camacho, [facility]   • BACK SURGERY      LUMBAR   • BREAST RECONSTRUCTION, BREAST TISSUE EXPANDER INSERTION Left 2002    Baring Contour Profile expander was placed 550 cc size, filled with 200 cc of saline, Dr. Herbert Napoles, [facility]   • COLONOSCOPY N/A 2014    Dr. Darci Kerns, [facility]; torts, tics, stool, polyp   • CYSTOSCOPY N/A 2010    with TVT takedown, Dr. Simon Wall, [facility]   • EPIDURAL BLOCK     • KNEE SURGERY Left     BROKEN   • MAMMO US BREAST BIOPSY ADDITIONAL W WO DEVICE Left 2002    2:00 position left breast, Infiltrating Ductal Carcinoma, [facility]   • MANDIBLE FRACTURE SURGERY     • MASTECTOMY Left 2002    Left Total Mastectomy and Left Axillary Gary Node Biobsy, Dr. Indu Akins, [facility]   • OTHER SURGICAL HISTORY      CARDIAC LOOP DEVICE, LEFT CHEST   • TENSION FREE VAGINAL TAPING WITH MINI ARC SLING N/A 10/26/2009    Dr. Gina Castillo, [facility]   • UPPER GASTROINTESTINAL ENDOSCOPY  2014    z-line irreg, grade a reflux, gastritis     Family History   Problem Relation Age of Onset   • Breast cancer Mother 35   • Colon cancer Mother 64   • Heart disease Father    • Cancer Father         throat and lung   • Colon polyps Father    • Malig Hyperthermia Neg Hx      Social History     Tobacco Use   • Smoking status: Former Smoker     Packs/day: 3.00     Types: Cigarettes     Last attempt to quit: 1983     Years since quittin.6   • Smokeless tobacco: Never Used   Substance Use Topics   • Alcohol use: No   • Drug use: No     Medications Prior to Admission   Medication Sig Dispense Refill Last Dose   •  ARIPiprazole (ABILIFY) 10 MG tablet Take 10 mg by mouth daily.   12/2/2018 at 0430   • atorvastatin (LIPITOR) 20 MG tablet Take 20 mg by mouth Daily.   12/2/2018 at 0430   • hydrALAZINE (APRESOLINE) 25 MG tablet Take 25 mg by mouth 3 (Three) Times a Day.   12/2/2018 at 0730   • HYDROcodone-acetaminophen (NORCO) 7.5-325 MG per tablet Take 1 tablet by mouth Every 8 (Eight) Hours As Needed for Moderate Pain . 40 tablet 0 12/2/2018 at 0800   • lamoTRIgine (LaMICtal) 100 MG tablet Take 100 mg by mouth 2 (two) times a day.   12/2/2018 at 043-   • LINZESS 72 MCG capsule capsule TAKE 1 CAPSULE BY MOUTH EVERY MORNING BEFORE BREAKFAST. 90 capsule 0 12/2/2018 at 0800   • lithium carbonate 300 MG capsule Take 300 mg by mouth daily.   12/3/2018 at 0430   • pantoprazole (PROTONIX) 40 MG EC tablet Take 40 mg by mouth Daily.   12/3/2018 at 0430   • pregabalin (LYRICA) 50 MG capsule Take 100 mg by mouth 2 (Two) Times a Day.   11/26/2018 at Unknown time   • Probiotic Product (SOLUBLE FIBER/PROBIOTICS PO) Take 1 tablet by mouth Daily.   12/2/2018 at 0800   • solifenacin (VESICARE) 10 MG tablet Take 10 mg by mouth Daily.   12/2/2018 at 0800   • acetaminophen (TYLENOL) 500 MG tablet Take 1,000 mg by mouth every night.   11/26/2018   • ALBUTEROL IN Inhale 2 puffs Every 4 (Four) Hours As Needed.   More than a month at Unknown time   • aspirin 81 MG tablet Take 81 mg by mouth daily.   11/26/2018   • B Complex Vitamins (VITAMIN B COMPLEX) tablet Take 1 tablet by mouth daily.   11/26/2018   • baclofen (LIORESAL) 10 MG tablet Take 10 mg by mouth 3 (Three) Times a Day.   12/1/2018   • BIOTIN PO Take 1 tablet by mouth daily.   11/26/2018   • Calcium Carb-Cholecalciferol (CALCIUM 600 + D) 600-200 MG-UNIT tablet Take 2 tablets by mouth daily.   11/26/2018   • celecoxib (CeleBREX) 200 MG capsule Take 200 mg by mouth daily.   11/26/2018   • Coenzyme Q10 (CO Q-10) 400 MG capsule Take 400 mg by mouth daily.   11/26/2018   •  diphenhydrAMINE-acetaminophen (TYLENOL PM)  MG tablet per tablet Take 1 tablet by mouth Every Night.   11/3/2018   • Docusate Calcium (STOOL SOFTENER PO) Take 1 capsule by mouth As Needed.   2018   • magnesium oxide (MAGOX) 400 (241.3 MG) MG tablet tablet Take 400 mg by mouth daily.   2018   • melatonin 5 MG tablet tablet Take 5 mg by mouth At Night As Needed.   2018   • ondansetron (ZOFRAN) 4 MG tablet Take 1 tablet by mouth Every 8 (Eight) Hours As Needed for Nausea or Vomiting. 30 tablet 0 2018     Allergies:  Morphine    Review of Systems   Constitutional: Negative for chills and fever.   HENT: Negative for congestion and sore throat.    Eyes: Negative for visual disturbance.   Respiratory: Negative for cough, chest tightness, shortness of breath and wheezing.    Cardiovascular: Negative for chest pain, palpitations and leg swelling.   Gastrointestinal: Negative for abdominal distention, abdominal pain, diarrhea, nausea and vomiting.   Endocrine: Negative for polydipsia and polyuria.   Genitourinary: Negative for difficulty urinating, dysuria, frequency and urgency.   Musculoskeletal: Negative for arthralgias and myalgias.   Skin: Negative for color change and rash.   Neurological: Negative for dizziness and light-headedness.        PHYSICAL EXAM    Vital Signs  tMax 24 hrs:  Temp (24hrs), Av.7 °F (36.5 °C), Min:97.5 °F (36.4 °C), Max:98 °F (36.7 °C)    Vitals Ranges:  Temp:  [97.5 °F (36.4 °C)-98 °F (36.7 °C)] 97.8 °F (36.6 °C)  Heart Rate:  [] 101  Resp:  [16-18] 16  BP: (109-138)/(58-86) 131/78    Physical Exam   Constitutional: She is oriented to person, place, and time. She appears well-developed and well-nourished.   HENT:   Head: Normocephalic and atraumatic.   Eyes: EOM are normal. Pupils are equal, round, and reactive to light.   Neck: Neck supple. No tracheal deviation present.   Cardiovascular: Normal rate and regular rhythm. Exam reveals no gallop.   No murmur  heard.  Pulmonary/Chest: Effort normal. No respiratory distress. She has no wheezes.   Abdominal: Soft. Bowel sounds are normal. She exhibits no distension. There is no tenderness.   Musculoskeletal: She exhibits no edema or tenderness.   Neurological: She is oriented to person, place, and time. No cranial nerve deficit.   Drowsy but does wake up and answer questions appropriately and is oriented ×3   Skin: Skin is warm and dry.   Nursing note and vitals reviewed.      Results Review:  Results from last 7 days   Lab Units  12/03/18   1203  11/27/18   1411   WBC 10*3/mm3   --   6.47   HEMOGLOBIN g/dL  12.0  13.1   HEMATOCRIT %  39.6  40.5   PLATELETS 10*3/mm3   --   240     Results from last 7 days   Lab Units  11/27/18   1411   SODIUM mmol/L  140   POTASSIUM mmol/L  3.8   CHLORIDE mmol/L  102   CO2 mmol/L  25.9   BUN mg/dL  12   CREATININE mg/dL  0.76   CALCIUM mg/dL  10.4   GLUCOSE mg/dL  171*       I reviewed the patient's new clinical results.        Spinal stenosis of lumbar region with neurogenic claudication    Spondylolisthesis of lumbar region      Assessment & Plan  Hypertension - blood pressure looks very well controlled at this time.  I will place holding parameters on her hydralazine    COPD - she is a former smoker.  No evidence of exacerbation currently.  Wean oxygen postoperatively as tolerated and encouraged incentive spirometry.  I will order as needed DuoNeb's but she has no wheezing or respiratory difficulty at this time.    Bipolar disorder - continue outpatient psychotropic agents.  Currently appears stable    GERD with history of Gtz's esophagus - continue PPI    Hyperlipidemia - continue statin      I discussed the patients findings and my recommendations with patient and family    Thank you for allowing me to participate in the care of your patient.  I will follow along.    Pk Barajas MD  12/03/18  1:10 PM

## 2018-12-03 NOTE — ANESTHESIA PREPROCEDURE EVALUATION
Anesthesia Evaluation     Patient summary reviewed and Nursing notes reviewed   history of anesthetic complications: PONV  NPO Solid Status: > 6 hours  NPO Liquid Status: > 2 hours           Airway   Mallampati: II  TM distance: >3 FB  Neck ROM: full  No difficulty expected  Dental    (+) partials    Comment: Upper front bridge.. Lower partial    Pulmonary - normal exam    breath sounds clear to auscultation  (+) a smoker Former, COPD mild, sleep apnea,   Cardiovascular - normal exam    Rhythm: regular  Rate: normal    (+) hypertension, dysrhythmias (hx a-fib, has loop recorder.  No recent a-fib) Atrial Fib, PVD, hyperlipidemia,       Neuro/Psych  (+) dizziness/light headedness, numbness, psychiatric history Bipolar,     GI/Hepatic/Renal/Endo    (+)  GERD,      Musculoskeletal     (+) back pain,   Abdominal  - normal exam   Substance History - negative use     OB/GYN negative ob/gyn ROS         Other   (+) arthritis   history of cancer remission                    Anesthesia Plan    ASA 3     general     intravenous induction   Anesthetic plan, all risks, benefits, and alternatives have been provided, discussed and informed consent has been obtained with: patient.    Plan discussed with CRNA.

## 2018-12-04 LAB
ANION GAP SERPL CALCULATED.3IONS-SCNC: 7.6 MMOL/L
BASOPHILS # BLD AUTO: 0.03 10*3/MM3 (ref 0–0.2)
BASOPHILS NFR BLD AUTO: 0.4 % (ref 0–1.5)
BUN BLD-MCNC: 9 MG/DL (ref 8–23)
BUN/CREAT SERPL: 13.8 (ref 7–25)
CALCIUM SPEC-SCNC: 8.8 MG/DL (ref 8.6–10.5)
CHLORIDE SERPL-SCNC: 105 MMOL/L (ref 98–107)
CO2 SERPL-SCNC: 27.4 MMOL/L (ref 22–29)
CREAT BLD-MCNC: 0.65 MG/DL (ref 0.57–1)
DEPRECATED RDW RBC AUTO: 41.5 FL (ref 37–54)
EOSINOPHIL # BLD AUTO: 0.06 10*3/MM3 (ref 0–0.7)
EOSINOPHIL NFR BLD AUTO: 0.7 % (ref 0.3–6.2)
ERYTHROCYTE [DISTWIDTH] IN BLOOD BY AUTOMATED COUNT: 12.9 % (ref 11.7–13)
GFR SERPL CREATININE-BSD FRML MDRD: 90 ML/MIN/1.73
GLUCOSE BLD-MCNC: 93 MG/DL (ref 65–99)
HCT VFR BLD AUTO: 30.2 % (ref 35.6–45.5)
HGB BLD-MCNC: 9.8 G/DL (ref 11.9–15.5)
IMM GRANULOCYTES # BLD: 0.03 10*3/MM3 (ref 0–0.03)
IMM GRANULOCYTES NFR BLD: 0.4 % (ref 0–0.5)
LYMPHOCYTES # BLD AUTO: 2.86 10*3/MM3 (ref 0.9–4.8)
LYMPHOCYTES NFR BLD AUTO: 34.7 % (ref 19.6–45.3)
MCH RBC QN AUTO: 28.5 PG (ref 26.9–32)
MCHC RBC AUTO-ENTMCNC: 32.5 G/DL (ref 32.4–36.3)
MCV RBC AUTO: 87.8 FL (ref 80.5–98.2)
MONOCYTES # BLD AUTO: 1.1 10*3/MM3 (ref 0.2–1.2)
MONOCYTES NFR BLD AUTO: 13.3 % (ref 5–12)
NEUTROPHILS # BLD AUTO: 4.2 10*3/MM3 (ref 1.9–8.1)
NEUTROPHILS NFR BLD AUTO: 50.9 % (ref 42.7–76)
NRBC BLD MANUAL-RTO: 0 /100 WBC (ref 0–0)
PLATELET # BLD AUTO: 167 10*3/MM3 (ref 140–500)
PMV BLD AUTO: 10.5 FL (ref 6–12)
POTASSIUM BLD-SCNC: 4.2 MMOL/L (ref 3.5–5.2)
RBC # BLD AUTO: 3.44 10*6/MM3 (ref 3.9–5.2)
SODIUM BLD-SCNC: 140 MMOL/L (ref 136–145)
WBC NRBC COR # BLD: 8.25 10*3/MM3 (ref 4.5–10.7)

## 2018-12-04 PROCEDURE — 97110 THERAPEUTIC EXERCISES: CPT | Performed by: PHYSICAL THERAPIST

## 2018-12-04 PROCEDURE — 99024 POSTOP FOLLOW-UP VISIT: CPT | Performed by: ORTHOPAEDIC SURGERY

## 2018-12-04 PROCEDURE — 97110 THERAPEUTIC EXERCISES: CPT

## 2018-12-04 PROCEDURE — 97162 PT EVAL MOD COMPLEX 30 MIN: CPT | Performed by: PHYSICAL THERAPIST

## 2018-12-04 PROCEDURE — 85025 COMPLETE CBC W/AUTO DIFF WBC: CPT | Performed by: INTERNAL MEDICINE

## 2018-12-04 PROCEDURE — 80048 BASIC METABOLIC PNL TOTAL CA: CPT | Performed by: INTERNAL MEDICINE

## 2018-12-04 RX ORDER — PREGABALIN 50 MG/1
50 CAPSULE ORAL EVERY 12 HOURS SCHEDULED
Status: DISCONTINUED | OUTPATIENT
Start: 2018-12-04 | End: 2018-12-06 | Stop reason: HOSPADM

## 2018-12-04 RX ADMIN — SENNOSIDES AND DOCUSATE SODIUM 1 TABLET: 8.6; 5 TABLET ORAL at 20:14

## 2018-12-04 RX ADMIN — PREGABALIN 50 MG: 50 CAPSULE ORAL at 20:15

## 2018-12-04 RX ADMIN — DOCUSATE SODIUM 100 MG: 100 CAPSULE, LIQUID FILLED ORAL at 20:14

## 2018-12-04 RX ADMIN — DOCUSATE SODIUM 100 MG: 100 CAPSULE, LIQUID FILLED ORAL at 07:38

## 2018-12-04 RX ADMIN — CYCLOBENZAPRINE 10 MG: 10 TABLET, FILM COATED ORAL at 07:37

## 2018-12-04 RX ADMIN — OXYCODONE AND ACETAMINOPHEN 2 TABLET: 5; 325 TABLET ORAL at 13:28

## 2018-12-04 RX ADMIN — POTASSIUM CHLORIDE AND SODIUM CHLORIDE 100 ML/HR: 450; 150 INJECTION, SOLUTION INTRAVENOUS at 00:40

## 2018-12-04 RX ADMIN — LAMOTRIGINE 100 MG: 100 TABLET ORAL at 20:15

## 2018-12-04 RX ADMIN — PREGABALIN 50 MG: 50 CAPSULE ORAL at 13:28

## 2018-12-04 RX ADMIN — ARIPIPRAZOLE 10 MG: 10 TABLET ORAL at 07:39

## 2018-12-04 RX ADMIN — LAMOTRIGINE 100 MG: 100 TABLET ORAL at 07:38

## 2018-12-04 RX ADMIN — OXYBUTYNIN CHLORIDE 10 MG: 10 TABLET, FILM COATED, EXTENDED RELEASE ORAL at 07:38

## 2018-12-04 RX ADMIN — OXYCODONE AND ACETAMINOPHEN 2 TABLET: 5; 325 TABLET ORAL at 20:14

## 2018-12-04 RX ADMIN — PANTOPRAZOLE SODIUM 40 MG: 40 TABLET, DELAYED RELEASE ORAL at 07:38

## 2018-12-04 RX ADMIN — ATORVASTATIN CALCIUM 20 MG: 20 TABLET, FILM COATED ORAL at 07:39

## 2018-12-04 RX ADMIN — OXYCODONE AND ACETAMINOPHEN 2 TABLET: 5; 325 TABLET ORAL at 03:43

## 2018-12-04 RX ADMIN — SODIUM CHLORIDE, PRESERVATIVE FREE 3 ML: 5 INJECTION INTRAVENOUS at 20:20

## 2018-12-04 RX ADMIN — SENNOSIDES AND DOCUSATE SODIUM 1 TABLET: 8.6; 5 TABLET ORAL at 07:39

## 2018-12-04 RX ADMIN — LITHIUM CARBONATE 300 MG: 300 CAPSULE, GELATIN COATED ORAL at 07:39

## 2018-12-04 RX ADMIN — OXYCODONE AND ACETAMINOPHEN 2 TABLET: 5; 325 TABLET ORAL at 07:39

## 2018-12-04 NOTE — PLAN OF CARE
Problem: Patient Care Overview  Goal: Plan of Care Review   12/04/18 0919   OTHER   Outcome Summary Pt presents with pain and limited mobility s/p laminectomy and fusion. She would benefit from PT to address these impairments.

## 2018-12-04 NOTE — PLAN OF CARE
Problem: Patient Care Overview  Goal: Plan of Care Review  Outcome: Ongoing (interventions implemented as appropriate)   12/04/18 0650   Coping/Psychosocial   Plan of Care Reviewed With patient   Plan of Care Review   Progress improving   OTHER   Outcome Summary Pt c/o pain at incision site. Able to ambulate with assist x1 and walker.        Problem: Fall Risk (Adult)  Goal: Absence of Fall  Outcome: Ongoing (interventions implemented as appropriate)      Problem: Pain, Acute (Adult)  Goal: Acceptable Pain Control/Comfort Level  Outcome: Ongoing (interventions implemented as appropriate)

## 2018-12-04 NOTE — PROGRESS NOTES
" LOS: 1 day     Name: Davida Anderson  Age: 73 y.o.  Sex: female  :  1945  MRN: [id number]         Primary Care Physician: Marek Nguyễn MD    Subjective   Subjective  Denies chest pain or shortness of breath.  States that her pain is currently controlled on PCA pump.    Objective   Vital Signs  Temp:  [97.6 °F (36.4 °C)-98.6 °F (37 °C)] 98.6 °F (37 °C)  Heart Rate:  [78-90] 82  Resp:  [18] 18  BP: ()/(49-79) 99/53  Body mass index is 20.83 kg/m².    Objective:  General Appearance:  Comfortable and in no acute distress.    Vital signs: (most recent): Blood pressure 99/53, pulse 82, temperature 98.6 °F (37 °C), temperature source Oral, resp. rate 18, height 168.9 cm (66.5\"), weight 59.4 kg (131 lb), SpO2 96 %.    Lungs:  Normal effort and normal respiratory rate.    Heart: Normal rate.  Regular rhythm.    Abdomen: Abdomen is soft.  Bowel sounds are normal.   There is no abdominal tenderness.     Extremities: There is no dependent edema or local swelling.    Neurological: Patient is alert and oriented to person, place and time.    Skin:  Warm and dry.              Results Review:       I reviewed the patient's new clinical results.    Results from last 7 days   Lab Units  18   0441  18   1203  18   1411   WBC 10*3/mm3  8.25   --   6.47   HEMOGLOBIN g/dL  9.8*  12.0  13.1   PLATELETS 10*3/mm3  167   --   240     Results from last 7 days   Lab Units  18   0441  18   1411   SODIUM mmol/L  140  140   POTASSIUM mmol/L  4.2  3.8   CHLORIDE mmol/L  105  102   CO2 mmol/L  27.4  25.9   BUN mg/dL  9  12   CREATININE mg/dL  0.65  0.76   CALCIUM mg/dL  8.8  10.4   GLUCOSE mg/dL  93  171*                 Scheduled Meds:     ARIPiprazole 10 mg Oral Daily   atorvastatin 20 mg Oral Daily   docusate sodium 100 mg Oral BID   lamoTRIgine 100 mg Oral Q12H   linaclotide 72 mcg Oral QAM AC   lithium carbonate 300 mg Oral Daily   oxybutynin XL 10 mg Oral Daily   pantoprazole 40 mg Oral " Daily   polyethylene glycol 17 g Oral Daily   pregabalin 50 mg Oral Q12H   sennosides-docusate sodium 1 tablet Oral BID   sodium chloride 3 mL Intravenous Q12H     PRN Meds:   bisacodyl  •  cyclobenzaprine  •  ipratropium-albuterol  •  melatonin  •  naloxone  •  ondansetron **OR** ondansetron ODT **OR** ondansetron  •  oxyCODONE-acetaminophen  •  sodium chloride  •  temazepam  Continuous Infusions:    HYDROmorphone HCl-NaCl     lactated ringers 9 mL/hr Last Rate: 9 mL/hr (12/03/18 0626)   sodium chloride 0.45 % with KCl 20 mEq 100 mL/hr Last Rate: 100 mL/hr (12/04/18 0040)       Assessment/Plan   Active Hospital Problems    Diagnosis Date Noted   • Spinal stenosis of lumbar region with neurogenic claudication [M48.062] 11/09/2018   • Spondylolisthesis of lumbar region [M43.16] 11/09/2018      Resolved Hospital Problems   No resolved problems to display.       Assessment & Plan    Hypertension -  PCA pump added yesterday evening which is now driving down her blood pressure.  I will stop her hydralazine altogether.  She is asymptomatic from the standpoint of her lower blood pressures.     COPD - she is a former smoker.  No evidence of exacerbation currently.   she has been weaned to room air.  Continue as needed bronchodilators.     Bipolar disorder - continue outpatient psychotropic agents.  Currently appears stable     GERD with history of Gtz's esophagus - continue PPI     Hyperlipidemia - continue statin    Pk Barajas MD  [facility]  12/04/18  1:21 PM

## 2018-12-04 NOTE — THERAPY TREATMENT NOTE
Acute Care - Physical Therapy Treatment Note  [facility]     Patient Name: Davida Anderson  : 1945  MRN: [id number]  Today's Date: 2018             Admit Date: 12/3/2018    Visit Dx:    ICD-10-CM ICD-9-CM   1. Spondylolisthesis of lumbar region M43.16 738.4   2. Spinal stenosis of lumbar region with neurogenic claudication M48.062 724.03     Patient Active Problem List   Diagnosis   • BP (high blood pressure)   • History of left breast infiltrating ductal cancer 2cm s/p mastectomy with reconstruction  s/p chemo   • H/O ETOH abuse   • Gtz's esophagus   • Colon polyps   • Bipolar 1 disorder (CMS/HCC)   • Arthritis   • Raynaud's disease   • Neuropathy   • Lumbar spine pain   • Spondylolisthesis at L4-L5 level   • Right lumbar radiculopathy   • Gtz's esophagus without dysplasia   • Hx of adenomatous colonic polyps   • Dysphagia   • Diarrhea   • Primary osteoarthritis of left knee   • Primary localized osteoarthrosis of the knee, right   • Chronic pain of left knee   • Spinal stenosis of lumbar region with neurogenic claudication   • Spondylolisthesis of lumbar region       Therapy Treatment    Rehabilitation Treatment Summary     Row Name 18 1500             Treatment Time/Intention    Discipline  physical therapy assistant  -      Document Type  therapy note (daily note)  -      Subjective Information  complains of;weakness;fatigue;pain  -      Care Plan Review  patient/other agree to care plan  -      Care Plan Review, Other Participant(s)  spouse  -      Comment  req assisyt to don brace  -2      Existing Precautions/Restrictions  fall;brace worn when out of bed  -      Recorded by [] Yenny Lee, PAULINO 18 1554  [JM2] Yenny Lee, \[facility] [place] [facility]\"" 18 1708      Row Name 18 1500             Bed Mobility Assessment/Treatment    Supine-Sit [place] (Bed Mobility)  moderate assist (50% patient effort);verbal cues;nonverbal cues (demo/gesture)  -      Bed  Mobility, Safety Issues  decreased use of arms for pushing/pulling;impaired trunk control for bed mobility  -      Assistive Device (Bed Mobility)  bed rails  -      Recorded by [JM] Yenny Lee, PTA 12/04/18 1708      Row Name 12/04/18 1500             Sit-Stand Transfer    Sit-Stand [place] (Transfers)  minimum assist (75% patient effort);verbal cues;nonverbal cues (demo/gesture) req 2 attempts  -      Assistive Device (Sit-Stand Transfers)  walker, front-wheeled  -      Recorded by [JM] Yenny Lee, [facility] 12/04/18 1708      Row Name 12/04/18 1500             Stand-Sit Transfer    Stand-Sit [place] (Transfers)  minimum assist (75% patient effort);verbal cues  -      Assistive Device (Stand-Sit Transfers)  walker, front-wheeled  -      Recorded by [] Yenny Lee, [facility] 12/04/18 1708      Row Name 12/04/18 1500             Toilet Transfer    Type (Toilet Transfer)  sit-stand  -      [place] Level (Toilet Transfer)  minimum assist (75% patient effort);verbal cues  -      Assistive Device (Toilet Transfer)  commode chair;grab bars/safety frame;walker, front-wheeled  -      Recorded by [] Yenny Lee, [facility] 12/04/18 1708      Row Name 12/04/18 1500             Gait/Stairs Assessment/Training    [place] Level (Gait)  contact guard  -      Assistive Device (Gait)  walker, front-wheeled  -      Distance in Feet (Gait)  65  -      Deviations/Abnormal Patterns (Gait)  antalgic;mely decreased;base of support, narrow;stride length decreased  -      Comment (Gait/Stairs)  pain and fatigue limiting amb dist  -      Recorded by [JM] Yenny Lee, PTA 12/04/18 1708      Row Name 12/04/18 1500             Positioning and Restraints    Pre-Treatment Position  in bed  -      In Chair  sitting;call light within reach;encouraged to call for assist;notified nsg;with family/caregiver no alarm upon entry  -      Recorded by [] Yenny Lee, [facility] 12/04/18  1708      Row Name 12/04/18 1500             Pain Scale: Numbers Pre/Post-Treatment    Pain Scale: Numbers, Pretreatment  6/10  -JM      Pain Scale: Numbers, Post-Treatment  8/10  -JM      Pain Location - Orientation  lower  -JM      Pain Location  back  -JM      Pain Intervention(s)  Medication (See MAR);Repositioned  -JM      Recorded by [ALE] Yenny Lee PTA 12/04/18 1708      Row Name                Wound 12/03/18 0852 Other (See comments) back incision    Wound - Properties Group Date first assessed: 12/03/18 [JMA] Time first assessed: 0852 [JMA] Side: Other (See comments) [JMA] Location: back [JMA] Type: incision [JMA] Recorded by:  [CHRISTAL] Arabella Gill RN 12/03/18 0852      User Key  (r) = Recorded By, (t) = Taken By, (c) = Cosigned By    Initials Name Effective Dates Discipline    Arabella Rivera RN 06/16/16 -  Nurse    Yenny Leo PTA 03/07/18 -  PT          Wound 12/03/18 0852 Other (See comments) back incision (Active)   Dressing Appearance dry;intact 12/4/2018  7:50 AM   Closure WYATT 12/4/2018  7:50 AM   Drainage Amount none 12/4/2018  7:50 AM   Dressing Care, Wound foam;gauze 12/4/2018  7:50 AM       PT Rehab Goals     Row Name 12/04/18 0849             Bed Mobility Goal 1 (PT)    Activity/Assistive Device (Bed Mobility Goal 1, PT)  bed mobility activities, all  -KH      Will Level/Cues Needed (Bed Mobility Goal 1, PT)  independent  -KH      Time Frame (Bed Mobility Goal 1, PT)  4 days  -KH         Transfer Goal 1 (PT)    Activity/Assistive Device (Transfer Goal 1, PT)  transfers, all  -KH      Will Level/Cues Needed (Transfer Goal 1, PT)  supervision required  -KH      Time Frame (Transfer Goal 1, PT)  4 days  -KH         Gait Training Goal 1 (PT)    Activity/Assistive Device (Gait Training Goal 1, PT)  gait (walking locomotion)  -KH      Will Level (Gait Training Goal 1, PT)  supervision required  -KH      Distance (Gait Goal 1, PT)  200  -KH      Time Frame  (Gait Training Goal 1, PT)  4 days  -         Stairs Goal 1 (PT)    Activity/Assistive Device (Stairs Goal 1, PT)  stairs, all skills  -      [place] Level/Cues Needed (Stairs Goal 1, PT)  contact guard assist  -      Number of Stairs (Stairs Goal 1, PT)  5  -      Time Frame (Stairs Goal 1, PT)  4 days  -         Patient Education Goal (PT)    Activity (Patient Education Goal, PT)  HEP, precautions  -      [place]/Cues/Accuracy (Memory Goal 2, PT)  demonstrates adequately  -      Time Frame (Patient Education Goal, PT)  4 days  -        User Key  (r) = Recorded By, (t) = Taken By, (c) = Cosigned By    Initials Name Provider Type Discipline    Debbie Muniz, PT Physical Therapist PT          Physical Therapy Education     Title: PT OT SLP Therapies (Done)     Topic: Physical Therapy (Done)     Point: Mobility training (Done)     Learning Progress Summary           Patient Acceptance, E,D, VU,NR by CHEO at 12/4/2018  9:19 AM                   Point: Home exercise program (Done)     Learning Progress Summary           Patient Acceptance, E,D, VU,NR by  at 12/4/2018  9:19 AM                   Point: Body mechanics (Done)     Learning Progress Summary           Patient Acceptance, E,D, VU,NR by CHEO at 12/4/2018  9:19 AM                   Point: Precautions (Done)     Learning Progress Summary           Patient Acceptance, E,D, VU,NR by  at 12/4/2018  9:19 AM                               User Key     Initials Effective Dates Name Provider Type Discipline     06/08/18 -  Debbie Watters, PT Physical Therapist PT                PT Recommendation and Plan     Plan of Care Reviewed With: patient  Outcome Summary: pt having limitations due to pain, husb arrived in room after session over, unsure if home or SNU at [place] at present-will need to address stairs and bed mob again prior to decision  Outcome Measures     Row Name 12/04/18 0900             How much help from another  person do you currently need...    Turning from your back to your side while in flat bed without using bedrails?  3  -KH      Moving from lying on back to sitting on the side of a flat bed without bedrails?  3  -KH      Moving to and from a bed to a chair (including a wheelchair)?  3  -KH      Standing up from a chair using your arms (e.g., wheelchair, bedside chair)?  3  -KH      Climbing 3-5 steps with a railing?  3  -KH      To walk in hospital room?  3  -KH      AM-PAC 6 Clicks Score  18  -KH         Functional Assessment    Outcome Measure Options  AM-PAC 6 Clicks Basic Mobility (PT)  -        User Key  (r) = Recorded By, (t) = Taken By, (c) = Cosigned By    Initials Name Provider Type    Debbie Muniz, PT Physical Therapist         Time Calculation:   PT Charges     Row Name 12/04/18 1551 12/04/18 0848          Time Calculation    Start Time  1421  -  0825  -     Stop Time  1446  -  0848  -     Time Calculation (min)  25 min  -  23 min  -     PT Received On  12/04/18  -  12/04/18  -CHEO     PT - Next Appointment  12/05/18  -  12/04/18  -     PT Goal Re-Cert Due Date  --  12/07/18  -        Time Calculation- PT    Total Timed Code Minutes- PT  23 minute(s)  -JM  10 minute(s)  -       User Key  (r) = Recorded By, (t) = Taken By, (c) = Cosigned By    Initials Name Provider Type    Debbie Muniz, PT Physical Therapist    Yenny Leo PTA Physical Therapy Assistant        Therapy Suggested Charges     Code   Minutes Charges    None           Therapy Charges for Today     Code Description Service Date Service Provider Modifiers Qty    20270299389 HC PT THER PROC EA 15 MIN 12/4/2018 Yenny Lee PTA GP 2          PT G-Codes  Outcome Measure Options: AM-PAC 6 Clicks Basic Mobility (PT)  AM-PAC 6 Clicks Score: 18    Yenny Lee PTA  12/4/2018

## 2018-12-04 NOTE — PROGRESS NOTES
AVSS.  Complains of back pain, not unexpected.  Leg pain is better.  Moves feet well.  Hemoglobin 9.8  Plan PT, mobilize

## 2018-12-04 NOTE — DISCHARGE PLACEMENT REQUEST
"Davida Anderson (73 y.o. Female)     Date of Birth Social Security Number Address Home Phone MRN    1945  [address] [id number] [phone] [id number]    Jewish Marital Status          Non-Buddhist        Admission Date Admission Type Admitting Provider Attending Provider Department, Room/Bed    12/3/18 Elective Austin Keith MD Werner, Joseph G, MD [address], P598/1    Discharge Date Discharge Disposition Discharge Destination                       Attending Provider:  Austin Keith MD    Allergies:  Morphine    Isolation:  None   Infection:  None   Code Status:  CPR    Ht:  168.9 cm (66.5\")   Wt:  59.4 kg (131 lb)    Admission Cmt:  None   Principal Problem:  None                Active Insurance as of 12/3/2018     Primary Coverage     Payor Plan Insurance Group Employer/Plan Group    HUMANA MEDICARE REPLACEMENT HUMANA MEDICARE REPL [id number]     Payor Plan Address Payor Plan Phone Number Payor Plan Fax Number Effective Dates    [address] [address] [phone]  1/1/2013 - None Entered    Former[facility] [address]       Subscriber Name Subscriber Birth Date Member ID       DAVIDA ANDERSON 1945 [id number]                 Emergency Contacts      (Rel.) Home Phone Work Phone Mobile Phone    Eddi Anderson (Spouse) [phone] -- [phone]              "

## 2018-12-04 NOTE — PLAN OF CARE
Problem: Patient Care Overview  Goal: Plan of Care Review  Outcome: Ongoing (interventions implemented as appropriate)   12/04/18 1750   Coping/Psychosocial   Plan of Care Reviewed With patient;family   Plan of Care Review   Progress improving   OTHER   Outcome Summary Patient alert and verbal with needs. Continues with PCA pump and po pain meds. Patient was able to sit in the chair a couple of times today and wore her brace. F/C removed this morning and patient is voiding without difficulty.        Problem: Fall Risk (Adult)  Goal: Identify Related Risk Factors and Signs and Symptoms  Outcome: Ongoing (interventions implemented as appropriate)   12/04/18 1750   Fall Risk (Adult)   Related Risk Factors (Fall Risk) gait/mobility problems;neuro disease/injury;environment unfamiliar   Signs and Symptoms (Fall Risk) presence of risk factors     Goal: Absence of Fall  Outcome: Ongoing (interventions implemented as appropriate)      Problem: Pain, Acute (Adult)  Goal: Identify Related Risk Factors and Signs and Symptoms  Outcome: Ongoing (interventions implemented as appropriate)   12/04/18 1750   Pain, Acute (Adult)   Related Risk Factors (Acute Pain) surgery   Signs and Symptoms (Acute Pain) verbalization of pain descriptors     Goal: Acceptable Pain Control/Comfort Level  Outcome: Ongoing (interventions implemented as appropriate)

## 2018-12-04 NOTE — PROGRESS NOTES
Discharge Planning Assessment  [facility]     Patient Name: Davida Anderson  MRN: [id number]  Today's Date: 12/4/2018    Admit Date: 12/3/2018    Discharge Needs Assessment     Row Name 12/04/18 1655       Living Environment    Lives With  spouse    Name(s) of Who Lives With Patient  spouse Eddi Anderson [phone]    Current Living Arrangements  home/apartment/condo    Primary Care Provided by  self;spouse/significant other    Provides Primary Care For  no one, unable/limited ability to care for self    Family Caregiver if Needed  spouse    Family Caregiver Names  spouse Eddi Anderson [phone]    Quality of Family Relationships  helpful;involved;supportive    Able to Return to Prior Arrangements  yes       Resource/Environmental Concerns    Resource/Environmental Concerns  home accessibility    Home Accessibility Concerns  stairs to enter home 6 steps with 2 handrails to enter the multi level home; 13 steps with 1 handrail and a landing with 4 steps and no handrail to get to the bedroom upstairs    Transportation Concerns  car, none       Transition Planning    Patient/Family Anticipates Transition to  home with family;inpatient rehabilitation facility    Patient/Family Anticipated Services at Transition  home health care;skilled nursing    Transportation Anticipated  family or friend will provide       Discharge Needs Assessment    Concerns to be Addressed  discharge planning    Equipment Currently Used at Home  cane, straight;walker, rolling;commode    Anticipated Changes Related to Illness  none    Equipment Needed After Discharge  none    Outpatient/Agency/Support Group Needs  homecare agency;skilled nursing facility;inpatient rehabilitation facility    Discharge Facility/Level of Care Needs  nursing facility, skilled;rehabilitation facility;home with home health    Offered/Gave Vendor List  yes    Current Discharge Risk  physical impairment        Discharge Plan     Row Name  12/04/18 5989       Plan    Plan  Home with assistance from her spouse vs home with [facility] vs. SNF- referral made to [facility]    Patient/Family in Agreement with Plan  yes    Plan Comments  Met with the patient and her spouse Eddi Anderson [phone] bedside, checked IMM, explained role of CCP, verified facesheet and discussed discharge planning needs.  The patient and her spouse are unsure if the patient will need to return home with assistance from her spouse vs home with [facility]- voicemail left to follow for HH orders vs. SNF- referral made to [facility]. The patient will have assistance from her family at home, she has a cane, walker and 3 in 1 and has 6 steps with 2 handrails to enter the multi level home; 13 steps with 1 handrail and a landing with 4 steps and no handrail to get to the bedroom upstairs.  The patient's PCP is Marek Nguyễn, pharmacy is [facility] on [address] but the patient is agreeable to Meds to [facility].  The patient reports having no trouble remembering to take her medication or with affording her medication.  The patient's spouse states that she has used [facility] in the past and would want to use them again if needed and states that she has been to both [facility] and [facility].  The patient's spouse was provided with a HH/SNF list and CCP telephone contact number. The patient was encouraged to bring her POA documents and her spouse he can transport her upon d/c.  CCP will follow for P.T. recommendations and will follow up with the patient and her spouse to assist with patient's d/c home with assistance from her spouse vs home with [facility] vs. SNF- referral made to [facility].  CARMEN Valladares        Destination      Service Provider Request Status Selected Services Address Phone Number Fax Number    [facility] Pending - Request Sent N/A [address] [address] [phone]       Cindy Penny  CARMEN 12/4/2018 1654    12/4/18- Referral made to [facility] to [facility] for [facility] Kaur.  CARMEN Valladares                 Durable Medical Equipment      No service coordination in this encounter.      Dialysis/Infusion      No service coordination in this encounter.      Home Medical Care      Service Provider Request Status Selected Services Address Phone Number Fax Number    [facility] Pending - No Request Sent N/A [address] [id number] [phone]       CARMEN Ramires 12/4/2018 1653    12/4/18- Voicemail referral left for [facility] to follow for HH orders.  CARMEN Valladares                 Community Resources      No service coordination in this encounter.        Expected Discharge Date and Time     Expected Discharge Date Expected Discharge Time    Dec 4, 2018         Demographic Summary     Row Name 12/04/18 1654       General Information    Admission Type  inpatient    Arrived From  home    Referral Source  nursing;physician    Reason for Consult  discharge planning    Preferred Language  English     Used During This Interaction  no        Functional Status     Row Name 12/04/18 1654       Functional Status    Usual Activity Tolerance  moderate    Current Activity Tolerance  fair       Functional Status, IADL    Medications  assistive equipment    Meal Preparation  assistive equipment    Housekeeping  assistive equipment    Laundry  assistive equipment    Shopping  assistive equipment       Mental Status    General Appearance WDL  WDL       Mental Status Summary    Recent Changes in Mental Status/Cognitive Functioning  no changes        Psychosocial    No documentation.       Abuse/Neglect    No documentation.       Legal    No documentation.       Substance Abuse    No documentation.       Patient Forms     Row Name 12/04/18 1654       Patient Forms    Provider Choice List  Delivered    Delivered to  Patient    Method of delivery   In person            CARMEN Ramires

## 2018-12-04 NOTE — PLAN OF CARE
Problem: Patient Care Overview  Goal: Plan of Care Review  Outcome: Ongoing (interventions implemented as appropriate)   12/04/18 9706   Coping/Psychosocial   Plan of Care Reviewed With patient   OTHER   Outcome Summary pt having limitations due to pain, husb arrived in room after session over, unsure if home or SNU at [place] at present-will need to address stairs and bed mob again prior to decision

## 2018-12-04 NOTE — THERAPY EVALUATION
Acute Care - Physical Therapy Initial Evaluation  [facility]     Patient Name: Davida Anderson  : 1945  MRN: [id number]  Today's Date: 2018                Admit Date: 12/3/2018    Visit Dx:     ICD-10-CM ICD-9-CM   1. Spondylolisthesis of lumbar region M43.16 738.4   2. Spinal stenosis of lumbar region with neurogenic claudication M48.062 724.03     Patient Active Problem List   Diagnosis   • BP (high blood pressure)   • History of left breast infiltrating ductal cancer 2cm s/p mastectomy with reconstruction  s/p chemo   • H/O ETOH abuse   • Gtz's esophagus   • Colon polyps   • Bipolar 1 disorder (CMS/HCC)   • Arthritis   • Raynaud's disease   • Neuropathy   • Lumbar spine pain   • Spondylolisthesis at L4-L5 level   • Right lumbar radiculopathy   • Gtz's esophagus without dysplasia   • Hx of adenomatous colonic polyps   • Dysphagia   • Diarrhea   • Primary osteoarthritis of left knee   • Primary localized osteoarthrosis of the knee, right   • Chronic pain of left knee   • Spinal stenosis of lumbar region with neurogenic claudication   • Spondylolisthesis of lumbar region     Past Medical History:   Diagnosis Date   • Acid reflux    • Arthritis    • Gtz esophagus    • Bipolar 2 disorder (CMS/HCC)    • Bradycardia     WITH SURGERY   • COPD (chronic obstructive pulmonary disease) (CMS/HCC)     MILD, USES AINHALER PRN   • DDD (degenerative disc disease), lumbar    • Diverticulosis    • Dizziness    • Frequent UTI     RECENT UTI FINISHED COURSE OF ANTIBX   • H/O Infiltrating ductal carcinoma of left breast     Stage IIA, Grade 3 ER/SC +, HER2 -, treated with 5 years of tamoxifen, 5 years of Femara, completed in    • High cholesterol    • History of alcoholism (CMS/HCC)     40 YRS AGO   • History of Clostridium difficile colitis    • History of gastric ulcer    • History of loop recorder    • Hypertension    • Irregular heartbeat     a fib   • Low back pain    • Lumbar herniated  disc    • Mass of right breast on mammogram 03/17/2016    10 o'clock position, 4 cm from the nipple,   • Neuropathy    • PONV (postoperative nausea and vomiting)    • Raynaud disease    • Sleep apnea     MILD, NO NEED FOR CPAP   • Urinary incontinence      Past Surgical History:   Procedure Laterality Date   • ANKLE OPEN REDUCTION INTERNAL FIXATION Right 08/14/2015    Dr. Dandre Camacho, [facility]   • BACK SURGERY      LUMBAR   • BREAST RECONSTRUCTION, BREAST TISSUE EXPANDER INSERTION Left 04/11/2002    [place] Contour Profile expander was placed 550 cc size, filled with 200 cc of saline, Dr. Herbert Napoles, [facility]   • COLONOSCOPY N/A 09/16/2014    Dr. Darci Kerns, [facility]; torts, tics, stool, polyp   • CYSTOSCOPY N/A 05/07/2010    with TVT takedown, Dr. Siomn Wall, [facility]   • EPIDURAL BLOCK     • KNEE SURGERY Left     BROKEN   • MAMMO US BREAST BIOPSY ADDITIONAL W WO DEVICE Left 02/21/2002    2:00 position left breast, Infiltrating Ductal Carcinoma, [facility]   • MANDIBLE FRACTURE SURGERY     • MASTECTOMY Left 04/11/2002    Left Total Mastectomy and Left Axillary [place] Node Biobsy, Dr. Indu Akins, [facility]   • OTHER SURGICAL HISTORY      CARDIAC LOOP DEVICE, LEFT CHEST   • TENSION FREE VAGINAL TAPING WITH MINI ARC SLING N/A 10/26/2009    Dr. Gina Castillo, [facility]   • UPPER GASTROINTESTINAL ENDOSCOPY  09/16/2014    z-line irreg, grade a reflux, gastritis        PT ASSESSMENT (last 12 hours)      Physical Therapy Evaluation     Row Name 12/04/18 0813          PT Evaluation Time/Intention    Subjective Information  complains of;pain  -KH     Document Type  evaluation  -     Mode of Treatment  physical therapy  -     Patient Effort  good  -     Symptoms Noted During/After Treatment  increased pain  -     Row Name 12/04/18 3681          General Information    Patient Observations  alert;cooperative;agree to therapy  -     General Observations of Patient  in bed  -     Prior Level of Function  independent:  -     Equipment  Currently Used at Home  none  -     Pertinent History of Current Functional Problem  s/p mainectomy and fusion  -     Existing Precautions/Restrictions  fall  -     Row Name 12/04/18 0849          Relationship/Environment    Lives With  spouse  -     Row Name 12/04/18 0849          Resource/Environmental Concerns    Current Living Arrangements  home/apartment/condo  -     Row Name 12/04/18 0849          Cognitive Assessment/Interventions    Additional Documentation  Cognitive Assessment/Intervention (Group)  -     Row Name 12/04/18 0849          Cognitive Assessment/Intervention- PT/OT    Orientation Status (Cognition)  oriented x 4  -     Follows Commands (Cognition)  WNL  -     Personal Safety Interventions  fall prevention program maintained;gait belt;nonskid shoes/slippers when out of bed  -     Row Name 12/04/18 0849          Bed Mobility Assessment/Treatment    Bed Mobility Assessment/Treatment  rolling right;supine-sit;sidelying-sit  -     Rolling Right [place] (Bed Mobility)  contact guard;verbal cues  -     Sidelying-Sit [place] (Bed Mobility)  minimum assist (75% patient effort)  -     Assistive Device (Bed Mobility)  bed rails  -     Row Name 12/04/18 0849          Transfer Assessment/Treatment    Transfer Assessment/Treatment  sit-stand transfer;stand-sit transfer  -     Sit-Stand [place] (Transfers)  minimum assist (75% patient effort)  -     Stand-Sit [place] (Transfers)  minimum assist (75% patient effort)  -     Row Name 12/04/18 0849          Sit-Stand Transfer    Assistive Device (Sit-Stand Transfers)  walker, front-wheeled  -     Row Name 12/04/18 0849          Stand-Sit Transfer    Assistive Device (Stand-Sit Transfers)  walker, front-wheeled  -     Row Name 12/04/18 0849          Gait/Stairs Assessment/Training    [place] Level (Gait)  contact guard  -     Assistive Device (Gait)  walker, front-wheeled  -     Distance in Feet  (Gait)  85  -KH     Pattern (Gait)  step-through  -     Deviations/Abnormal Patterns (Gait)  antalgic;gait speed decreased  -     Row Name 12/04/18 0849          General ROM    GENERAL ROM COMMENTS  WFL  -     Row Name 12/04/18 0849          MMT (Manual Muscle Testing)    General MMT Comments  WFL  -     Row Name 12/04/18 0849          Motor Assessment/Intervention    Additional Documentation  Therapeutic Exercise Interventions (Group)  -     Row Name 12/04/18 0849          Therapeutic Exercise    Comment (Therapeutic Exercise)  AP, LAQ x 10 reps  -     Row Name 12/04/18 0849          Pain Assessment    Additional Documentation  Pain Scale: Numbers Pre/Post-Treatment (Group)  -     Row Name 12/04/18 0849          Pain Scale: Numbers Pre/Post-Treatment    Pain Scale: Numbers, Pretreatment  6/10  -KH     Pain Scale: Numbers, Post-Treatment  6/10  -KH     Pain Location - Orientation  lower  -KH     Pain Location  back  -     Pain Intervention(s)  Repositioned  -     Row Name             Wound 12/03/18 0852 Other (See comments) back incision    Wound - Properties Group Date first assessed: 12/03/18  - Time first assessed: 0852  - Side: Other (See comments)  - Location: back  - Type: incision  -    Row Name 12/04/18 0849          Plan of Care Review    Plan of Care Reviewed With  patient  -[facility] Name 12/04/18 0849          Physical Therapy Clinical Impression    Patient/Family Goals Statement (PT Clinical Impression)  return home  -     Criteria for Skilled Interventions Met (PT Clinical Impression)  treatment indicated  -     Impairments Found (describe specific impairments)  gait, locomotion, and balance  -     Rehab Potential (PT Clinical Summary)  good, to achieve stated therapy goals  -     Row Name 12/04/18 0849          Physical Therapy Goals    Bed Mobility Goal Selection (PT)  bed mobility, PT goal 1  -KH     Transfer Goal Selection (PT)  transfer, PT goal 1  -KH     Gait  Training Goal Selection (PT)  gait training, PT goal 1  -KH     Stairs Goal Selection (PT)  stairs, PT goal 1  -     Additional Documentation  Stairs Goal Selection (PT) (Row)  -     Row Name 12/04/18 0849          Bed Mobility Goal 1 (PT)    Activity/Assistive Device (Bed Mobility Goal 1, PT)  bed mobility activities, all  -KH     [place] Level/Cues Needed (Bed Mobility Goal 1, PT)  independent  -KH     Time Frame (Bed Mobility Goal 1, PT)  4 days  -     Row Name 12/04/18 0849          Transfer Goal 1 (PT)    Activity/Assistive Device (Transfer Goal 1, PT)  transfers, all  -KH     [place] Level/Cues Needed (Transfer Goal 1, PT)  supervision required  -KH     Time Frame (Transfer Goal 1, PT)  4 days  -     Row Name 12/04/18 0849          Gait Training Goal 1 (PT)    Activity/Assistive Device (Gait Training Goal 1, PT)  gait (walking locomotion)  -     [place] Level (Gait Training Goal 1, PT)  supervision required  -KH     Distance (Gait Goal 1, PT)  200  -KH     Time Frame (Gait Training Goal 1, PT)  4 days  -     Row Name 12/04/18 0849          Stairs Goal 1 (PT)    Activity/Assistive Device (Stairs Goal 1, PT)  stairs, all skills  -KH     [place] Level/Cues Needed (Stairs Goal 1, PT)  contact guard assist  -     Number of Stairs (Stairs Goal 1, PT)  5  -KH     Time Frame (Stairs Goal 1, PT)  4 days  -     Row Name 12/04/18 0849          Patient Education Goal (PT)    Activity (Patient Education Goal, PT)  HEP, precautions  -     [place]/Cues/Accuracy (Memory Goal 2, PT)  demonstrates adequately  -KH     Time Frame (Patient Education Goal, PT)  4 days  -     Row Name 12/04/18 0849          Positioning and Restraints    Pre-Treatment Position  in bed  -     Post Treatment Position  chair  -     In Chair  sitting;call light within reach;encouraged to call for assist;notified nsg;with family/caregiver  -       User Key  (r) = Recorded By, (t) = Taken By, (c) =  Cosigned By    Initials Name Provider Type    Debibe Muniz, PT Physical Therapist    Arabella Ta RN Registered Nurse        Physical Therapy Education     Title: PT OT SLP Therapies (Done)     Topic: Physical Therapy (Done)     Point: Mobility training (Done)     Learning Progress Summary           Patient Acceptance, E,D, VU,NR by  at 12/4/2018  9:19 AM                   Point: Home exercise program (Done)     Learning Progress Summary           Patient Acceptance, E,D, VU,NR by  at 12/4/2018  9:19 AM                   Point: Body mechanics (Done)     Learning Progress Summary           Patient Acceptance, E,D, VU,NR by  at 12/4/2018  9:19 AM                   Point: Precautions (Done)     Learning Progress Summary           Patient Acceptance, E,D, VU,NR by  at 12/4/2018  9:19 AM                               User Key     Initials Effective Dates Name Provider Type Discipline     06/08/18 -  Debbie Watters, PT Physical Therapist PT              PT Recommendation and Plan  Anticipated Discharge Disposition (PT): home with assist  Planned Therapy Interventions (PT Eval): bed mobility training, gait training, home exercise program, patient/family education, stair training, strengthening, transfer training  Therapy Frequency (PT Clinical Impression): 2 times/day  Outcome Summary/Treatment Plan (PT)  Anticipated Discharge Disposition (PT): home with assist  Plan of Care Reviewed With: patient  Outcome Summary: Pt presents with pain and limited mobility s/p laminectomy and fusion. She would benefit from PT to address these impairments.   Outcome Measures     Row Name 12/04/18 0900             How much help from another person do you currently need...    Turning from your back to your side while in flat bed without using bedrails?  3  -KH      Moving from lying on back to sitting on the side of a flat bed without bedrails?  3  -KH      Moving to and from a bed to a chair (including  a wheelchair)?  3  -KH      Standing up from a chair using your arms (e.g., wheelchair, bedside chair)?  3  -KH      Climbing 3-5 steps with a railing?  3  -KH      To walk in hospital room?  3  -KH      AM-PAC 6 Clicks Score  18  -KH         Functional Assessment    Outcome Measure Options  AM-PAC 6 Clicks Basic Mobility (PT)  -        User Key  (r) = Recorded By, (t) = Taken By, (c) = Cosigned By    Initials Name Provider Type    Debbie Muniz, PT Physical Therapist         Time Calculation:   PT Charges     Row Name 12/04/18 0848             Time Calculation    Start Time  0825  -      Stop Time  0848  -      Time Calculation (min)  23 min  -      PT Received On  12/04/18  -      PT - Next Appointment  12/04/18  -      PT Goal Re-Cert Due Date  12/07/18  -         Time Calculation- PT    Total Timed Code Minutes- PT  10 minute(s)  -CHEO        User Key  (r) = Recorded By, (t) = Taken By, (c) = Cosigned By    Initials Name Provider Type    Debbie Muniz, PT Physical Therapist        Therapy Suggested Charges     Code   Minutes Charges    None           Therapy Charges for Today     Code Description Service Date Service Provider Modifiers Qty    55089801556 HC PT EVAL MOD COMPLEXITY 2 12/4/2018 Debbie Watters, PT GP 1    01614440203 HC PT THER PROC EA 15 MIN 12/4/2018 Debbie Watters, PT GP 1          PT G-Codes  Outcome Measure Options: AM-PAC 6 Clicks Basic Mobility (PT)  AM-PAC 6 Clicks Score: 18      Debbie Watters PT  12/4/2018

## 2018-12-05 LAB
HCT VFR BLD AUTO: 31.4 % (ref 35.6–45.5)
HGB BLD-MCNC: 9.5 G/DL (ref 11.9–15.5)

## 2018-12-05 PROCEDURE — 85014 HEMATOCRIT: CPT | Performed by: ORTHOPAEDIC SURGERY

## 2018-12-05 PROCEDURE — 97110 THERAPEUTIC EXERCISES: CPT

## 2018-12-05 PROCEDURE — 85018 HEMOGLOBIN: CPT | Performed by: ORTHOPAEDIC SURGERY

## 2018-12-05 PROCEDURE — 99024 POSTOP FOLLOW-UP VISIT: CPT | Performed by: ORTHOPAEDIC SURGERY

## 2018-12-05 RX ADMIN — LAMOTRIGINE 100 MG: 100 TABLET ORAL at 09:36

## 2018-12-05 RX ADMIN — TEMAZEPAM 15 MG: 15 CAPSULE ORAL at 22:45

## 2018-12-05 RX ADMIN — OXYCODONE AND ACETAMINOPHEN 2 TABLET: 5; 325 TABLET ORAL at 20:28

## 2018-12-05 RX ADMIN — PREGABALIN 50 MG: 50 CAPSULE ORAL at 09:36

## 2018-12-05 RX ADMIN — DOCUSATE SODIUM 100 MG: 100 CAPSULE, LIQUID FILLED ORAL at 09:36

## 2018-12-05 RX ADMIN — OXYBUTYNIN CHLORIDE 10 MG: 10 TABLET, FILM COATED, EXTENDED RELEASE ORAL at 09:36

## 2018-12-05 RX ADMIN — OXYCODONE AND ACETAMINOPHEN 2 TABLET: 5; 325 TABLET ORAL at 01:24

## 2018-12-05 RX ADMIN — LITHIUM CARBONATE 300 MG: 300 CAPSULE, GELATIN COATED ORAL at 09:36

## 2018-12-05 RX ADMIN — POLYETHYLENE GLYCOL 3350 17 G: 17 POWDER, FOR SOLUTION ORAL at 09:36

## 2018-12-05 RX ADMIN — ATORVASTATIN CALCIUM 20 MG: 20 TABLET, FILM COATED ORAL at 09:36

## 2018-12-05 RX ADMIN — LAMOTRIGINE 100 MG: 100 TABLET ORAL at 20:26

## 2018-12-05 RX ADMIN — DOCUSATE SODIUM 100 MG: 100 CAPSULE, LIQUID FILLED ORAL at 20:26

## 2018-12-05 RX ADMIN — OXYCODONE AND ACETAMINOPHEN 2 TABLET: 5; 325 TABLET ORAL at 15:51

## 2018-12-05 RX ADMIN — SENNOSIDES AND DOCUSATE SODIUM 1 TABLET: 8.6; 5 TABLET ORAL at 20:26

## 2018-12-05 RX ADMIN — OXYCODONE AND ACETAMINOPHEN 2 TABLET: 5; 325 TABLET ORAL at 10:05

## 2018-12-05 RX ADMIN — PANTOPRAZOLE SODIUM 40 MG: 40 TABLET, DELAYED RELEASE ORAL at 09:36

## 2018-12-05 RX ADMIN — OXYCODONE AND ACETAMINOPHEN 2 TABLET: 5; 325 TABLET ORAL at 05:58

## 2018-12-05 RX ADMIN — SODIUM CHLORIDE, PRESERVATIVE FREE 3 ML: 5 INJECTION INTRAVENOUS at 20:32

## 2018-12-05 RX ADMIN — SENNOSIDES AND DOCUSATE SODIUM 1 TABLET: 8.6; 5 TABLET ORAL at 09:36

## 2018-12-05 RX ADMIN — TEMAZEPAM 15 MG: 15 CAPSULE ORAL at 01:24

## 2018-12-05 RX ADMIN — PREGABALIN 50 MG: 50 CAPSULE ORAL at 20:26

## 2018-12-05 RX ADMIN — ARIPIPRAZOLE 10 MG: 10 TABLET ORAL at 09:36

## 2018-12-05 NOTE — PROGRESS NOTES
Continued Stay Note  [facility]     Patient Name: Davida Anderson  MRN: [id number]  Today's Date: 12/5/2018    Admit Date: 12/3/2018    Discharge Plan     Row Name 12/05/18 1304       Plan    Plan  Confirm plan to d/c home with [facility].    Plan Comments  Per Lauren, the patient is no longer interested in [facility] and wants to d/c home.  CARMEN Valladares        Discharge Codes    No documentation.       Expected Discharge Date and Time     Expected Discharge Date Expected Discharge Time    Dec 5, 2018             CARMEN Ramires

## 2018-12-05 NOTE — PROGRESS NOTES
Orthopedic Progress Note      Patient: Davida Anderson    YOB: 1945    Medical Record Number: [id number]    Attending Physician: Austin Keith MD    Date of Admission: 12/3/2018  5:42 AM    Admitting Dx:  Spinal stenosis of lumbar region with neurogenic claudication [M48.062]  Spondylolisthesis of lumbar region [M43.16]  Spondylolisthesis of lumbar region [M43.16]    Status Post: L4-5 laminectomy and fusion with instrumentation    Post Operative Day Number: 2    Current Problem List:   Patient Active Problem List   Diagnosis   • BP (high blood pressure)   • History of left breast infiltrating ductal cancer 2cm s/p mastectomy with reconstruction 2002 s/p chemo   • H/O ETOH abuse   • Gtz's esophagus   • Colon polyps   • Bipolar 1 disorder (CMS/HCC)   • Arthritis   • Raynaud's disease   • Neuropathy   • Lumbar spine pain   • Spondylolisthesis at L4-L5 level   • Right lumbar radiculopathy   • Gtz's esophagus without dysplasia   • Hx of adenomatous colonic polyps   • Dysphagia   • Diarrhea   • Primary osteoarthritis of left knee   • Primary localized osteoarthrosis of the knee, right   • Chronic pain of left knee   • Spinal stenosis of lumbar region with neurogenic claudication   • Spondylolisthesis of lumbar region         Past Medical History:   Diagnosis Date   • Acid reflux    • Arthritis    • Gtz esophagus    • Bipolar 2 disorder (CMS/HCC)    • Bradycardia     WITH SURGERY   • COPD (chronic obstructive pulmonary disease) (CMS/HCC)     MILD, USES AINHALER PRN   • DDD (degenerative disc disease), lumbar    • Diverticulosis    • Dizziness    • Frequent UTI     RECENT UTI FINISHED COURSE OF ANTIBX   • H/O Infiltrating ductal carcinoma of left breast 2002    Stage IIA, Grade 3 ER/NC +, HER2 -, treated with 5 years of tamoxifen, 5 years of Femara, completed in 2012   • High cholesterol    • History of alcoholism (CMS/HCC)     40 YRS AGO   • History of Clostridium difficile colitis 2014    • History of gastric ulcer    • History of loop recorder    • Hypertension    • Irregular heartbeat     a fib   • Low back pain    • Lumbar herniated disc    • Mass of right breast on mammogram 03/17/2016    10 o'clock position, 4 cm from the nipple,   • Neuropathy    • PONV (postoperative nausea and vomiting)    • Raynaud disease    • Sleep apnea     MILD, NO NEED FOR CPAP   • Urinary incontinence        SUBJECTIVE: 73 y.o.  female.  Awake and alert.  No complaints    OBJECTIVE:   Vitals:    12/04/18 1354 12/04/18 1741 12/04/18 2216 12/05/18 0411   BP: 101/52 111/71 108/71 119/67   BP Location: Right arm Right arm Right arm Right arm   Patient Position: Lying Lying Lying Lying   Pulse: 84 86 89 88   Resp: 18 18 18 16   Temp: 97.4 °F (36.3 °C) 99.4 °F (37.4 °C) 98.7 °F (37.1 °C)    TempSrc: Oral Oral Oral    SpO2: 98% 100% 95% 92%   Weight:       Height:         I/O last 3 completed shifts:  In: -   Out: 1150 [Urine:1150]    Current Medications:  Scheduled Meds:  ARIPiprazole 10 mg Oral Daily   atorvastatin 20 mg Oral Daily   docusate sodium 100 mg Oral BID   lamoTRIgine 100 mg Oral Q12H   linaclotide 72 mcg Oral QAM AC   lithium carbonate 300 mg Oral Daily   oxybutynin XL 10 mg Oral Daily   pantoprazole 40 mg Oral Daily   polyethylene glycol 17 g Oral Daily   pregabalin 50 mg Oral Q12H   sennosides-docusate sodium 1 tablet Oral BID   sodium chloride 3 mL Intravenous Q12H     PRN Meds:.bisacodyl  •  cyclobenzaprine  •  ipratropium-albuterol  •  melatonin  •  naloxone  •  ondansetron **OR** ondansetron ODT **OR** ondansetron  •  oxyCODONE-acetaminophen  •  sodium chloride  •  temazepam    Diagnostic Tests:   Lab Results (last 24 hours)     Procedure Component Value Units Date/Time    Hemoglobin & Hematocrit, Blood [[id number]]  (Abnormal) Collected:  12/05/18 0340    Specimen:  Blood Updated:  12/05/18 0429     Hemoglobin 9.5 g/dL      Hematocrit 31.4 %           PHYSICAL EXAM:  Presently sitting up in a chair and  tolerating it well.  Back dressing is dry and intact area did she has good motion and sensation to both legs.  Denies any leg pain but does complain of some low back pain primarily incisional only.  Getting good relief with oral medication.  Voiding well since Fang was removed yesterday.  Abdomen is soft with bowel sounds however has not had a BM since admission.  Hemoglobin 9.5 hematocrit 31.4.  Vital signs are stable     ASSESSMENT & PLAN:    She and is planning to go home with her  however she does have 8 steps to get into the house and over 14 to get up to her bedroom.  Will have PT work with her on the steps today and tomorrow to discharge.  May need to have home health PT to continue working with her on the steps at home      Date: 12/5/2018    JOSE ENRIQUE Cole MD

## 2018-12-05 NOTE — PLAN OF CARE
Problem: Patient Care Overview  Goal: Plan of Care Review  Outcome: Ongoing (interventions implemented as appropriate)   12/05/18 3023   Coping/Psychosocial   Plan of Care Reviewed With patient;spouse   Plan of Care Review   Progress improving   OTHER   Outcome Summary incr amb dist, fatigue limiting stair training this am, will try in pm

## 2018-12-05 NOTE — THERAPY TREATMENT NOTE
Acute Care - Physical Therapy Treatment Note  [facility]     Patient Name: Davida Anderson  : 1945  MRN: [id number]  Today's Date: 2018             Admit Date: 12/3/2018    Visit Dx:    ICD-10-CM ICD-9-CM   1. Spondylolisthesis of lumbar region M43.16 738.4   2. Spinal stenosis of lumbar region with neurogenic claudication M48.062 724.03     Patient Active Problem List   Diagnosis   • BP (high blood pressure)   • History of left breast infiltrating ductal cancer 2cm s/p mastectomy with reconstruction  s/p chemo   • H/O ETOH abuse   • Gtz's esophagus   • Colon polyps   • Bipolar 1 disorder (CMS/HCC)   • Arthritis   • Raynaud's disease   • Neuropathy   • Lumbar spine pain   • Spondylolisthesis at L4-L5 level   • Right lumbar radiculopathy   • Gtz's esophagus without dysplasia   • Hx of adenomatous colonic polyps   • Dysphagia   • Diarrhea   • Primary osteoarthritis of left knee   • Primary localized osteoarthrosis of the knee, right   • Chronic pain of left knee   • Spinal stenosis of lumbar region with neurogenic claudication   • Spondylolisthesis of lumbar region       Therapy Treatment    Rehabilitation Treatment Summary     Row Name 18 1616 18 0950          Treatment Time/Intention    Discipline  physical therapist  -EJ  physical therapy assistant  -     Document Type  therapy note (daily note)  -  therapy note (daily note)  -     Subjective Information  no complaints  -  complains of;fatigue;pain  -     Mode of Treatment  physical therapy  -  --     Care Plan Review  --  patient/other agree to care plan  -     Care Plan Review, Other Participant(s)  --  spouse  -     Therapy Frequency (PT Clinical Impression)  daily  -  --     Patient Effort  good  -  --     Comment  --  pt reports pain improved and feeling much better today  -     Existing Precautions/Restrictions  brace worn when out of bed;spinal  -EJ  fall;brace worn when out of bed  -      Treatment Considerations/Comments  --  fatigues quickly, but did just finish bath w/nsg  -JM     Recorded by [EJ] Anjelica Colmenares, PT 12/05/18 1635 [JM] Yenny Lee, PTA 12/05/18 1330     Row Name 12/05/18 1616             Cognitive Assessment/Intervention- PT/OT    Personal Safety Interventions  fall prevention program maintained;gait belt;nonskid shoes/slippers when out of bed;supervised activity  -EJ      Recorded by [EJ] Anjelica Colmenares, PT 12/05/18 1635      Row Name 12/05/18 1616             Bed Mobility Assessment/Treatment    Comment (Bed Mobility)  up in chair  -EJ      Recorded by [EJ] Anjelica Colmenares, PT 12/05/18 1635      Row Name 12/05/18 1616 12/05/18 0950          Sit-Stand Transfer    Sit-Stand [place] (Transfers)  stand by assist;contact guard  -EJ  verbal cues;nonverbal cues (demo/gesture);contact guard req 2 attempts  -JM     Assistive Device (Sit-Stand Transfers)  walker, front-wheeled  -EJ  walker, front-wheeled  -JM     Recorded by [EJ] Anjelica Colmenares, PT 12/05/18 1635 [JM] Yenny Lee, PTA 12/05/18 1330     Row Name 12/05/18 1616 12/05/18 0950          Stand-Sit Transfer    Stand-Sit [place] (Transfers)  stand by assist  -EJ  minimum assist (75% patient effort);verbal cues  -JM     Assistive Device (Stand-Sit Transfers)  walker, front-wheeled  -EJ  walker, front-wheeled  -JM     Recorded by [EJ] Anjelica Colmenares, PT 12/05/18 1635 [JM] Yenny Lee, PTA 12/05/18 1330     Row Name 12/05/18 1616 12/05/18 0950          Gait/Stairs Assessment/Training    [place] Level (Gait)  contact guard  -EJ  contact guard  -JM     Assistive Device (Gait)  walker, front-wheeled  -EJ  walker, front-wheeled  -JM     Distance in Feet (Gait)  150  -EJ  75  -JM     Deviations/Abnormal Patterns (Gait)  antalgic;mely decreased;stride length decreased  -EJ  antalgic;mely decreased;base of support, narrow;stride length decreased  -JM     [place] Level (Stairs)   verbal cues;contact guard  -EJ  --     Handrail Location (Stairs)  left side (ascending)  -EJ  --     Number of Steps (Stairs)  12  -EJ  --     Ascending Technique (Stairs)  step-over-step  -EJ  --     Descending Technique (Stairs)  step-over-step  -EJ  --     Comment (Gait/Stairs)  --  will need to try stairs in pm  -JM     Recorded by [EJ] Anjelica Colmenares, PT 12/05/18 1635 [JM] Yenny Lee, PTA 12/05/18 1330     Row Name 12/05/18 1616 12/05/18 0950          Positioning and Restraints    Pre-Treatment Position  sitting in chair/recliner  -EJ  bathroom  -     Post Treatment Position  chair  -EJ  --     In Chair  notified nsg;sitting;call light within reach;encouraged to call for assist;with family/caregiver  -  sitting;call light within reach;encouraged to call for assist;with family/caregiver  -JM     Recorded by [EJ] Anjelica Colmenares, PT 12/05/18 1635 [JM] Yenny Lee, PTA 12/05/18 1330     Row Name 12/05/18 1616 12/05/18 0950          Pain Scale: Numbers Pre/Post-Treatment    Pain Scale: Numbers, Pretreatment  10/10  -EJ  6/10  -JM     Pain Scale: Numbers, Post-Treatment  --  6/10  -JM     Pain Location - Orientation  --  lower  -JM     Pain Location  back  -EJ  back  -JM     Pain Intervention(s)  --  Medication (See MAR);Repositioned;Ambulation/increased activity  -     Recorded by [EJ] Anjelica Colmenares, PT 12/05/18 1635 [JM] Yenny Lee, PTA 12/05/18 1330     Row Name                Wound 12/03/18 0852 Other (See comments) back incision    Wound - Properties Group Date first assessed: 12/03/18 [JMA] Time first assessed: 0852 [JMA] Side: Other (See comments) [JMA] Location: back [JMA] Type: incision [JMA] Recorded by:  [JMA] Araeblla Gill RN 12/03/18 0852    Row Name 12/05/18 1616             Plan of Care Review    Plan of Care Reviewed With  patient;spouse  -      Recorded by [EJ] Anjelica Colmenares, PT 12/05/18 2304        User Key  (r) = Recorded By, (t) = Taken By, (c) =  Cosigned By    Initials Name Effective Dates Discipline    Arabella Rivera RN 06/16/16 -  Nurse    Yenny Leo, PAULINO 03/07/18 -  PT    Anjelica Knox, PT 04/03/18 -  PT          Wound 12/03/18 0852 Other (See comments) back incision (Active)   Dressing Appearance dry;intact 12/5/2018  9:40 AM   Closure WYATT 12/5/2018  9:40 AM   Base dressing in place, unable to visualize 12/5/2018  9:40 AM   Drainage Amount none 12/5/2018  9:40 AM           Physical Therapy Education     Title: PT OT SLP Therapies (Done)     Topic: Physical Therapy (Done)     Point: Mobility training (Done)     Learning Progress Summary           Patient Acceptance, E,TB,D, VU,NR by ALE at 12/5/2018  1:31 PM    Comment:  educ on wearing brace when OOB    Acceptance, E,D, VU,NR by CHEO at 12/4/2018  9:19 AM   Family Acceptance, E,TB,D, VU,NR by ALE at 12/5/2018  1:31 PM    Comment:  educ on wearing brace when OOB                   Point: Home exercise program (Done)     Learning Progress Summary           Patient Acceptance, E,TB,D, VU,NR by ALE at 12/5/2018  1:31 PM    Comment:  educ on wearing brace when OOB    Acceptance, E,D, VU,NR by CHEO at 12/4/2018  9:19 AM   Family Acceptance, E,TB,D, VU,NR by ALE at 12/5/2018  1:31 PM    Comment:  educ on wearing brace when OOB                   Point: Body mechanics (Done)     Learning Progress Summary           Patient Acceptance, E,TB,D, VU,NR by ALE at 12/5/2018  1:31 PM    Comment:  educ on wearing brace when OOB    Acceptance, E,D, VU,NR by CHEO at 12/4/2018  9:19 AM   Family Acceptance, E,TB,D, VU,NR by ALE at 12/5/2018  1:31 PM    Comment:  educ on wearing brace when OOB                   Point: Precautions (Done)     Learning Progress Summary           Patient Acceptance, E,TB,D, VU,NR by ALE at 12/5/2018  1:31 PM    Comment:  educ on wearing brace when OOB    Acceptance, E,D, VU,NR by CHEO at 12/4/2018  9:19 AM   Family Acceptance, FAUSTO,RENZO SPENCER, KURT,NR by ALE at 12/5/2018  1:31 PM    Comment:  educ on  wearing brace when OOB                               User Key     Initials Effective Dates Name Provider Type [facility] 06/08/18 -  Debbie Watters, PT Physical Therapist PT    ALE 03/07/18 -  Yenny Lee PTA Physical Therapy Assistant PT                PT Recommendation and Plan  Therapy Frequency (PT Clinical Impression): daily  Plan of Care Reviewed With: patient, spouse  Outcome Measures     Row Name 12/05/18 1300 12/04/18 0900          How much help from another person do you currently need...    Turning from your back to your side while in flat bed without using bedrails?  3  -  3  -KH     Moving from lying on back to sitting on the side of a flat bed without bedrails?  3  -  3  -KH     Moving to and from a bed to a chair (including a wheelchair)?  3  -  3  -KH     Standing up from a chair using your arms (e.g., wheelchair, bedside chair)?  3  -  3  -KH     Climbing 3-5 steps with a railing?  3  -  3  -KH     To walk in hospital room?  3  -  3  -KH     AM-PAC 6 Clicks Score  18  -  18  -KH        Functional Assessment    Outcome Measure Options  --  AM-PAC 6 Clicks Basic Mobility (PT)  -       User Key  (r) = Recorded By, (t) = Taken By, (c) = Cosigned By    Initials Name Provider Type    Debbie Muniz, PT Physical Therapist    Yenny Leo PTA Physical Therapy Assistant         Time Calculation:   PT Charges     Row Name 12/05/18 1635 12/05/18 1301          Time Calculation    Start Time  1616  -EJ  0920  -     Stop Time  1633  -EJ  0939  -     Time Calculation (min)  17 min  -EJ  19 min  -     PT Received On  12/05/18  -  12/05/18  -ALE     PT - Next Appointment  12/06/18  -  12/05/18  -       User Key  (r) = Recorded By, (t) = Taken By, (c) = Cosigned By    Initials Name Provider Type    Yenny Leo PTA Physical Therapy Assistant    Anjelica Knox, PT Physical Therapist        Therapy Suggested Charges     Code   Minutes  Charges    None           Therapy Charges for Today     Code Description Service Date Service Provider Modifiers Qty    20463375239 HC PT THER PROC EA 15 MIN 12/5/2018 Anjelica Colmenares, PT GP 1          PT G-Codes  Outcome Measure Options: AM-PAC 6 Clicks Basic Mobility (PT)  AM-PAC 6 Clicks Score: 18    Anjelica Colmenares, PT  12/5/2018

## 2018-12-05 NOTE — PLAN OF CARE
Problem: Patient Care Overview  Goal: Plan of Care Review  Outcome: Ongoing (interventions implemented as appropriate)   12/05/18 0637   Coping/Psychosocial   Plan of Care Reviewed With patient   Plan of Care Review   Progress improving   OTHER   Outcome Summary Pt alert and oriented but forgetful. Ambulating with assist x1 and walker. Voiding without difficulty.        Problem: Fall Risk (Adult)  Goal: Absence of Fall  Outcome: Ongoing (interventions implemented as appropriate)      Problem: Pain, Acute (Adult)  Goal: Acceptable Pain Control/Comfort Level  Outcome: Ongoing (interventions implemented as appropriate)

## 2018-12-05 NOTE — PROGRESS NOTES
Continued Stay Note  [facility]     Patient Name: Davida Anderson  MRN: [id number]  Today's Date: 12/5/2018    Admit Date: 12/3/2018    Discharge Plan     Row Name 12/05/18 1606       Plan    Plan  [facility] precert pending    Patient/Family in Agreement with Plan  yes    Plan Comments  Call placed to spouse Eddi Anderson @ [phone].  Spouse advises that they would still like to consider rehab if warranted.  CCP to follow-up tomorrow on determination and precert with [employer].      Row Name 12/05/18 1509       Plan    Plan Comments  Bed confirmed for [facility] and will await precert.  CCP to follow-up with family due to patient confusion.                 Expected Discharge Date and Time     Expected Discharge Date Expected Discharge Time    Dec 6, 2018             Barbie Dean RN

## 2018-12-05 NOTE — THERAPY TREATMENT NOTE
Acute Care - Physical Therapy Treatment Note  [facility]     Patient Name: Davida Anderson  : 1945  MRN: [id number]  Today's Date: 2018             Admit Date: 12/3/2018    Visit Dx:    ICD-10-CM ICD-9-CM   1. Spondylolisthesis of lumbar region M43.16 738.4   2. Spinal stenosis of lumbar region with neurogenic claudication M48.062 724.03     Patient Active Problem List   Diagnosis   • BP (high blood pressure)   • History of left breast infiltrating ductal cancer 2cm s/p mastectomy with reconstruction  s/p chemo   • H/O ETOH abuse   • Gtz's esophagus   • Colon polyps   • Bipolar 1 disorder (CMS/HCC)   • Arthritis   • Raynaud's disease   • Neuropathy   • Lumbar spine pain   • Spondylolisthesis at L4-L5 level   • Right lumbar radiculopathy   • Gtz's esophagus without dysplasia   • Hx of adenomatous colonic polyps   • Dysphagia   • Diarrhea   • Primary osteoarthritis of left knee   • Primary localized osteoarthrosis of the knee, right   • Chronic pain of left knee   • Spinal stenosis of lumbar region with neurogenic claudication   • Spondylolisthesis of lumbar region       Therapy Treatment    Rehabilitation Treatment Summary     Row Name 18 0950             Treatment Time/Intention    Discipline  physical therapy assistant  -      Document Type  therapy note (daily note)  -      Subjective Information  complains of;fatigue;pain  -      Care Plan Review  patient/other agree to care plan  -      Care Plan Review, Other Participant(s)  spouse  -      Comment  pt reports pain improved and feeling much better today  -      Existing Precautions/Restrictions  fall;brace worn when out of bed  -      Treatment Considerations/Comments  fatigues quickly, but did just finish bath w/nsg  -ALE      Recorded by [ALE] Yenny Lee PTA 18 1330      Row Name 18 5525             Sit-Stand Transfer    Sit-Stand [place] (Transfers)  verbal cues;nonverbal cues  (demo/gesture);contact guard req 2 attempts  -      Assistive Device (Sit-Stand Transfers)  walker, front-wheeled  -JM      Recorded by [JM] Yenny Lee, [facility] 12/05/18 1330      Row Name 12/05/18 0950             Stand-Sit Transfer    Stand-Sit [place] (Transfers)  minimum assist (75% patient effort);verbal cues  -      Assistive Device (Stand-Sit Transfers)  walker, front-wheeled  -JM      Recorded by [JM] Yenny Lee, [facility] 12/05/18 1330      Row Name 12/05/18 0999             Gait/Stairs Assessment/Training    [place] Level (Gait)  contact guard  -      Assistive Device (Gait)  walker, front-wheeled  -JM      Distance in Feet (Gait)  75  -JM      Deviations/Abnormal Patterns (Gait)  antalgic;mely decreased;base of support, narrow;stride length decreased  -      Comment (Gait/Stairs)  will need to try stairs in pm  -JM      Recorded by [JM] Yenny Lee, [facility] 12/05/18 1330      Row Name 12/05/18 0978             Positioning and Restraints    Pre-Treatment Position  bathroom  -JM      In Chair  sitting;call light within reach;encouraged to call for assist;with family/caregiver  -      Recorded by [JM] Yenny Lee, [facility] 12/05/18 1330      Row Name 12/05/18 0925             Pain Scale: Numbers Pre/Post-Treatment    Pain Scale: Numbers, Pretreatment  6/10  -JM      Pain Scale: Numbers, Post-Treatment  6/10  -JM      Pain Location - Orientation  lower  -JM      Pain Location  back  -      Pain Intervention(s)  Medication (See MAR);Repositioned;Ambulation/increased activity  -      Recorded by [JM] Yenny Lee, PTA 12/05/18 1330      Row Name                Wound 12/03/18 0876 Other (See comments) back incision    Wound - Properties Group Date first assessed: 12/03/18 [JMA] Time first assessed: 0852 [JMA] Side: Other (See comments) [JMA] Location: back [JMA] Type: incision [JMA] Recorded by:  [JMA] Arabella Gill RN 12/03/18 0852      User Key  (r) = Recorded By, (t) =  Taken By, (c) = Cosigned By    Initials Name Effective Dates Discipline    Arabella Rivera RN 06/16/16 -  Nurse    Yenny Leo PTA 03/07/18 -  PT          Wound 12/03/18 0852 Other (See comments) back incision (Active)   Dressing Appearance dry;intact 12/5/2018  9:40 AM   Closure WYATT 12/5/2018  9:40 AM   Base dressing in place, unable to visualize 12/5/2018  9:40 AM   Drainage Amount none 12/5/2018  9:40 AM           Physical Therapy Education     Title: PT OT SLP Therapies (Done)     Topic: Physical Therapy (Done)     Point: Mobility training (Done)     Learning Progress Summary           Patient Acceptance, E,TB,D, VU,NR by ALE at 12/5/2018  1:31 PM    Comment:  educ on wearing brace when OOB    Acceptance, E,D, VU,NR by CHEO at 12/4/2018  9:19 AM   Family Acceptance, E,TB,D, VU,NR by ALE at 12/5/2018  1:31 PM    Comment:  educ on wearing brace when OOB                   Point: Home exercise program (Done)     Learning Progress Summary           Patient Acceptance, E,TB,D, VU,NR by ALE at 12/5/2018  1:31 PM    Comment:  educ on wearing brace when OOB    Acceptance, E,D, VU,NR by CHEO at 12/4/2018  9:19 AM   Family Acceptance, E,TB,D, VU,NR by ALE at 12/5/2018  1:31 PM    Comment:  educ on wearing brace when OOB                   Point: Body mechanics (Done)     Learning Progress Summary           Patient Acceptance, E,TB,D, VU,NR by ALE at 12/5/2018  1:31 PM    Comment:  educ on wearing brace when OOB    Acceptance, E,D, VU,NR by CHEO at 12/4/2018  9:19 AM   Family Acceptance, E,TB,D, VU,NR by ALE at 12/5/2018  1:31 PM    Comment:  educ on wearing brace when OOB                   Point: Precautions (Done)     Learning Progress Summary           Patient Acceptance, E,TB,D, VU,NR by ALE at 12/5/2018  1:31 PM    Comment:  educ on wearing brace when OOB    Acceptance, E,D, VU,NR by CHEO at 12/4/2018  9:19 AM   Family Acceptance, E,TB,D, VU,NR by ALE at 12/5/2018  1:31 PM    Comment:  educ on wearing brace when OOB                                User Key     Initials Effective Dates Name Provider Type Discipline     06/08/18 -  Debbie Watters, PT Physical Therapist PT    ALE 03/07/18 -  Yenny Lee PTA Physical Therapy Assistant PT                PT Recommendation and Plan     Plan of Care Reviewed With: patient, spouse  Progress: improving  Outcome Summary: incr amb dist, fatigue limiting stair training this am, will try in pm  Outcome Measures     Row Name 12/05/18 1300 12/04/18 0900          How much help from another person do you currently need...    Turning from your back to your side while in flat bed without using bedrails?  3  -  3  -     Moving from lying on back to sitting on the side of a flat bed without bedrails?  3  -  3  -     Moving to and from a bed to a chair (including a wheelchair)?  3  -  3  -KH     Standing up from a chair using your arms (e.g., wheelchair, bedside chair)?  3  -  3  -     Climbing 3-5 steps with a railing?  3  -  3  -     To walk in hospital room?  3  -  3  -     AM-PAC 6 Clicks Score  18  -  18  -KH        Functional Assessment    Outcome Measure Options  --  AM-PAC 6 Clicks Basic Mobility (PT)  -       User Key  (r) = Recorded By, (t) = Taken By, (c) = Cosigned By    Initials Name Provider Type    Debbie Muniz, PT Physical Therapist    Yenny Leo PTA Physical Therapy Assistant         Time Calculation:   PT Charges     Row Name 12/05/18 1301             Time Calculation    Start Time  0920  -      Stop Time  0939  -      Time Calculation (min)  19 min  -      PT Received On  12/05/18  -      PT - Next Appointment  12/05/18  -        User Key  (r) = Recorded By, (t) = Taken By, (c) = Cosigned By    Initials Name Provider Type    Yenny Leo PTA Physical Therapy Assistant        Therapy Suggested Charges     Code   Minutes Charges    None           Therapy Charges for Today     Code Description Service Date  Service Provider Modifiers Qty    20620837575 HC PT THER PROC EA 15 MIN 12/4/2018 Yenny Lee PTA GP 2    74378356517 HC PT THER PROC EA 15 MIN 12/5/2018 Yenny Lee PTA GP 1          PT G-Codes  Outcome Measure Options: AM-PAC 6 Clicks Basic Mobility (PT)  AM-PAC 6 Clicks Score: 18    Yenny Lee PTA  12/5/2018

## 2018-12-05 NOTE — PROGRESS NOTES
Continued Stay Note  [facility]     Patient Name: Davida Anderson  MRN: [id number]  Today's Date: 12/5/2018    Admit Date: 12/3/2018    Discharge Plan     Row Name 12/05/18 0826       Plan    Plan Comments  Per Lauren, no beds until Thur/Friday at [facility].  CCP to follow-up today for possible back-up choices.               Expected Discharge Date and Time     Expected Discharge Date Expected Discharge Time    Dec 6, 2018             Barbie Dean RN

## 2018-12-05 NOTE — PROGRESS NOTES
" LOS: 2 days     Name: Davida Anderson  Age: 73 y.o.  Sex: female  :  1945  MRN: [id number]         Primary Care Physician: Marek Nguyễn MD    Subjective   Subjective  Patient is off PCA  Walked with PT   at bed side     Objective   Vital Signs  Temp:  [97.4 °F (36.3 °C)-99.4 °F (37.4 °C)] 98.7 °F (37.1 °C)  Heart Rate:  [84-89] 88  Resp:  [16-18] 16  BP: (101-119)/(52-71) 119/67  Body mass index is 20.83 kg/m².    Objective:  General Appearance:  Comfortable and in no acute distress.    Vital signs: (most recent): Blood pressure 119/67, pulse 88, temperature 98.7 °F (37.1 °C), temperature source Oral, resp. rate 16, height 168.9 cm (66.5\"), weight 59.4 kg (131 lb), SpO2 92 %.    Lungs:  Normal effort and normal respiratory rate.    Heart: Normal rate.  Regular rhythm.    Abdomen: Abdomen is soft.  Bowel sounds are normal.   There is no abdominal tenderness.     Extremities: There is no dependent edema or local swelling.    Neurological: Patient is alert and oriented to person, place and time.    Skin:  Warm and dry.              Results Review:       I reviewed the patient's new clinical results.    Results from last 7 days   Lab Units  18   0340  18   0441  18   1203   WBC 10*3/mm3   --   8.25   --    HEMOGLOBIN g/dL  9.5*  9.8*  12.0   PLATELETS 10*3/mm3   --   167   --      Results from last 7 days   Lab Units  18   0441   SODIUM mmol/L  140   POTASSIUM mmol/L  4.2   CHLORIDE mmol/L  105   CO2 mmol/L  27.4   BUN mg/dL  9   CREATININE mg/dL  0.65   CALCIUM mg/dL  8.8   GLUCOSE mg/dL  93                 Scheduled Meds:     ARIPiprazole 10 mg Oral Daily   atorvastatin 20 mg Oral Daily   docusate sodium 100 mg Oral BID   lamoTRIgine 100 mg Oral Q12H   linaclotide 72 mcg Oral QAM AC   lithium carbonate 300 mg Oral Daily   oxybutynin XL 10 mg Oral Daily   pantoprazole 40 mg Oral Daily   polyethylene glycol 17 g Oral Daily   pregabalin 50 mg Oral Q12H "   sennosides-docusate sodium 1 tablet Oral BID   sodium chloride 3 mL Intravenous Q12H     PRN Meds:   bisacodyl  •  cyclobenzaprine  •  ipratropium-albuterol  •  melatonin  •  naloxone  •  ondansetron **OR** ondansetron ODT **OR** ondansetron  •  oxyCODONE-acetaminophen  •  sodium chloride  •  temazepam  Continuous Infusions:    lactated ringers 9 mL/hr Last Rate: 9 mL/hr (12/03/18 0626)   sodium chloride 0.45 % with KCl 20 mEq 100 mL/hr Last Rate: 100 mL/hr (12/04/18 0040)       Assessment/Plan   Active Hospital Problems    Diagnosis Date Noted   • Spinal stenosis of lumbar region with neurogenic claudication [M48.062] 11/09/2018   • Spondylolisthesis of lumbar region [M43.16] 11/09/2018      Resolved Hospital Problems   No resolved problems to display.       Assessment & Plan    Hypertension -  Monitor    Pain, off PCA.     COPD - she is a former smoker.  No evidence of exacerbation currently.   she has been weaned to room air.  Continue as needed bronchodilators.     Bipolar disorder - continue outpatient psychotropic agents.  Currently appears stable     GERD with history of Gtz's esophagus - continue PPI     Hyperlipidemia - continue statin      Xu hCao MD  [facility]  12/05/18  1:39 PM

## 2018-12-06 VITALS
HEIGHT: 67 IN | HEART RATE: 82 BPM | RESPIRATION RATE: 16 BRPM | OXYGEN SATURATION: 93 % | BODY MASS INDEX: 20.56 KG/M2 | DIASTOLIC BLOOD PRESSURE: 59 MMHG | WEIGHT: 131 LBS | SYSTOLIC BLOOD PRESSURE: 97 MMHG | TEMPERATURE: 98.4 F

## 2018-12-06 PROCEDURE — 97110 THERAPEUTIC EXERCISES: CPT

## 2018-12-06 PROCEDURE — 99024 POSTOP FOLLOW-UP VISIT: CPT | Performed by: ORTHOPAEDIC SURGERY

## 2018-12-06 RX ORDER — SENNA AND DOCUSATE SODIUM 50; 8.6 MG/1; MG/1
1 TABLET, FILM COATED ORAL 2 TIMES DAILY
Qty: 30 TABLET | Refills: 0 | Status: SHIPPED | OUTPATIENT
Start: 2018-12-06 | End: 2018-12-31 | Stop reason: SDUPTHER

## 2018-12-06 RX ORDER — OXYCODONE HYDROCHLORIDE AND ACETAMINOPHEN 5; 325 MG/1; MG/1
1-2 TABLET ORAL EVERY 4 HOURS
Qty: 45 TABLET | Refills: 0 | Status: SHIPPED | OUTPATIENT
Start: 2018-12-06 | End: 2018-12-13 | Stop reason: SDUPTHER

## 2018-12-06 RX ADMIN — CYCLOBENZAPRINE 10 MG: 10 TABLET, FILM COATED ORAL at 03:45

## 2018-12-06 RX ADMIN — ATORVASTATIN CALCIUM 20 MG: 20 TABLET, FILM COATED ORAL at 08:47

## 2018-12-06 RX ADMIN — ARIPIPRAZOLE 10 MG: 10 TABLET ORAL at 08:47

## 2018-12-06 RX ADMIN — POLYETHYLENE GLYCOL 3350 17 G: 17 POWDER, FOR SOLUTION ORAL at 08:48

## 2018-12-06 RX ADMIN — PANTOPRAZOLE SODIUM 40 MG: 40 TABLET, DELAYED RELEASE ORAL at 08:48

## 2018-12-06 RX ADMIN — DOCUSATE SODIUM 100 MG: 100 CAPSULE, LIQUID FILLED ORAL at 08:47

## 2018-12-06 RX ADMIN — OXYCODONE AND ACETAMINOPHEN 2 TABLET: 5; 325 TABLET ORAL at 06:05

## 2018-12-06 RX ADMIN — OXYCODONE AND ACETAMINOPHEN 2 TABLET: 5; 325 TABLET ORAL at 01:14

## 2018-12-06 RX ADMIN — LAMOTRIGINE 100 MG: 100 TABLET ORAL at 08:47

## 2018-12-06 RX ADMIN — LITHIUM CARBONATE 300 MG: 300 CAPSULE, GELATIN COATED ORAL at 08:47

## 2018-12-06 RX ADMIN — PREGABALIN 50 MG: 50 CAPSULE ORAL at 08:48

## 2018-12-06 RX ADMIN — SENNOSIDES AND DOCUSATE SODIUM 1 TABLET: 8.6; 5 TABLET ORAL at 08:47

## 2018-12-06 NOTE — DISCHARGE INSTRUCTIONS
BEE BROWNLEE JR., M.D.  ORTHOPAEDIC SURGERY  [address]  [address]    LUMBAR FUSION SURGERY  HOME INSTRUCTIONS    1.     You will need to check your dressing or have a family member to check           your dressing EVERYDAY for the following signs:             Increase in redness           Increase in swelling around the incision or low back area           Increase in pain           Any drainage noted on the dressing or from the incision           Edges of the incision are pulling apart once dressing is removed           Increase in overall body temperature (greater than 100.5 degrees)           Dressings are to be changed only if directed by the physician            Call your physician if any of these listed above occur after you are           discharged from the hospital.  DO NOT wait until your office visit to           inform the physician.    2.     Continue with the exercise program twice a day as instructed by the           physical therapist at the hospital.  A more involved physical therapy          program may be started 3 months after your surgery.    3.     Walking must be done on a daily basis.  You should walk at least twice           a day and more if you are able.  Start with short distances and gradually           add a little distance everyday.    4.     You may wish to use an elevated toilet seat for up to12 weeks after surgery.    5.     You MUST wear your brace anytime you are up.  You do not have to wear             your brace at nighttime when walking a short distance to the bathroom.    6.     You may shower three (3) days after your surgery, as long as there is no                drainage.  The dressing is waterproof and should be left on and intact until you are         seen in the office for your first post-operative visit. You may remove          your brace to shower.                                                                                                             7.     You can sit in a high-straight back chair with arms as tolerated, unless  instructed otherwise.  Do not flex your hips more than 90 degrees when        sitting.  Avoid low, soft chairs.    8.     You may want to sleep on a firm mattress.  Avoid waterbeds for 3-6 months           after surgery.     9.     Patients should not smoke.  Smoking interferes with the fusion and the                healing time.     10.   Avoid pushing or pulling.  There will be no lifting of anything over 5 pounds for the           first few months.    11.   You may drive a car, usually 2-4 weeks after surgery, but only after you           have checked with your physician.    12.   You may ride in a car after your surgery and for no more than           30 minutes to one hour at a time.  Short distance riding to and from the           physician’s office for follow-up visits is the only time you should be in a           car until after the 2-4 weeks time frame.    13.   You may go home in a car.  You must wear your seat belt.    14.   You will be sent home with pain medication, but it will only be used up to           4 weeks after surgery.  Refills may be obtained, but ONLY during office           hours.    15.   You may go and down stairs, but take it easy at first.  It is preferable that   the first few weeks at home, try to only use the stairs once or twice a day          until you have had a chance to regain your strength.    16.   NO bending at the waist.  You may need to use your “reacher” to assist you          in picking up items off of the floor. (not mandatory to have)  If you absolutely                 something, you may squat by bending at your hips and knees.          need to reach for.    17.   Sexual activity should be avoided for 2 weeks after surgery.  Be careful and use              common sense.  You may have to adjust your position to lessen the strain on your         back.    18.   Return to the  office in 2 weeks to see Dr. Keith.

## 2018-12-06 NOTE — PROGRESS NOTES
Continued Stay Note  [facility]     Patient Name: Davida Anderson  MRN: [id number]  Today's Date: 12/6/2018    Admit Date: 12/3/2018    Discharge Plan     Row Name 12/06/18 0849       Plan    Plan Comments  Met w/ spouse in [facility] and confirmed plan.                                          Expected Discharge Date and Time     Expected Discharge Date Expected Discharge Time    Dec 6, 2018             Barbie Dean RN

## 2018-12-06 NOTE — DISCHARGE SUMMARY
Orthopedic Discharge Summary      Patient: Davida Anderson  YOB: 1945  Medical Record Number: [id number]    Attending Physician: Austin Keith MD  Consulting Physician(s):   Consults     Date and Time Order Name Status Description    12/3/2018 1140 Inpatient Hospitalist Consult Completed           Date of Admission: 12/3/2018  5:42 AM  Date of Discharge:12/6/2018    Discharge Diagnosis: L4-5 laminectomy and fusion with instrumentation,   Acute Blood Loss Anemia      Presenting Problem/History of Present Illness: Spinal stenosis of lumbar region with neurogenic claudication [M48.062]  Spondylolisthesis of lumbar region [M43.16]  Spondylolisthesis of lumbar region [M43.16]        Allergies:   Allergies   Allergen Reactions   • Morphine Hives, Itching and Rash       Discharge Medications     Discharge Medications      New Medications      Instructions Start Date   oxyCODONE-acetaminophen 5-325 MG per tablet  Commonly known as:  PERCOCET   1-2 tablets by mouth every 4-6 hours when necessary pain      sennosides-docusate sodium 8.6-50 MG tablet  Commonly known as:  SENOKOT-S   1 tablet, Oral, 2 Times Daily         Continue These Medications      Instructions Start Date   ALBUTEROL IN   2 puffs, Inhalation, Every 4 Hours PRN      ARIPiprazole 10 MG tablet  Commonly known as:  ABILIFY   10 mg, Oral, Daily      aspirin 81 MG tablet   81 mg, Oral, Daily      atorvastatin 20 MG tablet  Commonly known as:  LIPITOR   20 mg, Oral, Daily      CALCIUM 600 + D 600-200 MG-UNIT tablet  Generic drug:  Calcium Carb-Cholecalciferol   2 tablets, Oral, Daily      Co Q-10 400 MG capsule   400 mg, Oral, Daily      diphenhydrAMINE-acetaminophen  MG tablet per tablet  Commonly known as:  TYLENOL PM   1 tablet, Oral, Nightly      hydrALAZINE 25 MG tablet  Commonly known as:  APRESOLINE   25 mg, Oral, 3 Times Daily      lamoTRIgine 100 MG tablet  Commonly known as:  LaMICtal   100 mg, Oral, 2 Times Daily      lithium  carbonate 300 MG capsule   300 mg, Oral, Daily      magnesium oxide 400 (241.3 Mg) MG tablet tablet  Commonly known as:  MAGOX   400 mg, Oral, Daily      melatonin 5 MG tablet tablet   5 mg, Oral, Nightly PRN      ondansetron 4 MG tablet  Commonly known as:  ZOFRAN   4 mg, Oral, Every 8 Hours PRN      pregabalin 50 MG capsule  Commonly known as:  LYRICA   100 mg, Oral, 2 Times Daily      SOLUBLE FIBER/PROBIOTICS PO   1 tablet, Oral, Daily      STOOL SOFTENER PO   1 capsule, Oral, As Needed         Stop These Medications    acetaminophen 500 MG tablet  Commonly known as:  TYLENOL     celecoxib 200 MG capsule  Commonly known as:  CeleBREX     HYDROcodone-acetaminophen 7.5-325 MG per tablet  Commonly known as:  NORCO        ASK your doctor about these medications      Instructions Start Date   baclofen 10 MG tablet  Commonly known as:  LIORESAL   10 mg, Oral, 3 Times Daily      BIOTIN PO   1 tablet, Oral, Daily      LINZESS 72 MCG capsule capsule  Generic drug:  linaclotide   72 mcg, Oral, Every Morning Before Breakfast      pantoprazole 40 MG EC tablet  Commonly known as:  PROTONIX   40 mg, Oral, Daily      solifenacin 10 MG tablet  Commonly known as:  VESICARE   10 mg, Oral, Daily      Vitamin B Complex tablet   1 tablet, Oral, Daily               Past Medical History:   Diagnosis Date   • Acid reflux    • Arthritis    • Gtz esophagus    • Bipolar 2 disorder (CMS/HCC)    • Bradycardia     WITH SURGERY   • COPD (chronic obstructive pulmonary disease) (CMS/HCC)     MILD, USES AINHALER PRN   • DDD (degenerative disc disease), lumbar    • Diverticulosis    • Dizziness    • Frequent UTI     RECENT UTI FINISHED COURSE OF ANTIBX   • H/O Infiltrating ductal carcinoma of left breast 2002    Stage IIA, Grade 3 ER/WV +, HER2 -, treated with 5 years of tamoxifen, 5 years of Femara, completed in 2012   • High cholesterol    • History of alcoholism (CMS/HCC)     40 YRS AGO   • History of Clostridium difficile colitis 2014   •  History of gastric ulcer    • History of loop recorder    • Hypertension    • Irregular heartbeat     a fib   • Low back pain    • Lumbar herniated disc    • Mass of right breast on mammogram 2016    10 o'clock position, 4 cm from the nipple,   • Neuropathy    • PONV (postoperative nausea and vomiting)    • Raynaud disease    • Sleep apnea     MILD, NO NEED FOR CPAP   • Urinary incontinence         Past Surgical History:   Procedure Laterality Date   • ANKLE OPEN REDUCTION INTERNAL FIXATION Right 2015    Dr. Dandre Camacho, [facility]   • BACK SURGERY      LUMBAR   • BREAST RECONSTRUCTION, BREAST TISSUE EXPANDER INSERTION Left 2002    [place] Contour Profile expander was placed 550 cc size, filled with 200 cc of saline, Dr. Herbert Napoles, [facility]   • COLONOSCOPY N/A 2014    Dr. Darci Kerns, [facility]; torts, tics, stool, polyp   • CYSTOSCOPY N/A 2010    with TVT takedown, Dr. Simon Wall, [facility]   • EPIDURAL BLOCK     • KNEE SURGERY Left     BROKEN   • MAMMO US BREAST BIOPSY ADDITIONAL W WO DEVICE Left 2002    2:00 position left breast, Infiltrating Ductal Carcinoma, [facility]   • MANDIBLE FRACTURE SURGERY     • MASTECTOMY Left 2002    Left Total Mastectomy and Left Axillary [place] Node Biobsy, Dr. Indu Akins, [facility]   • OTHER SURGICAL HISTORY      CARDIAC LOOP DEVICE, LEFT CHEST   • TENSION FREE VAGINAL TAPING WITH MINI ARC SLING N/A 10/26/2009    Dr. Gina Castillo, [facility]   • UPPER GASTROINTESTINAL ENDOSCOPY  2014    z-line irreg, grade a reflux, gastritis        Social History     Occupational History   • Not on file   Tobacco Use   • Smoking status: Former Smoker     Packs/day: 3.00     Types: Cigarettes     Last attempt to quit: 1983     Years since quittin.6   • Smokeless tobacco: Never Used   Substance and Sexual Activity   • Alcohol use: No   • Drug use: No   • Sexual activity: Defer      Social History     Social History Narrative   • Not on file        Family History    Problem Relation Age of Onset   • Breast cancer Mother 35   • Colon cancer Mother 64   • Heart disease Father    • Cancer Father         throat and lung   • Colon polyps Father    • Malig Hyperthermia Neg Hx          Physical Exam: 73 y.o. female  General Appearance:    Alert, cooperative, in no acute distress                      Vitals:    12/05/18 1441 12/05/18 1757 12/05/18 2135 12/06/18 0603   BP: 104/69 112/66 106/59 97/59   BP Location: Right arm Right arm Right arm Right arm   Patient Position: Lying Lying Lying Lying   Pulse: 77 84 84 82   Resp: 16 16 16    Temp: 97.9 °F (36.6 °C) 97.9 °F (36.6 °C) 98.7 °F (37.1 °C) 98.4 °F (36.9 °C)   TempSrc: Oral Oral Oral Oral   SpO2:  99% 95% 93%   Weight:       Height:            DIAGNOSTIC TESTS:   Admission on 12/03/2018   Component Date Value Ref Range Status   • Hemoglobin 12/03/2018 12.0  11.9 - 15.5 g/dL Final   • Hematocrit 12/03/2018 39.6  35.6 - 45.5 % Final   • Glucose 12/04/2018 93  65 - 99 mg/dL Final   • BUN 12/04/2018 9  8 - 23 mg/dL Final   • Creatinine 12/04/2018 0.65  0.57 - 1.00 mg/dL Final   • Sodium 12/04/2018 140  136 - 145 mmol/L Final   • Potassium 12/04/2018 4.2  3.5 - 5.2 mmol/L Final   • Chloride 12/04/2018 105  98 - 107 mmol/L Final   • CO2 12/04/2018 27.4  22.0 - 29.0 mmol/L Final   • Calcium 12/04/2018 8.8  8.6 - 10.5 mg/dL Final   • eGFR Non African Amer 12/04/2018 90  >60 mL/min/1.73 Final   • BUN/Creatinine Ratio 12/04/2018 13.8  7.0 - 25.0 Final   • Anion Gap 12/04/2018 7.6  mmol/L Final   • WBC 12/04/2018 8.25  4.50 - 10.70 10*3/mm3 Final   • RBC 12/04/2018 3.44* 3.90 - 5.20 10*6/mm3 Final   • Hemoglobin 12/04/2018 9.8* 11.9 - 15.5 g/dL Final   • Hematocrit 12/04/2018 30.2* 35.6 - 45.5 % Final   • MCV 12/04/2018 87.8  80.5 - 98.2 fL Final   • MCH 12/04/2018 28.5  26.9 - 32.0 pg Final   • MCHC 12/04/2018 32.5  32.4 - 36.3 g/dL Final   • RDW 12/04/2018 12.9  11.7 - 13.0 % Final   • RDW-SD 12/04/2018 41.5  37.0 - 54.0 fl Final   • MPV  12/04/2018 10.5  6.0 - 12.0 fL Final   • Platelets 12/04/2018 167  140 - 500 10*3/mm3 Final   • Neutrophil % 12/04/2018 50.9  42.7 - 76.0 % Final   • Lymphocyte % 12/04/2018 34.7  19.6 - 45.3 % Final   • Monocyte % 12/04/2018 13.3* 5.0 - 12.0 % Final   • Eosinophil % 12/04/2018 0.7  0.3 - 6.2 % Final   • Basophil % 12/04/2018 0.4  0.0 - 1.5 % Final   • Immature Grans % 12/04/2018 0.4  0.0 - 0.5 % Final   • Neutrophils, Absolute 12/04/2018 4.20  1.90 - 8.10 10*3/mm3 Final   • Lymphocytes, Absolute 12/04/2018 2.86  0.90 - 4.80 10*3/mm3 Final   • Monocytes, Absolute 12/04/2018 1.10  0.20 - 1.20 10*3/mm3 Final   • Eosinophils, Absolute 12/04/2018 0.06  0.00 - 0.70 10*3/mm3 Final   • Basophils, Absolute 12/04/2018 0.03  0.00 - 0.20 10*3/mm3 Final   • Immature Grans, Absolute 12/04/2018 0.03  0.00 - 0.03 10*3/mm3 Final   • nRBC 12/04/2018 0.0  0.0 - 0.0 /100 WBC Final   • Hemoglobin 12/05/2018 9.5* 11.9 - 15.5 g/dL Final   • Hematocrit 12/05/2018 31.4* 35.6 - 45.5 % Final       No results found.    Hospital Course:  73 y.o. female admitted to [facility] to services of Austin Keith MD with Spinal stenosis of lumbar region with neurogenic claudication [M48.062]  Spondylolisthesis of lumbar region [M43.16]  Spondylolisthesis of lumbar region [M43.16] on 12/3/2018 and underwent L4-5 laminectomy and fusion with instrumentation  Per Austin Keith MD. Antibiotic and VTE prophylaxis were per [facility]IP protocols.  The patient was admitted to the floor where IV and/or oral pain medications were administered for postoperative pain.  At discharge the incisional pain was tolerable and preop neurologic function was intact.  The dressing was dry and the wound was clean.    Condition on Discharge:  Stable    Discharge Instructions: . Patient may weight bear as tolerated unless otherwise specified. Continue LOPEZ hose daily (for two weeks) and ice regularly. Patient also instructed on incentive spirometer during hospitalization  and encouraged to continue to use at home regularly. Patient may shower on POD #3 if and when all wound drainage has stopped.  Where applicable, the brace should be worn when up and about.  It need not be worn to the bathroom and certainly not in the shower.  A detailed list of instructions specific to the operation was given to the patient at the time of discharge.    Follow up Instructions:  Follow up in the office with Dr. Austin Keith Jr. in 2-3 weeks - patient to call the office at [phone] to schedule. Prescriptions were given for pain medication.    Follow-up Appointments  Future Appointments   Date Time Provider Department Center   12/19/2018  1:50 PM Austin Keith MD [facility] [address] None         Discharge Disposition Plan:today to home    Date: 12/6/2018    Austin Keith MD  12/06/18  7:50 AM

## 2018-12-06 NOTE — PLAN OF CARE
Problem: Patient Care Overview  Goal: Plan of Care Review  Outcome: Ongoing (interventions implemented as appropriate)   12/06/18 6744   Coping/Psychosocial   Plan of Care Reviewed With patient   Plan of Care Review   Progress improving   OTHER   Outcome Summary VSS, up with walker and assist x1, percocet and flexeril for pain relief, possible home today, will continue to monitor.       Problem: Fall Risk (Adult)  Goal: Identify Related Risk Factors and Signs and Symptoms  Outcome: Ongoing (interventions implemented as appropriate)    Goal: Absence of Fall  Outcome: Ongoing (interventions implemented as appropriate)      Problem: Pain, Acute (Adult)  Goal: Identify Related Risk Factors and Signs and Symptoms  Outcome: Ongoing (interventions implemented as appropriate)    Goal: Acceptable Pain Control/Comfort Level  Outcome: Ongoing (interventions implemented as appropriate)

## 2018-12-06 NOTE — THERAPY TREATMENT NOTE
Acute Care - Physical Therapy Treatment Note  [facility]     Patient Name: Davida Anderson  : 1945  MRN: [id number]  Today's Date: 2018             Admit Date: 12/3/2018    Visit Dx:    ICD-10-CM ICD-9-CM   1. Spondylolisthesis of lumbar region M43.16 738.4   2. Spinal stenosis of lumbar region with neurogenic claudication M48.062 724.03     Patient Active Problem List   Diagnosis   • BP (high blood pressure)   • History of left breast infiltrating ductal cancer 2cm s/p mastectomy with reconstruction  s/p chemo   • H/O ETOH abuse   • Gtz's esophagus   • Colon polyps   • Bipolar 1 disorder (CMS/HCC)   • Arthritis   • Raynaud's disease   • Neuropathy   • Lumbar spine pain   • Spondylolisthesis at L4-L5 level   • Right lumbar radiculopathy   • Gtz's esophagus without dysplasia   • Hx of adenomatous colonic polyps   • Dysphagia   • Diarrhea   • Primary osteoarthritis of left knee   • Primary localized osteoarthrosis of the knee, right   • Chronic pain of left knee   • Spinal stenosis of lumbar region with neurogenic claudication   • Spondylolisthesis of lumbar region       Therapy Treatment    Rehabilitation Treatment Summary     Row Name 18 0837             Treatment Time/Intention    Discipline  physical therapy assistant  -      Document Type  discharge treatment;therapy note (daily note)  -ALE      Subjective Information  complains of;fatigue;pain  -      Care Plan Review  patient/other agree to care plan  -      Care Plan Review, Other Participant(s)  spouse  -      Comment  pt reports feeling much better today  -      Existing Precautions/Restrictions  fall;spinal;brace worn when out of bed  -      Recorded by [ALE] Yenny Lee PTA 18 0842      Row Name 18 0837             Bed Mobility Assessment/Treatment    Supine-Sit [place] (Bed Mobility)  supervision  -      Comment (Bed Mobility)  pt able to don brace indep  -      Recorded by [ALE] Jesus  Yenny, PTA 12/06/18 0842      Row Name 12/06/18 0837             Sit-Stand Transfer    Sit-Stand [place] (Transfers)  supervision;verbal cues cues for hand placement  -      Assistive Device (Sit-Stand Transfers)  walker, front-wheeled  -JM      Recorded by [JM] Yenny Lee, PTA 12/06/18 0842      Row Name 12/06/18 0837             Stand-Sit Transfer    Stand-Sit [place] (Transfers)  supervision  -      Recorded by [JM] Yenny Lee, PTA 12/06/18 0842      Row Name 12/06/18 0837             Toilet Transfer    Type (Toilet Transfer)  stand-sit  -JM      [place] Level (Toilet Transfer)  supervision  -      Assistive Device (Toilet Transfer)  walker, front-wheeled  -JM      Recorded by [JM] Yenny Lee, PTA 12/06/18 0842      Row Name 12/06/18 0837             Gait/Stairs Assessment/Training    [place] Level (Gait)  supervision;verbal cues  -      Assistive Device (Gait)  walker, front-wheeled  -      Distance in Feet (Gait)  200  -JM      Comment (Gait/Stairs)  some assist to keep rwx from veering R  -JM      Recorded by [JM] Yenny Lee, PTA 12/06/18 0842      Row Name 12/06/18 0837             Positioning and Restraints    Pre-Treatment Position  in bed  -JM      Post Treatment Position  bathroom  -JM      Bathroom  sitting;call light within reach;encouraged to call for assist  -JM      Recorded by [JM] Yenny Lee, PTA 12/06/18 0842      Row Name 12/06/18 0837             Pain Scale: Numbers Pre/Post-Treatment    Pain Scale: Numbers, Pretreatment  4/10  -JM      Pain Scale: Numbers, Post-Treatment  4/10  -JM      Pain Location  back  -JM      Pain Intervention(s)  Medication (See MAR)  -JM      Recorded by [JM] Yenny Lee, PTA 12/06/18 0842      Row Name                Wound 12/03/18 0852 Other (See comments) back incision    Wound - Properties Group Date first assessed: 12/03/18 [JMA] Time first assessed: 0852 [JMA] Side: Other (See comments) [JMA]  Location: back [CHRISTAL] Type: incision [CHRISTAL] Recorded by:  [CHRISTAL] Arabella Gill RN 12/03/18 0852      User Key  (r) = Recorded By, (t) = Taken By, (c) = Cosigned By    Initials Name Effective Dates Discipline    Arabella Rivera RN 06/16/16 -  Nurse    Yenny Leo, PTA 03/07/18 -  PT          Wound 12/03/18 0852 Other (See comments) back incision (Active)   Dressing Appearance dry;intact 12/5/2018  8:28 PM   Closure WYATT 12/5/2018  8:28 PM   Base dressing in place, unable to visualize 12/5/2018  8:28 PM   Drainage Amount none 12/5/2018  8:28 PM   Dressing Care, Wound foam;gauze 12/5/2018  8:28 PM           Physical Therapy Education     Title: PT OT SLP Therapies (Done)     Topic: Physical Therapy (Done)     Point: Mobility training (Done)     Learning Progress Summary           Patient Acceptance, E,TB,D, VU,NR by ALE at 12/5/2018  1:31 PM    Comment:  educ on wearing brace when OOB    Acceptance, E,D, VU,NR by CHEO at 12/4/2018  9:19 AM   Family Acceptance, E,TB,D, VU,NR by ALE at 12/5/2018  1:31 PM    Comment:  educ on wearing brace when OOB                   Point: Home exercise program (Done)     Learning Progress Summary           Patient Acceptance, E,TB,D, VU,NR by ALE at 12/5/2018  1:31 PM    Comment:  educ on wearing brace when OOB    Acceptance, E,D, VU,NR by CHEO at 12/4/2018  9:19 AM   Family Acceptance, E,TB,D, VU,NR by ALE at 12/5/2018  1:31 PM    Comment:  educ on wearing brace when OOB                   Point: Body mechanics (Done)     Learning Progress Summary           Patient Acceptance, E,TB,D, VU,NR by ALE at 12/5/2018  1:31 PM    Comment:  educ on wearing brace when OOB    Acceptance, E,D, VU,NR by CHEO at 12/4/2018  9:19 AM   Family Acceptance, E,TB,D, VU,NR by ALE at 12/5/2018  1:31 PM    Comment:  educ on wearing brace when OOB                   Point: Precautions (Done)     Learning Progress Summary           Patient Acceptance, E,TB,D, VU,NR by ALE at 12/5/2018  1:31 PM    Comment:  educ on  wearing brace when OOB    Acceptance, E,D, VU,NR by CHEO at 12/4/2018  9:19 AM   Family Acceptance, E,TB,D, VU,NR by ALE at 12/5/2018  1:31 PM    Comment:  educ on wearing brace when OOB                               User Key     Initials Effective Dates Name Provider Type Discipline     06/08/18 -  Debbie Watters, PT Physical Therapist PT    ALE 03/07/18 -  Yenny Lee PTA Physical Therapy Assistant PT                PT Recommendation and Plan     Plan of Care Reviewed With: patient, spouse  Progress: improving  Outcome Summary: incr amb dist, fatigue limiting stair training this am, will try in pm  Outcome Measures     Row Name 12/05/18 1300 12/04/18 0900          How much help from another person do you currently need...    Turning from your back to your side while in flat bed without using bedrails?  3  -  3  -     Moving from lying on back to sitting on the side of a flat bed without bedrails?  3  -  3  -     Moving to and from a bed to a chair (including a wheelchair)?  3  -  3  -KH     Standing up from a chair using your arms (e.g., wheelchair, bedside chair)?  3  -  3  -     Climbing 3-5 steps with a railing?  3  -  3  -     To walk in hospital room?  3  -  3  -     AM-PAC 6 Clicks Score  18  -  18  -KH        Functional Assessment    Outcome Measure Options  --  AM-[facility] 6 Clicks Basic Mobility (PT)  -       User Key  (r) = Recorded By, (t) = Taken By, (c) = Cosigned By    Initials Name Provider Type    Debbie Muniz, PT Physical Therapist    Yenny Leo, PAULINO Physical Therapy Assistant         Time Calculation:   PT Charges     Row Name 12/06/18 0836             Time Calculation    Start Time  0810  -      Stop Time  0829  -      Time Calculation (min)  19 min  -      PT Received On  12/06/18  -         Time Calculation- PT    Total Timed Code Minutes- PT  19 minute(s)  -        User Key  (r) = Recorded By, (t) = Taken By, (c) = Cosigned By     Initials Name Provider Type     Yenny Lee PTA Physical Therapy Assistant        Therapy Suggested Charges     Code   Minutes Charges    None           Therapy Charges for Today     Code Description Service Date Service Provider Modifiers Qty    32043928427 HC PT THER PROC EA 15 MIN 12/5/2018 Yenny Lee PTA GP 1    50806827153 HC PT THER PROC EA 15 MIN 12/6/2018 Yenny Lee PTA GP 1          PT G-Codes  Outcome Measure Options: AM-PAC 6 Clicks Basic Mobility (PT)  AM-PAC 6 Clicks Score: 18    Yenny Lee PTA  12/6/2018

## 2018-12-06 NOTE — PROGRESS NOTES
Stop day 3: AVSS awake alert oriented no new complaints.  The wound is clean.  She moves the legs well.  Hemoglobin 9.5.  Doing well

## 2018-12-06 NOTE — PROGRESS NOTES
Case Management Discharge Note    Final Note: Home w/ spouse    Destination      No service has been selected for the patient.      Durable Medical Equipment      No service has been selected for the patient.      Dialysis/Infusion      No service has been selected for the patient.      Home Medical Care      No service has been selected for the patient.      Community Resources      No service has been selected for the patient.        Other: Other    Final Discharge Disposition Code: 01 - home or self-care

## 2018-12-07 ENCOUNTER — READMISSION MANAGEMENT (OUTPATIENT)
Dept: CALL CENTER | Facility: HOSPITAL | Age: 73
End: 2018-12-07

## 2018-12-07 NOTE — OUTREACH NOTE
Prep Survey      Responses   Facility patient discharged from?  [facility]   Is patient eligible?  Yes   Discharge diagnosis  L4-5 laminectomy and fusion with instrumentation,    Does the patient have one of the following disease processes/diagnoses(primary or secondary)?  General Surgery   Does the patient have Home health ordered?  No   Is there a DME ordered?  No   Prep survey completed?  Yes          Nghia Smith RN

## 2018-12-10 ENCOUNTER — TELEPHONE (OUTPATIENT)
Dept: ORTHOPEDIC SURGERY | Facility: CLINIC | Age: 73
End: 2018-12-10

## 2018-12-10 ENCOUNTER — READMISSION MANAGEMENT (OUTPATIENT)
Dept: CALL CENTER | Facility: HOSPITAL | Age: 73
End: 2018-12-10

## 2018-12-10 NOTE — TELEPHONE ENCOUNTER
Call return to the patient.  I have advised her that she can take any over-the-counter laxative A to allow her to have a BM.  Would recommend that she take MiraLAX every day from now on along with her Senokot while taking narcotics

## 2018-12-10 NOTE — OUTREACH NOTE
General Surgery Week 1 Survey      Responses   Facility patient discharged from?  [facility]   Does the patient have one of the following disease processes/diagnoses(primary or secondary)?  General Surgery   Is there a successful TCM telephone encounter documented?  No   Week 1 attempt successful?  Yes   Call start time  1158   Call end time  1205   Discharge diagnosis  L4-5 laminectomy and fusion with instrumentation,    Meds reviewed with patient/caregiver?  Yes   Is the patient having any side effects they believe may be caused by any medication additions or changes?  No   Does the patient have all medications related to this admission filled (includes all antibiotics, pain medications, etc.)  Yes   Is the patient taking all medications as directed (includes completed medication regime)?  Yes   Does the patient have a follow up appointment scheduled with their surgeon?  Yes   Has the patient kept scheduled appointments due by today?  N/A   Psychosocial issues?  No   Did the patient receive a copy of their discharge instructions?  Yes   Nursing interventions  Reviewed instructions with patient   What is the patient's perception of their health status since discharge?  Improving   Nursing interventions  Nurse provided patient education   Is the patient /caregiver able to teach back basic post-op care?  Continue use of incentive spirometry at least 1 week post discharge, Practice 'cough and deep breath', Lifting as instructed by MD in discharge instructions, Do not remove steri-strips, Keep incision areas clean,dry and protected   Is the patient/caregiver able to teach back signs and symptoms of incisional infection?  Increased redness, swelling or pain at the incisonal site, Increased drainage or bleeding, Incisional warmth, Pus or odor from incision, Fever   Is the patient/caregiver able to teach back steps to recovery at home?  Set small, achievable goals for return to baseline health, Rest and rebuild strength,  gradually increase activity, Make a list of questions for surgeon's appointment, Eat a well-balance diet   Is the patient/caregiver able to teach back the hierarchy of who to call/visit for symptoms/problems? PCP, Specialist, Home health nurse, Urgent Care, ED, 911  Yes   Week 1 call completed?  Yes   Wrap up additional comments  pt has a call into the doctor bout not having a bm x 1 week taking senna plus, miralax and pt has a hx of not havi g regular bm's           Tamera Slade, RN

## 2018-12-10 NOTE — TELEPHONE ENCOUNTER
POST OP-JGW-SX 12/3/18    Taking Oxycodone and Senokot. No bowel movement since surgery. Crampy stomach.    JGW IN OR-PATIENT ASKED FOR SOMEONE TO ADDRESS TODAY

## 2018-12-13 RX ORDER — OXYCODONE HYDROCHLORIDE AND ACETAMINOPHEN 5; 325 MG/1; MG/1
1-2 TABLET ORAL EVERY 4 HOURS
Qty: 45 TABLET | Refills: 0 | Status: SHIPPED | OUTPATIENT
Start: 2018-12-13 | End: 2018-12-27 | Stop reason: SDUPTHER

## 2018-12-17 ENCOUNTER — TELEPHONE (OUTPATIENT)
Dept: ORTHOPEDIC SURGERY | Facility: CLINIC | Age: 73
End: 2018-12-17

## 2018-12-17 ENCOUNTER — READMISSION MANAGEMENT (OUTPATIENT)
Dept: CALL CENTER | Facility: HOSPITAL | Age: 73
End: 2018-12-17

## 2018-12-17 NOTE — TELEPHONE ENCOUNTER
"Patient scheduled for 12/19 PO/LAMI FUSION 12-3. Patient spoke with Davida via a courtesy call from [facility] & was directed to leave a message with JEANA - patient is having some numbness & tingling in Right Foot since yesterday 12/16/18. Per patient \"Doesn't really hurt, just the fact that they exist\". Thanks /srh  "

## 2018-12-17 NOTE — OUTREACH NOTE
General Surgery Week 2 Survey      Responses   Facility patient discharged from?  [facility]   Does the patient have one of the following disease processes/diagnoses(primary or secondary)?  General Surgery   Week 2 attempt successful?  Yes   Call start time  0931   Call end time  0935   Meds reviewed with patient/caregiver?  Yes   Is the patient having any side effects they believe may be caused by any medication additions or changes?  No   Is the patient taking all medications as directed (includes completed medication regime)?  Yes   Has the patient kept scheduled appointments due by today?  N/A   What is the patient's perception of their health status since discharge?  Improving   Week 2 call completed?  Yes   Wrap up additional comments  She is having some numbness and tingling to her left foot that started yesterday.  Instructed her to call her surgeon and to let them know, her follow up appt is this Wednesday.          Davida Salinas RN

## 2018-12-19 ENCOUNTER — OFFICE VISIT (OUTPATIENT)
Dept: ORTHOPEDIC SURGERY | Facility: CLINIC | Age: 73
End: 2018-12-19

## 2018-12-19 VITALS — HEIGHT: 67 IN | BODY MASS INDEX: 20.56 KG/M2 | TEMPERATURE: 97.6 F | WEIGHT: 131 LBS

## 2018-12-19 DIAGNOSIS — Z98.1 STATUS POST LAMINECTOMY WITH SPINAL FUSION: Primary | ICD-10-CM

## 2018-12-19 PROCEDURE — 72100 X-RAY EXAM L-S SPINE 2/3 VWS: CPT | Performed by: ORTHOPAEDIC SURGERY

## 2018-12-19 PROCEDURE — 99024 POSTOP FOLLOW-UP VISIT: CPT | Performed by: ORTHOPAEDIC SURGERY

## 2018-12-27 ENCOUNTER — READMISSION MANAGEMENT (OUTPATIENT)
Dept: CALL CENTER | Facility: HOSPITAL | Age: 73
End: 2018-12-27

## 2018-12-27 RX ORDER — OXYCODONE HYDROCHLORIDE AND ACETAMINOPHEN 5; 325 MG/1; MG/1
1-2 TABLET ORAL EVERY 4 HOURS
Qty: 45 TABLET | Refills: 0 | Status: SHIPPED | OUTPATIENT
Start: 2018-12-27 | End: 2019-01-14 | Stop reason: SDUPTHER

## 2018-12-27 NOTE — OUTREACH NOTE
General Surgery Week 3 Survey      Responses   Facility patient discharged from?  [facility]   Does the patient have one of the following disease processes/diagnoses(primary or secondary)?  General Surgery   Week 3 attempt successful?  Yes   Call start time  0748   Call end time  0753   Discharge diagnosis  L4-5 laminectomy and fusion with instrumentation,    Meds reviewed with patient/caregiver?  Yes   Is the patient taking all medications as directed (includes completed medication regime)?  Yes   Has the patient kept scheduled appointments due by today?  Yes   Psychosocial issues?  No   What is the patient's perception of their health status since discharge?  Improving   Nursing interventions  Nurse provided patient education [Patient is geting low on pain medications.  She takes 2 a day, one at bedtime and 1 before her walk.  Advised to call Dr Keith for refill.]   Is the patient/caregiver able to teach back signs and symptoms of incisional infection?  -- [Healing. No S/s infection.]   Week 3 call completed?  Yes   Revoked  No further contact(revokes)-requires comment   Graduated/Revoked comments  Goals met. Appts kept.   Wrap up additional comments  Patient is doing well. Walking about a mile.          Janice Thomas RN

## 2018-12-31 ENCOUNTER — TELEPHONE (OUTPATIENT)
Dept: ORTHOPEDIC SURGERY | Facility: CLINIC | Age: 73
End: 2018-12-31

## 2018-12-31 RX ORDER — SENNA AND DOCUSATE SODIUM 50; 8.6 MG/1; MG/1
1 TABLET, FILM COATED ORAL 2 TIMES DAILY
Qty: 30 TABLET | Refills: 0 | Status: SHIPPED | OUTPATIENT
Start: 2018-12-31 | End: 2019-02-01 | Stop reason: SDUPTHER

## 2019-01-09 NOTE — PROGRESS NOTES
Continued Stay Note  [facility]     Patient Name: Davida Anderson  MRN: [id number]  Today's Date: 12/6/2018    Admit Date: 12/3/2018    Discharge Plan     Row Name 12/06/18 0839       Plan    Plan  Home with family assistance.  [facility] following for possible dc orders.    Patient/Family in Agreement with Plan  yes    Plan Comments  Met w/ patient in room.  Introduced self and discussed discharge.  Patient feels she will do fine at home w/ spouse.  Did well with PT this morning.  Has all DME needed in the home.  Hx with [facility] and would like them again if MD agrees.  CCP to follow-up with Negin when she sees patient today.  Patient also has two children that live locally to assist if needed.            Expected Discharge Date and Time     Expected Discharge Date Expected Discharge Time    Dec 6, 2018             Barbie Dean RN     Patient has been notified. See phone note from 12/7/18.

## 2019-01-14 RX ORDER — OXYCODONE HYDROCHLORIDE AND ACETAMINOPHEN 5; 325 MG/1; MG/1
1-2 TABLET ORAL EVERY 4 HOURS
Qty: 45 TABLET | Refills: 0 | Status: SHIPPED | OUTPATIENT
Start: 2019-01-14 | End: 2019-03-08

## 2019-01-22 ENCOUNTER — OFFICE VISIT (OUTPATIENT)
Dept: ORTHOPEDIC SURGERY | Facility: CLINIC | Age: 74
End: 2019-01-22

## 2019-01-22 VITALS — HEIGHT: 66 IN | TEMPERATURE: 98.4 F | BODY MASS INDEX: 21.05 KG/M2 | WEIGHT: 131 LBS

## 2019-01-22 DIAGNOSIS — M25.569 KNEE PAIN, UNSPECIFIED CHRONICITY, UNSPECIFIED LATERALITY: ICD-10-CM

## 2019-01-22 DIAGNOSIS — Z98.1 S/P LUMBAR FUSION: Primary | ICD-10-CM

## 2019-01-22 PROCEDURE — 72100 X-RAY EXAM L-S SPINE 2/3 VWS: CPT | Performed by: ORTHOPAEDIC SURGERY

## 2019-01-22 PROCEDURE — 99024 POSTOP FOLLOW-UP VISIT: CPT | Performed by: ORTHOPAEDIC SURGERY

## 2019-01-22 NOTE — PROGRESS NOTES
Back pain is improved.  He did notice some popping.  No significant leg pain.  Two-view x-rays of the lumbar spine obtained to evaluate hardware and  fusion bone suggest further healing since prior x-ray.  We will need an AP and lateral lumbar x-ray on return visit.  Instructions were given.  She also has some knee pain medial joint line on the left and bone on bone in the medial compartment of for which she was previously managed by Dr. Camacho.  I'm going to have her see Dr. Serrano for further discussion of knee replacement

## 2019-02-01 RX ORDER — SENNA AND DOCUSATE SODIUM 50; 8.6 MG/1; MG/1
1 TABLET, FILM COATED ORAL 2 TIMES DAILY
Qty: 30 TABLET | Refills: 0 | Status: SHIPPED | OUTPATIENT
Start: 2019-02-01 | End: 2019-03-08

## 2019-02-26 ENCOUNTER — TELEPHONE (OUTPATIENT)
Dept: ORTHOPEDIC SURGERY | Facility: CLINIC | Age: 74
End: 2019-02-26

## 2019-02-28 ENCOUNTER — TELEPHONE (OUTPATIENT)
Dept: GASTROENTEROLOGY | Facility: CLINIC | Age: 74
End: 2019-02-28

## 2019-02-28 NOTE — TELEPHONE ENCOUNTER
----- Message from Abhijeet Aldridge sent at 2/28/2019  9:25 AM EST -----  Regarding: linzess  Contact: [phone]  Questions about medication

## 2019-02-28 NOTE — TELEPHONE ENCOUNTER
Call from pt.  Requesting refill on linzess 72 mcg 1 tab po daily.  Reports good response with this. Requests fill to [employer].  Jamaal completed for linzess as above, #90, R1.    Annual appt scheduled for 7/15 @ 2pm.

## 2019-03-05 ENCOUNTER — TRANSCRIBE ORDERS (OUTPATIENT)
Dept: ADMINISTRATIVE | Facility: HOSPITAL | Age: 74
End: 2019-03-05

## 2019-03-05 DIAGNOSIS — Z12.31 VISIT FOR SCREENING MAMMOGRAM: Primary | ICD-10-CM

## 2019-03-08 ENCOUNTER — CONSULT (OUTPATIENT)
Dept: ORTHOPEDIC SURGERY | Facility: CLINIC | Age: 74
End: 2019-03-08

## 2019-03-08 VITALS — WEIGHT: 138 LBS | HEIGHT: 66 IN | TEMPERATURE: 99.8 F | BODY MASS INDEX: 22.18 KG/M2

## 2019-03-08 DIAGNOSIS — M25.562 ACUTE PAIN OF LEFT KNEE: Primary | ICD-10-CM

## 2019-03-08 DIAGNOSIS — M17.12 PRIMARY OSTEOARTHRITIS OF LEFT KNEE: ICD-10-CM

## 2019-03-08 PROCEDURE — 73562 X-RAY EXAM OF KNEE 3: CPT | Performed by: ORTHOPAEDIC SURGERY

## 2019-03-08 PROCEDURE — 99214 OFFICE O/P EST MOD 30 MIN: CPT | Performed by: ORTHOPAEDIC SURGERY

## 2019-03-08 RX ORDER — MELOXICAM 15 MG/1
15 TABLET ORAL ONCE
Status: CANCELLED | OUTPATIENT
Start: 2019-05-20 | End: 2019-03-08

## 2019-03-08 RX ORDER — PREGABALIN 75 MG/1
150 CAPSULE ORAL ONCE
Status: CANCELLED | OUTPATIENT
Start: 2019-05-20 | End: 2019-03-08

## 2019-03-08 RX ORDER — CEFAZOLIN SODIUM 2 G/100ML
2 INJECTION, SOLUTION INTRAVENOUS ONCE
Status: CANCELLED | OUTPATIENT
Start: 2019-05-20 | End: 2019-03-08

## 2019-03-08 RX ORDER — VANCOMYCIN HYDROCHLORIDE 1 G/200ML
15 INJECTION, SOLUTION INTRAVENOUS ONCE
Status: CANCELLED | OUTPATIENT
Start: 2019-05-20 | End: 2019-03-08

## 2019-03-08 NOTE — PROGRESS NOTES
Patient: Davida Anderson  YOB: 1945 73 y.o. female  Medical Record Number: [id number]    Chief Complaints:   Chief Complaint   Patient presents with   • Left Knee - Establish Care, Pain       History of Present Illness:Davida Anderson is a 73 y.o. female who presents with complaints of left medial knee pain.  She describes a stabbing aching pain worse with weightbearing.  It has progressed markedly over the last year.  It now limits her basic activities of daily living.  She has had previous injections taken anti-inflammatories and on therapy exercises without relief of symptoms.  She has now recovered from recent lumbar spine surgery in December and is ready to proceed with addressing her knee.    Allergies:   Allergies   Allergen Reactions   • Morphine Hives, Itching and Rash       Medications:   Current Outpatient Medications   Medication Sig Dispense Refill   • ALBUTEROL IN Inhale 2 puffs Every 4 (Four) Hours As Needed.     • ARIPiprazole (ABILIFY) 10 MG tablet Take 10 mg by mouth daily.     • aspirin 81 MG tablet Take 81 mg by mouth daily.     • atorvastatin (LIPITOR) 20 MG tablet Take 20 mg by mouth Daily.     • B Complex Vitamins (VITAMIN B COMPLEX) tablet Take 1 tablet by mouth daily.     • baclofen (LIORESAL) 10 MG tablet Take 10 mg by mouth 3 (Three) Times a Day.     • BIOTIN PO Take 1 tablet by mouth daily.     • Calcium Carb-Cholecalciferol (CALCIUM 600 + D) 600-200 MG-UNIT tablet Take 2 tablets by mouth daily.     • Coenzyme Q10 (CO Q-10) 400 MG capsule Take 400 mg by mouth daily.     • diphenhydrAMINE-acetaminophen (TYLENOL PM)  MG tablet per tablet Take 1 tablet by mouth Every Night.     • Docusate Calcium (STOOL SOFTENER PO) Take 1 capsule by mouth As Needed.     • hydrALAZINE (APRESOLINE) 25 MG tablet Take 25 mg by mouth 3 (Three) Times a Day.     • lamoTRIgine (LaMICtal) 100 MG tablet Take 100 mg by mouth 2 (two) times a day.     • linaclotide (LINZESS) 72 MCG capsule capsule Take  "1 capsule by mouth Every Morning Before Breakfast. 90 capsule 1   • lithium carbonate 300 MG capsule Take 300 mg by mouth daily.     • magnesium oxide (MAGOX) 400 (241.3 MG) MG tablet tablet Take 400 mg by mouth daily.     • melatonin 5 MG tablet tablet Take 5 mg by mouth At Night As Needed.     • pantoprazole (PROTONIX) 40 MG EC tablet Take 40 mg by mouth Daily.     • pregabalin (LYRICA) 50 MG capsule Take 100 mg by mouth 2 (Two) Times a Day.     • Probiotic Product (SOLUBLE FIBER/PROBIOTICS PO) Take 1 tablet by mouth Daily.     • solifenacin (VESICARE) 10 MG tablet Take 10 mg by mouth Daily.       No current facility-administered medications for this visit.          The following portions of the patient's history were reviewed and updated as appropriate: allergies, current medications, past family history, past medical history, past social history, past surgical history and problem list.    Review of Systems:   A 14 point review of systems was performed. All systems negative except pertinent positives/negative listed in HPI above    Physical Exam:   Vitals:    03/08/19 1403   Temp: 99.8 °F (37.7 °C)   TempSrc: Temporal   Weight: 62.6 kg (138 lb)   Height: 167.6 cm (66\")       General: A and O x 3, ASA, NAD    SCLERA:    Normal    DENTITION:   Normal  Knee:  left    ALIGNMENT:     Varus  ,   Patella  tracks  midline    GAIT:    Antalgic    SKIN:    No abnormality    RANGE OF MOTION:   3  -  120   DEG    STRENGTH:   4  / 5    LIGAMENTS:    No varus / valgus instability.   Negative  Lachman.    MENISCUS:     Negative   Marlo       DISTAL PULSES:    Paplable    DISTAL SENSATION :   Intact    LYMPHATICS:     No   lymphadenopathy    OTHER:          - Positive   effusion      - Crepitance with ROM          Radiology:  Xrays 3views left knee (ap,lateral, sunrise) were ordered and reviewed for evaluation of knee pain demonstrating advanced varus osteoarthritis with bone on bone articulation, subchondral cysts, and " periarticular osteophytes    Assessment/Plan: Left knee advanced end stage OA - failed conservative measures.  Continuation of conservative management vs. TKA discussed.  The patient wishes to proceed with total knee replacement.  At this point the patient has failed the full compliment of conservative treatment and stating complete understanding of the risks/benefits/ anternatives wishes to proceed with surgical treatment.    Risk and benefits of surgery were reviewed.  Including, but not limited to, blood clots or pulmonary embolism, anesthesia risk, infection, fracture, skin/leg numbness, persistent pain/crepitance/popping/catching, failure of the implant, need for future surgeries, hematoma, possible nerve or blood vessel injury, need for transfusion, and potential risk of stroke,heart attack or death, among others.  The patient understands and wishes to proceed.     It was explained that if tissue has been repaired or reconstructed, there is also an increased chance of failure which may require further management.  Following the completion of the discussion, the patient expressed understanding of this planned course of care, all their questions were answered and consent will be obtained preoperatively.    Operative Plan: left [facility] Total Knee Replacement an overnight staywith home health rehab        Anurag Serrano MD  3/8/2019

## 2019-03-26 ENCOUNTER — OFFICE VISIT (OUTPATIENT)
Dept: ORTHOPEDIC SURGERY | Facility: CLINIC | Age: 74
End: 2019-03-26

## 2019-03-26 VITALS — BODY MASS INDEX: 21.21 KG/M2 | HEIGHT: 66 IN | WEIGHT: 132 LBS | TEMPERATURE: 98.4 F

## 2019-03-26 DIAGNOSIS — M25.561 PAIN IN BOTH KNEES, UNSPECIFIED CHRONICITY: ICD-10-CM

## 2019-03-26 DIAGNOSIS — M25.562 PAIN IN BOTH KNEES, UNSPECIFIED CHRONICITY: ICD-10-CM

## 2019-03-26 DIAGNOSIS — Z98.1 STATUS POST LAMINECTOMY WITH SPINAL FUSION: Primary | ICD-10-CM

## 2019-03-26 PROCEDURE — 99213 OFFICE O/P EST LOW 20 MIN: CPT | Performed by: ORTHOPAEDIC SURGERY

## 2019-03-26 PROCEDURE — 72100 X-RAY EXAM L-S SPINE 2/3 VWS: CPT | Performed by: ORTHOPAEDIC SURGERY

## 2019-03-26 NOTE — PROGRESS NOTES
She is 4 months out and doing very well no leg pain minimal back pain no tenderness and good strength on examination.  2 view x-rays of the lumbar spine suggest a solid fusion compared to prior films.  She is going to undergo total knee replacement in 6 weeks and I think she will be ready she will call for problems.

## 2019-03-27 ENCOUNTER — HOSPITAL ENCOUNTER (OUTPATIENT)
Dept: PHYSICAL THERAPY | Facility: HOSPITAL | Age: 74
Setting detail: THERAPIES SERIES
Discharge: HOME | End: 2019-03-27

## 2019-03-27 DIAGNOSIS — M17.12 OSTEOARTHRITIS OF LEFT KNEE, UNSPECIFIED OSTEOARTHRITIS TYPE: ICD-10-CM

## 2019-03-27 DIAGNOSIS — M25.562 CHRONIC PAIN OF LEFT KNEE: Primary | ICD-10-CM

## 2019-03-27 DIAGNOSIS — G89.29 CHRONIC PAIN OF LEFT KNEE: Primary | ICD-10-CM

## 2019-03-27 DIAGNOSIS — Z47.89 ORTHOPEDIC AFTERCARE: ICD-10-CM

## 2019-03-27 DIAGNOSIS — Z98.1 HISTORY OF SPINAL FUSION: ICD-10-CM

## 2019-03-27 PROCEDURE — 97110 THERAPEUTIC EXERCISES: CPT

## 2019-03-27 PROCEDURE — 97535 SELF CARE MNGMENT TRAINING: CPT

## 2019-03-27 PROCEDURE — 97162 PT EVAL MOD COMPLEX 30 MIN: CPT

## 2019-03-27 NOTE — THERAPY EVALUATION
Outpatient Physical Therapy Ortho Initial Evaluation  [facility]     Patient Name: Davida Anderson  : 1945  MRN: [id number]  Today's Date: 3/27/2019      Visit Date: 2019    Patient Active Problem List   Diagnosis   • BP (high blood pressure)   • History of left breast infiltrating ductal cancer 2cm s/p mastectomy with reconstruction  s/p chemo   • H/O ETOH abuse   • Gtz's esophagus   • Colon polyps   • Bipolar 1 disorder (CMS/HCC)   • Arthritis   • Raynaud's disease   • Neuropathy   • Lumbar spine pain   • Spondylolisthesis at L4-L5 level   • Right lumbar radiculopathy   • Gtz's esophagus without dysplasia   • Hx of adenomatous colonic polyps   • Dysphagia   • Diarrhea   • Primary osteoarthritis of left knee   • Primary localized osteoarthrosis of the knee, right   • Chronic pain of left knee   • Spinal stenosis of lumbar region with neurogenic claudication   • Spondylolisthesis of lumbar region        Past Medical History:   Diagnosis Date   • Acid reflux    • Arthritis    • Gtz esophagus    • Bipolar 2 disorder (CMS/HCC)    • Bradycardia     WITH SURGERY   • COPD (chronic obstructive pulmonary disease) (CMS/HCC)     MILD, USES AINHALER PRN   • DDD (degenerative disc disease), lumbar    • Diverticulosis    • Dizziness    • Frequent UTI     RECENT UTI FINISHED COURSE OF ANTIBX   • H/O Infiltrating ductal carcinoma of left breast     Stage IIA, Grade 3 ER/ND +, HER2 -, treated with 5 years of tamoxifen, 5 years of Femara, completed in    • High cholesterol    • History of alcoholism (CMS/HCC)     40 YRS AGO   • History of Clostridium difficile colitis    • History of gastric ulcer    • History of loop recorder    • Hypertension    • Irregular heartbeat     a fib   • Low back pain    • Lumbar herniated disc    • Mass of right breast on mammogram 2016    10 o'clock position, 4 cm from the nipple,   • Neuropathy    • PONV (postoperative nausea and vomiting)    •  Raynaud disease    • Sleep apnea     MILD, NO NEED FOR CPAP   • Urinary incontinence         Past Surgical History:   Procedure Laterality Date   • ANKLE OPEN REDUCTION INTERNAL FIXATION Right 08/14/2015    Dr. Dandre Camacho, [facility]   • BACK SURGERY      LUMBAR   • BREAST RECONSTRUCTION, BREAST TISSUE EXPANDER INSERTION Left 04/11/2002    [place] Contour Profile expander was placed 550 cc size, filled with 200 cc of saline, Dr. Herbert Napoles, [facility]   • COLONOSCOPY N/A 09/16/2014    Dr. Darci Kerns, [facility]; torts, tics, stool, polyp   • COLONOSCOPY N/A 9/27/2016    NTEH, diverticulosis, tortuous colon, Two 3-5mm polyps in the descending colon and the transverse colon, IH.  PATH: Tubular adenoma   • COLONOSCOPY N/A 12/12/2017    NTEH, tics, torts, IH, TA w/low grade dysplasia   • CYSTOSCOPY N/A 05/07/2010    with TVT takedown, Dr. Simon Wall, [facility]   • ENDOSCOPY N/A 9/27/2016    z line irreg, bilious gastric fluid- fluid aspiration preformed, gastritis.  PATH: Squamous and glandular mucosa with minimal superficial acute inflammation.    • ENDOSCOPY N/A 12/12/2017    Z line irregular, gastritis, duodenitis, chronic inflammation   • EPIDURAL BLOCK     • KNEE SURGERY Left     BROKEN   • LUMBAR DISCECTOMY FUSION INSTRUMENTATION N/A 12/3/2018    Procedure: L4-5 laminectomy and fusion with instrumentation;  Surgeon: Austin Keith MD;  Location: [facility];  Service: Orthopedic Spine   • MAMMO US BREAST BIOPSY ADDITIONAL W WO DEVICE Left 02/21/2002    2:00 position left breast, Infiltrating Ductal Carcinoma, [facility]   • MANDIBLE FRACTURE SURGERY     • MASTECTOMY Left 04/11/2002    Left Total Mastectomy and Left Axillary [place] Node [place], Dr. Indu Akins, [facility]   • OTHER SURGICAL HISTORY      CARDIAC LOOP DEVICE, LEFT CHEST   • TENSION FREE VAGINAL TAPING WITH MINI ARC SLING N/A 10/26/2009    Dr. Gina Castillo, [facility]   • UPPER GASTROINTESTINAL ENDOSCOPY  09/16/2014    z-line irreg, grade a reflux, gastritis       Visit Dx:      ICD-10-CM ICD-9-CM   1. Chronic pain of left knee M25.562 719.46    G89.29 338.29   2. Osteoarthritis of left knee, unspecified osteoarthritis type M17.12 715.96   3. History of spinal fusion Z98.1 V45.4   4. Orthopedic aftercare Z47.89 V54.9         Patient History     Row Name 03/27/19 1600             History    Chief Complaint  Pain  -LB      Type of Pain  Back pain;Knee pain  -LB      Date Current Problem(s) Began  12/03/18  -LB      Brief Description of Current Complaint  Pt s/p laminectomy and lumbar fusion performed by Dr. Keith 12/3/18. She also has hx of chronic B knee pain with plans for TKR. SHe reports swelling, aching in L knee. She walks 1 mile per day per back rehabilitation. Her knee can tolerate standing, sleeping. It is worsening and her L knee is bone on bone and she wants to do it now before she gets older. It is scheduled for 5/20/19. Pt has bedroom upstairs but plans to stay on first floor after surgery. She has 5 steps to enter home with handrail.   -LB      Previous treatment for THIS PROBLEM  Surgery  -LB      Surgery Date:  12/04/18  -LB      Patient/Caregiver Goals  Know what to do to help the symptoms prehabilitation for L knee  -LB      Occupation/sports/leisure activities  Pt enjoys gardening, walking.  -LB      Patient seeing anyone else for problem(s)?  yes  -LB      How has patient tried to help current problem?  pt did not do PT following back surgery.  -LB      What clinical tests have you had for this problem?  X-ray  -LB      Results of Clinical Tests  end stage OA  -LB      Surgery/Hospitalization  lumbar fusion/laminectomy 12/2018  -LB      History of Previous Related Injuries  previous lumbar laminectomy several years ago  -LB         Pain     Pain Location  Back;Knee  -LB      Pain at Present  3  -LB      Pain at Best  0  -LB      Pain at Worst  4  -LB      Pain Frequency  Intermittent  -LB      Pain Description  Aching  -LB      What Performance Factors Make the Current  Problem(s) WORSE?  walking long distance  -LB      What Performance Factors Make the Current Problem(s) BETTER?  rest  -LB      Tolerance Time- Standing  30 minutes  -LB      Tolerance Time- Sitting  no issue  -LB      Tolerance Time- Walking  1 mile  -LB      Tolerance Time- Lying  no issue  -LB      Is your sleep disturbed?  No  -LB      What position do you sleep in?  Supine;Right sidelying;Left sidelying  -LB      Difficulties at work?  Pt does not work.  -LB      Difficulties with ADL's?  Able to perform without lifting tasks.  -LB      Difficulties with recreational activities?  Pain in L knee with walking.  -LB         Fall Risk Assessment    Any falls in the past year:  No  -LB         Services    Prior Rehab/Home Health Experiences  Yes  -LB      Where was the prior experience with Rehab/Home Health  [facility]  -LB      Are you currently receiving Home Health services  No  -LB      Do you plan to receive Home Health services in the near future  No  -LB         Daily Activities    Primary Language  English  -LB      Pt Participated in POC and Goals  Yes  -LB         Safety    Are you being hurt, hit, or frightened by anyone at home or in your life?  No  -LB      Are you being neglected by a caregiver  No  -LB        User Key  (r) = Recorded By, (t) = Taken By, (c) = Cosigned By    Initials Name Provider Type    LB Tasha Yanez PT Physical Therapist          PT Ortho     Row Name 03/27/19 1600       Subjective Comments    Subjective Comments  I am having my L knee replaced in May. I am going to [facility] at the end of April.  -LB       Precautions and Contraindications    Precautions/Limitations  spinal precautions  -LB    Precautions  fusion 12/3/18  -LB       Subjective Pain    Pre-Treatment Pain Level  3  -LB       Posture/Observations    Posture/Observations Comments  slight genu varum, dec lumbar lordosis  -LB       Myotomal Screen- Lower Quarter Clearing    Hip flexion (L2)  Bilateral:;4 (Good)  -LB     Knee extension (L3)  Bilateral:;5 (Normal)  -LB    Ankle DF (L4)  Bilateral:;5 (Normal)  -LB    Ankle PF (S1)  Bilateral:;4 (Good)  -LB    Knee flexion (S2)  Bilateral:;5 (Normal)  -LB       General ROM    GENERAL ROM COMMENTS  BLE WFL L knee 0-130 deg AROM  -LB       Sensation    Sensation WNL?  WNL  -LB       Lower Extremity Flexibility    Hamstrings  Bilateral:;Mildly limited  -LB    Gastrocnemius  Bilateral:;Mildly limited  -LB       Gait/Stairs Assessment/Training    [place] Level (Gait)  independent  -LB      User Key  (r) = Recorded By, (t) = Taken By, (c) = Cosigned By    Initials Name Provider Type    LB Tasha Yanez, PT Physical Therapist                  [unfilled]    Therapy Education  Education Details: discussed core/hip role in lumbar stabilization, knee protection, reviewed log roll, lifting mechanics, avoiding aggravating postures, prolonged FF to reduce lumbar strain. Issued HEP, discussed surgical expectations from rehab standpoint.  Given: Symptoms/condition management, HEP, Posture/body mechanics, Mobility training  Program: New  How Provided: Verbal, Demonstration, Written  Provided to: Patient  Level of Understanding: Teach back education performed, Verbalized, Demonstrated  62733 - PT Self Care/Mgmt Minutes: 15     PT OP Goals     Row Name 03/27/19 1600          PT Short Term Goals    STG Date to Achieve  04/10/19  -LB     STG 1  Pt will demonstrate understanding and compliance with initial HEP.  -LB     STG 1 Progress  New  -LB     STG 2  Pt will demonstrate appropriate TA activation to protect lumbar spine.  -LB     STG 2 Progress  New  -LB     STG 3  --  -LB     STG 3 Progress  --  -LB        Long Term Goals    LTG Date to Achieve  04/26/19  -LB     LTG 1  Pt will demonstrate understanding and compliance with advanced HEP to allow her to prepare for upcoming TKR.  -LB     LTG 1 Progress  New  -LB     LTG 2  --  -LB     LTG 2 Progress  --  -LB     LTG 3  --  -LB     LTG 3  Progress  --  -LB        Time Calculation    PT Goal Re-Cert Due Date  06/25/19  -LB       User Key  (r) = Recorded By, (t) = Taken By, (c) = Cosigned By    Initials Name Provider Type    Tasha Decker PT Physical Therapist          PT Assessment/Plan     Row Name 03/27/19 1748          PT Assessment    Functional Limitations  Performance in leisure activities;Limitations in community activities;Impaired gait;Impaired locomotion;Limitations in functional capacity and performance  -LB     Impairments  Impaired flexibility;Range of motion;Pain;Joint mobility;Gait  -LB     Assessment Comments  Pt is 73 y.o. female referred to outpatient physical therapy for evaluation and treatment of  evolving  left knee pain described as an aching and occasional swelling that is worse with prolonged walking and stair negotiation. Pt is preparing for L TKR 5/20/18 by Dr. Serrano and presents today for pre-hab exercise program.  Patient presents with full AROM of L knee and functional strength in knee extensor/flexors. She demonstrates slight deficits in hip/core strength and slightly dec VMO activation in LLE. PMHx consistent with recent lumbar fusion/laminectomy (12/3/18). Personal factors affecting her care include limited exercise since lumbar surgery in December, upcoming travel to [place] in 3.5 weeks. Pt demonstrates signs and symptoms consistent with degenerative changes in L knee, prehabilitation for L knee, and dec core/hip strength.  Pt scored 12% disability on the Modified Oswestry and 35% disability on the KOS. Pt is limited in their ability to participate in walking long distances and gardening. She will benefit from continued skilled PT services to create HEP and prepare pt for upcoming TKR. Thank you for this referral.  -LB     Please refer to paper survey for additional self-reported information  Yes  -LB     Rehab Potential  Good  -LB     Patient/caregiver participated in establishment of treatment plan and goals   Yes  -LB     Patient would benefit from skilled therapy intervention  Yes  -LB        PT Plan    PT Frequency  1x/week;Other (comment) 2 visits; one every other week  -LB     Predicted Duration of Therapy Intervention (Therapy Eval)  4 weeks   -LB     Planned CPT's?  PT EVAL MOD COMPLELITY: 41661;PT RE-EVAL: 03216;PT THER PROC EA 15 MIN: 20784;PT MANUAL THERAPY EA 15 MIN: 09259;PT GAIT TRAINING EA 15 MIN: 61048;PT HOT OR COLD PACK TREAT MCARE;PT HOT/COLD PACK WC NONMCARE: 20676;PT THER ACT EA 15 MIN: 47025;PT NEUROMUSC RE-EDUCATION EA 15 MIN: 61560  -LB     PT Plan Comments  Review HEP, assess tolerance, discuss stair negotiation post operatively, advance HEP, consider standing TA activation  -LB       User Key  (r) = Recorded By, (t) = Taken By, (c) = Cosigned By    Initials Name Provider Type    LB Tasha Yanez, PT Physical Therapist            Exercises     Row Name 03/27/19 1600             Subjective Comments    Subjective Comments  I am having my L knee replaced in May. I am going to [facility] at the end of April.  -LB         Subjective Pain    Able to rate subjective pain?  yes  -LB      Pre-Treatment Pain Level  3  -LB         Total Minutes    07750 - PT Therapeutic Exercise Minutes  15  -LB         Exercise 1    Exercise Name 1  QS  -LB      Reps 1  10  -LB      Time 1  5  -LB         Exercise 2    Exercise Name 2  SAQ  -LB      Reps 2  10  -LB      Time 2  2  -LB         Exercise 3    Exercise Name 3  hip adduction + PPT  -LB      Reps 3  10  -LB      Time 3  5  -LB         Exercise 4    Exercise Name 4  glute set  -LB      Reps 4  10  -LB      Time 4  5  -LB         Exercise 5    Exercise Name 5  standing  HS curl  -LB      Reps 5  10  -LB         Exercise 6    Exercise Name 6  HR  -LB      Reps 6  10  -LB         Exercise 7    Exercise Name 7  HL hip abduction  -LB      Reps 7  10  -LB      Additional Comments  RTB  -LB        User Key  (r) = Recorded By, (t) = Taken By, (c) = Cosigned By     Initials Name Provider Type    LB Tasha Yanez, PT Physical Therapist                        Outcome Measure Options: Modifed [place], Knee Outcome Score- ADL  Knee Outcome Score  Knee Outcome Score Comments: 35% disability   Modified Oswestry  Modified Oswestry Score/Comments: 12% disability       Time Calculation:     Start Time: 1700  Stop Time: 1745  Time Calculation (min): 45 min  Total Timed Code Minutes- PT: 30 minute(s)     Therapy Charges for Today     Code Description Service Date Service Provider Modifiers Qty    99551680032 HC PT THER PROC EA 15 MIN 3/27/2019 Tasha Yanez, PT GP 1    22371124118 HC PT SELF CARE/MGMT/TRAIN EA 15 MIN 3/27/2019 Tasha Yanez, PT GP 1    86020039437 HC PT EVAL MOD COMPLEXITY 1 3/27/2019 Tasha Yanez, PT GP 1          PT G-Codes  Outcome Measure Options: Modifed [place], Knee Outcome Score- ADL  Modified Oswestry Score/Comments: 12% disability          Tasha Yanez PT  3/27/2019

## 2019-03-29 ENCOUNTER — APPOINTMENT (OUTPATIENT)
Dept: PHYSICAL THERAPY | Facility: HOSPITAL | Age: 74
End: 2019-03-29

## 2019-04-03 ENCOUNTER — HOSPITAL ENCOUNTER (OUTPATIENT)
Dept: MAMMOGRAPHY | Facility: HOSPITAL | Age: 74
Discharge: HOME | End: 2019-04-03
Admitting: SPECIALIST

## 2019-04-03 DIAGNOSIS — Z12.31 VISIT FOR SCREENING MAMMOGRAM: ICD-10-CM

## 2019-04-03 PROCEDURE — 77067 SCR MAMMO BI INCL CAD: CPT

## 2019-04-03 PROCEDURE — 77063 BREAST TOMOSYNTHESIS BI: CPT

## 2019-04-05 ENCOUNTER — APPOINTMENT (OUTPATIENT)
Dept: PHYSICAL THERAPY | Facility: HOSPITAL | Age: 74
End: 2019-04-05

## 2019-04-08 ENCOUNTER — APPOINTMENT (OUTPATIENT)
Dept: PHYSICAL THERAPY | Facility: HOSPITAL | Age: 74
End: 2019-04-08

## 2019-04-10 ENCOUNTER — HOSPITAL ENCOUNTER (OUTPATIENT)
Dept: PHYSICAL THERAPY | Facility: HOSPITAL | Age: 74
Setting detail: THERAPIES SERIES
Discharge: HOME | End: 2019-04-10

## 2019-04-10 DIAGNOSIS — Z98.1 HISTORY OF SPINAL FUSION: ICD-10-CM

## 2019-04-10 DIAGNOSIS — M25.562 CHRONIC PAIN OF LEFT KNEE: Primary | ICD-10-CM

## 2019-04-10 DIAGNOSIS — M17.12 OSTEOARTHRITIS OF LEFT KNEE, UNSPECIFIED OSTEOARTHRITIS TYPE: ICD-10-CM

## 2019-04-10 DIAGNOSIS — Z47.89 ORTHOPEDIC AFTERCARE: ICD-10-CM

## 2019-04-10 DIAGNOSIS — G89.29 CHRONIC PAIN OF LEFT KNEE: Primary | ICD-10-CM

## 2019-04-10 PROCEDURE — 97110 THERAPEUTIC EXERCISES: CPT

## 2019-04-10 NOTE — THERAPY DISCHARGE NOTE
Outpatient Physical Therapy Ortho Treatment Note/Discharge Summary  [facility]     Patient Name: Davida Anderson  : 1945  MRN: [id number]  Today's Date: 4/10/2019      Visit Date: 04/10/2019    Visit Dx:    ICD-10-CM ICD-9-CM   1. Chronic pain of left knee M25.562 719.46    G89.29 338.29   2. Osteoarthritis of left knee, unspecified osteoarthritis type M17.12 715.96   3. History of spinal fusion Z98.1 V45.4   4. Orthopedic aftercare Z47.89 V54.9       Patient Active Problem List   Diagnosis   • BP (high blood pressure)   • History of left breast infiltrating ductal cancer 2cm s/p mastectomy with reconstruction  s/p chemo   • H/O ETOH abuse   • Gtz's esophagus   • Colon polyps   • Bipolar 1 disorder (CMS/HCC)   • Arthritis   • Raynaud's disease   • Neuropathy   • Lumbar spine pain   • Spondylolisthesis at L4-L5 level   • Right lumbar radiculopathy   • Gtz's esophagus without dysplasia   • Hx of adenomatous colonic polyps   • Dysphagia   • Diarrhea   • Primary osteoarthritis of left knee   • Primary localized osteoarthrosis of the knee, right   • Chronic pain of left knee   • Spinal stenosis of lumbar region with neurogenic claudication   • Spondylolisthesis of lumbar region        Past Medical History:   Diagnosis Date   • Acid reflux    • Arthritis    • Gtz esophagus    • Bipolar 2 disorder (CMS/HCC)    • Bradycardia     WITH SURGERY   • COPD (chronic obstructive pulmonary disease) (CMS/HCC)     MILD, USES AINHALER PRN   • DDD (degenerative disc disease), lumbar    • Diverticulosis    • Dizziness    • Drug therapy    • Fibrocystic breast    • Frequent UTI     RECENT UTI FINISHED COURSE OF ANTIBX   • H/O Infiltrating ductal carcinoma of left breast     Stage IIA, Grade 3 ER/IN +, HER2 -, treated with 5 years of tamoxifen, 5 years of Femara, completed in    • High cholesterol    • History of alcoholism (CMS/HCC)     40 YRS AGO   • History of Clostridium difficile colitis    •  History of gastric ulcer    • History of loop recorder    • Hypertension    • Irregular heartbeat     a fib   • Low back pain    • Lumbar herniated disc    • Mass of right breast on mammogram 03/17/2016    10 o'clock position, 4 cm from the nipple,   • Neuropathy    • PONV (postoperative nausea and vomiting)    • Raynaud disease    • Sleep apnea     MILD, NO NEED FOR CPAP   • Urinary incontinence         Past Surgical History:   Procedure Laterality Date   • ANKLE OPEN REDUCTION INTERNAL FIXATION Right 08/14/2015    Dr. Dandre Camacho, [facility]   • BACK SURGERY      LUMBAR   • BREAST RECONSTRUCTION, BREAST TISSUE EXPANDER INSERTION Left 04/11/2002    [place] Contour Profile expander was placed 550 cc size, filled with 200 cc of saline, Dr. Herbert Napoles, [facility]   • COLONOSCOPY N/A 09/16/2014    Dr. Darci Kerns, [facility]; torts, tics, stool, polyp   • COLONOSCOPY N/A 9/27/2016    NTEH, diverticulosis, tortuous colon, Two 3-5mm polyps in the descending colon and the transverse colon, IH.  PATH: Tubular adenoma   • COLONOSCOPY N/A 12/12/2017    NTEH, tics, torts, IH, TA w/low grade dysplasia   • CYSTOSCOPY N/A 05/07/2010    with TVT takedown, Dr. Simon Wall, [facility]   • ENDOSCOPY N/A 9/27/2016    z line irreg, bilious gastric fluid- fluid aspiration preformed, gastritis.  PATH: Squamous and glandular mucosa with minimal superficial acute inflammation.    • ENDOSCOPY N/A 12/12/2017    Z line irregular, gastritis, duodenitis, chronic inflammation   • EPIDURAL BLOCK     • KNEE SURGERY Left     BROKEN   • LUMBAR DISCECTOMY FUSION INSTRUMENTATION N/A 12/3/2018    Procedure: L4-5 laminectomy and fusion with instrumentation;  Surgeon: Austin Keith MD;  Location: [facility];  Service: Orthopedic Spine   • MAMMO US BREAST BIOPSY ADDITIONAL W WO DEVICE Left 02/21/2002    2:00 position left breast, Infiltrating Ductal Carcinoma, [facility]   • MANDIBLE FRACTURE SURGERY     • MASTECTOMY Left 04/11/2002    Left Total Mastectomy and Left  Axillary [place] Node [place], Dr. Indu Akins, [facility]   • OTHER SURGICAL HISTORY      CARDIAC LOOP DEVICE, LEFT CHEST   • TENSION FREE VAGINAL TAPING WITH MINI ARC SLING N/A 10/26/2009    Dr. Gina Castillo, [facility]   • UPPER GASTROINTESTINAL ENDOSCOPY  09/16/2014    z-line irreg, grade a reflux, gastritis                       PT Assessment/Plan     Row Name 04/10/19 1120          PT Assessment    Assessment Comments  Pt returns for first follow up since evaluation reporting good tolerance and compliance with HEP and continuing walking for exercise at home. She is having some inc in pain in L knee but reports consistent 2/10 pain. She is traveling at the end of the month and reports she is ready for surgery. D/c today and will follow up post-operatively.  -LB        PT Plan    PT Plan Comments  d/c to HEP until after surgery.   -LB       User Key  (r) = Recorded By, (t) = Taken By, (c) = Cosigned By    Initials Name Provider Type    LB Tasha Yanez, PT Physical Therapist              Exercises     Row Name 04/10/19 1000             Subjective Comments    Subjective Comments  I am doing well. I can tell I am using it more bc it is sore. We leave for [place] late April. I am ready for the surgery.  -LB         Subjective Pain    Able to rate subjective pain?  yes  -LB      Pre-Treatment Pain Level  2  -LB         Total Minutes    43868 - PT Therapeutic Exercise Minutes  40  -LB         Exercise 1    Exercise Name 1  QS  -LB      Reps 1  15   -LB      Time 1  5  -LB      Additional Comments  to towel  -LB         Exercise 2    Exercise Name 2  SAQ  -LB      Sets 2  2  -LB      Reps 2  10  -LB      Time 2  2  -LB         Exercise 3    Exercise Name 3  hip adduction + PPT  -LB      Sets 3  2  -LB      Reps 3  10  -LB      Time 3  5  -LB         Exercise 4    Exercise Name 4  glute set + mini bridge  -LB      Sets 4  2  -LB      Reps 4  10  -LB      Time 4  5  -LB         Exercise 5    Exercise Name 5  standing  HS curl  -LB       Sets 5  2  -LB      Reps 5  10  -LB      Additional Comments  4#  -LB         Exercise 6    Exercise Name 6  HR  -LB      Sets 6  2  -LB      Reps 6  10  -LB         Exercise 7    Exercise Name 7  HL hip abduction  -LB      Sets 7  2  -LB      Reps 7  10  -LB      Additional Comments  GTB  -LB         Exercise 8    Exercise Name 8  standing B shoulder extension + TA  -LB      Sets 8  2  -LB      Reps 8  10  -LB      Additional Comments  RTB  -LB         Exercise 9    Exercise Name 9  stair HS stretch  -LB      Reps 9  3  -LB      Time 9  20  -LB         Exercise 10    Exercise Name 10  stair gastroc stretch  -LB      Reps 10  3  -LB      Time 10  20  -LB        User Key  (r) = Recorded By, (t) = Taken By, (c) = Cosigned By    Initials Name Provider Type    Tasha Decker, PT Physical Therapist                         PT OP Goals     Row Name 04/10/19 1100          PT Short Term Goals    STG Date to Achieve  04/10/19  -LB     STG 1  Pt will demonstrate understanding and compliance with initial HEP.  -LB     STG 1 Progress  Met  -LB     STG 2  Pt will demonstrate appropriate TA activation to protect lumbar spine.  -LB     STG 2 Progress  Met  -LB        Long Term Goals    LTG Date to Achieve  04/26/19  -LB     LTG 1  Pt will demonstrate understanding and compliance with advanced HEP to allow her to prepare for upcoming TKR.  -LB     LTG 1 Progress  Met  -LB       User Key  (r) = Recorded By, (t) = Taken By, (c) = Cosigned By    Initials Name Provider Type    Tasha Decker, PT Physical Therapist          Therapy Education  Education Details: progressed HEP, reviewed HEP, discussed stair negotiation post operatively  Given: Symptoms/condition management, HEP  Program: Reinforced  How Provided: Verbal, Demonstration, Written  Provided to: Patient  Level of Understanding: Teach back education performed, Verbalized, Demonstrated              Time Calculation:   Start Time: 1045  Stop Time: 1125  Time Calculation  (min): 40 min  Total Timed Code Minutes- PT: 38 minute(s)  Therapy Charges for Today     Code Description Service Date Service Provider Modifiers Qty    79314275355 HC PT THER PROC EA 15 MIN 4/10/2019 Tasha Yanez, PT GP 3                OP PT Discharge Summary  Date of Discharge: 04/10/19  Reason for Discharge: All goals achieved  Outcomes Achieved: Able to achieve all goals within established timeline  Discharge Destination: Home with home program  Discharge Instructions/Additional Comments: see assessment       Tasha Yanez, PT  4/10/2019

## 2019-04-16 ENCOUNTER — APPOINTMENT (OUTPATIENT)
Dept: PHYSICAL THERAPY | Facility: HOSPITAL | Age: 74
End: 2019-04-16

## 2019-04-19 ENCOUNTER — APPOINTMENT (OUTPATIENT)
Dept: PHYSICAL THERAPY | Facility: HOSPITAL | Age: 74
End: 2019-04-19

## 2019-04-23 ENCOUNTER — APPOINTMENT (OUTPATIENT)
Dept: PHYSICAL THERAPY | Facility: HOSPITAL | Age: 74
End: 2019-04-23

## 2019-04-26 ENCOUNTER — APPOINTMENT (OUTPATIENT)
Dept: PHYSICAL THERAPY | Facility: HOSPITAL | Age: 74
End: 2019-04-26

## 2019-04-30 ENCOUNTER — APPOINTMENT (OUTPATIENT)
Dept: PHYSICAL THERAPY | Facility: HOSPITAL | Age: 74
End: 2019-04-30

## 2019-05-09 ENCOUNTER — APPOINTMENT (OUTPATIENT)
Dept: PREADMISSION TESTING | Facility: HOSPITAL | Age: 74
End: 2019-05-09

## 2019-05-09 VITALS — HEIGHT: 66 IN | WEIGHT: 132.8 LBS | BODY MASS INDEX: 21.34 KG/M2

## 2019-05-09 DIAGNOSIS — M17.12 PRIMARY OSTEOARTHRITIS OF LEFT KNEE: ICD-10-CM

## 2019-05-09 LAB
ANION GAP SERPL CALCULATED.3IONS-SCNC: 12.8 MMOL/L
BACTERIA UR QL AUTO: NORMAL /HPF
BILIRUB UR QL STRIP: NEGATIVE
BUN BLD-MCNC: 12 MG/DL (ref 8–23)
BUN/CREAT SERPL: 15.4 (ref 7–25)
CALCIUM SPEC-SCNC: 10.3 MG/DL (ref 8.6–10.5)
CHLORIDE SERPL-SCNC: 99 MMOL/L (ref 98–107)
CLARITY UR: CLEAR
CO2 SERPL-SCNC: 23.2 MMOL/L (ref 22–29)
COLOR UR: YELLOW
CREAT BLD-MCNC: 0.78 MG/DL (ref 0.57–1)
DEPRECATED RDW RBC AUTO: 46.6 FL (ref 37–54)
ERYTHROCYTE [DISTWIDTH] IN BLOOD BY AUTOMATED COUNT: 13.7 % (ref 12.3–15.4)
GFR SERPL CREATININE-BSD FRML MDRD: 73 ML/MIN/1.73
GLUCOSE BLD-MCNC: 98 MG/DL (ref 65–99)
GLUCOSE UR STRIP-MCNC: NEGATIVE MG/DL
HCT VFR BLD AUTO: 41.3 % (ref 34–46.6)
HGB BLD-MCNC: 12.5 G/DL (ref 12–15.9)
HGB UR QL STRIP.AUTO: NEGATIVE
HYALINE CASTS UR QL AUTO: NORMAL /LPF
KETONES UR QL STRIP: NEGATIVE
LEUKOCYTE ESTERASE UR QL STRIP.AUTO: ABNORMAL
MCH RBC QN AUTO: 28 PG (ref 26.6–33)
MCHC RBC AUTO-ENTMCNC: 30.3 G/DL (ref 31.5–35.7)
MCV RBC AUTO: 92.4 FL (ref 79–97)
NITRITE UR QL STRIP: NEGATIVE
PH UR STRIP.AUTO: 7 [PH] (ref 5–8)
PLATELET # BLD AUTO: 229 10*3/MM3 (ref 140–450)
PMV BLD AUTO: 9.5 FL (ref 6–12)
POTASSIUM BLD-SCNC: 4 MMOL/L (ref 3.5–5.2)
PROT UR QL STRIP: NEGATIVE
RBC # BLD AUTO: 4.47 10*6/MM3 (ref 3.77–5.28)
RBC # UR: NORMAL /HPF
REF LAB TEST METHOD: NORMAL
SODIUM BLD-SCNC: 135 MMOL/L (ref 136–145)
SP GR UR STRIP: 1.01 (ref 1–1.03)
SQUAMOUS #/AREA URNS HPF: NORMAL /HPF
UROBILINOGEN UR QL STRIP: ABNORMAL
WBC NRBC COR # BLD: 5.46 10*3/MM3 (ref 3.4–10.8)
WBC UR QL AUTO: NORMAL /HPF

## 2019-05-09 PROCEDURE — 63710000001 MUPIROCIN 2 % OINTMENT: Performed by: ORTHOPAEDIC SURGERY

## 2019-05-09 PROCEDURE — 85027 COMPLETE CBC AUTOMATED: CPT | Performed by: ORTHOPAEDIC SURGERY

## 2019-05-09 PROCEDURE — A9270 NON-COVERED ITEM OR SERVICE: HCPCS | Performed by: ORTHOPAEDIC SURGERY

## 2019-05-09 PROCEDURE — 93005 ELECTROCARDIOGRAM TRACING: CPT

## 2019-05-09 PROCEDURE — 81001 URINALYSIS AUTO W/SCOPE: CPT | Performed by: ORTHOPAEDIC SURGERY

## 2019-05-09 PROCEDURE — 80048 BASIC METABOLIC PNL TOTAL CA: CPT | Performed by: ORTHOPAEDIC SURGERY

## 2019-05-09 PROCEDURE — 93010 ELECTROCARDIOGRAM REPORT: CPT | Performed by: INTERNAL MEDICINE

## 2019-05-09 PROCEDURE — 36415 COLL VENOUS BLD VENIPUNCTURE: CPT

## 2019-05-09 ASSESSMENT — KOOS JR
KOOS JR SCORE: 68.284
KOOS JR SCORE: 7

## 2019-05-09 NOTE — DISCHARGE INSTRUCTIONS
Take the following medications the morning of surgery with a small sip of water: HYDRALAZINE, PROTONIX, LAMICTAL, AMBILIFY AND LITHIUM    ARRIVAL TIME 06:00        General Instructions:  • Do not eat solid food after midnight the night before surgery.  • You may drink clear liquids day of surgery but must stop at least one hour before your hospital arrival time.  • It is beneficial for you to have a clear drink that contains carbohydrates the day of surgery.  We suggest a 12 to 20 ounce bottle of Gatorade or Powerade for non-diabetic patients or a 12 to 20 ounce bottle of G2 or Powerade Zero for diabetic patients. (Pediatric patients, are not advised to drink a 12 to 20 ounce carbohydrate drink)    Clear liquids are liquids you can see through.  Nothing red in color.     Plain water                               Sports drinks  Sodas                                   Gelatin (Jell-O)  Fruit juices without pulp such as white grape juice and apple juice  Popsicles that contain no fruit or yogurt  Tea or coffee (no cream or milk added)  Gatorade / Powerade  G2 / Powerade Zero    • Infants may have breast milk up to four hours before surgery.  • Infants drinking formula may drink formula up to six hours before surgery.   • Patients who avoid smoking, chewing tobacco and alcohol for 4 weeks prior to surgery have a reduced risk of post-operative complications.  Quit smoking as many days before surgery as you can.  • Do not smoke, use chewing tobacco or drink alcohol the day of surgery.   • If applicable bring your C-PAP/ BI-PAP machine.  • Bring any papers given to you in the doctor’s office.  • Wear clean comfortable clothes and socks.  • Do not wear contact lenses, false eyelashes or make-up.  Bring a case for your glasses.   • Bring crutches or walker if applicable.  • Remove all piercings.  Leave jewelry and any other valuables at home.  • Hair extensions with metal clips must be removed prior to surgery.  • The  Pre-Admission Testing nurse will instruct you to bring medications if unable to obtain an accurate list in Pre-Admission Testing.        If you were given a blood bank ID arm band remember to bring it with you the day of surgery.    Preventing a Surgical Site Infection:  • For 2 to 3 days before surgery, avoid shaving with a razor because the razor can irritate skin and make it easier to develop an infection.    • Any areas of open skin can increase the risk of a post-operative wound infection by allowing bacteria to enter and travel throughout the body.  Notify your surgeon if you have any skin wounds / rashes even if it is not near the expected surgical site.  The area will need assessed to determine if surgery should be delayed until it is healed.  • The night prior to surgery sleep in a clean bed with clean clothing.  Do not allow pets to sleep with you.  • Shower on the morning of surgery using a fresh bar of anti-bacterial soap (such as Dial) and clean washcloth.  Dry with a clean towel and dress in clean clothing.  • Ask your surgeon if you will be receiving antibiotics prior to surgery.  • Make sure you, your family, and all healthcare providers clean their hands with soap and water or an alcohol based hand  before caring for you or your wound.    Day of surgery:  Upon arrival, a Pre-op nurse and Anesthesiologist will review your health history, obtain vital signs, and answer questions you may have.  The only belongings needed at this time will be a list of your home medications and if applicable your C-PAP/BI-PAP machine.  If you are staying overnight your family can leave the rest of your belongings in the car and bring them to your room later.  A Pre-op nurse will start an IV and you may receive medication in preparation for surgery, including something to help you relax.  Your family will be able to see you in the Pre-op area.  While you are in surgery your family should notify the waiting room   if they leave the waiting room area and provide a contact phone number.    Please be aware that surgery does come with discomfort.  We want to make every effort to control your discomfort so please discuss any uncontrolled symptoms with your nurse.   Your doctor will most likely have prescribed pain medications.      If you are going home after surgery you will receive individualized written care instructions before being discharged.  A responsible adult must drive you to and from the hospital on the day of your surgery and stay with you for 24 hours.    If you are staying overnight following surgery, you will be transported to your hospital room following the recovery period.  [facility] has all private rooms.    You have received a list of surgical assistants for your reference.  If you have any questions please call Pre-Admission Testing at [phone].  Deductibles and co-payments are collected on the day of service. Please be prepared to pay the required co-pay, deductible or deposit on the day of service as defined by your plan.    2% CHLORAHEXIDINE GLUCONATE* CLOTH  Preparing or “prepping” skin before surgery can reduce the risk of infection at the surgical site. To make the process easier, [facility] has chosen disposable cloths moistened with a rinse-free, 2% Chlorhexidine Gluconate (CHG) antiseptic solution. The steps below outline the prepping process and should be carefully followed.        Use the prep cloth on the area that is circled in the diagram             Directions Night before Surgery  1) Shower using a fresh bar of anti-bacterial soap (such as Dial) and clean washcloth.  Use a clean towel to completely dry your skin.  2) Do not use any lotions, oils or creams on your skin.  3) Open the package and remove 1 cloth, wipe your skin for 30 seconds in a circular motion.  Allow to dry for 3 minutes.  4) Repeat #3 with second cloth.  5) Do not touch your eyes,  ears, or mouth with the prep cloth.  6) Allow the wet prep solution to air dry.  7) Discard the prep cloth and wash your hands with soap and water.   8) Dress in clean bed clothes and sleep on fresh clean bed sheets.   9) You may experience some temporary itching after the prep.    Directions Day of Surgery  1) Repeat steps 1,2,3,4,5,6,7, and 9.   2) Dress in clean clothes before coming to the hospital.    BACTROBAN NASAL OINTMENT  There are many germs normally in your nose. Bactroban is an ointment that will help reduce these germs. Please follow these instructions for Bactroban use:      ____The day before surgery in the morning  Date__5/19______    ____The day before surgery in the evening              Date___5/19_____    ____The day of surgery in the morning    Date__5/20______    **Squirt ½ package of Bactroban Ointment onto a cotton applicator and apply to inside of 1st nostril.  Squirt the remaining Bactroban and apply to the inside of the other nostril.

## 2019-05-16 ENCOUNTER — OFFICE VISIT (OUTPATIENT)
Dept: ORTHOPEDIC SURGERY | Facility: CLINIC | Age: 74
End: 2019-05-16

## 2019-05-16 VITALS
DIASTOLIC BLOOD PRESSURE: 70 MMHG | HEIGHT: 66 IN | WEIGHT: 132 LBS | BODY MASS INDEX: 21.21 KG/M2 | TEMPERATURE: 97.2 F | SYSTOLIC BLOOD PRESSURE: 138 MMHG

## 2019-05-16 DIAGNOSIS — M17.12 PRIMARY OSTEOARTHRITIS OF LEFT KNEE: Primary | ICD-10-CM

## 2019-05-16 PROCEDURE — S0260 H&P FOR SURGERY: HCPCS | Performed by: NURSE PRACTITIONER

## 2019-05-16 PROCEDURE — 77077 JOINT SURVEY SINGLE VIEW: CPT | Performed by: ORTHOPAEDIC SURGERY

## 2019-05-16 NOTE — H&P
Patient: Davida Anderson    Date of Admission: 5/20/19    YOB: 1945    Medical Record Number: [id number]    Admitting Physician: Dr. Anurag Serrano    Reason for Admission: End Stage Left Knee OA    History of Present Illness: 73 y.o. female presents with severe end stage knee osteoarthritis which has not been responsive to the full compliment of conservative measures. Despite conservative attempts, there is still severe, constant activity limiting pain. Given the severity of the pain, the patient has elected to proceed with knee replacement.    Allergies:   Allergies   Allergen Reactions   • Morphine Hives, Itching and Rash         Current Medications:  Scheduled Meds:  PRN Meds:.    PMH:     Past Medical History:   Diagnosis Date   • Acid reflux    • Arthritis    • Atrial fibrillation (CMS/HCC)    • Gtz esophagus    • Bipolar 2 disorder (CMS/HCC)    • Bradycardia     WITH SURGERY   • COPD (chronic obstructive pulmonary disease) (CMS/HCC)     MILD, USES AINHALER PRN   • DDD (degenerative disc disease), lumbar    • Diverticulosis    • Dizziness    • Drug therapy    • Fibrocystic breast    • Frequent UTI     RECENT UTI FINISHED COURSE OF ANTIBX   • H/O Infiltrating ductal carcinoma of left breast 2002    Stage IIA, Grade 3 ER/OK +, HER2 -, treated with 5 years of tamoxifen, 5 years of Femara, completed in 2012   • High cholesterol    • History of alcoholism (CMS/HCC)     40 YRS AGO   • History of Clostridium difficile colitis 2014   • History of gastric ulcer    • History of loop recorder    • Hypertension    • Irregular heartbeat     a fib   • Low back pain    • Lumbar herniated disc    • Mass of right breast on mammogram 03/17/2016    10 o'clock position, 4 cm from the nipple,   • Neuropathy    • PONV (postoperative nausea and vomiting)    • Raynaud disease    • Sleep apnea     MILD, NO NEED FOR CPAP   • Urinary incontinence        PF/Surg/Soc Hx:     Past Surgical History:   Procedure Laterality Date    • ANKLE OPEN REDUCTION INTERNAL FIXATION Right 08/14/2015    Dr. Dandre Camacho, [facility]   • BACK SURGERY      LUMBAR   • BREAST RECONSTRUCTION, BREAST TISSUE EXPANDER INSERTION Left 04/11/2002    [place] Contour Profile expander was placed 550 cc size, filled with 200 cc of saline, Dr. Herbert Napoles, [facility]   • COLONOSCOPY N/A 09/16/2014    Dr. Darci Kerns, [facility]; torts, tics, stool, polyp   • COLONOSCOPY N/A 9/27/2016    NTEH, diverticulosis, tortuous colon, Two 3-5mm polyps in the descending colon and the transverse colon, IH.  PATH: Tubular adenoma   • COLONOSCOPY N/A 12/12/2017    NTEH, tics, torts, IH, TA w/low grade dysplasia   • CYSTOSCOPY N/A 05/07/2010    with TVT takedown, Dr. Simon Wall, [facility]   • ENDOSCOPY N/A 9/27/2016    z line irreg, bilious gastric fluid- fluid aspiration preformed, gastritis.  PATH: Squamous and glandular mucosa with minimal superficial acute inflammation.    • ENDOSCOPY N/A 12/12/2017    Z line irregular, gastritis, duodenitis, chronic inflammation   • EPIDURAL BLOCK     • KNEE SURGERY Left     BROKEN   • LUMBAR DISCECTOMY FUSION INSTRUMENTATION N/A 12/3/2018    Procedure: L4-5 laminectomy and fusion with instrumentation;  Surgeon: Austin Keith MD;  Location: [facility];  Service: Orthopedic Spine   • MAMMO US BREAST BIOPSY ADDITIONAL W WO DEVICE Left 02/21/2002    2:00 position left breast, Infiltrating Ductal Carcinoma, [facility]   • MANDIBLE FRACTURE SURGERY     • MASTECTOMY Left 04/11/2002    Left Total Mastectomy and Left Axillary [place] Node Biobsy, Dr. Indu Akins, [facility]   • OTHER SURGICAL HISTORY      CARDIAC LOOP DEVICE, LEFT CHEST   • TENSION FREE VAGINAL TAPING WITH MINI ARC SLING N/A 10/26/2009    Dr. Gina Castillo, [facility]   • UPPER GASTROINTESTINAL ENDOSCOPY  09/16/2014    z-line irreg, grade a reflux, gastritis        Social History     Occupational History   • Not on file   Tobacco Use   • Smoking status: Former Smoker     Packs/day: 3.00     Types: Cigarettes     Last  "attempt to quit: 1983     Years since quittin.1   • Smokeless tobacco: Never Used   Substance and Sexual Activity   • Alcohol use: No   • Drug use: No   • Sexual activity: Not on file      Social History     Social History Narrative   • Not on file        Family History   Problem Relation Age of Onset   • Breast cancer Mother 35   • Colon cancer Mother 64   • Heart disease Father    • Cancer Father         throat and lung   • Colon polyps Father    • Malig Hyperthermia Neg Hx          Review of Systems:   A 14 point review of systems was performed, pertinent positives discussed above, all other systems are negative    Physical Exam: 73 y.o. female  Vital Signs :   Vitals:    19 1324   BP: 138/70   BP Location: Right arm   Patient Position: Sitting   Temp: 97.2 °F (36.2 °C)   TempSrc: Temporal   Weight: 59.9 kg (132 lb)   Height: 167.6 cm (66\")     General: Alert and Oriented x 3, No acute distress.  Psych: mood and affect appropriate; recent and remote memory intact  Eyes: conjunctiva clear; pupils equally round and reactive, sclera nonicteric  CV: no peripheral edema  Resp: normal respiratory effort  Skin: no rashes or wounds; normal turgor  Musculosketetal; pain and crepitance with knee range of motion  Vascular: palpable distal pulses    Xrays:  -3 views (AP, lateral, and sunrise) were reviewed demonstrating end-stage OA with bone on bone articulation.  -A full length AP xray was ordered and reviewed today for purposes of operative alignment demonstrating end stage arthritic findings. There are no previous full length films for review    Assessment:  End-stage Left knee osteoarthritis. Conservative measures have failed.      Plan:  The plan is to proceed with Left Total Knee Replacement. The patient voiced understanding of the risks, benefits, and alternative forms of treatment that were discussed with Dr Serrano at the time of scheduling.  Patient's been on going home with home health next " day    Kayd Estrada, APRN  5/16/2019

## 2019-07-15 ENCOUNTER — OFFICE VISIT (OUTPATIENT)
Dept: GASTROENTEROLOGY | Facility: CLINIC | Age: 74
End: 2019-07-15

## 2019-07-15 VITALS
WEIGHT: 130.8 LBS | HEIGHT: 66 IN | TEMPERATURE: 98.7 F | BODY MASS INDEX: 21.02 KG/M2 | SYSTOLIC BLOOD PRESSURE: 126 MMHG | DIASTOLIC BLOOD PRESSURE: 70 MMHG

## 2019-07-15 DIAGNOSIS — K58.9 IRRITABLE BOWEL SYNDROME, UNSPECIFIED TYPE: ICD-10-CM

## 2019-07-15 DIAGNOSIS — K21.00 GASTROESOPHAGEAL REFLUX DISEASE WITH ESOPHAGITIS: ICD-10-CM

## 2019-07-15 DIAGNOSIS — K22.70 BARRETT'S ESOPHAGUS WITHOUT DYSPLASIA: Primary | ICD-10-CM

## 2019-07-15 PROCEDURE — 99214 OFFICE O/P EST MOD 30 MIN: CPT | Performed by: INTERNAL MEDICINE

## 2019-07-15 RX ORDER — PHENAZOPYRIDINE HYDROCHLORIDE 100 MG/1
100 TABLET, FILM COATED ORAL AS NEEDED
COMMUNITY
Start: 2019-07-13 | End: 2020-07-12

## 2019-07-15 RX ORDER — NITROFURANTOIN 25; 75 MG/1; MG/1
100 CAPSULE ORAL DAILY
COMMUNITY
Start: 2019-07-13 | End: 2019-07-23

## 2019-07-15 NOTE — PROGRESS NOTES
"Chief Complaint   Patient presents with   • Follow-up   • Heartburn   • Irritable Bowel Syndrome        Davida Anderson is a  73 y.o. female here for a follow up visit for GERD, IBS, history of polyps    HPI this 73-year-old white female patient of Dr. Marek Nguyễn returns in follow-up since last seen June 2018.  She had issues at that time with nausea and vomiting as well as her reflux and dysphagia.  She also carries a diagnosis of irritable bowel syndrome with constipation.  Her reflux is managed with omeprazole 40 mg daily.  Linzess does afford some symptomatic relief regarding her constipation.  She still has occasional issues with dysphagia to breads and meats.  She had undergone upper and lower endoscopic evaluation in December 2017 and we reviewed those examinations.  The polyp removed from her colon was adenomatous and she warrants follow-up colonoscopy in December 2020.  We talked about further studies as it relates to swallowing issues and she does recall having previous video fluoroscopy swallow study performed.  Per her account this has been \"years ago\" and this may warrant repeat evaluation if her symptoms persist or worsen.  She does describe some nighttime reflux issues and I encouraged her to consider use of a histamine blocker at bedtime in addition to her omeprazole.  She will try Zantac to see if this affords symptomatic relief.    Past Medical History:   Diagnosis Date   • Acid reflux    • Arthritis    • Atrial fibrillation (CMS/HCC)    • Gtz esophagus    • Bipolar 2 disorder (CMS/HCC)    • Bradycardia     WITH SURGERY   • COPD (chronic obstructive pulmonary disease) (CMS/HCC)     MILD, USES AINHALER PRN   • DDD (degenerative disc disease), lumbar    • Diverticulosis    • Dizziness    • Drug therapy    • Fibrocystic breast    • Frequent UTI     RECENT UTI FINISHED COURSE OF ANTIBX   • H/O Infiltrating ductal carcinoma of left breast 2002    Stage IIA, Grade 3 ER/AK +, HER2 -, treated with 5 " years of tamoxifen, 5 years of Femara, completed in 2012   • High cholesterol    • History of alcoholism (CMS/HCC)     40 YRS AGO   • History of Clostridium difficile colitis 2014   • History of gastric ulcer    • History of loop recorder    • Hypertension    • Irregular heartbeat     a fib   • Low back pain    • Lumbar herniated disc    • Mass of right breast on mammogram 03/17/2016    10 o'clock position, 4 cm from the nipple,   • Neuropathy    • PONV (postoperative nausea and vomiting)    • Raynaud disease    • Sleep apnea     MILD, NO NEED FOR CPAP   • Urinary incontinence        Current Outpatient Medications   Medication Sig Dispense Refill   • ALBUTEROL IN Inhale 2 puffs Every 4 (Four) Hours As Needed.     • ARIPiprazole (ABILIFY) 10 MG tablet Take 10 mg by mouth daily.     • atorvastatin (LIPITOR) 20 MG tablet Take 20 mg by mouth Daily.     • B Complex Vitamins (VITAMIN B COMPLEX) tablet Take 1 tablet by mouth daily.     • baclofen (LIORESAL) 10 MG tablet Take 10 mg by mouth 3 (Three) Times a Day.     • BIOTIN PO Take 1 tablet by mouth daily.     • Calcium Carb-Cholecalciferol (CALCIUM 600 + D) 600-200 MG-UNIT tablet Take 2 tablets by mouth daily.     • Chlorhexidine Gluconate (HIBICLENS EX) Apply  topically. AS DIRECTED PREOP     • Coenzyme Q10 (CO Q-10) 400 MG capsule Take 400 mg by mouth daily.     • diphenhydrAMINE-acetaminophen (TYLENOL PM)  MG tablet per tablet Take 1 tablet by mouth Every Night.     • Docusate Calcium (STOOL SOFTENER PO) Take 1 capsule by mouth As Needed.     • hydrALAZINE (APRESOLINE) 25 MG tablet Take 25 mg by mouth 4 (Four) Times a Day.     • lamoTRIgine (LaMICtal) 100 MG tablet Take 100 mg by mouth 2 (two) times a day.     • linaclotide (LINZESS) 72 MCG capsule capsule Take 1 capsule by mouth Every Morning Before Breakfast. 90 capsule 1   • lithium carbonate 300 MG capsule Take 300 mg by mouth daily.     • magnesium oxide (MAGOX) 400 (241.3 MG) MG tablet tablet Take 400 mg  by mouth daily.     • melatonin 5 MG tablet tablet Take 5 mg by mouth At Night As Needed.     • mupirocin (BACTROBAN) 2 % ointment Apply  topically to the appropriate area as directed 3 (Three) Times a Day. AS DIRECTED PREOP     • nitrofurantoin, macrocrystal-monohydrate, (MACROBID) 100 MG capsule Take 100 mg by mouth Daily.     • pantoprazole (PROTONIX) 40 MG EC tablet Take 40 mg by mouth 2 (Two) Times a Day.     • phenazopyridine (PYRIDIUM) 100 MG tablet Take 100 mg by mouth Daily.     • pregabalin (LYRICA) 50 MG capsule Take 100 mg by mouth Daily.     • Probiotic Product (SOLUBLE FIBER/PROBIOTICS PO) Take 1 tablet by mouth Daily.     • solifenacin (VESICARE) 10 MG tablet Take 10 mg by mouth Daily.       No current facility-administered medications for this visit.      Facility-Administered Medications Ordered in Other Visits   Medication Dose Route Frequency Provider Last Rate Last Dose   • mupirocin (BACTROBAN) 2 % nasal ointment   Nasal BID Anurag Serrano MD           PRN Meds:.    Allergies   Allergen Reactions   • Morphine Hives, Itching and Rash       Social History     Socioeconomic History   • Marital status:      Spouse name: Not on file   • Number of children: Not on file   • Years of education: Not on file   • Highest education level: Not on file   Tobacco Use   • Smoking status: Former Smoker     Packs/day: 3.00     Types: Cigarettes     Last attempt to quit: 1983     Years since quittin.2   • Smokeless tobacco: Never Used   Substance and Sexual Activity   • Alcohol use: No   • Drug use: No       Family History   Problem Relation Age of Onset   • Breast cancer Mother 35   • Colon cancer Mother 64   • Heart disease Father    • Cancer Father         throat and lung   • Colon polyps Father    • Malig Hyperthermia Neg Hx        Review of Systems   Constitutional: Negative for activity change, appetite change, fatigue and unexpected weight change.   HENT: Negative for congestion, facial  swelling, sore throat, trouble swallowing and voice change.    Eyes: Negative for photophobia and visual disturbance.   Respiratory: Negative for cough and choking.    Cardiovascular: Negative for chest pain.   Gastrointestinal: Positive for constipation. Negative for abdominal distention, abdominal pain, anal bleeding, blood in stool, diarrhea, nausea, rectal pain and vomiting.        GERD  Dysphagia   Endocrine: Negative for polyphagia.   Musculoskeletal: Negative for arthralgias, gait problem and joint swelling.   Skin: Negative for color change, pallor and rash.   Allergic/Immunologic: Negative for food allergies.   Neurological: Negative for speech difficulty and headaches.   Hematological: Does not bruise/bleed easily.   Psychiatric/Behavioral: Negative for agitation, confusion and sleep disturbance.       Vitals:    07/15/19 1348   BP: 126/70   Temp: 98.7 °F (37.1 °C)       Physical Exam   Constitutional: She is oriented to person, place, and time. She appears well-developed and well-nourished.   HENT:   Head: Normocephalic.   Mouth/Throat: Oropharynx is clear and moist.   Eyes: Conjunctivae and EOM are normal.   Neck: Normal range of motion.   Cardiovascular: Normal rate and regular rhythm.   Pulmonary/Chest: Breath sounds normal.   Abdominal: Soft. Bowel sounds are normal.   Musculoskeletal: Normal range of motion.   Neurological: She is alert and oriented to person, place, and time.   Skin: Skin is warm and dry.   Psychiatric: She has a normal mood and affect. Her behavior is normal.       ASSESSMENT  #1 GERD: Still some breakthrough symptoms at night  #2 IBS with constipation: Some improvement with Linzess  #3 history of polyps      PLAN  Refill medications  Follow-up annually  Offer colonoscopy in December 2020      ICD-10-CM ICD-9-CM   1. Gtz's esophagus without dysplasia K22.70 530.85   2. Gastroesophageal reflux disease with esophagitis K21.0 530.11   3. Irritable bowel syndrome, unspecified type  K58.9 564.1

## 2019-07-16 RX ORDER — LINACLOTIDE 72 UG/1
CAPSULE, GELATIN COATED ORAL
Qty: 90 CAPSULE | Refills: 1 | OUTPATIENT
Start: 2019-07-16

## 2019-07-23 ENCOUNTER — TELEPHONE (OUTPATIENT)
Dept: GASTROENTEROLOGY | Facility: CLINIC | Age: 74
End: 2019-07-23

## 2019-07-23 NOTE — TELEPHONE ENCOUNTER
----- Message from Mayr [employer] sent at 7/23/2019 10:44 AM EDT -----  Regarding: PA/Nory  Contact: [phone]  Pt is calling in regards an authorization for linaclotide (LINZESS) 72 MCG. [employer] Tel: [phone] and fax:[phone]

## 2019-07-23 NOTE — TELEPHONE ENCOUNTER
Call to pt.  Advise that linzess pa request received - will route accordingly.  Verb understanding.    Message to Sunita MOODY

## 2019-08-01 ENCOUNTER — PREP FOR SURGERY (OUTPATIENT)
Dept: OTHER | Facility: HOSPITAL | Age: 74
End: 2019-08-01

## 2019-08-01 DIAGNOSIS — M17.12 PRIMARY OSTEOARTHRITIS OF LEFT KNEE: Primary | ICD-10-CM

## 2019-08-02 ENCOUNTER — APPOINTMENT (OUTPATIENT)
Dept: PREADMISSION TESTING | Facility: HOSPITAL | Age: 74
End: 2019-08-02

## 2019-08-02 VITALS
WEIGHT: 129.9 LBS | DIASTOLIC BLOOD PRESSURE: 76 MMHG | HEART RATE: 73 BPM | HEIGHT: 66 IN | SYSTOLIC BLOOD PRESSURE: 157 MMHG | BODY MASS INDEX: 20.88 KG/M2 | TEMPERATURE: 98.1 F | OXYGEN SATURATION: 96 % | RESPIRATION RATE: 16 BRPM

## 2019-08-02 DIAGNOSIS — M17.12 PRIMARY OSTEOARTHRITIS OF LEFT KNEE: ICD-10-CM

## 2019-08-02 LAB
ANION GAP SERPL CALCULATED.3IONS-SCNC: 9.1 MMOL/L (ref 5–15)
BUN BLD-MCNC: 13 MG/DL (ref 8–23)
BUN/CREAT SERPL: 18.3 (ref 7–25)
CALCIUM SPEC-SCNC: 9.9 MG/DL (ref 8.6–10.5)
CHLORIDE SERPL-SCNC: 97 MMOL/L (ref 98–107)
CO2 SERPL-SCNC: 25.9 MMOL/L (ref 22–29)
CREAT BLD-MCNC: 0.71 MG/DL (ref 0.57–1)
DEPRECATED RDW RBC AUTO: 44.3 FL (ref 37–54)
ERYTHROCYTE [DISTWIDTH] IN BLOOD BY AUTOMATED COUNT: 13.3 % (ref 12.3–15.4)
GFR SERPL CREATININE-BSD FRML MDRD: 81 ML/MIN/1.73
GLUCOSE BLD-MCNC: 92 MG/DL (ref 65–99)
HCT VFR BLD AUTO: 38.4 % (ref 34–46.6)
HGB BLD-MCNC: 11.7 G/DL (ref 12–15.9)
MCH RBC QN AUTO: 27.6 PG (ref 26.6–33)
MCHC RBC AUTO-ENTMCNC: 30.5 G/DL (ref 31.5–35.7)
MCV RBC AUTO: 90.6 FL (ref 79–97)
PLATELET # BLD AUTO: 227 10*3/MM3 (ref 140–450)
PMV BLD AUTO: 9.8 FL (ref 6–12)
POTASSIUM BLD-SCNC: 4.4 MMOL/L (ref 3.5–5.2)
RBC # BLD AUTO: 4.24 10*6/MM3 (ref 3.77–5.28)
SODIUM BLD-SCNC: 132 MMOL/L (ref 136–145)
WBC NRBC COR # BLD: 4.78 10*3/MM3 (ref 3.4–10.8)

## 2019-08-02 PROCEDURE — 80048 BASIC METABOLIC PNL TOTAL CA: CPT | Performed by: ORTHOPAEDIC SURGERY

## 2019-08-02 PROCEDURE — 93010 ELECTROCARDIOGRAM REPORT: CPT | Performed by: INTERNAL MEDICINE

## 2019-08-02 PROCEDURE — 63710000001 MUPIROCIN 2 % OINTMENT: Performed by: ORTHOPAEDIC SURGERY

## 2019-08-02 PROCEDURE — 36415 COLL VENOUS BLD VENIPUNCTURE: CPT

## 2019-08-02 PROCEDURE — A9270 NON-COVERED ITEM OR SERVICE: HCPCS | Performed by: ORTHOPAEDIC SURGERY

## 2019-08-02 PROCEDURE — 93005 ELECTROCARDIOGRAM TRACING: CPT

## 2019-08-02 PROCEDURE — 85027 COMPLETE CBC AUTOMATED: CPT | Performed by: ORTHOPAEDIC SURGERY

## 2019-08-02 RX ORDER — CHLORHEXIDINE GLUCONATE 500 MG/1
CLOTH TOPICAL
COMMUNITY
End: 2019-10-25 | Stop reason: HOSPADM

## 2019-08-02 ASSESSMENT — KOOS JR
KOOS JR SCORE: 57.14
KOOS JR SCORE: 12

## 2019-08-02 NOTE — DISCHARGE INSTRUCTIONS
Take the following medications the morning of surgery with a small sip of water:    SURGERY DATE 08/14, PATIENT WILL BE CALLED THE DAY BEFORE WITH THE ARRIVAL TIME, TAKE HYDRALAZINE, LAMICTAL, PANTOPRAZOLE, LYRICA DAY OF SURGERY, PATIENT STATES SHE NEEDS TO TAKE ABILIFY AND LITHIUM ALSO.    General Instructions:  • Do not eat solid food after midnight the night before surgery.  • You may drink clear liquids day of surgery but must stop at least one hour before your hospital arrival time.  • It is beneficial for you to have a clear drink that contains carbohydrates the day of surgery.  We suggest a 12 to 20 ounce bottle of Gatorade or Powerade for non-diabetic patients or a 12 to 20 ounce bottle of G2 or Powerade Zero for diabetic patients. (Pediatric patients, are not advised to drink a 12 to 20 ounce carbohydrate drink)    Clear liquids are liquids you can see through.  Nothing red in color.     Plain water                               Sports drinks  Sodas                                   Gelatin (Jell-O)  Fruit juices without pulp such as white grape juice and apple juice  Popsicles that contain no fruit or yogurt  Tea or coffee (no cream or milk added)  Gatorade / Powerade  G2 / Powerade Zero    • Infants may have breast milk up to four hours before surgery.  • Infants drinking formula may drink formula up to six hours before surgery.   • Patients who avoid smoking, chewing tobacco and alcohol for 4 weeks prior to surgery have a reduced risk of post-operative complications.  Quit smoking as many days before surgery as you can.  • Do not smoke, use chewing tobacco or drink alcohol the day of surgery.   • If applicable bring your C-PAP/ BI-PAP machine.  • Bring any papers given to you in the doctor’s office.  • Wear clean comfortable clothes and socks.  • Do not wear contact lenses, false eyelashes or make-up.  Bring a case for your glasses.   • Bring crutches or walker if applicable.  • Remove all piercings.   Leave jewelry and any other valuables at home.  • Hair extensions with metal clips must be removed prior to surgery.  • The Pre-Admission Testing nurse will instruct you to bring medications if unable to obtain an accurate list in Pre-Admission Testing.        If you were given a blood bank ID arm band remember to bring it with you the day of surgery.    Preventing a Surgical Site Infection:  • For 2 to 3 days before surgery, avoid shaving with a razor because the razor can irritate skin and make it easier to develop an infection.    • Any areas of open skin can increase the risk of a post-operative wound infection by allowing bacteria to enter and travel throughout the body.  Notify your surgeon if you have any skin wounds / rashes even if it is not near the expected surgical site.  The area will need assessed to determine if surgery should be delayed until it is healed.  • The night prior to surgery sleep in a clean bed with clean clothing.  Do not allow pets to sleep with you.  • Shower on the morning of surgery using a fresh bar of anti-bacterial soap (such as Dial) and clean washcloth.  Dry with a clean towel and dress in clean clothing.  • Ask your surgeon if you will be receiving antibiotics prior to surgery.  • Make sure you, your family, and all healthcare providers clean their hands with soap and water or an alcohol based hand  before caring for you or your wound.    Day of surgery:  Upon arrival, a Pre-op nurse and Anesthesiologist will review your health history, obtain vital signs, and answer questions you may have.  The only belongings needed at this time will be a list of your home medications and if applicable your C-PAP/BI-PAP machine.  If you are staying overnight your family can leave the rest of your belongings in the car and bring them to your room later.  A Pre-op nurse will start an IV and you may receive medication in preparation for surgery, including something to help you relax.   Your family will be able to see you in the Pre-op area.  While you are in surgery your family should notify the waiting room  if they leave the waiting room area and provide a contact phone number.    Please be aware that surgery does come with discomfort.  We want to make every effort to control your discomfort so please discuss any uncontrolled symptoms with your nurse.   Your doctor will most likely have prescribed pain medications.      If you are going home after surgery you will receive individualized written care instructions before being discharged.  A responsible adult must drive you to and from the hospital on the day of your surgery and stay with you for 24 hours.    If you are staying overnight following surgery, you will be transported to your hospital room following the recovery period.  [facility] has all private rooms.    You have received a list of surgical assistants for your reference.  If you have any questions please call Pre-Admission Testing at [phone].  Deductibles and co-payments are collected on the day of service. Please be prepared to pay the required co-pay, deductible or deposit on the day of service as defined by your plan.    2% CHLORAHEXIDINE GLUCONATE* CLOTH  Preparing or “prepping” skin before surgery can reduce the risk of infection at the surgical site. To make the process easier, [facility] has chosen disposable cloths moistened with a rinse-free, 2% Chlorhexidine Gluconate (CHG) antiseptic solution. The steps below outline the prepping process and should be carefully followed.        Use the prep cloth on the area that is circled in the diagram             Directions Night before Surgery  1) Shower using a fresh bar of anti-bacterial soap (such as Dial) and clean washcloth.  Use a clean towel to completely dry your skin.  2) Do not use any lotions, oils or creams on your skin.  3) Open the package and remove 1 cloth, wipe your skin for  30 seconds in a circular motion.  Allow to dry for 3 minutes.  4) Repeat #3 with second cloth.  5) Do not touch your eyes, ears, or mouth with the prep cloth.  6) Allow the wet prep solution to air dry.  7) Discard the prep cloth and wash your hands with soap and water.   8) Dress in clean bed clothes and sleep on fresh clean bed sheets.   9) You may experience some temporary itching after the prep.    Directions Day of Surgery  1) Repeat steps 1,2,3,4,5,6,7, and 9.   2) Dress in clean clothes before coming to the hospital.    BACTROBAN NASAL OINTMENT  There are many germs normally in your nose. Bactroban is an ointment that will help reduce these germs. Please follow these instructions for Bactroban use:      ____The day before surgery in the morning  Date________    ____The day before surgery in the evening              Date________    ____The day of surgery in the morning    Date________    **Squirt ½ package of Bactroban Ointment onto a cotton applicator and apply to inside of 1st nostril.  Squirt the remaining Bactroban and apply to the inside of the other nostril.

## 2019-08-05 ENCOUNTER — TELEPHONE (OUTPATIENT)
Dept: ORTHOPEDIC SURGERY | Facility: CLINIC | Age: 74
End: 2019-08-05

## 2019-08-05 NOTE — TELEPHONE ENCOUNTER
----- Message from DIANA Daugherty sent at 8/5/2019  2:27 PM EDT -----  EKG abnormal.  I see patient had a cardiac workup, but don't see cardiac clearance letter in chart.  Can you please contact cardiologist for clearance?

## 2019-08-05 NOTE — TELEPHONE ENCOUNTER
Have left message with Dr. Short's medical assistant to see if the patient is cleared from a cardiac standpoint

## 2019-08-06 NOTE — TELEPHONE ENCOUNTER
Received a call back from Dr. Short's office.  Patient is cleared for surgery and I will fax a note to the office

## 2019-08-08 ENCOUNTER — OFFICE VISIT (OUTPATIENT)
Dept: ORTHOPEDIC SURGERY | Facility: CLINIC | Age: 74
End: 2019-08-08

## 2019-08-08 VITALS
BODY MASS INDEX: 20.99 KG/M2 | SYSTOLIC BLOOD PRESSURE: 160 MMHG | DIASTOLIC BLOOD PRESSURE: 90 MMHG | HEIGHT: 66 IN | TEMPERATURE: 97.6 F | WEIGHT: 130.6 LBS

## 2019-08-08 DIAGNOSIS — M25.562 CHRONIC PAIN OF LEFT KNEE: Primary | ICD-10-CM

## 2019-08-08 DIAGNOSIS — G89.29 CHRONIC PAIN OF LEFT KNEE: Primary | ICD-10-CM

## 2019-08-08 PROCEDURE — 73562 X-RAY EXAM OF KNEE 3: CPT | Performed by: NURSE PRACTITIONER

## 2019-08-08 PROCEDURE — 77077 JOINT SURVEY SINGLE VIEW: CPT | Performed by: ORTHOPAEDIC SURGERY

## 2019-08-08 PROCEDURE — S0260 H&P FOR SURGERY: HCPCS | Performed by: NURSE PRACTITIONER

## 2019-08-08 NOTE — H&P
Patient: Davida Anderson    Date of Admission: 8/14/19    YOB: 1945    Medical Record Number: [id number]    Admitting Physician: Dr. Anurag Serrano    Reason for Admission: End Stage Left Knee OA    History of Present Illness: 73 y.o. female presents with severe end stage knee osteoarthritis which has not been responsive to the full compliment of conservative measures. Despite conservative attempts, there is still severe, constant activity limiting pain. Given the severity of the pain, the patient has elected to proceed with knee replacement.    Allergies:   Allergies   Allergen Reactions   • Morphine Hives, Itching and Rash         Current Medications:  Home Medications:    Current Outpatient Medications on File Prior to Visit   Medication Sig   • ALBUTEROL IN Inhale 2 puffs Every 4 (Four) Hours As Needed. PATIENT STATES SHE RARELY USES   • ARIPiprazole (ABILIFY) 10 MG tablet Take 10 mg by mouth daily.   • atorvastatin (LIPITOR) 20 MG tablet Take 20 mg by mouth Daily.   • B Complex Vitamins (VITAMIN B COMPLEX) tablet Take 1 tablet by mouth daily.   • baclofen (LIORESAL) 10 MG tablet Take 10 mg by mouth 3 (Three) Times a Day.   • BIOTIN PO Take 1 tablet by mouth daily.   • Calcium Carb-Cholecalciferol (CALCIUM 600 + D) 600-200 MG-UNIT tablet Take 2 tablets by mouth daily.   • Chlorhexidine Gluconate (HIBICLENS EX) Apply  topically. AS DIRECTED PREOP   • Chlorhexidine Gluconate Cloth 2 % pads Apply  topically. PER MD ORDER   • Coenzyme Q10 (CO Q-10) 400 MG capsule Take 400 mg by mouth daily.   • diphenhydrAMINE-acetaminophen (TYLENOL PM)  MG tablet per tablet Take 1 tablet by mouth Every Night.   • Docusate Calcium (STOOL SOFTENER PO) Take 1 capsule by mouth As Needed.   • hydrALAZINE (APRESOLINE) 25 MG tablet Take 25 mg by mouth 4 (Four) Times a Day.   • lamoTRIgine (LaMICtal) 100 MG tablet Take 100 mg by mouth 2 (two) times a day.   • linaclotide (LINZESS) 72 MCG capsule capsule Take 1 capsule by  mouth Every Morning Before Breakfast.   • lithium carbonate 300 MG capsule Take 300 mg by mouth daily.   • magnesium oxide (MAGOX) 400 (241.3 MG) MG tablet tablet Take 400 mg by mouth daily.   • melatonin 5 MG tablet tablet Take 5 mg by mouth At Night As Needed.   • mupirocin (BACTROBAN) 2 % nasal ointment into the nostril(s) as directed by provider 2 (Two) Times a Day. PER MD ORDER   • mupirocin (BACTROBAN) 2 % ointment Apply  topically to the appropriate area as directed 3 (Three) Times a Day. AS DIRECTED PREOP   • pantoprazole (PROTONIX) 40 MG EC tablet Take 40 mg by mouth 2 (Two) Times a Day.   • phenazopyridine (PYRIDIUM) 100 MG tablet Take 100 mg by mouth As Needed.   • pregabalin (LYRICA) 50 MG capsule Take 100 mg by mouth Daily.   • Probiotic Product (SOLUBLE FIBER/PROBIOTICS PO) Take 1 tablet by mouth Daily.   • solifenacin (VESICARE) 10 MG tablet Take 10 mg by mouth Daily.     Current Facility-Administered Medications on File Prior to Visit   Medication   • mupirocin (BACTROBAN) 2 % nasal ointment     PRN Meds:.    PMH:     Past Medical History:   Diagnosis Date   • Acid reflux    • Arthritis    • Atrial fibrillation (CMS/HCC)    • Gtz esophagus    • Bipolar 2 disorder (CMS/HCC)    • Bradycardia     WITH SURGERY   • COPD (chronic obstructive pulmonary disease) (CMS/HCC)     MILD, USES AINHALER PRN   • DDD (degenerative disc disease), lumbar    • Diverticulosis    • Dizziness    • Drug therapy    • Fibrocystic breast    • Frequent UTI     SEES DR GOFF FOR FREQUENT UTI'S WILL BE TAKING ANTIBIOTICS 1 WEEK BEFORE SURGERY   • H/O Infiltrating ductal carcinoma of left breast 2002    Stage IIA, Grade 3 ER/CT +, HER2 -, treated with 5 years of tamoxifen, 5 years of Femara, completed in 2012   • High cholesterol    • History of alcoholism (CMS/HCC)     40 YRS AGO   • History of Clostridium difficile colitis 2014   • History of gastric ulcer    • History of loop recorder    • Hypertension    • Iron deficiency     • Irregular heartbeat     a fib   • Low back pain    • Lumbar herniated disc    • Mass of right breast on mammogram 03/17/2016    10 o'clock position, 4 cm from the nipple,   • Neuropathy    • PONV (postoperative nausea and vomiting)    • Raynaud disease    • Sleep apnea     MILD, NO NEED FOR CPAP   • Tremors of nervous system    • Urinary incontinence        PF/Surg/Soc Hx:     Past Surgical History:   Procedure Laterality Date   • ANKLE OPEN REDUCTION INTERNAL FIXATION Right 08/14/2015    Dr. Dandre Camacho, [facility]   • BACK SURGERY      LUMBAR   • BREAST RECONSTRUCTION, BREAST TISSUE EXPANDER INSERTION Left 04/11/2002    [place] Contour Profile expander was placed 550 cc size, filled with 200 cc of saline, Dr. Herbert Napoles, [facility]   • COLONOSCOPY N/A 09/16/2014    Dr. Darci Kerns, [facility]; torts, tics, stool, polyp   • COLONOSCOPY N/A 9/27/2016    NTEH, diverticulosis, tortuous colon, Two 3-5mm polyps in the descending colon and the transverse colon, IH.  PATH: Tubular adenoma   • COLONOSCOPY N/A 12/12/2017    NTEH, tics, torts, IH, TA w/low grade dysplasia   • CYSTOSCOPY N/A 05/07/2010    with TVT takedown, Dr. Simon Wall, [facility]   • ENDOSCOPY N/A 9/27/2016    z line irreg, bilious gastric fluid- fluid aspiration preformed, gastritis.  PATH: Squamous and glandular mucosa with minimal superficial acute inflammation.    • ENDOSCOPY N/A 12/12/2017    Z line irregular, gastritis, duodenitis, chronic inflammation   • EPIDURAL BLOCK     • KNEE SURGERY Left     BROKEN   • LUMBAR DISCECTOMY FUSION INSTRUMENTATION N/A 12/3/2018    Procedure: L4-5 laminectomy and fusion with instrumentation;  Surgeon: Austin Keith MD;  Location: [facility];  Service: Orthopedic Spine   • MAMMO US BREAST BIOPSY ADDITIONAL W WO DEVICE Left 02/21/2002    2:00 position left breast, Infiltrating Ductal Carcinoma, [facility]   • MANDIBLE FRACTURE SURGERY     • MASTECTOMY Left 04/11/2002    Left Total Mastectomy and Left Axillary [place] Node Biobsy,  "Dr. Indu Akins, [facility]   • OTHER SURGICAL HISTORY      CARDIAC LOOP DEVICE, LEFT CHEST   • TENSION FREE VAGINAL TAPING WITH MINI ARC SLING N/A 10/26/2009    Dr. Gina Castillo, [facility]   • UPPER GASTROINTESTINAL ENDOSCOPY  2014    z-line irreg, grade a reflux, gastritis        Social History     Occupational History   • Not on file   Tobacco Use   • Smoking status: Former Smoker     Packs/day: 3.00     Types: Cigarettes     Last attempt to quit: 1983     Years since quittin.3   • Smokeless tobacco: Never Used   Substance and Sexual Activity   • Alcohol use: No   • Drug use: No   • Sexual activity: Defer      Social History     Social History Narrative   • Not on file        Family History   Problem Relation Age of Onset   • Breast cancer Mother 35   • Colon cancer Mother 64   • Heart disease Father    • Cancer Father         throat and lung   • Colon polyps Father    • Malig Hyperthermia Neg Hx          Review of Systems:   A 14 point review of systems was performed, pertinent positives discussed above, all other systems are negative    Physical Exam: 73 y.o. female  Vital Signs :   Vitals:    19 1459   BP: 160/90   Temp: 97.6 °F (36.4 °C)   Weight: 59.2 kg (130 lb 9.6 oz)   Height: 167.6 cm (66\")     General: Alert and Oriented x 3, No acute distress.  Psych: mood and affect appropriate; recent and remote memory intact  Eyes: conjunctiva clear; pupils equally round and reactive, sclera nonicteric  CV: no peripheral edema  Resp: normal respiratory effort  Skin: no rashes or wounds; normal turgor  Musculosketetal; pain and crepitance with knee range of motion  Vascular: palpable distal pulses    Xrays:  -3 views (AP, lateral, and sunrise) were ordered and reviewed demonstrating end-stage OA with bone on bone articulation.  -A full length AP xray was ordered and reviewed today for purposes of operative alignment demonstrating end stage arthritic findings. There are no previous full length films for " review    Assessment:  End-stage Left knee osteoarthritis. Conservative measures have failed.      Plan:  The plan is to proceed with Left Total Knee Replacement. The patient voiced understanding of the risks, benefits, and alternative forms of treatment that were discussed with Dr Serrano at the time of scheduling.  Patient's plan on going home with home health next day    Kady Estrada, APRN  8/8/2019

## 2019-08-09 ENCOUNTER — TELEPHONE (OUTPATIENT)
Dept: ORTHOPEDIC SURGERY | Facility: CLINIC | Age: 74
End: 2019-08-09

## 2019-08-09 NOTE — TELEPHONE ENCOUNTER
Patient needs to contact her primary care physician immediately.  If there is an infection then we will need to cancel her surgery and reschedule it

## 2019-08-09 NOTE — TELEPHONE ENCOUNTER
(cld gone for the day) I called and spoke with the patient and advised per MLL to check with PCP immediately to see if there is an infection and if so surgery will have to be cancelled and rescheduled. It is after 5 pm on a Friday and patient will probably go to an urgent care center. She will call us on Monday to let us know how she is doing and what she finds out.

## 2019-08-09 NOTE — TELEPHONE ENCOUNTER
called inquiring about a message he says they left earlier today. After questioning he may have left a voice mail message with the surgery scheduler?  Patient is scheduled for TKA on 8/14 and has come down with sinus infection symptoms today with colored discharge. No fever.   Was also supposed to start a prophylactic antibiotic (prescribed by urologist) today 5 days prior to surgery for UTI history. Does not know whether to start it. ? Still have surgery.  Please advise.

## 2019-08-13 ENCOUNTER — TELEPHONE (OUTPATIENT)
Dept: ORTHOPEDIC SURGERY | Facility: CLINIC | Age: 74
End: 2019-08-13

## 2019-08-13 NOTE — TELEPHONE ENCOUNTER
Per Dr. Serrano, please note that patient has canceled surgery for tomorrow and see if patients need to be moved

## 2019-08-16 RX ORDER — LINACLOTIDE 72 UG/1
CAPSULE, GELATIN COATED ORAL
Qty: 90 CAPSULE | Refills: 1 | OUTPATIENT
Start: 2019-08-16

## 2019-08-20 ENCOUNTER — TELEPHONE (OUTPATIENT)
Dept: GASTROENTEROLOGY | Facility: CLINIC | Age: 74
End: 2019-08-20

## 2019-08-20 NOTE — TELEPHONE ENCOUNTER
Called pt and pt states she needs her linzess 72mcg po daily #90 with 3 refills needs to now go to [facility].  Advised pt we will escribe this for you. Pt verb understanding.

## 2019-08-20 NOTE — TELEPHONE ENCOUNTER
----- Message from Tova Heaton sent at 8/20/2019  2:23 PM EDT -----  Regarding: Med refill  Contact: [phone]  Linzess refill needed.     [facility] Mail  [phone] phone to [facility]

## 2019-10-09 ENCOUNTER — PREP FOR SURGERY (OUTPATIENT)
Dept: OTHER | Facility: HOSPITAL | Age: 74
End: 2019-10-09

## 2019-10-09 DIAGNOSIS — M17.12 PRIMARY OSTEOARTHRITIS OF LEFT KNEE: Primary | ICD-10-CM

## 2019-10-11 ENCOUNTER — APPOINTMENT (OUTPATIENT)
Dept: PREADMISSION TESTING | Facility: HOSPITAL | Age: 74
End: 2019-10-11

## 2019-10-11 VITALS
TEMPERATURE: 97.7 F | HEIGHT: 67 IN | DIASTOLIC BLOOD PRESSURE: 90 MMHG | SYSTOLIC BLOOD PRESSURE: 150 MMHG | OXYGEN SATURATION: 99 % | BODY MASS INDEX: 20.09 KG/M2 | HEART RATE: 63 BPM | WEIGHT: 128 LBS

## 2019-10-11 DIAGNOSIS — M17.12 PRIMARY OSTEOARTHRITIS OF LEFT KNEE: ICD-10-CM

## 2019-10-11 LAB
ANION GAP SERPL CALCULATED.3IONS-SCNC: 12.1 MMOL/L (ref 5–15)
BUN BLD-MCNC: 11 MG/DL (ref 8–23)
BUN/CREAT SERPL: 13.9 (ref 7–25)
CALCIUM SPEC-SCNC: 10 MG/DL (ref 8.6–10.5)
CHLORIDE SERPL-SCNC: 101 MMOL/L (ref 98–107)
CO2 SERPL-SCNC: 25.9 MMOL/L (ref 22–29)
CREAT BLD-MCNC: 0.79 MG/DL (ref 0.57–1)
DEPRECATED RDW RBC AUTO: 39.1 FL (ref 37–54)
ERYTHROCYTE [DISTWIDTH] IN BLOOD BY AUTOMATED COUNT: 12.1 % (ref 12.3–15.4)
GFR SERPL CREATININE-BSD FRML MDRD: 72 ML/MIN/1.73
GLUCOSE BLD-MCNC: 98 MG/DL (ref 65–99)
HCT VFR BLD AUTO: 39.3 % (ref 34–46.6)
HGB BLD-MCNC: 12.8 G/DL (ref 12–15.9)
MCH RBC QN AUTO: 28.6 PG (ref 26.6–33)
MCHC RBC AUTO-ENTMCNC: 32.6 G/DL (ref 31.5–35.7)
MCV RBC AUTO: 87.9 FL (ref 79–97)
PLATELET # BLD AUTO: 215 10*3/MM3 (ref 140–450)
PMV BLD AUTO: 9.7 FL (ref 6–12)
POTASSIUM BLD-SCNC: 4.4 MMOL/L (ref 3.5–5.2)
RBC # BLD AUTO: 4.47 10*6/MM3 (ref 3.77–5.28)
SODIUM BLD-SCNC: 139 MMOL/L (ref 136–145)
WBC NRBC COR # BLD: 6.78 10*3/MM3 (ref 3.4–10.8)

## 2019-10-11 PROCEDURE — 85027 COMPLETE CBC AUTOMATED: CPT | Performed by: ORTHOPAEDIC SURGERY

## 2019-10-11 PROCEDURE — 80048 BASIC METABOLIC PNL TOTAL CA: CPT | Performed by: ORTHOPAEDIC SURGERY

## 2019-10-11 PROCEDURE — 36415 COLL VENOUS BLD VENIPUNCTURE: CPT

## 2019-10-11 RX ORDER — DILTIAZEM HYDROCHLORIDE 120 MG/1
120 CAPSULE, COATED, EXTENDED RELEASE ORAL EVERY MORNING
Status: ON HOLD | COMMUNITY
End: 2023-03-15

## 2019-10-11 RX ORDER — CEPHALEXIN 500 MG/1
500 CAPSULE ORAL 4 TIMES DAILY
COMMUNITY
End: 2021-09-02

## 2019-10-11 ASSESSMENT — KOOS JR
KOOS JR SCORE: 11
KOOS JR SCORE: 59.381

## 2019-10-11 NOTE — DISCHARGE INSTRUCTIONS
Take the following medications the morning of surgery with a small sip of water:    PANTOPRAZOLE,  CARTIA,  LAMOTRIGINE, ABILIFY AND LITHIUM    TIME OF ARRIVAL WILL BE GIVEN TO YOU BY DR VO OFFICE    General Instructions:  • Do not eat solid food after midnight the night before surgery.  • You may drink clear liquids day of surgery but must stop at least one hour before your hospital arrival time.  • It is beneficial for you to have a clear drink that contains carbohydrates the day of surgery.  We suggest a 12 to 20 ounce bottle of Gatorade or Powerade for non-diabetic patients or a 12 to 20 ounce bottle of G2 or Powerade Zero for diabetic patients. (Pediatric patients, are not advised to drink a 12 to 20 ounce carbohydrate drink)    Clear liquids are liquids you can see through.  Nothing red in color.     Plain water                               Sports drinks  Sodas                                   Gelatin (Jell-O)  Fruit juices without pulp such as white grape juice and apple juice  Popsicles that contain no fruit or yogurt  Tea or coffee (no cream or milk added)  Gatorade / Powerade  G2 / Powerade Zero    • Infants may have breast milk up to four hours before surgery.  • Infants drinking formula may drink formula up to six hours before surgery.   • Patients who avoid smoking, chewing tobacco and alcohol for 4 weeks prior to surgery have a reduced risk of post-operative complications.  Quit smoking as many days before surgery as you can.  • Do not smoke, use chewing tobacco or drink alcohol the day of surgery.   • If applicable bring your C-PAP/ BI-PAP machine.  • Bring any papers given to you in the doctor’s office.  • Wear clean comfortable clothes.  • Do not wear contact lenses, false eyelashes or make-up.  Bring a case for your glasses.   • Bring crutches or walker if applicable.  • Remove all piercings.  Leave jewelry and any other valuables at home.  • Hair extensions with metal clips must be removed  prior to surgery.  • The Pre-Admission Testing nurse will instruct you to bring medications if unable to obtain an accurate list in Pre-Admission Testing.        If you were given a blood bank ID arm band remember to bring it with you the day of surgery.    Preventing a Surgical Site Infection:  • For 2 to 3 days before surgery, avoid shaving with a razor because the razor can irritate skin and make it easier to develop an infection.    • Any areas of open skin can increase the risk of a post-operative wound infection by allowing bacteria to enter and travel throughout the body.  Notify your surgeon if you have any skin wounds / rashes even if it is not near the expected surgical site.  The area will need assessed to determine if surgery should be delayed until it is healed.  • The night prior to surgery sleep in a clean bed with clean clothing.  Do not allow pets to sleep with you.  • Shower on the morning of surgery using a fresh bar of anti-bacterial soap (such as Dial) and clean washcloth.  Dry with a clean towel and dress in clean clothing.  • Ask your surgeon if you will be receiving antibiotics prior to surgery.  • Make sure you, your family, and all healthcare providers clean their hands with soap and water or an alcohol based hand  before caring for you or your wound.    Day of surgery:  Your arrival time is approximately two hours before your scheduled surgery time.  Upon arrival, a Pre-op nurse and Anesthesiologist will review your health history, obtain vital signs, and answer questions you may have.  The only belongings needed at this time will be a list of your home medications and if applicable your C-PAP/BI-PAP machine.  If you are staying overnight your family can leave the rest of your belongings in the car and bring them to your room later.  A Pre-op nurse will start an IV and you may receive medication in preparation for surgery, including something to help you relax.  Your family will  be able to see you in the Pre-op area.  Two visitors at a time will be allowed in the Pre-op room.  While you are in surgery your family should notify the waiting room  if they leave the waiting room area and provide a contact phone number.    Please be aware that surgery does come with discomfort.  We want to make every effort to control your discomfort so please discuss any uncontrolled symptoms with your nurse.   Your doctor will most likely have prescribed pain medications.      If you are going home after surgery you will receive individualized written care instructions before being discharged.  A responsible adult must drive you to and from the hospital on the day of your surgery and stay with you for 24 hours.    If you are staying overnight following surgery, you will be transported to your hospital room following the recovery period.  [facility] has all private rooms.    You have received a list of surgical assistants for your reference.  If you have any questions please call Pre-Admission Testing at [phone].  Deductibles and co-payments are collected on the day of service. Please be prepared to pay the required co-pay, deductible or deposit on the day of service as defined by your plan.    2% CHLORAHEXIDINE GLUCONATE* CLOTH  Preparing or “prepping” skin before surgery can reduce the risk of infection at the surgical site. To make the process easier, [facility] has chosen disposable cloths moistened with a rinse-free, 2% Chlorhexidine Gluconate (CHG) antiseptic solution. The steps below outline the prepping process and should be carefully followed.        Use the prep cloth on the area that is circled in the diagram             Directions Night before Surgery  1) Shower using a fresh bar of anti-bacterial soap (such as Dial) and clean washcloth.  Use a clean towel to completely dry your skin.  2) Do not use any lotions, oils or creams on your skin.  3) Open the  package and remove 1 cloth, wipe your skin for 30 seconds in a circular motion.  Allow to dry for 3 minutes.  4) Repeat #3 with second cloth.  5) Do not touch your eyes, ears, or mouth with the prep cloth.  6) Allow the wet prep solution to air dry.  7) Discard the prep cloth and wash your hands with soap and water.   8) Dress in clean bed clothes and sleep on fresh clean bed sheets.   9) You may experience some temporary itching after the prep.    Directions Day of Surgery  1) Repeat steps 1,2,3,4,5,6,7, and 9.   2) Dress in clean clothes before coming to the hospital.    BACTROBAN NASAL OINTMENT  There are many germs normally in your nose. Bactroban is an ointment that will help reduce these germs. Please follow these instructions for Bactroban use:      ____The day before surgery in the morning  Date________    ____The day before surgery in the evening              Date________    ____The day of surgery in the morning    Date________    **Squirt ½ package of Bactroban Ointment onto a cotton applicator and apply to inside of 1st nostril.  Squirt the remaining Bactroban and apply to the inside of the other nostril.    PERIDEX- ORAL:  Use only if your surgeon has ordered  Use the night before and morning of surgery - Swish, gargle, and spit - do not swallow.

## 2019-10-17 ENCOUNTER — OFFICE VISIT (OUTPATIENT)
Dept: ORTHOPEDIC SURGERY | Facility: CLINIC | Age: 74
End: 2019-10-17

## 2019-10-17 VITALS
BODY MASS INDEX: 20.72 KG/M2 | HEIGHT: 67 IN | WEIGHT: 132 LBS | DIASTOLIC BLOOD PRESSURE: 90 MMHG | SYSTOLIC BLOOD PRESSURE: 140 MMHG | TEMPERATURE: 97.6 F

## 2019-10-17 DIAGNOSIS — G89.29 CHRONIC PAIN OF LEFT KNEE: Primary | ICD-10-CM

## 2019-10-17 DIAGNOSIS — M25.562 CHRONIC PAIN OF LEFT KNEE: Primary | ICD-10-CM

## 2019-10-17 PROCEDURE — 99024 POSTOP FOLLOW-UP VISIT: CPT | Performed by: NURSE PRACTITIONER

## 2019-10-17 RX ORDER — DIPHENHYDRAMINE HCL 25 MG
25 CAPSULE ORAL EVERY 6 HOURS PRN
COMMUNITY

## 2019-10-17 NOTE — H&P
Patient: Davida Anderson    Date of Admission: 10/21/19    YOB: 1945    Medical Record Number: [id number]    Admitting Physician: Dr. Anurag Serrano    Reason for Admission: End Stage Left Knee OA    History of Present Illness: 73 y.o. female presents with severe end stage knee osteoarthritis which has not been responsive to the full compliment of conservative measures. Despite conservative attempts, there is still severe, constant activity limiting pain. Given the severity of the pain, the patient has elected to proceed with knee replacement.    Allergies:   Allergies   Allergen Reactions   • Morphine Hives, Itching and Rash         Current Medications:  Home Medications:    Current Outpatient Medications on File Prior to Visit   Medication Sig   • ALBUTEROL IN Inhale 2 puffs Every 4 (Four) Hours As Needed.   • ARIPiprazole (ABILIFY) 10 MG tablet Take 10 mg by mouth Every Morning.   • atorvastatin (LIPITOR) 20 MG tablet Take 20 mg by mouth Every Morning.   • cephalexin (KEFLEX) 500 MG capsule Take 500 mg by mouth 4 (Four) Times a Day. PREVENTIVE FOR UTI'S   • dilTIAZem CD (CARTIA XT) 120 MG 24 hr capsule Take 120 mg by mouth Every Morning.   • diphenhydrAMINE (BENADRYL) 25 mg capsule Take 25 mg by mouth Every 6 (Six) Hours As Needed for Itching.   • diphenhydrAMINE-acetaminophen (TYLENOL PM)  MG tablet per tablet Take 1 tablet by mouth Every Night.   • Docusate Calcium (STOOL SOFTENER PO) Take 1 capsule by mouth As Needed.   • lamoTRIgine (LaMICtal) 100 MG tablet Take 100 mg by mouth 2 (two) times a day.   • linaclotide (LINZESS) 72 MCG capsule capsule Take 1 capsule by mouth Every Morning Before Breakfast.   • lithium carbonate 300 MG capsule Take 300 mg by mouth Every Morning.   • pantoprazole (PROTONIX) 40 MG EC tablet Take 40 mg by mouth 2 (Two) Times a Day.   • phenazopyridine (PYRIDIUM) 100 MG tablet Take 100 mg by mouth As Needed.   • pregabalin (LYRICA) 50 MG capsule Take 150 mg by mouth  Every Night.   • Probiotic Product (SOLUBLE FIBER/PROBIOTICS PO) Take 1 tablet by mouth Every Morning.   • solifenacin (VESICARE) 10 MG tablet Take 10 mg by mouth Every Morning.   • B Complex Vitamins (VITAMIN B COMPLEX) tablet Take 1 tablet by mouth Every Morning.   • baclofen (LIORESAL) 10 MG tablet Take 10 mg by mouth 3 (Three) Times a Day.   • BIOTIN PO Take 1 tablet by mouth Every Morning.   • Calcium Carb-Cholecalciferol (CALCIUM 600 + D) 600-200 MG-UNIT tablet Take 2 tablets by mouth Every Morning.   • Chlorhexidine Gluconate Cloth 2 % pads Apply  topically. As directed pre op   • Coenzyme Q10 (CO Q-10) 400 MG capsule Take 400 mg by mouth Every Morning.   • magnesium oxide (MAGOX) 400 (241.3 MG) MG tablet tablet Take 400 mg by mouth Every Morning.   • melatonin 5 MG tablet tablet Take 5 mg by mouth At Night As Needed.   • mupirocin (BACTROBAN) 2 % nasal ointment into the nostril(s) as directed by provider 2 (Two) Times a Day. As directed pre op     Current Facility-Administered Medications on File Prior to Visit   Medication   • mupirocin (BACTROBAN) 2 % nasal ointment     PRN Meds:.    PMH:     Past Medical History:   Diagnosis Date   • Acid reflux    • Arthritis    • Atrial fibrillation (CMS/HCC)    • Gtz esophagus    • Bipolar 2 disorder (CMS/HCC)    • Bradycardia     WITH SURGERY   • COPD (chronic obstructive pulmonary disease) (CMS/HCC)     MILD, USES AINHALER PRN   • DDD (degenerative disc disease), lumbar    • Diverticulosis    • Dizziness    • Drug therapy    • Fibrocystic breast    • Frequent UTI     SEES DR GOFF FOR FREQUENT UTI'S, ON KEFLEX   • H/O Infiltrating ductal carcinoma of left breast 2002    Stage IIA, Grade 3 ER/MS +, HER2 -, treated with 5 years of tamoxifen, 5 years of Femara, completed in 2012   • High cholesterol    • History of alcoholism (CMS/HCC)     40 YRS AGO   • History of Clostridium difficile colitis 2014   • History of gastric ulcer    • History of loop recorder    •  Hypertension    • Iron deficiency    • Irregular heartbeat     a fib   • Low back pain    • Lumbar herniated disc    • Mass of right breast on mammogram 03/17/2016    10 o'clock position, 4 cm from the nipple,   • Neuropathy    • PONV (postoperative nausea and vomiting)    • Raynaud disease    • Sleep apnea     MILD, NO NEED FOR CPAP   • Tremors of nervous system    • Urinary incontinence        PF/Surg/Soc Hx:     Past Surgical History:   Procedure Laterality Date   • ANKLE OPEN REDUCTION INTERNAL FIXATION Right 08/14/2015    Dr. Dandre Camacho, [facility]   • BACK SURGERY      LUMBAR   • BREAST RECONSTRUCTION, BREAST TISSUE EXPANDER INSERTION Left 04/11/2002    Watkins Contour Profile expander was placed 550 cc size, filled with 200 cc of saline, Dr. Herbert Napoles, [facility]   • COLONOSCOPY N/A 09/16/2014    Dr. Darci Kerns, [facility]; torts, tics, stool, polyp   • COLONOSCOPY N/A 9/27/2016    NTEH, diverticulosis, tortuous colon, Two 3-5mm polyps in the descending colon and the transverse colon, IH.  PATH: Tubular adenoma   • COLONOSCOPY N/A 12/12/2017    NTEH, tics, torts, IH, TA w/low grade dysplasia   • CYSTOSCOPY N/A 05/07/2010    with TVT takedown, Dr. Simon Wall, [facility]   • ENDOSCOPY N/A 9/27/2016    z line irreg, bilious gastric fluid- fluid aspiration preformed, gastritis.  PATH: Squamous and glandular mucosa with minimal superficial acute inflammation.    • ENDOSCOPY N/A 12/12/2017    Z line irregular, gastritis, duodenitis, chronic inflammation   • EPIDURAL BLOCK     • KNEE SURGERY Left     BROKEN   • LUMBAR DISCECTOMY FUSION INSTRUMENTATION N/A 12/3/2018    Procedure: L4-5 laminectomy and fusion with instrumentation;  Surgeon: Austin Keith MD;  Location: [facility] OR;  Service: Orthopedic Spine   • MAMMO US BREAST BIOPSY ADDITIONAL W WO DEVICE Left 02/21/2002    2:00 position left breast, Infiltrating Ductal Carcinoma, [facility]   • MANDIBLE FRACTURE SURGERY     • MASTECTOMY Left 04/11/2002    Left Total Mastectomy and  "Left Axillary Cove Node Biobsy, Dr. Indu Akins, [facility]   • OTHER SURGICAL HISTORY      CARDIAC LOOP DEVICE, LEFT CHEST   • TENSION FREE VAGINAL TAPING WITH MINI ARC SLING N/A 10/26/2009    Dr. Gina Castillo, [facility]   • UPPER GASTROINTESTINAL ENDOSCOPY  2014    z-line irreg, grade a reflux, gastritis        Social History     Occupational History   • Not on file   Tobacco Use   • Smoking status: Former Smoker     Packs/day: 3.00     Years: 25.00     Pack years: 75.00     Types: Cigarettes     Last attempt to quit: 1983     Years since quittin.5   • Smokeless tobacco: Never Used   Substance and Sexual Activity   • Alcohol use: No     Comment: RECOVERYING ,  LAST DRINK 40 YRS AGO   • Drug use: No   • Sexual activity: Defer      Social History     Social History Narrative   • Not on file        Family History   Problem Relation Age of Onset   • Breast cancer Mother 35   • Colon cancer Mother 64   • Heart disease Father    • Cancer Father         throat and lung   • Colon polyps Father    • Malig Hyperthermia Neg Hx          Review of Systems:   A 14 point review of systems was performed, pertinent positives discussed above, all other systems are negative    Physical Exam: 73 y.o. female  Vital Signs :   Vitals:    10/17/19 1441   BP: 140/90   BP Location: Right arm   Patient Position: Sitting   Temp: 97.6 °F (36.4 °C)   Weight: 59.9 kg (132 lb)   Height: 168.9 cm (66.5\")     General: Alert and Oriented x 3, No acute distress.  Psych: mood and affect appropriate; recent and remote memory intact  Eyes: conjunctiva clear; pupils equally round and reactive, sclera nonicteric  CV: no peripheral edema  Resp: normal respiratory effort  Skin: no rashes or wounds; normal turgor  Musculosketetal; pain and crepitance with knee range of motion  Vascular: palpable distal pulses    Xrays:  -3 views (AP, lateral, and sunrise) were reviewed demonstrating end-stage OA with bone on bone articulation.  -A full length AP " xray was reviewed today for purposes of operative alignment demonstrating end stage arthritic findings. There are no previous full length films for review    Assessment:  End-stage Left knee osteoarthritis. Conservative measures have failed.      Plan:  The plan is to proceed with Left Total Knee Replacement. The patient voiced understanding of the risks, benefits, and alternative forms of treatment that were discussed with Dr Serrano at the time of scheduling.  Patient's plan on going home with home health next day we do have surgery clearance from patient's cardiologist    Kady Estrada, APRN  10/17/2019

## 2019-10-21 ENCOUNTER — ANESTHESIA (OUTPATIENT)
Dept: PERIOP | Facility: HOSPITAL | Age: 74
End: 2019-10-21

## 2019-10-21 ENCOUNTER — HOSPITAL ENCOUNTER (OUTPATIENT)
Facility: HOSPITAL | Age: 74
Setting detail: OBSERVATION
Discharge: HOME HEALTH | End: 2019-10-25
Attending: ORTHOPAEDIC SURGERY | Admitting: ORTHOPAEDIC SURGERY

## 2019-10-21 ENCOUNTER — APPOINTMENT (OUTPATIENT)
Dept: GENERAL RADIOLOGY | Facility: HOSPITAL | Age: 74
End: 2019-10-21

## 2019-10-21 ENCOUNTER — ANESTHESIA EVENT (OUTPATIENT)
Dept: PERIOP | Facility: HOSPITAL | Age: 74
End: 2019-10-21

## 2019-10-21 DIAGNOSIS — M17.12 PRIMARY OSTEOARTHRITIS OF LEFT KNEE: ICD-10-CM

## 2019-10-21 DIAGNOSIS — M25.562 ACUTE PAIN OF LEFT KNEE: Primary | ICD-10-CM

## 2019-10-21 LAB
CREAT BLD-MCNC: 0.78 MG/DL (ref 0.57–1)
GFR SERPL CREATININE-BSD FRML MDRD: 73 ML/MIN/1.73

## 2019-10-21 PROCEDURE — 25010000002 DEXAMETHASONE PER 1 MG: Performed by: NURSE ANESTHETIST, CERTIFIED REGISTERED

## 2019-10-21 PROCEDURE — 25010000002 FENTANYL CITRATE (PF) 100 MCG/2ML SOLUTION: Performed by: ANESTHESIOLOGY

## 2019-10-21 PROCEDURE — 25010000003 CEFAZOLIN IN DEXTROSE 2-4 GM/100ML-% SOLUTION: Performed by: ORTHOPAEDIC SURGERY

## 2019-10-21 PROCEDURE — G0378 HOSPITAL OBSERVATION PER HR: HCPCS

## 2019-10-21 PROCEDURE — C9290 INJ, BUPIVACAINE LIPOSOME: HCPCS | Performed by: ORTHOPAEDIC SURGERY

## 2019-10-21 PROCEDURE — C1776 JOINT DEVICE (IMPLANTABLE): HCPCS | Performed by: ORTHOPAEDIC SURGERY

## 2019-10-21 PROCEDURE — 97161 PT EVAL LOW COMPLEX 20 MIN: CPT

## 2019-10-21 PROCEDURE — C1713 ANCHOR/SCREW BN/BN,TIS/BN: HCPCS | Performed by: ORTHOPAEDIC SURGERY

## 2019-10-21 PROCEDURE — 25010000002 KETOROLAC TROMETHAMINE PER 15 MG: Performed by: ORTHOPAEDIC SURGERY

## 2019-10-21 PROCEDURE — 82565 ASSAY OF CREATININE: CPT | Performed by: ORTHOPAEDIC SURGERY

## 2019-10-21 PROCEDURE — 25010000002 VANCOMYCIN PER 500 MG: Performed by: ORTHOPAEDIC SURGERY

## 2019-10-21 PROCEDURE — 25010000003 BUPIVACAINE LIPOSOME 1.3 % SUSPENSION 20 ML VIAL: Performed by: ORTHOPAEDIC SURGERY

## 2019-10-21 PROCEDURE — 73560 X-RAY EXAM OF KNEE 1 OR 2: CPT

## 2019-10-21 PROCEDURE — 27447 TOTAL KNEE ARTHROPLASTY: CPT | Performed by: ORTHOPAEDIC SURGERY

## 2019-10-21 PROCEDURE — 25010000002 MIDAZOLAM PER 1 MG: Performed by: ANESTHESIOLOGY

## 2019-10-21 PROCEDURE — 25010000002 PROPOFOL 10 MG/ML EMULSION: Performed by: NURSE ANESTHETIST, CERTIFIED REGISTERED

## 2019-10-21 PROCEDURE — 97110 THERAPEUTIC EXERCISES: CPT

## 2019-10-21 DEVICE — LEGION NARROW CRUCIATE RETAINING                                    OXINIUM SIZE 6N LEFT
Type: IMPLANTABLE DEVICE | Site: KNEE | Status: FUNCTIONAL
Brand: LEGION

## 2019-10-21 DEVICE — CMT BONE PALACOS R HI/VISC 1X40: Type: IMPLANTABLE DEVICE | Site: KNEE | Status: FUNCTIONAL

## 2019-10-21 DEVICE — GENESIS II NON-POROUS TIBIAL                                    BASEPLATE SIZE 4 LEFT
Type: IMPLANTABLE DEVICE | Site: KNEE | Status: FUNCTIONAL
Brand: GENESIS II

## 2019-10-21 DEVICE — LEGION CRUCIATE RETAINING HIGH                                    FLEX HIGHLY CROSS LINKED                                    POLYETHYLENE SIZE 3-4 9MM
Type: IMPLANTABLE DEVICE | Site: KNEE | Status: FUNCTIONAL
Brand: LEGION

## 2019-10-21 DEVICE — IMPLANTABLE DEVICE: Type: IMPLANTABLE DEVICE | Site: KNEE | Status: FUNCTIONAL

## 2019-10-21 DEVICE — GENESIS II BICONVEX PATELLA 29MM
Type: IMPLANTABLE DEVICE | Site: KNEE | Status: FUNCTIONAL
Brand: GENESIS II

## 2019-10-21 RX ORDER — ONDANSETRON 4 MG/1
4 TABLET, FILM COATED ORAL EVERY 6 HOURS PRN
Qty: 10 TABLET | Refills: 0 | Status: SHIPPED | OUTPATIENT
Start: 2019-10-21 | End: 2019-10-31

## 2019-10-21 RX ORDER — MELOXICAM 15 MG/1
15 TABLET ORAL ONCE
Status: COMPLETED | OUTPATIENT
Start: 2019-10-21 | End: 2019-10-21

## 2019-10-21 RX ORDER — ACETAMINOPHEN 325 MG/1
325 TABLET ORAL EVERY 4 HOURS PRN
Status: DISCONTINUED | OUTPATIENT
Start: 2019-10-21 | End: 2019-10-23

## 2019-10-21 RX ORDER — DILTIAZEM HYDROCHLORIDE 120 MG/1
120 CAPSULE, COATED, EXTENDED RELEASE ORAL EVERY MORNING
Status: DISCONTINUED | OUTPATIENT
Start: 2019-10-22 | End: 2019-10-25 | Stop reason: HOSPADM

## 2019-10-21 RX ORDER — FAMOTIDINE 10 MG/ML
20 INJECTION, SOLUTION INTRAVENOUS ONCE
Status: COMPLETED | OUTPATIENT
Start: 2019-10-21 | End: 2019-10-21

## 2019-10-21 RX ORDER — GLYCOPYRROLATE 0.2 MG/ML
INJECTION INTRAMUSCULAR; INTRAVENOUS AS NEEDED
Status: DISCONTINUED | OUTPATIENT
Start: 2019-10-21 | End: 2019-10-21 | Stop reason: SURG

## 2019-10-21 RX ORDER — PROPOFOL 10 MG/ML
VIAL (ML) INTRAVENOUS CONTINUOUS PRN
Status: DISCONTINUED | OUTPATIENT
Start: 2019-10-21 | End: 2019-10-21 | Stop reason: SURG

## 2019-10-21 RX ORDER — PANTOPRAZOLE SODIUM 40 MG/1
40 TABLET, DELAYED RELEASE ORAL
Status: DISCONTINUED | OUTPATIENT
Start: 2019-10-22 | End: 2019-10-21 | Stop reason: SDUPTHER

## 2019-10-21 RX ORDER — HYDROCODONE BITARTRATE AND ACETAMINOPHEN 7.5; 325 MG/1; MG/1
2 TABLET ORAL EVERY 4 HOURS PRN
Status: DISCONTINUED | OUTPATIENT
Start: 2019-10-21 | End: 2019-10-23

## 2019-10-21 RX ORDER — SODIUM CHLORIDE 0.9 % (FLUSH) 0.9 %
3 SYRINGE (ML) INJECTION EVERY 12 HOURS SCHEDULED
Status: DISCONTINUED | OUTPATIENT
Start: 2019-10-21 | End: 2019-10-21 | Stop reason: HOSPADM

## 2019-10-21 RX ORDER — PROMETHAZINE HYDROCHLORIDE 25 MG/1
25 TABLET ORAL ONCE AS NEEDED
Status: DISCONTINUED | OUTPATIENT
Start: 2019-10-21 | End: 2019-10-21 | Stop reason: HOSPADM

## 2019-10-21 RX ORDER — PROMETHAZINE HYDROCHLORIDE 25 MG/ML
6.25 INJECTION, SOLUTION INTRAMUSCULAR; INTRAVENOUS
Status: DISCONTINUED | OUTPATIENT
Start: 2019-10-21 | End: 2019-10-21 | Stop reason: HOSPADM

## 2019-10-21 RX ORDER — EPHEDRINE SULFATE 50 MG/ML
INJECTION, SOLUTION INTRAVENOUS AS NEEDED
Status: DISCONTINUED | OUTPATIENT
Start: 2019-10-21 | End: 2019-10-21 | Stop reason: SURG

## 2019-10-21 RX ORDER — UREA 10 %
3 LOTION (ML) TOPICAL NIGHTLY PRN
Status: DISCONTINUED | OUTPATIENT
Start: 2019-10-21 | End: 2019-10-25 | Stop reason: HOSPADM

## 2019-10-21 RX ORDER — IPRATROPIUM BROMIDE AND ALBUTEROL SULFATE 2.5; .5 MG/3ML; MG/3ML
3 SOLUTION RESPIRATORY (INHALATION) ONCE AS NEEDED
Status: DISCONTINUED | OUTPATIENT
Start: 2019-10-21 | End: 2019-10-21 | Stop reason: HOSPADM

## 2019-10-21 RX ORDER — NALOXONE HCL 0.4 MG/ML
0.2 VIAL (ML) INJECTION AS NEEDED
Status: DISCONTINUED | OUTPATIENT
Start: 2019-10-21 | End: 2019-10-21 | Stop reason: HOSPADM

## 2019-10-21 RX ORDER — DOCUSATE SODIUM 100 MG/1
100 CAPSULE, LIQUID FILLED ORAL 2 TIMES DAILY
Status: DISCONTINUED | OUTPATIENT
Start: 2019-10-21 | End: 2019-10-25 | Stop reason: HOSPADM

## 2019-10-21 RX ORDER — VANCOMYCIN HYDROCHLORIDE 1 G/200ML
15 INJECTION, SOLUTION INTRAVENOUS ONCE
Status: COMPLETED | OUTPATIENT
Start: 2019-10-21 | End: 2019-10-21

## 2019-10-21 RX ORDER — PROMETHAZINE HYDROCHLORIDE 25 MG/1
25 SUPPOSITORY RECTAL ONCE AS NEEDED
Status: DISCONTINUED | OUTPATIENT
Start: 2019-10-21 | End: 2019-10-21 | Stop reason: HOSPADM

## 2019-10-21 RX ORDER — LITHIUM CARBONATE 300 MG/1
300 CAPSULE ORAL EVERY MORNING
Status: DISCONTINUED | OUTPATIENT
Start: 2019-10-22 | End: 2019-10-25 | Stop reason: HOSPADM

## 2019-10-21 RX ORDER — MELOXICAM 15 MG/1
15 TABLET ORAL DAILY
Qty: 30 TABLET | Refills: 0 | Status: SHIPPED | OUTPATIENT
Start: 2019-10-22 | End: 2019-11-21

## 2019-10-21 RX ORDER — OXYCODONE AND ACETAMINOPHEN 7.5; 325 MG/1; MG/1
1 TABLET ORAL ONCE AS NEEDED
Status: DISCONTINUED | OUTPATIENT
Start: 2019-10-21 | End: 2019-10-21 | Stop reason: HOSPADM

## 2019-10-21 RX ORDER — DEXAMETHASONE SODIUM PHOSPHATE 4 MG/ML
INJECTION, SOLUTION INTRA-ARTICULAR; INTRALESIONAL; INTRAMUSCULAR; INTRAVENOUS; SOFT TISSUE AS NEEDED
Status: DISCONTINUED | OUTPATIENT
Start: 2019-10-21 | End: 2019-10-21 | Stop reason: SURG

## 2019-10-21 RX ORDER — EPHEDRINE SULFATE 50 MG/ML
5 INJECTION, SOLUTION INTRAVENOUS ONCE AS NEEDED
Status: DISCONTINUED | OUTPATIENT
Start: 2019-10-21 | End: 2019-10-21 | Stop reason: HOSPADM

## 2019-10-21 RX ORDER — ONDANSETRON 4 MG/1
4 TABLET, FILM COATED ORAL EVERY 6 HOURS PRN
Status: DISCONTINUED | OUTPATIENT
Start: 2019-10-21 | End: 2019-10-25 | Stop reason: HOSPADM

## 2019-10-21 RX ORDER — FENTANYL CITRATE 50 UG/ML
50 INJECTION, SOLUTION INTRAMUSCULAR; INTRAVENOUS
Status: DISCONTINUED | OUTPATIENT
Start: 2019-10-21 | End: 2019-10-21 | Stop reason: HOSPADM

## 2019-10-21 RX ORDER — DIPHENHYDRAMINE HCL 25 MG
25 CAPSULE ORAL
Status: DISCONTINUED | OUTPATIENT
Start: 2019-10-21 | End: 2019-10-21 | Stop reason: HOSPADM

## 2019-10-21 RX ORDER — KETOROLAC TROMETHAMINE 30 MG/ML
30 INJECTION, SOLUTION INTRAMUSCULAR; INTRAVENOUS EVERY 8 HOURS
Status: COMPLETED | OUTPATIENT
Start: 2019-10-21 | End: 2019-10-22

## 2019-10-21 RX ORDER — DIPHENHYDRAMINE HYDROCHLORIDE 50 MG/ML
12.5 INJECTION INTRAMUSCULAR; INTRAVENOUS
Status: DISCONTINUED | OUTPATIENT
Start: 2019-10-21 | End: 2019-10-21 | Stop reason: HOSPADM

## 2019-10-21 RX ORDER — MIDAZOLAM HYDROCHLORIDE 1 MG/ML
1 INJECTION INTRAMUSCULAR; INTRAVENOUS
Status: DISCONTINUED | OUTPATIENT
Start: 2019-10-21 | End: 2019-10-21 | Stop reason: HOSPADM

## 2019-10-21 RX ORDER — WOUND DRESSING ADHESIVE - LIQUID
LIQUID MISCELLANEOUS AS NEEDED
Status: DISCONTINUED | OUTPATIENT
Start: 2019-10-21 | End: 2019-10-21 | Stop reason: HOSPADM

## 2019-10-21 RX ORDER — HYDROCODONE BITARTRATE AND ACETAMINOPHEN 7.5; 325 MG/1; MG/1
1 TABLET ORAL ONCE AS NEEDED
Status: DISCONTINUED | OUTPATIENT
Start: 2019-10-21 | End: 2019-10-21 | Stop reason: HOSPADM

## 2019-10-21 RX ORDER — MELOXICAM 15 MG/1
15 TABLET ORAL DAILY
Status: DISCONTINUED | OUTPATIENT
Start: 2019-10-22 | End: 2019-10-25 | Stop reason: HOSPADM

## 2019-10-21 RX ORDER — BUPIVACAINE HYDROCHLORIDE 7.5 MG/ML
INJECTION, SOLUTION EPIDURAL; RETROBULBAR
Status: COMPLETED | OUTPATIENT
Start: 2019-10-21 | End: 2019-10-21

## 2019-10-21 RX ORDER — SODIUM CHLORIDE, SODIUM LACTATE, POTASSIUM CHLORIDE, CALCIUM CHLORIDE 600; 310; 30; 20 MG/100ML; MG/100ML; MG/100ML; MG/100ML
9 INJECTION, SOLUTION INTRAVENOUS CONTINUOUS
Status: DISCONTINUED | OUTPATIENT
Start: 2019-10-21 | End: 2019-10-21 | Stop reason: HOSPADM

## 2019-10-21 RX ORDER — TRANEXAMIC ACID 100 MG/ML
INJECTION, SOLUTION INTRAVENOUS AS NEEDED
Status: DISCONTINUED | OUTPATIENT
Start: 2019-10-21 | End: 2019-10-21 | Stop reason: SURG

## 2019-10-21 RX ORDER — MIDAZOLAM HYDROCHLORIDE 1 MG/ML
2 INJECTION INTRAMUSCULAR; INTRAVENOUS
Status: DISCONTINUED | OUTPATIENT
Start: 2019-10-21 | End: 2019-10-21 | Stop reason: HOSPADM

## 2019-10-21 RX ORDER — LIDOCAINE HYDROCHLORIDE 10 MG/ML
0.5 INJECTION, SOLUTION EPIDURAL; INFILTRATION; INTRACAUDAL; PERINEURAL ONCE AS NEEDED
Status: DISCONTINUED | OUTPATIENT
Start: 2019-10-21 | End: 2019-10-21 | Stop reason: HOSPADM

## 2019-10-21 RX ORDER — LIDOCAINE HYDROCHLORIDE 20 MG/ML
INJECTION, SOLUTION INFILTRATION; PERINEURAL AS NEEDED
Status: DISCONTINUED | OUTPATIENT
Start: 2019-10-21 | End: 2019-10-21 | Stop reason: SURG

## 2019-10-21 RX ORDER — MAGNESIUM HYDROXIDE 1200 MG/15ML
LIQUID ORAL AS NEEDED
Status: DISCONTINUED | OUTPATIENT
Start: 2019-10-21 | End: 2019-10-21 | Stop reason: HOSPADM

## 2019-10-21 RX ORDER — ONDANSETRON 2 MG/ML
4 INJECTION INTRAMUSCULAR; INTRAVENOUS EVERY 6 HOURS PRN
Status: DISCONTINUED | OUTPATIENT
Start: 2019-10-21 | End: 2019-10-25 | Stop reason: HOSPADM

## 2019-10-21 RX ORDER — HYDROCODONE BITARTRATE AND ACETAMINOPHEN 7.5; 325 MG/1; MG/1
2 TABLET ORAL EVERY 4 HOURS PRN
Qty: 84 TABLET | Refills: 0 | Status: SHIPPED | OUTPATIENT
Start: 2019-10-21 | End: 2019-10-31

## 2019-10-21 RX ORDER — HYDROMORPHONE HYDROCHLORIDE 1 MG/ML
0.5 INJECTION, SOLUTION INTRAMUSCULAR; INTRAVENOUS; SUBCUTANEOUS
Status: DISCONTINUED | OUTPATIENT
Start: 2019-10-21 | End: 2019-10-21 | Stop reason: HOSPADM

## 2019-10-21 RX ORDER — FLUMAZENIL 0.1 MG/ML
0.2 INJECTION INTRAVENOUS AS NEEDED
Status: DISCONTINUED | OUTPATIENT
Start: 2019-10-21 | End: 2019-10-21 | Stop reason: HOSPADM

## 2019-10-21 RX ORDER — PROMETHAZINE HYDROCHLORIDE 25 MG/ML
12.5 INJECTION, SOLUTION INTRAMUSCULAR; INTRAVENOUS ONCE AS NEEDED
Status: DISCONTINUED | OUTPATIENT
Start: 2019-10-21 | End: 2019-10-21 | Stop reason: HOSPADM

## 2019-10-21 RX ORDER — PANTOPRAZOLE SODIUM 40 MG/1
40 TABLET, DELAYED RELEASE ORAL 2 TIMES DAILY
Status: DISCONTINUED | OUTPATIENT
Start: 2019-10-21 | End: 2019-10-25 | Stop reason: HOSPADM

## 2019-10-21 RX ORDER — PREGABALIN 75 MG/1
150 CAPSULE ORAL ONCE
Status: DISCONTINUED | OUTPATIENT
Start: 2019-10-21 | End: 2019-10-21

## 2019-10-21 RX ORDER — ARIPIPRAZOLE 10 MG/1
10 TABLET ORAL EVERY MORNING
Status: DISCONTINUED | OUTPATIENT
Start: 2019-10-22 | End: 2019-10-25 | Stop reason: HOSPADM

## 2019-10-21 RX ORDER — ONDANSETRON 2 MG/ML
4 INJECTION INTRAMUSCULAR; INTRAVENOUS ONCE AS NEEDED
Status: DISCONTINUED | OUTPATIENT
Start: 2019-10-21 | End: 2019-10-21 | Stop reason: HOSPADM

## 2019-10-21 RX ORDER — PROPOFOL 10 MG/ML
VIAL (ML) INTRAVENOUS AS NEEDED
Status: DISCONTINUED | OUTPATIENT
Start: 2019-10-21 | End: 2019-10-21 | Stop reason: SURG

## 2019-10-21 RX ORDER — HYDRALAZINE HYDROCHLORIDE 20 MG/ML
5 INJECTION INTRAMUSCULAR; INTRAVENOUS
Status: DISCONTINUED | OUTPATIENT
Start: 2019-10-21 | End: 2019-10-21 | Stop reason: HOSPADM

## 2019-10-21 RX ORDER — SODIUM CHLORIDE 0.9 % (FLUSH) 0.9 %
3-10 SYRINGE (ML) INJECTION AS NEEDED
Status: DISCONTINUED | OUTPATIENT
Start: 2019-10-21 | End: 2019-10-21 | Stop reason: HOSPADM

## 2019-10-21 RX ORDER — OXYBUTYNIN CHLORIDE 10 MG/1
10 TABLET, EXTENDED RELEASE ORAL DAILY
Status: DISCONTINUED | OUTPATIENT
Start: 2019-10-21 | End: 2019-10-25 | Stop reason: HOSPADM

## 2019-10-21 RX ORDER — ATORVASTATIN CALCIUM 20 MG/1
20 TABLET, FILM COATED ORAL EVERY MORNING
Status: DISCONTINUED | OUTPATIENT
Start: 2019-10-22 | End: 2019-10-25 | Stop reason: HOSPADM

## 2019-10-21 RX ORDER — BACLOFEN 10 MG/1
10 TABLET ORAL 3 TIMES DAILY
Status: DISCONTINUED | OUTPATIENT
Start: 2019-10-21 | End: 2019-10-25 | Stop reason: HOSPADM

## 2019-10-21 RX ORDER — HYDROCODONE BITARTRATE AND ACETAMINOPHEN 7.5; 325 MG/1; MG/1
1 TABLET ORAL EVERY 4 HOURS PRN
Status: DISCONTINUED | OUTPATIENT
Start: 2019-10-21 | End: 2019-10-23

## 2019-10-21 RX ORDER — ACETAMINOPHEN 10 MG/ML
1000 INJECTION, SOLUTION INTRAVENOUS ONCE
Status: COMPLETED | OUTPATIENT
Start: 2019-10-21 | End: 2019-10-21

## 2019-10-21 RX ORDER — CEFAZOLIN SODIUM 2 G/100ML
2 INJECTION, SOLUTION INTRAVENOUS EVERY 8 HOURS
Status: COMPLETED | OUTPATIENT
Start: 2019-10-21 | End: 2019-10-22

## 2019-10-21 RX ORDER — CEFAZOLIN SODIUM 2 G/100ML
2 INJECTION, SOLUTION INTRAVENOUS ONCE
Status: COMPLETED | OUTPATIENT
Start: 2019-10-21 | End: 2019-10-21

## 2019-10-21 RX ORDER — ACETAMINOPHEN 325 MG/1
650 TABLET ORAL ONCE AS NEEDED
Status: DISCONTINUED | OUTPATIENT
Start: 2019-10-21 | End: 2019-10-21 | Stop reason: HOSPADM

## 2019-10-21 RX ORDER — LABETALOL HYDROCHLORIDE 5 MG/ML
5 INJECTION, SOLUTION INTRAVENOUS
Status: DISCONTINUED | OUTPATIENT
Start: 2019-10-21 | End: 2019-10-21 | Stop reason: HOSPADM

## 2019-10-21 RX ORDER — ASPIRIN 325 MG
325 TABLET, DELAYED RELEASE (ENTERIC COATED) ORAL EVERY 12 HOURS SCHEDULED
Status: DISCONTINUED | OUTPATIENT
Start: 2019-10-22 | End: 2019-10-25

## 2019-10-21 RX ORDER — LAMOTRIGINE 100 MG/1
100 TABLET ORAL EVERY 12 HOURS SCHEDULED
Status: DISCONTINUED | OUTPATIENT
Start: 2019-10-21 | End: 2019-10-25 | Stop reason: HOSPADM

## 2019-10-21 RX ORDER — POLYETHYLENE GLYCOL 3350 17 G/17G
17 POWDER, FOR SOLUTION ORAL 2 TIMES DAILY
Qty: 510 G | Refills: 0 | Status: SHIPPED | OUTPATIENT
Start: 2019-10-22 | End: 2019-11-06

## 2019-10-21 RX ADMIN — EPHEDRINE SULFATE 5 MG: 50 INJECTION INTRAMUSCULAR; INTRAVENOUS; SUBCUTANEOUS at 08:48

## 2019-10-21 RX ADMIN — DEXAMETHASONE SODIUM PHOSPHATE 8 MG: 4 INJECTION INTRA-ARTICULAR; INTRALESIONAL; INTRAMUSCULAR; INTRAVENOUS; SOFT TISSUE at 08:21

## 2019-10-21 RX ADMIN — SODIUM CHLORIDE, POTASSIUM CHLORIDE, SODIUM LACTATE AND CALCIUM CHLORIDE 500 ML: 600; 310; 30; 20 INJECTION, SOLUTION INTRAVENOUS at 06:55

## 2019-10-21 RX ADMIN — HYDROCODONE BITARTRATE AND ACETAMINOPHEN 2 TABLET: 7.5; 325 TABLET ORAL at 18:51

## 2019-10-21 RX ADMIN — SODIUM CHLORIDE, POTASSIUM CHLORIDE, SODIUM LACTATE AND CALCIUM CHLORIDE 9 ML/HR: 600; 310; 30; 20 INJECTION, SOLUTION INTRAVENOUS at 07:16

## 2019-10-21 RX ADMIN — TRANEXAMIC ACID 1000 MG: 100 INJECTION, SOLUTION INTRAVENOUS at 09:04

## 2019-10-21 RX ADMIN — CEFAZOLIN SODIUM 2 G: 2 INJECTION, SOLUTION INTRAVENOUS at 08:21

## 2019-10-21 RX ADMIN — LIDOCAINE HYDROCHLORIDE 60 MG: 20 INJECTION, SOLUTION INFILTRATION; PERINEURAL at 08:20

## 2019-10-21 RX ADMIN — FENTANYL CITRATE 50 MCG: 50 INJECTION INTRAMUSCULAR; INTRAVENOUS at 08:06

## 2019-10-21 RX ADMIN — DOCUSATE SODIUM 100 MG: 100 CAPSULE, LIQUID FILLED ORAL at 14:30

## 2019-10-21 RX ADMIN — BUPIVACAINE HYDROCHLORIDE 2 ML: 7.5 INJECTION, SOLUTION EPIDURAL; RETROBULBAR at 08:15

## 2019-10-21 RX ADMIN — BACLOFEN 10 MG: 10 TABLET ORAL at 17:58

## 2019-10-21 RX ADMIN — DOCUSATE SODIUM 100 MG: 100 CAPSULE, LIQUID FILLED ORAL at 21:17

## 2019-10-21 RX ADMIN — PROPOFOL 20 MG: 10 INJECTION, EMULSION INTRAVENOUS at 08:20

## 2019-10-21 RX ADMIN — PROPOFOL 120 MCG/KG/MIN: 10 INJECTION, EMULSION INTRAVENOUS at 08:20

## 2019-10-21 RX ADMIN — Medication 3 ML: at 10:40

## 2019-10-21 RX ADMIN — FAMOTIDINE 20 MG: 10 INJECTION INTRAVENOUS at 07:15

## 2019-10-21 RX ADMIN — LAMOTRIGINE 100 MG: 100 TABLET ORAL at 21:17

## 2019-10-21 RX ADMIN — EPHEDRINE SULFATE 5 MG: 50 INJECTION INTRAMUSCULAR; INTRAVENOUS; SUBCUTANEOUS at 08:58

## 2019-10-21 RX ADMIN — HYDROCODONE BITARTRATE AND ACETAMINOPHEN 2 TABLET: 7.5; 325 TABLET ORAL at 23:00

## 2019-10-21 RX ADMIN — POLYETHYLENE GLYCOL 3350 17 G: 17 POWDER, FOR SOLUTION ORAL at 14:32

## 2019-10-21 RX ADMIN — MIDAZOLAM 1 MG: 1 INJECTION INTRAMUSCULAR; INTRAVENOUS at 07:16

## 2019-10-21 RX ADMIN — HYDROCODONE BITARTRATE AND ACETAMINOPHEN 1 TABLET: 7.5; 325 TABLET ORAL at 14:31

## 2019-10-21 RX ADMIN — MUPIROCIN 1 APPLICATION: 20 OINTMENT TOPICAL at 14:34

## 2019-10-21 RX ADMIN — KETOROLAC TROMETHAMINE 30 MG: 30 INJECTION, SOLUTION INTRAMUSCULAR at 10:39

## 2019-10-21 RX ADMIN — EPHEDRINE SULFATE 5 MG: 50 INJECTION INTRAMUSCULAR; INTRAVENOUS; SUBCUTANEOUS at 08:38

## 2019-10-21 RX ADMIN — GLYCOPYRROLATE 0.2 MG: 0.2 INJECTION INTRAMUSCULAR; INTRAVENOUS at 08:06

## 2019-10-21 RX ADMIN — MELOXICAM 15 MG: 15 TABLET ORAL at 07:15

## 2019-10-21 RX ADMIN — OXYBUTYNIN CHLORIDE 10 MG: 10 TABLET, EXTENDED RELEASE ORAL at 14:34

## 2019-10-21 RX ADMIN — SODIUM CHLORIDE, POTASSIUM CHLORIDE, SODIUM LACTATE AND CALCIUM CHLORIDE: 600; 310; 30; 20 INJECTION, SOLUTION INTRAVENOUS at 08:52

## 2019-10-21 RX ADMIN — BACLOFEN 10 MG: 10 TABLET ORAL at 21:17

## 2019-10-21 RX ADMIN — PREGABALIN 150 MG: 100 CAPSULE ORAL at 21:17

## 2019-10-21 RX ADMIN — PANTOPRAZOLE SODIUM 40 MG: 40 TABLET, DELAYED RELEASE ORAL at 21:20

## 2019-10-21 RX ADMIN — Medication 400 MG: at 14:33

## 2019-10-21 RX ADMIN — ACETAMINOPHEN 1000 MG: 10 INJECTION, SOLUTION INTRAVENOUS at 08:21

## 2019-10-21 RX ADMIN — KETOROLAC TROMETHAMINE 30 MG: 30 INJECTION, SOLUTION INTRAMUSCULAR at 18:00

## 2019-10-21 RX ADMIN — CEFAZOLIN SODIUM 2 G: 2 INJECTION, SOLUTION INTRAVENOUS at 18:00

## 2019-10-21 RX ADMIN — VANCOMYCIN HYDROCHLORIDE 1000 MG: 1 INJECTION, SOLUTION INTRAVENOUS at 07:19

## 2019-10-21 NOTE — ANESTHESIA POSTPROCEDURE EVALUATION
Patient: Davida Anderson    Procedure Summary     Date:  10/21/19 Room / Location:  [facility] OR  / [facility] MAIN OR    Anesthesia Start:  0803 Anesthesia Stop:  1001    Procedure:  TOTAL KNEE ARTHROPLASTY (Left Knee) Diagnosis:       Primary osteoarthritis of left knee      (Primary osteoarthritis of left knee [M17.12])    Surgeon:  Anurag Serrano MD Provider:  Dequan Johnson MD    Anesthesia Type:  spinal, MAC ASA Status:  3          Anesthesia Type: spinal, MAC  Last vitals  BP   108/64 (10/21/19 1000)   Temp   36.3 °C (97.3 °F) (10/21/19 0956)   Pulse   77 (10/21/19 1000)   Resp   12 (10/21/19 1000)     SpO2   98 % (10/21/19 1000)     Post Anesthesia Care and Evaluation    Patient location during evaluation: PACU  Patient participation: complete - patient participated  Level of consciousness: awake and alert  Pain management: adequate  Airway patency: patent  Anesthetic complications: No anesthetic complications    Cardiovascular status: acceptable  Respiratory status: acceptable  Hydration status: acceptable    Comments: --------------------            10/21/19               1000     --------------------   BP:       108/64     Pulse:      77       Resp:       12       Temp:                SpO2:      98%      --------------------

## 2019-10-21 NOTE — ANESTHESIA PROCEDURE NOTES
Spinal Block      Patient location during procedure: OR  Start Time: 10/21/2019 8:12 AM  Stop Time: 10/21/2019 8:15 AM  Indication:at surgeon's request  Performed By  Anesthesiologist: Dequan Johnson MD  Preanesthetic Checklist  Completed: patient identified, site marked, surgical consent, pre-op evaluation, timeout performed, IV checked, risks and benefits discussed and monitors and equipment checked  Spinal Block Prep:  Patient Position:sitting  Sterile Tech:cap, gloves, mask and sterile barriers  Prep:Betadine  Patient Monitoring:blood pressure monitoring, continuous pulse oximetry and EKG  Spinal Block Procedure  Approach:midline  Guidance:landmark technique and palpation technique  Location:L4-L5  Needle Type:Loren  Needle Gauge:24 G  Placement of Spinal needle event:cerebrospinal fluid aspirated  Paresthesia: no  Fluid Appearance:clear  Medications: bupivacaine PF (MARCAINE) 0.75 % injection, 2 mL  Med Administered at 10/21/2019 8:15 AM   Post Assessment  Patient Tolerance:patient tolerated the procedure well with no apparent complications  Complications no

## 2019-10-21 NOTE — ANESTHESIA PREPROCEDURE EVALUATION
Anesthesia Evaluation     Patient summary reviewed and Nursing notes reviewed   history of anesthetic complications: PONV  NPO Solid Status: > 8 hours  NPO Liquid Status: > 4 hours           Airway   Mallampati: II  Neck ROM: full  No difficulty expected  Dental      Comment: Caps, veneers, partial    Pulmonary     breath sounds clear to auscultation  (+) a smoker (75 pk yrs) Former, COPD, sleep apnea,   Cardiovascular     Rhythm: regular    (+) hypertension, dysrhythmias, hyperlipidemia,       Neuro/Psych  (+) dizziness/light headedness, tremors, numbness, psychiatric history Bipolar,       ROS Comment: Right lumbar radiculopathy, lumbar spine pain, spondyloisthesis L4-L5, lumbar disectomy, fusion  GI/Hepatic/Renal/Endo    (+)  GERD,      Musculoskeletal     Abdominal    Substance History      OB/GYN          Other   (+) arthritis   history of cancer      Other Comment: Raynauds                  Anesthesia Plan    ASA 3     spinal   (Pulse tends to drop into 30's, pre treat in OR with Robinal)  intravenous induction   Anesthetic plan, all risks, benefits, and alternatives have been provided, discussed and informed consent has been obtained with: patient.

## 2019-10-22 LAB
HCT VFR BLD AUTO: 30.6 % (ref 34–46.6)
HGB BLD-MCNC: 10.3 G/DL (ref 12–15.9)

## 2019-10-22 PROCEDURE — 85018 HEMOGLOBIN: CPT | Performed by: ORTHOPAEDIC SURGERY

## 2019-10-22 PROCEDURE — G0378 HOSPITAL OBSERVATION PER HR: HCPCS

## 2019-10-22 PROCEDURE — 97110 THERAPEUTIC EXERCISES: CPT

## 2019-10-22 PROCEDURE — 97150 GROUP THERAPEUTIC PROCEDURES: CPT

## 2019-10-22 PROCEDURE — 25010000002 ONDANSETRON PER 1 MG: Performed by: ORTHOPAEDIC SURGERY

## 2019-10-22 PROCEDURE — 85014 HEMATOCRIT: CPT | Performed by: ORTHOPAEDIC SURGERY

## 2019-10-22 PROCEDURE — 25010000003 CEFAZOLIN IN DEXTROSE 2-4 GM/100ML-% SOLUTION: Performed by: ORTHOPAEDIC SURGERY

## 2019-10-22 PROCEDURE — 25010000002 KETOROLAC TROMETHAMINE PER 15 MG: Performed by: ORTHOPAEDIC SURGERY

## 2019-10-22 RX ADMIN — KETOROLAC TROMETHAMINE 30 MG: 30 INJECTION, SOLUTION INTRAMUSCULAR at 01:31

## 2019-10-22 RX ADMIN — HYDROCODONE BITARTRATE AND ACETAMINOPHEN 1 TABLET: 7.5; 325 TABLET ORAL at 06:16

## 2019-10-22 RX ADMIN — ONDANSETRON 4 MG: 2 INJECTION INTRAMUSCULAR; INTRAVENOUS at 05:52

## 2019-10-22 RX ADMIN — OXYBUTYNIN CHLORIDE 10 MG: 10 TABLET, EXTENDED RELEASE ORAL at 09:34

## 2019-10-22 RX ADMIN — LAMOTRIGINE 100 MG: 100 TABLET ORAL at 20:42

## 2019-10-22 RX ADMIN — MELOXICAM 15 MG: 15 TABLET ORAL at 09:35

## 2019-10-22 RX ADMIN — BACLOFEN 10 MG: 10 TABLET ORAL at 20:42

## 2019-10-22 RX ADMIN — BACLOFEN 10 MG: 10 TABLET ORAL at 09:34

## 2019-10-22 RX ADMIN — MUPIROCIN 1 APPLICATION: 20 OINTMENT TOPICAL at 20:42

## 2019-10-22 RX ADMIN — MUPIROCIN 1 APPLICATION: 20 OINTMENT TOPICAL at 09:35

## 2019-10-22 RX ADMIN — LAMOTRIGINE 100 MG: 100 TABLET ORAL at 09:34

## 2019-10-22 RX ADMIN — DILTIAZEM HYDROCHLORIDE 120 MG: 120 CAPSULE, COATED, EXTENDED RELEASE ORAL at 09:34

## 2019-10-22 RX ADMIN — ASPIRIN 325 MG: 325 TABLET, DELAYED RELEASE ORAL at 20:42

## 2019-10-22 RX ADMIN — ARIPIPRAZOLE 10 MG: 10 TABLET ORAL at 09:36

## 2019-10-22 RX ADMIN — KETOROLAC TROMETHAMINE 30 MG: 30 INJECTION, SOLUTION INTRAMUSCULAR at 09:35

## 2019-10-22 RX ADMIN — POLYETHYLENE GLYCOL 3350 17 G: 17 POWDER, FOR SOLUTION ORAL at 20:42

## 2019-10-22 RX ADMIN — DOCUSATE SODIUM 100 MG: 100 CAPSULE, LIQUID FILLED ORAL at 20:49

## 2019-10-22 RX ADMIN — ATORVASTATIN CALCIUM 20 MG: 20 TABLET, FILM COATED ORAL at 09:34

## 2019-10-22 RX ADMIN — PREGABALIN 150 MG: 100 CAPSULE ORAL at 20:48

## 2019-10-22 RX ADMIN — LITHIUM CARBONATE 300 MG: 300 CAPSULE, GELATIN COATED ORAL at 09:32

## 2019-10-22 RX ADMIN — HYDROCODONE BITARTRATE AND ACETAMINOPHEN 2 TABLET: 7.5; 325 TABLET ORAL at 20:42

## 2019-10-22 RX ADMIN — PANTOPRAZOLE SODIUM 40 MG: 40 TABLET, DELAYED RELEASE ORAL at 20:42

## 2019-10-22 RX ADMIN — CEFAZOLIN SODIUM 2 G: 2 INJECTION, SOLUTION INTRAVENOUS at 01:31

## 2019-10-22 RX ADMIN — DOCUSATE SODIUM 100 MG: 100 CAPSULE, LIQUID FILLED ORAL at 09:35

## 2019-10-22 RX ADMIN — Medication 400 MG: at 09:33

## 2019-10-22 RX ADMIN — ASPIRIN 325 MG: 325 TABLET, DELAYED RELEASE ORAL at 09:34

## 2019-10-22 RX ADMIN — HYDROCODONE BITARTRATE AND ACETAMINOPHEN 1 TABLET: 7.5; 325 TABLET ORAL at 09:44

## 2019-10-22 RX ADMIN — PANTOPRAZOLE SODIUM 40 MG: 40 TABLET, DELAYED RELEASE ORAL at 09:33

## 2019-10-23 ENCOUNTER — APPOINTMENT (OUTPATIENT)
Dept: CARDIOLOGY | Facility: HOSPITAL | Age: 74
End: 2019-10-23

## 2019-10-23 LAB
BH CV LOWER VASCULAR LEFT COMMON FEMORAL AUGMENT: NORMAL
BH CV LOWER VASCULAR LEFT COMMON FEMORAL COMPETENT: NORMAL
BH CV LOWER VASCULAR LEFT COMMON FEMORAL COMPRESS: NORMAL
BH CV LOWER VASCULAR LEFT COMMON FEMORAL PHASIC: NORMAL
BH CV LOWER VASCULAR LEFT COMMON FEMORAL SPONT: NORMAL
BH CV LOWER VASCULAR LEFT DISTAL FEMORAL COMPRESS: NORMAL
BH CV LOWER VASCULAR LEFT GASTRONEMIUS COMPRESS: NORMAL
BH CV LOWER VASCULAR LEFT GREATER SAPH AK COMPRESS: NORMAL
BH CV LOWER VASCULAR LEFT GREATER SAPH BK COMPRESS: NORMAL
BH CV LOWER VASCULAR LEFT MID FEMORAL AUGMENT: NORMAL
BH CV LOWER VASCULAR LEFT MID FEMORAL COMPETENT: NORMAL
BH CV LOWER VASCULAR LEFT MID FEMORAL COMPRESS: NORMAL
BH CV LOWER VASCULAR LEFT MID FEMORAL PHASIC: NORMAL
BH CV LOWER VASCULAR LEFT MID FEMORAL SPONT: NORMAL
BH CV LOWER VASCULAR LEFT PERONEAL COMPRESS: NORMAL
BH CV LOWER VASCULAR LEFT POPLITEAL AUGMENT: NORMAL
BH CV LOWER VASCULAR LEFT POPLITEAL COMPETENT: NORMAL
BH CV LOWER VASCULAR LEFT POPLITEAL COMPRESS: NORMAL
BH CV LOWER VASCULAR LEFT POPLITEAL PHASIC: NORMAL
BH CV LOWER VASCULAR LEFT POPLITEAL SPONT: NORMAL
BH CV LOWER VASCULAR LEFT POSTERIOR TIBIAL COMPRESS: NORMAL
BH CV LOWER VASCULAR LEFT PROXIMAL FEMORAL COMPRESS: NORMAL
BH CV LOWER VASCULAR LEFT SAPHENOFEMORAL JUNCTION COMPRESS: NORMAL
BH CV LOWER VASCULAR RIGHT COMMON FEMORAL AUGMENT: NORMAL
BH CV LOWER VASCULAR RIGHT COMMON FEMORAL COMPETENT: NORMAL
BH CV LOWER VASCULAR RIGHT COMMON FEMORAL COMPRESS: NORMAL
BH CV LOWER VASCULAR RIGHT COMMON FEMORAL PHASIC: NORMAL
BH CV LOWER VASCULAR RIGHT COMMON FEMORAL SPONT: NORMAL
HCT VFR BLD AUTO: 27.7 % (ref 34–46.6)
HGB BLD-MCNC: 8.9 G/DL (ref 12–15.9)

## 2019-10-23 PROCEDURE — 85018 HEMOGLOBIN: CPT | Performed by: ORTHOPAEDIC SURGERY

## 2019-10-23 PROCEDURE — G0378 HOSPITAL OBSERVATION PER HR: HCPCS

## 2019-10-23 PROCEDURE — 85014 HEMATOCRIT: CPT | Performed by: ORTHOPAEDIC SURGERY

## 2019-10-23 PROCEDURE — 93971 EXTREMITY STUDY: CPT

## 2019-10-23 PROCEDURE — 97150 GROUP THERAPEUTIC PROCEDURES: CPT

## 2019-10-23 PROCEDURE — 97110 THERAPEUTIC EXERCISES: CPT

## 2019-10-23 RX ORDER — HYDROCODONE BITARTRATE AND ACETAMINOPHEN 5; 325 MG/1; MG/1
1 TABLET ORAL EVERY 4 HOURS PRN
Status: DISCONTINUED | OUTPATIENT
Start: 2019-10-23 | End: 2019-10-23

## 2019-10-23 RX ORDER — ACETAMINOPHEN 325 MG/1
650 TABLET ORAL EVERY 6 HOURS
Status: DISCONTINUED | OUTPATIENT
Start: 2019-10-23 | End: 2019-10-25 | Stop reason: HOSPADM

## 2019-10-23 RX ADMIN — ASPIRIN 325 MG: 325 TABLET, DELAYED RELEASE ORAL at 08:50

## 2019-10-23 RX ADMIN — PANTOPRAZOLE SODIUM 40 MG: 40 TABLET, DELAYED RELEASE ORAL at 20:54

## 2019-10-23 RX ADMIN — PANTOPRAZOLE SODIUM 40 MG: 40 TABLET, DELAYED RELEASE ORAL at 08:50

## 2019-10-23 RX ADMIN — HYDROCODONE BITARTRATE AND ACETAMINOPHEN 2 TABLET: 7.5; 325 TABLET ORAL at 06:32

## 2019-10-23 RX ADMIN — Medication 400 MG: at 06:28

## 2019-10-23 RX ADMIN — BACLOFEN 10 MG: 10 TABLET ORAL at 20:54

## 2019-10-23 RX ADMIN — LITHIUM CARBONATE 300 MG: 300 CAPSULE, GELATIN COATED ORAL at 06:28

## 2019-10-23 RX ADMIN — ASPIRIN 325 MG: 325 TABLET, DELAYED RELEASE ORAL at 20:54

## 2019-10-23 RX ADMIN — LAMOTRIGINE 100 MG: 100 TABLET ORAL at 20:54

## 2019-10-23 RX ADMIN — DOCUSATE SODIUM 100 MG: 100 CAPSULE, LIQUID FILLED ORAL at 08:50

## 2019-10-23 RX ADMIN — ACETAMINOPHEN 650 MG: 325 TABLET, FILM COATED ORAL at 16:15

## 2019-10-23 RX ADMIN — BACLOFEN 10 MG: 10 TABLET ORAL at 16:15

## 2019-10-23 RX ADMIN — ARIPIPRAZOLE 10 MG: 10 TABLET ORAL at 06:28

## 2019-10-23 RX ADMIN — LAMOTRIGINE 100 MG: 100 TABLET ORAL at 08:50

## 2019-10-23 RX ADMIN — DILTIAZEM HYDROCHLORIDE 120 MG: 120 CAPSULE, COATED, EXTENDED RELEASE ORAL at 06:28

## 2019-10-23 RX ADMIN — PREGABALIN 150 MG: 100 CAPSULE ORAL at 20:54

## 2019-10-23 RX ADMIN — OXYBUTYNIN CHLORIDE 10 MG: 10 TABLET, EXTENDED RELEASE ORAL at 08:50

## 2019-10-23 RX ADMIN — ATORVASTATIN CALCIUM 20 MG: 20 TABLET, FILM COATED ORAL at 06:28

## 2019-10-23 RX ADMIN — ACETAMINOPHEN 650 MG: 325 TABLET, FILM COATED ORAL at 11:27

## 2019-10-23 RX ADMIN — POLYETHYLENE GLYCOL 3350 17 G: 17 POWDER, FOR SOLUTION ORAL at 20:55

## 2019-10-23 RX ADMIN — ACETAMINOPHEN 650 MG: 325 TABLET, FILM COATED ORAL at 23:44

## 2019-10-23 RX ADMIN — BACLOFEN 10 MG: 10 TABLET ORAL at 08:50

## 2019-10-23 RX ADMIN — DOCUSATE SODIUM 100 MG: 100 CAPSULE, LIQUID FILLED ORAL at 20:54

## 2019-10-23 RX ADMIN — MELOXICAM 15 MG: 15 TABLET ORAL at 08:50

## 2019-10-23 RX ADMIN — POLYETHYLENE GLYCOL 3350 17 G: 17 POWDER, FOR SOLUTION ORAL at 08:50

## 2019-10-24 ENCOUNTER — APPOINTMENT (OUTPATIENT)
Dept: CARDIOLOGY | Facility: HOSPITAL | Age: 74
End: 2019-10-24

## 2019-10-24 ENCOUNTER — APPOINTMENT (OUTPATIENT)
Dept: NEUROLOGY | Facility: HOSPITAL | Age: 74
End: 2019-10-24

## 2019-10-24 ENCOUNTER — APPOINTMENT (OUTPATIENT)
Dept: MRI IMAGING | Facility: HOSPITAL | Age: 74
End: 2019-10-24

## 2019-10-24 LAB
AORTIC DIMENSIONLESS INDEX: 1 (DI)
BASOPHILS # BLD AUTO: 0.05 10*3/MM3 (ref 0–0.2)
BASOPHILS NFR BLD AUTO: 0.7 % (ref 0–1.5)
BH CV ECHO MEAS - ACS: 1.5 CM
BH CV ECHO MEAS - AO MAX PG (FULL): 0.54 MMHG
BH CV ECHO MEAS - AO MAX PG: 11.7 MMHG
BH CV ECHO MEAS - AO MEAN PG (FULL): 0 MMHG
BH CV ECHO MEAS - AO MEAN PG: 5 MMHG
BH CV ECHO MEAS - AO ROOT AREA (BSA CORRECTED): 1.7
BH CV ECHO MEAS - AO ROOT AREA: 6.6 CM^2
BH CV ECHO MEAS - AO ROOT DIAM: 2.9 CM
BH CV ECHO MEAS - AO V2 MAX: 171 CM/SEC
BH CV ECHO MEAS - AO V2 MEAN: 105 CM/SEC
BH CV ECHO MEAS - AO V2 VTI: 30.3 CM
BH CV ECHO MEAS - AVA(I,A): 2.6 CM^2
BH CV ECHO MEAS - AVA(I,D): 2.6 CM^2
BH CV ECHO MEAS - AVA(V,A): 2.5 CM^2
BH CV ECHO MEAS - AVA(V,D): 2.5 CM^2
BH CV ECHO MEAS - BSA(HAYCOCK): 1.7 M^2
BH CV ECHO MEAS - BSA: 1.7 M^2
BH CV ECHO MEAS - BZI_BMI: 21 KILOGRAMS/M^2
BH CV ECHO MEAS - BZI_METRIC_HEIGHT: 167.6 CM
BH CV ECHO MEAS - BZI_METRIC_WEIGHT: 59 KG
BH CV ECHO MEAS - EDV(CUBED): 103.8 ML
BH CV ECHO MEAS - EDV(MOD-SP2): 68 ML
BH CV ECHO MEAS - EDV(MOD-SP4): 87 ML
BH CV ECHO MEAS - EDV(TEICH): 102.4 ML
BH CV ECHO MEAS - EF(CUBED): 78.9 %
BH CV ECHO MEAS - EF(MOD-BP): 69 %
BH CV ECHO MEAS - EF(MOD-SP2): 64.7 %
BH CV ECHO MEAS - EF(MOD-SP4): 72.4 %
BH CV ECHO MEAS - EF(TEICH): 71.1 %
BH CV ECHO MEAS - ESV(CUBED): 22 ML
BH CV ECHO MEAS - ESV(MOD-SP2): 24 ML
BH CV ECHO MEAS - ESV(MOD-SP4): 24 ML
BH CV ECHO MEAS - ESV(TEICH): 29.6 ML
BH CV ECHO MEAS - FS: 40.4 %
BH CV ECHO MEAS - IVS/LVPW: 1.2
BH CV ECHO MEAS - IVSD: 0.7 CM
BH CV ECHO MEAS - LAT PEAK E' VEL: 8 CM/SEC
BH CV ECHO MEAS - LV DIASTOLIC VOL/BSA (35-75): 52.2 ML/M^2
BH CV ECHO MEAS - LV MASS(C)D: 93.9 GRAMS
BH CV ECHO MEAS - LV MASS(C)DI: 56.4 GRAMS/M^2
BH CV ECHO MEAS - LV MAX PG: 11.2 MMHG
BH CV ECHO MEAS - LV MEAN PG: 5 MMHG
BH CV ECHO MEAS - LV SYSTOLIC VOL/BSA (12-30): 14.4 ML/M^2
BH CV ECHO MEAS - LV V1 MAX: 167 CM/SEC
BH CV ECHO MEAS - LV V1 MEAN: 98.1 CM/SEC
BH CV ECHO MEAS - LV V1 VTI: 31 CM
BH CV ECHO MEAS - LVIDD: 4.7 CM
BH CV ECHO MEAS - LVIDS: 2.8 CM
BH CV ECHO MEAS - LVLD AP2: 7.6 CM
BH CV ECHO MEAS - LVLD AP4: 7.9 CM
BH CV ECHO MEAS - LVLS AP2: 6.1 CM
BH CV ECHO MEAS - LVLS AP4: 5.8 CM
BH CV ECHO MEAS - LVOT AREA (M): 2.5 CM^2
BH CV ECHO MEAS - LVOT AREA: 2.5 CM^2
BH CV ECHO MEAS - LVOT DIAM: 1.8 CM
BH CV ECHO MEAS - LVPWD: 0.6 CM
BH CV ECHO MEAS - MED PEAK E' VEL: 7 CM/SEC
BH CV ECHO MEAS - MV A DUR: 0.19 SEC
BH CV ECHO MEAS - MV A MAX VEL: 100 CM/SEC
BH CV ECHO MEAS - MV DEC SLOPE: 282 CM/SEC^2
BH CV ECHO MEAS - MV DEC TIME: 230 SEC
BH CV ECHO MEAS - MV E MAX VEL: 78.6 CM/SEC
BH CV ECHO MEAS - MV E/A: 0.79
BH CV ECHO MEAS - MV MAX PG: 4.3 MMHG
BH CV ECHO MEAS - MV MEAN PG: 2 MMHG
BH CV ECHO MEAS - MV P1/2T MAX VEL: 74.3 CM/SEC
BH CV ECHO MEAS - MV P1/2T: 77.2 MSEC
BH CV ECHO MEAS - MV V2 MAX: 104 CM/SEC
BH CV ECHO MEAS - MV V2 MEAN: 59.6 CM/SEC
BH CV ECHO MEAS - MV V2 VTI: 26.8 CM
BH CV ECHO MEAS - MVA P1/2T LCG: 3 CM^2
BH CV ECHO MEAS - MVA(P1/2T): 2.9 CM^2
BH CV ECHO MEAS - MVA(VTI): 2.9 CM^2
BH CV ECHO MEAS - PA ACC TIME: 0.11 SEC
BH CV ECHO MEAS - PA MAX PG (FULL): 1.8 MMHG
BH CV ECHO MEAS - PA MAX PG: 5.8 MMHG
BH CV ECHO MEAS - PA PR(ACCEL): 31.3 MMHG
BH CV ECHO MEAS - PA V2 MAX: 120 CM/SEC
BH CV ECHO MEAS - PULM A REVS DUR: 0.17 SEC
BH CV ECHO MEAS - PULM A REVS VEL: 45.1 CM/SEC
BH CV ECHO MEAS - PULM DIAS VEL: 50.9 CM/SEC
BH CV ECHO MEAS - PULM S/D: 1.5
BH CV ECHO MEAS - PULM SYS VEL: 74.2 CM/SEC
BH CV ECHO MEAS - PVA(V,A): 1.7 CM^2
BH CV ECHO MEAS - PVA(V,D): 1.7 CM^2
BH CV ECHO MEAS - QP/QS: 0.52
BH CV ECHO MEAS - RAP SYSTOLE: 3 MMHG
BH CV ECHO MEAS - RV MAX PG: 4 MMHG
BH CV ECHO MEAS - RV MEAN PG: 2 MMHG
BH CV ECHO MEAS - RV V1 MAX: 99.8 CM/SEC
BH CV ECHO MEAS - RV V1 MEAN: 62 CM/SEC
BH CV ECHO MEAS - RV V1 VTI: 20.4 CM
BH CV ECHO MEAS - RVOT AREA: 2 CM^2
BH CV ECHO MEAS - RVOT DIAM: 1.6 CM
BH CV ECHO MEAS - RVSP: 28 MMHG
BH CV ECHO MEAS - SI(AO): 120.2 ML/M^2
BH CV ECHO MEAS - SI(CUBED): 49.2 ML/M^2
BH CV ECHO MEAS - SI(LVOT): 47.4 ML/M^2
BH CV ECHO MEAS - SI(MOD-SP2): 26.4 ML/M^2
BH CV ECHO MEAS - SI(MOD-SP4): 37.8 ML/M^2
BH CV ECHO MEAS - SI(TEICH): 43.7 ML/M^2
BH CV ECHO MEAS - SV(AO): 200.1 ML
BH CV ECHO MEAS - SV(CUBED): 81.9 ML
BH CV ECHO MEAS - SV(LVOT): 78.9 ML
BH CV ECHO MEAS - SV(MOD-SP2): 44 ML
BH CV ECHO MEAS - SV(MOD-SP4): 63 ML
BH CV ECHO MEAS - SV(RVOT): 41 ML
BH CV ECHO MEAS - SV(TEICH): 72.8 ML
BH CV ECHO MEAS - TAPSE (>1.6): 2.2 CM
BH CV ECHO MEAS - TR MAX VEL: 250 CM/SEC
BH CV ECHO MEASUREMENTS AVERAGE E/E' RATIO: 10.48
BH CV VAS BP RIGHT ARM: NORMAL MMHG
BH CV XLRA - RV BASE: 2.7 CM
BH CV XLRA - TDI S': 18 CM/SEC
DEPRECATED RDW RBC AUTO: 38 FL (ref 37–54)
EOSINOPHIL # BLD AUTO: 0.23 10*3/MM3 (ref 0–0.4)
EOSINOPHIL NFR BLD AUTO: 3 % (ref 0.3–6.2)
ERYTHROCYTE [DISTWIDTH] IN BLOOD BY AUTOMATED COUNT: 11.8 % (ref 12.3–15.4)
GLUCOSE BLDC GLUCOMTR-MCNC: 115 MG/DL (ref 70–130)
HCT VFR BLD AUTO: 28.5 % (ref 34–46.6)
HGB BLD-MCNC: 9.2 G/DL (ref 12–15.9)
IMM GRANULOCYTES # BLD AUTO: 0.02 10*3/MM3 (ref 0–0.05)
IMM GRANULOCYTES NFR BLD AUTO: 0.3 % (ref 0–0.5)
LEFT ATRIUM VOLUME INDEX: 26 ML/M2
LEFT ATRIUM VOLUME: 43 CM3
LV EF 2D ECHO EST: 69 %
LYMPHOCYTES # BLD AUTO: 1.52 10*3/MM3 (ref 0.7–3.1)
LYMPHOCYTES NFR BLD AUTO: 20 % (ref 19.6–45.3)
MAXIMAL PREDICTED HEART RATE: 149 BPM
MCH RBC QN AUTO: 28.5 PG (ref 26.6–33)
MCHC RBC AUTO-ENTMCNC: 32.3 G/DL (ref 31.5–35.7)
MCV RBC AUTO: 88.2 FL (ref 79–97)
MONOCYTES # BLD AUTO: 0.75 10*3/MM3 (ref 0.1–0.9)
MONOCYTES NFR BLD AUTO: 9.9 % (ref 5–12)
NEUTROPHILS # BLD AUTO: 5.04 10*3/MM3 (ref 1.7–7)
NEUTROPHILS NFR BLD AUTO: 66.1 % (ref 42.7–76)
NRBC BLD AUTO-RTO: 0 /100 WBC (ref 0–0.2)
PLATELET # BLD AUTO: 158 10*3/MM3 (ref 140–450)
PMV BLD AUTO: 10 FL (ref 6–12)
RBC # BLD AUTO: 3.23 10*6/MM3 (ref 3.77–5.28)
STRESS TARGET HR: 127 BPM
WBC NRBC COR # BLD: 7.61 10*3/MM3 (ref 3.4–10.8)

## 2019-10-24 PROCEDURE — 93010 ELECTROCARDIOGRAM REPORT: CPT | Performed by: INTERNAL MEDICINE

## 2019-10-24 PROCEDURE — 95816 EEG AWAKE AND DROWSY: CPT

## 2019-10-24 PROCEDURE — 99203 OFFICE O/P NEW LOW 30 MIN: CPT | Performed by: PSYCHIATRY & NEUROLOGY

## 2019-10-24 PROCEDURE — 70544 MR ANGIOGRAPHY HEAD W/O DYE: CPT

## 2019-10-24 PROCEDURE — 99203 OFFICE O/P NEW LOW 30 MIN: CPT | Performed by: INTERNAL MEDICINE

## 2019-10-24 PROCEDURE — G0378 HOSPITAL OBSERVATION PER HR: HCPCS

## 2019-10-24 PROCEDURE — 70553 MRI BRAIN STEM W/O & W/DYE: CPT

## 2019-10-24 PROCEDURE — 93306 TTE W/DOPPLER COMPLETE: CPT | Performed by: INTERNAL MEDICINE

## 2019-10-24 PROCEDURE — 97150 GROUP THERAPEUTIC PROCEDURES: CPT

## 2019-10-24 PROCEDURE — 93005 ELECTROCARDIOGRAM TRACING: CPT | Performed by: INTERNAL MEDICINE

## 2019-10-24 PROCEDURE — 97110 THERAPEUTIC EXERCISES: CPT

## 2019-10-24 PROCEDURE — 93306 TTE W/DOPPLER COMPLETE: CPT

## 2019-10-24 PROCEDURE — A9577 INJ MULTIHANCE: HCPCS | Performed by: ORTHOPAEDIC SURGERY

## 2019-10-24 PROCEDURE — 99024 POSTOP FOLLOW-UP VISIT: CPT | Performed by: NURSE PRACTITIONER

## 2019-10-24 PROCEDURE — 70549 MR ANGIOGRAPH NECK W/O&W/DYE: CPT

## 2019-10-24 PROCEDURE — 85025 COMPLETE CBC W/AUTO DIFF WBC: CPT | Performed by: ORTHOPAEDIC SURGERY

## 2019-10-24 PROCEDURE — 95816 EEG AWAKE AND DROWSY: CPT | Performed by: PSYCHIATRY & NEUROLOGY

## 2019-10-24 PROCEDURE — 82962 GLUCOSE BLOOD TEST: CPT

## 2019-10-24 PROCEDURE — 0 GADOBENATE DIMEGLUMINE 529 MG/ML SOLUTION: Performed by: ORTHOPAEDIC SURGERY

## 2019-10-24 PROCEDURE — 25010000002 ONDANSETRON PER 1 MG: Performed by: ORTHOPAEDIC SURGERY

## 2019-10-24 RX ORDER — SODIUM CHLORIDE 9 MG/ML
100 INJECTION, SOLUTION INTRAVENOUS CONTINUOUS
Status: DISCONTINUED | OUTPATIENT
Start: 2019-10-24 | End: 2019-10-25 | Stop reason: HOSPADM

## 2019-10-24 RX ADMIN — BACLOFEN 10 MG: 10 TABLET ORAL at 22:13

## 2019-10-24 RX ADMIN — PANTOPRAZOLE SODIUM 40 MG: 40 TABLET, DELAYED RELEASE ORAL at 09:23

## 2019-10-24 RX ADMIN — OXYBUTYNIN CHLORIDE 10 MG: 10 TABLET, EXTENDED RELEASE ORAL at 09:23

## 2019-10-24 RX ADMIN — LAMOTRIGINE 100 MG: 100 TABLET ORAL at 09:23

## 2019-10-24 RX ADMIN — DOCUSATE SODIUM 100 MG: 100 CAPSULE, LIQUID FILLED ORAL at 09:23

## 2019-10-24 RX ADMIN — PANTOPRAZOLE SODIUM 40 MG: 40 TABLET, DELAYED RELEASE ORAL at 22:13

## 2019-10-24 RX ADMIN — Medication 400 MG: at 06:11

## 2019-10-24 RX ADMIN — POLYETHYLENE GLYCOL 3350 17 G: 17 POWDER, FOR SOLUTION ORAL at 09:23

## 2019-10-24 RX ADMIN — ACETAMINOPHEN 650 MG: 325 TABLET, FILM COATED ORAL at 06:11

## 2019-10-24 RX ADMIN — MELOXICAM 15 MG: 15 TABLET ORAL at 09:23

## 2019-10-24 RX ADMIN — ACETAMINOPHEN 650 MG: 325 TABLET, FILM COATED ORAL at 16:55

## 2019-10-24 RX ADMIN — PREGABALIN 150 MG: 100 CAPSULE ORAL at 22:12

## 2019-10-24 RX ADMIN — ATORVASTATIN CALCIUM 20 MG: 20 TABLET, FILM COATED ORAL at 06:22

## 2019-10-24 RX ADMIN — SODIUM CHLORIDE 100 ML/HR: 9 INJECTION, SOLUTION INTRAVENOUS at 09:24

## 2019-10-24 RX ADMIN — ONDANSETRON 4 MG: 2 INJECTION INTRAMUSCULAR; INTRAVENOUS at 06:12

## 2019-10-24 RX ADMIN — SODIUM CHLORIDE 100 ML/HR: 9 INJECTION, SOLUTION INTRAVENOUS at 16:56

## 2019-10-24 RX ADMIN — ACETAMINOPHEN 650 MG: 325 TABLET, FILM COATED ORAL at 22:13

## 2019-10-24 RX ADMIN — LITHIUM CARBONATE 300 MG: 300 CAPSULE, GELATIN COATED ORAL at 06:11

## 2019-10-24 RX ADMIN — BACLOFEN 10 MG: 10 TABLET ORAL at 16:55

## 2019-10-24 RX ADMIN — DILTIAZEM HYDROCHLORIDE 120 MG: 120 CAPSULE, COATED, EXTENDED RELEASE ORAL at 06:11

## 2019-10-24 RX ADMIN — ASPIRIN 325 MG: 325 TABLET, DELAYED RELEASE ORAL at 09:23

## 2019-10-24 RX ADMIN — GADOBENATE DIMEGLUMINE 12 ML: 529 INJECTION, SOLUTION INTRAVENOUS at 21:00

## 2019-10-24 RX ADMIN — ARIPIPRAZOLE 10 MG: 10 TABLET ORAL at 06:11

## 2019-10-24 RX ADMIN — ACETAMINOPHEN 650 MG: 325 TABLET, FILM COATED ORAL at 10:29

## 2019-10-24 RX ADMIN — ASPIRIN 325 MG: 325 TABLET, DELAYED RELEASE ORAL at 22:13

## 2019-10-24 RX ADMIN — DOCUSATE SODIUM 100 MG: 100 CAPSULE, LIQUID FILLED ORAL at 22:14

## 2019-10-24 RX ADMIN — BACLOFEN 10 MG: 10 TABLET ORAL at 09:23

## 2019-10-24 RX ADMIN — LAMOTRIGINE 100 MG: 100 TABLET ORAL at 22:13

## 2019-10-25 VITALS
HEIGHT: 66 IN | SYSTOLIC BLOOD PRESSURE: 122 MMHG | TEMPERATURE: 98.1 F | HEART RATE: 73 BPM | OXYGEN SATURATION: 95 % | DIASTOLIC BLOOD PRESSURE: 72 MMHG | BODY MASS INDEX: 20.89 KG/M2 | RESPIRATION RATE: 16 BRPM | WEIGHT: 130 LBS

## 2019-10-25 LAB
HCT VFR BLD AUTO: 27.7 % (ref 34–46.6)
HGB BLD-MCNC: 8.8 G/DL (ref 12–15.9)

## 2019-10-25 PROCEDURE — 85018 HEMOGLOBIN: CPT | Performed by: ORTHOPAEDIC SURGERY

## 2019-10-25 PROCEDURE — 99024 POSTOP FOLLOW-UP VISIT: CPT | Performed by: NURSE PRACTITIONER

## 2019-10-25 PROCEDURE — 85014 HEMATOCRIT: CPT | Performed by: ORTHOPAEDIC SURGERY

## 2019-10-25 PROCEDURE — 99213 OFFICE O/P EST LOW 20 MIN: CPT | Performed by: NURSE PRACTITIONER

## 2019-10-25 PROCEDURE — 97110 THERAPEUTIC EXERCISES: CPT

## 2019-10-25 PROCEDURE — G0378 HOSPITAL OBSERVATION PER HR: HCPCS

## 2019-10-25 PROCEDURE — 97150 GROUP THERAPEUTIC PROCEDURES: CPT

## 2019-10-25 RX ADMIN — LAMOTRIGINE 100 MG: 100 TABLET ORAL at 08:53

## 2019-10-25 RX ADMIN — Medication 400 MG: at 06:21

## 2019-10-25 RX ADMIN — MELOXICAM 15 MG: 15 TABLET ORAL at 08:53

## 2019-10-25 RX ADMIN — ACETAMINOPHEN 650 MG: 325 TABLET, FILM COATED ORAL at 06:21

## 2019-10-25 RX ADMIN — ACETAMINOPHEN 650 MG: 325 TABLET, FILM COATED ORAL at 11:46

## 2019-10-25 RX ADMIN — DILTIAZEM HYDROCHLORIDE 120 MG: 120 CAPSULE, COATED, EXTENDED RELEASE ORAL at 06:21

## 2019-10-25 RX ADMIN — OXYBUTYNIN CHLORIDE 10 MG: 10 TABLET, EXTENDED RELEASE ORAL at 08:53

## 2019-10-25 RX ADMIN — ATORVASTATIN CALCIUM 20 MG: 20 TABLET, FILM COATED ORAL at 06:21

## 2019-10-25 RX ADMIN — LITHIUM CARBONATE 300 MG: 300 CAPSULE, GELATIN COATED ORAL at 06:21

## 2019-10-25 RX ADMIN — ARIPIPRAZOLE 10 MG: 10 TABLET ORAL at 06:21

## 2019-10-25 RX ADMIN — BACLOFEN 10 MG: 10 TABLET ORAL at 08:53

## 2019-10-25 RX ADMIN — ASPIRIN 325 MG: 325 TABLET, DELAYED RELEASE ORAL at 08:53

## 2019-10-25 RX ADMIN — PANTOPRAZOLE SODIUM 40 MG: 40 TABLET, DELAYED RELEASE ORAL at 08:53

## 2019-10-25 RX ADMIN — DOCUSATE SODIUM 100 MG: 100 CAPSULE, LIQUID FILLED ORAL at 08:53

## 2019-10-25 RX ADMIN — POLYETHYLENE GLYCOL 3350 17 G: 17 POWDER, FOR SOLUTION ORAL at 08:53

## 2019-11-08 ENCOUNTER — OFFICE VISIT (OUTPATIENT)
Dept: ORTHOPEDIC SURGERY | Facility: CLINIC | Age: 74
End: 2019-11-08

## 2019-11-08 VITALS — TEMPERATURE: 96.2 F | WEIGHT: 128 LBS | HEIGHT: 66 IN | BODY MASS INDEX: 20.57 KG/M2

## 2019-11-08 DIAGNOSIS — Z98.890 H/O: KNEE SURGERY: Primary | ICD-10-CM

## 2019-11-08 PROCEDURE — 99024 POSTOP FOLLOW-UP VISIT: CPT | Performed by: NURSE PRACTITIONER

## 2019-11-08 RX ORDER — POLYETHYLENE GLYCOL 3350 17 G/17G
17 POWDER, FOR SOLUTION ORAL
COMMUNITY
Start: 2019-10-25 | End: 2019-11-08

## 2019-11-08 RX ORDER — ASPIRIN 325 MG
325 TABLET ORAL
Status: ON HOLD | COMMUNITY
Start: 2019-10-25 | End: 2023-03-15

## 2019-11-08 RX ORDER — ONDANSETRON 4 MG/1
4 TABLET, FILM COATED ORAL
Status: ON HOLD | COMMUNITY
Start: 2019-10-25 | End: 2023-03-15

## 2019-11-08 RX ORDER — HYDROCODONE BITARTRATE AND ACETAMINOPHEN 7.5; 325 MG/1; MG/1
2 TABLET ORAL
COMMUNITY
Start: 2019-10-25 | End: 2019-11-18 | Stop reason: SDUPTHER

## 2019-11-08 NOTE — PROGRESS NOTES
Davida Anderson : 1945 MRN: [id number] DATE: 2019    DIAGNOSIS: 2 week follow up left total knee      SUBJECTIVE:Patient returns today for 2 week follow up of left total knee replacement. Patient reports doing well with no unusual complaints. Appears to be progressing appropriately.    OBJECTIVE:   Exam:. The incision is healing appropriately. No sign of infection. Range of motion is progressing as expected. The calf is soft and nontender with a negative Homans sign.    ASSESSMENT: 2 week status post left knee replacement.    PLAN: 1) Staples removed and steri strips applied   2) Order given for PT   3) Discontinue LOPEZ hose   4) Continue ice PRN   5) Continue Eliquis   6) Follow up in 4 weeks with repeat Xrays of left knee (3views)    Kady Estrada, APRN  2019

## 2019-11-12 ENCOUNTER — HOSPITAL ENCOUNTER (OUTPATIENT)
Dept: PHYSICAL THERAPY | Facility: HOSPITAL | Age: 74
Setting detail: THERAPIES SERIES
Discharge: HOME | End: 2019-11-12

## 2019-11-12 DIAGNOSIS — Z47.89 ORTHOPEDIC AFTERCARE: ICD-10-CM

## 2019-11-12 DIAGNOSIS — Z74.09 IMPAIRED MOBILITY: ICD-10-CM

## 2019-11-12 DIAGNOSIS — Z96.652 STATUS POST LEFT KNEE REPLACEMENT: Primary | ICD-10-CM

## 2019-11-12 PROCEDURE — 97110 THERAPEUTIC EXERCISES: CPT | Performed by: PHYSICAL THERAPIST

## 2019-11-12 PROCEDURE — 97112 NEUROMUSCULAR REEDUCATION: CPT | Performed by: PHYSICAL THERAPIST

## 2019-11-12 PROCEDURE — 97161 PT EVAL LOW COMPLEX 20 MIN: CPT | Performed by: PHYSICAL THERAPIST

## 2019-11-12 NOTE — THERAPY EVALUATION
Outpatient Physical Therapy Ortho Initial Evaluation  [facility]     Patient Name: Davida Anderson  : 1945  MRN: [id number]  Today's Date: 2019      Visit Date: 2019    Patient Active Problem List   Diagnosis   • BP (high blood pressure)   • History of left breast infiltrating ductal cancer 2cm s/p mastectomy with reconstruction  s/p chemo   • H/O ETOH abuse   • Gtz's esophagus   • Colon polyps   • Bipolar 1 disorder (CMS/HCC)   • Arthritis   • Raynaud's disease   • Neuropathy   • Lumbar spine pain   • Spondylolisthesis at L4-L5 level   • Right lumbar radiculopathy   • Gtz's esophagus without dysplasia   • Hx of adenomatous colonic polyps   • Dysphagia   • Diarrhea   • Primary osteoarthritis of left knee   • Primary localized osteoarthrosis of the knee, right   • Chronic pain of left knee   • Spinal stenosis of lumbar region with neurogenic claudication   • Spondylolisthesis of lumbar region   • Acute pain of left knee        Past Medical History:   Diagnosis Date   • Acid reflux    • Arthritis    • Atrial fibrillation (CMS/HCC)    • Gtz esophagus    • Bipolar 2 disorder (CMS/HCC)    • Bradycardia     WITH SURGERY   • COPD (chronic obstructive pulmonary disease) (CMS/HCC)     MILD, USES AINHALER PRN   • DDD (degenerative disc disease), lumbar    • Diverticulosis    • Dizziness    • Drug therapy    • Fibrocystic breast    • Frequent UTI     SEES DR GOFF FOR FREQUENT UTI'S, ON KEFLEX   • H/O Infiltrating ductal carcinoma of left breast     Stage IIA, Grade 3 ER/MN +, HER2 -, treated with 5 years of tamoxifen, 5 years of Femara, completed in    • High cholesterol    • History of alcoholism (CMS/HCC)     40 YRS AGO   • History of Clostridium difficile colitis    • History of gastric ulcer    • History of loop recorder    • Hypertension    • Iron deficiency    • Irregular heartbeat     a fib   • Low back pain    • Lumbar herniated disc    • Mass of right breast on  mammogram 03/17/2016    10 o'clock position, 4 cm from the nipple,   • Neuropathy    • PONV (postoperative nausea and vomiting)    • Raynaud disease    • Sleep apnea     MILD, NO NEED FOR CPAP   • Tremors of nervous system    • Urinary incontinence         Past Surgical History:   Procedure Laterality Date   • ANKLE OPEN REDUCTION INTERNAL FIXATION Right 08/14/2015    Dr. Dandre Camacho, [facility]   • BACK SURGERY      LUMBAR   • BREAST RECONSTRUCTION, BREAST TISSUE EXPANDER INSERTION Left 04/11/2002    [place] Contour Profile expander was placed 550 cc size, filled with 200 cc of saline, Dr. Herbert Napoles, [facility]   • COLONOSCOPY N/A 09/16/2014    Dr. Darci Kerns, [facility]; torts, tics, stool, polyp   • COLONOSCOPY N/A 9/27/2016    NTEH, diverticulosis, tortuous colon, Two 3-5mm polyps in the descending colon and the transverse colon, IH.  PATH: Tubular adenoma   • COLONOSCOPY N/A 12/12/2017    NTEH, tics, torts, IH, TA w/low grade dysplasia   • CYSTOSCOPY N/A 05/07/2010    with TVT takedown, Dr. Simon Wall, [facility]   • ENDOSCOPY N/A 9/27/2016    z line irreg, bilious gastric fluid- fluid aspiration preformed, gastritis.  PATH: Squamous and glandular mucosa with minimal superficial acute inflammation.    • ENDOSCOPY N/A 12/12/2017    Z line irregular, gastritis, duodenitis, chronic inflammation   • EPIDURAL BLOCK     • KNEE SURGERY Left     BROKEN   • LUMBAR DISCECTOMY FUSION INSTRUMENTATION N/A 12/3/2018    Procedure: L4-5 laminectomy and fusion with instrumentation;  Surgeon: Austin Keith MD;  Location: [facility];  Service: Orthopedic Spine   • MAMMO US BREAST BIOPSY ADDITIONAL W WO DEVICE Left 02/21/2002    2:00 position left breast, Infiltrating Ductal Carcinoma, [facility]   • MANDIBLE FRACTURE SURGERY     • MASTECTOMY Left 04/11/2002    Left Total Mastectomy and Left Axillary Stanford Node Biobsy, Dr. Indu Akins, [facility]   • OTHER SURGICAL HISTORY      CARDIAC LOOP DEVICE, LEFT CHEST   • TENSION FREE VAGINAL TAPING WITH  MINI ARC SLING N/A 10/26/2009    Dr. Gina Castillo, [facility]   • TOTAL KNEE ARTHROPLASTY Left 10/21/2019    Procedure: TOTAL KNEE ARTHROPLASTY;  Surgeon: Anurag Serrano MD;  Location: [facility];  Service: Orthopedics   • UPPER GASTROINTESTINAL ENDOSCOPY  09/16/2014    z-line irreg, grade a reflux, gastritis       Visit Dx:     ICD-10-CM ICD-9-CM   1. Status post left knee replacement Z96.652 V43.65   2. Orthopedic aftercare Z47.89 V54.9   3. Impaired mobility Z74.09 799.89         Patient History     Row Name 11/12/19 1300             History    Chief Complaint  Balance Problems;Difficulty Walking;Difficulty with daily activities;Joint swelling;Pain;Muscle weakness;Swelling  -CJ      Type of Pain  Knee pain  -CJ      Brief Description of Current Complaint  s/p L TKR. Impaired functional mobility reported  -CJ      Patient/Caregiver Goals  Relieve pain;Return to prior level of function;Improve mobility;Improve strength;Know what to do to help the symptoms  -CJ      Current Tobacco Use  none  -CJ      Patient's Rating of General Health  Good  -CJ      Hand Dominance  right-handed  -CJ      Related/Recent Hospitalizations  Yes  -CJ      Date of Hospitalization  10/21/19  -CJ      Are you or can you be pregnant  No  -CJ         Pain     Pain Location  Knee  -CJ      Pain at Present  8  -CJ      Pain at Best  4  -CJ      Pain at Worst  8  -CJ      Pain Frequency  Constant/continuous  -CJ      Pain Description  Throbbing;Aching  -CJ      What Performance Factors Make the Current Problem(s) WORSE?  standing/walking too long  -CJ      Is your sleep disturbed?  No  -CJ      Is medication used to assist with sleep?  Yes  -CJ      Difficulties at work?  retired  -CJ      Difficulties with ADL's?  yes; walking, stairs, squatting  -CJ      Difficulties with recreational activities?  yes; all  -CJ         Fall Risk Assessment    Any falls in the past year:  No  -CJ         Services    Prior Rehab/Home Health Experiences  Yes  -CJ       Are you currently receiving Home Health services  No  -CJ         Daily Activities    Primary Language  English  -CJ      Are you able to read  Yes  -CJ      Are you able to write  Yes  -CJ      How does patient learn best?  Reading;Pictures/Video  -CJ      Teaching needs identified  Home Exercise Program;Management of Condition  -CJ      Patient is concerned about/has problems with  Climbing Stairs;Coordination;Flexibility;Performing home management (household chores, shopping, care of dependents);Transfers (getting out of a chair, bed);Walking  -CJ      Does patient have problems with the following?  None  -CJ      Barriers to learning  None  -CJ      Pt Participated in POC and Goals  Yes  -CJ         Safety    Are you being hurt, hit, or frightened by anyone at home or in your life?  No  -CJ      Are you being neglected by a caregiver  No  -CJ        User Key  (r) = Recorded By, (t) = Taken By, (c) = Cosigned By    Initials Name Provider Type    CJ Ricardo Guevara, PT Physical Therapist          PT Ortho     Row Name 11/12/19 1300       Posture/Observations    Observations  Incision healing;Edema  -CJ    Posture/Observations Comments  baker's cyst palpated  -CJ       Left Lower Ext    Lt Knee Extension/Flexion AROM  (8)-98  -CJ    Lt Knee Extension/Flexion PROM  (5)-100  -CJ       MMT Left Lower Ext    Lt Knee Extension MMT, Gross Movement  (4-/5) good minus  -CJ    Lt Knee Flexion MMT, Gross Movement  (4-/5) good minus  -CJ       Flexibility    Flexibility Tested?  Lower Extremity  -CJ       Lower Extremity Flexibility    Hamstrings  Bilateral:;Mildly limited  -CJ    Quadriceps  Left:;Moderately limited  -CJ    Gastrocnemius  Bilateral:;Mildly limited  -CJ       Balance Skills Training    SLS  Unable on L  -CJ       Gait/Stairs Assessment/Training    [place] Level (Gait)  independent  -CJ    Left Sided Gait Deviations  heel strike decreased;weight shift ability decreased  -CJ    Handrail Location  (Stairs)  both sides  -    Number of Steps (Stairs)  4  -CJ    Ascending Technique (Stairs)  step-to-step  -CJ    Descending Technique (Stairs)  step-to-step  -      User Key  (r) = Recorded By, (t) = Taken By, (c) = Cosigned By    Initials Name Provider Type    Ricardo Joaquin, PT Physical Therapist                      Therapy Education  Given: HEP, Symptoms/condition management, Pain management, Mobility training  Program: New  How Provided: Verbal, Demonstration, Written  Provided to: Patient  Level of Understanding: Teach back education performed, Verbalized, Demonstrated     PT OP Goals     Row Name 11/12/19 1500          PT Short Term Goals    STG 1  Patient will be independent and compliant with initial home exercise program  -     STG 2  Patient will be independent with education for symptom management, posture, body mechanics, and strategies to minimize stress on affected tissues  -     STG 3  pt. to ambulate with near normal heel to toe gait pattern with SC to facilitate ease/safety with community mobility  -        Long Term Goals    LTG 1  Patient will be independent and compliant with advanced home exercise program to facilitate self-management of symptoms once discharged from formal therapy.  -     LTG 2  Patient will ambulate independently up/down one flight of stairs with reciprocal technique for improved household and community mobility  -     LTG 3  Patient will demonstrate 5/5 L knee strength for improved function and ease with return to deficit free ambulation and stair negotiation.  -     LTG 4  Patient will demonstrate improved knee flexion to at least 120 degrees and full knee extension for improved AROM and ease with ADLs and mobility.  -     LTG 5  Patient will be able to get in/out of the tub independently.  -     LTG 6  Pt. will report L knee pain </= 1-2/10 with all daily activities and sleeping.  -       User Key  (r) = Recorded By, (t) = Taken By, (c) = Cosigned  By    Initials Name Provider Type    Ricardo Joaquin, PT Physical Therapist          PT Assessment/Plan     Row Name 11/12/19 1527          PT Assessment    Functional Limitations  Impaired gait;Limitation in home management;Limitations in community activities;Performance in self-care ADL;Performance in leisure activities;Limitations in functional capacity and performance  -     Impairments  Balance;Gait;Impaired flexibility;Muscle strength;Pain;Range of motion;Impaired muscle endurance;Edema  -     Assessment Comments  Davida Anderson is a 74 y.o. year-old female referred to physical therapy for L knee pain and impaired mobility secondary to TKR performed on 10/21/19. She presents with a stable clinical presentation.  She has comorbidities of baker's cyst and personal factors of chronicity of knee pain that may affect her progress in the plan of care. Self scored disability measure of Knee Outcome Measure was a  39/80 (where 80/80 = no deficits). She demonstrated impaired gait (lack of heel strike and decreased weight shifting onto LLE), decreased flex/ext AROM, decreased LLE strength, and impaired stair negotiation.  Signs and symptoms are consistent with physical therapy diagnosis of impaired functional mobility secondary to joint replacement. She is appropriate for skilled therapy services at this time to address deficits and improve ease with household and community mobility and ADLs.  -CJ     Please refer to paper survey for additional self-reported information  Yes  -CJ     Rehab Potential  Good  -CJ     Patient/caregiver participated in establishment of treatment plan and goals  Yes  -CJ     Patient would benefit from skilled therapy intervention  Yes  -CJ        PT Plan    PT Frequency  2x/week  -CJ     Predicted Duration of Therapy Intervention (Therapy Eval)  8 weeks  -CJ     Planned CPT's?  PT EVAL LOW COMPLEXITY: 83912;PT THER PROC EA 15 MIN: 19119;PT THER ACT EA 15 MIN: 43637;PT MANUAL THERAPY EA  15 MIN: 32768;PT GAIT TRAINING EA 15 MIN: 83742;PT NEUROMUSC RE-EDUCATION EA 15 MIN: 20640;PT AQUATIC THERAPY EA 15 MIN: 00795;PT HOT OR COLD PACK TREAT MCARE;PT ULTRASOUND EA 15 MIN: 01898  -CJ     PT Plan Comments  Nustep warm up; L TKR protocol  -CJ       User Key  (r) = Recorded By, (t) = Taken By, (c) = Cosigned By    Initials Name Provider Type    Ricardo Joaquin PT Physical Therapist            OP Exercises     Row Name 11/12/19 1500             Total Minutes    12229 - PT Therapeutic Exercise Minutes  15  -CJ      30981 -  PT Neuromuscular Reeducation Minutes  15  -CJ         Exercise 1    Exercise Name 1  Nustep-L2  -CJ      Time 1  5 min  -CJ         Exercise 2    Exercise Name 2  calf stretch  -CJ      Reps 2  3  -CJ      Time 2  20 sec  -CJ         Exercise 3    Exercise Name 3  HS stretch on 2nd step  -CJ      Reps 3  2  -CJ      Time 3  20 sec  -CJ         Exercise 4    Exercise Name 4  Quad set  -CJ      Reps 4  10  -CJ      Time 4  5 sec  -CJ         Exercise 5    Exercise Name 5  Heel slides  -CJ      Reps 5  10  -CJ      Time 5  5 sec  -CJ         Exercise 6    Exercise Name 6  LAQ  -CJ      Reps 6  10  -CJ      Time 6  5 sec  -CJ         Exercise 7    Exercise Name 7  SAQ  -CJ      Reps 7  10  -CJ      Time 7  5 sec  -CJ         Exercise 8    Exercise Name 8  sidestepping  -CJ      Reps 8  3 laps  -CJ         Exercise 9    Exercise Name 9  tamera tamera  -CJ      Reps 9  10 B  -CJ         Exercise 10    Exercise Name 10  gait training  -CJ      Additional Comments  heel strike  -CJ        User Key  (r) = Recorded By, (t) = Taken By, (c) = Cosigned By    Initials Name Provider Type    Ricardo Joaquin, PT Physical Therapist                        Outcome Measure Options: Knee Outcome Score- ADL  Knee Outcome Score  Knee Outcome Score Comments: 39/80      Time Calculation:     Start Time: 1315  Stop Time: 1400  Time Calculation (min): 45 min  Total Timed Code Minutes- PT: 30 minute(s)     Therapy  Charges for Today     Code Description Service Date Service Provider Modifiers Qty    [id number]  PT NEUROMUSC RE EDUCATION EA 15 MIN 11/12/2019 Ricardo Guevara, PT GP 1    19884551616 HC PT THER PROC EA 15 MIN 11/12/2019 Ricardo Guevara, PT GP 1    [id number]  PT EVAL LOW COMPLEXITY 1 11/12/2019 Ricardo Guevara, PT GP 1          PT G-Codes  Outcome Measure Options: Knee Outcome Score- ADL         Ricardo Guevara, PT  11/12/2019

## 2019-11-14 ENCOUNTER — HOSPITAL ENCOUNTER (OUTPATIENT)
Dept: PHYSICAL THERAPY | Facility: HOSPITAL | Age: 74
Setting detail: THERAPIES SERIES
Discharge: HOME | End: 2019-11-14

## 2019-11-14 DIAGNOSIS — M25.562 CHRONIC PAIN OF LEFT KNEE: ICD-10-CM

## 2019-11-14 DIAGNOSIS — G89.29 CHRONIC PAIN OF LEFT KNEE: ICD-10-CM

## 2019-11-14 DIAGNOSIS — Z96.652 STATUS POST LEFT KNEE REPLACEMENT: Primary | ICD-10-CM

## 2019-11-14 DIAGNOSIS — Z74.09 IMPAIRED MOBILITY: ICD-10-CM

## 2019-11-14 DIAGNOSIS — Z47.89 ORTHOPEDIC AFTERCARE: ICD-10-CM

## 2019-11-14 PROCEDURE — 97110 THERAPEUTIC EXERCISES: CPT

## 2019-11-14 NOTE — THERAPY TREATMENT NOTE
Outpatient Physical Therapy Ortho Treatment Note  [facility]     Patient Name: Davida Anderson  : 1945  MRN: [id number]  Today's Date: 2019      Visit Date: 2019    Visit Dx:    ICD-10-CM ICD-9-CM   1. Status post left knee replacement Z96.652 V43.65   2. Orthopedic aftercare Z47.89 V54.9   3. Impaired mobility Z74.09 799.89   4. Chronic pain of left knee M25.562 719.46    G89.29 338.29       Patient Active Problem List   Diagnosis   • BP (high blood pressure)   • History of left breast infiltrating ductal cancer 2cm s/p mastectomy with reconstruction  s/p chemo   • H/O ETOH abuse   • Gtz's esophagus   • Colon polyps   • Bipolar 1 disorder (CMS/HCC)   • Arthritis   • Raynaud's disease   • Neuropathy   • Lumbar spine pain   • Spondylolisthesis at L4-L5 level   • Right lumbar radiculopathy   • Gtz's esophagus without dysplasia   • Hx of adenomatous colonic polyps   • Dysphagia   • Diarrhea   • Primary osteoarthritis of left knee   • Primary localized osteoarthrosis of the knee, right   • Chronic pain of left knee   • Spinal stenosis of lumbar region with neurogenic claudication   • Spondylolisthesis of lumbar region   • Acute pain of left knee        Past Medical History:   Diagnosis Date   • Acid reflux    • Arthritis    • Atrial fibrillation (CMS/HCC)    • Gtz esophagus    • Bipolar 2 disorder (CMS/HCC)    • Bradycardia     WITH SURGERY   • COPD (chronic obstructive pulmonary disease) (CMS/HCC)     MILD, USES AINHALER PRN   • DDD (degenerative disc disease), lumbar    • Diverticulosis    • Dizziness    • Drug therapy    • Fibrocystic breast    • Frequent UTI     SEES DR GOFF FOR FREQUENT UTI'S, ON KEFLEX   • H/O Infiltrating ductal carcinoma of left breast     Stage IIA, Grade 3 ER/[place] +, HER2 -, treated with 5 years of tamoxifen, 5 years of Femara, completed in    • High cholesterol    • History of alcoholism (CMS/HCC)     40 YRS AGO   • History of Clostridium  difficile colitis 2014   • History of gastric ulcer    • History of loop recorder    • Hypertension    • Iron deficiency    • Irregular heartbeat     a fib   • Low back pain    • Lumbar herniated disc    • Mass of right breast on mammogram 03/17/2016    10 o'clock position, 4 cm from the nipple,   • Neuropathy    • PONV (postoperative nausea and vomiting)    • Raynaud disease    • Sleep apnea     MILD, NO NEED FOR CPAP   • Tremors of nervous system    • Urinary incontinence         Past Surgical History:   Procedure Laterality Date   • ANKLE OPEN REDUCTION INTERNAL FIXATION Right 08/14/2015    Dr. Dandre Camacho, [facility]   • BACK SURGERY      LUMBAR   • BREAST RECONSTRUCTION, BREAST TISSUE EXPANDER INSERTION Left 04/11/2002    [place] Contour Profile expander was placed 550 cc size, filled with 200 cc of saline, Dr. Herbert Napoles, [facility]   • COLONOSCOPY N/A 09/16/2014    Dr. Darci Kerns, [facility]; torts, tics, stool, polyp   • COLONOSCOPY N/A 9/27/2016    NTEH, diverticulosis, tortuous colon, Two 3-5mm polyps in the descending colon and the transverse colon, IH.  PATH: Tubular adenoma   • COLONOSCOPY N/A 12/12/2017    NTEH, tics, torts, IH, TA w/low grade dysplasia   • CYSTOSCOPY N/A 05/07/2010    with TVT takedown, Dr. Simon Wall, [facility]   • ENDOSCOPY N/A 9/27/2016    z line irreg, bilious gastric fluid- fluid aspiration preformed, gastritis.  PATH: Squamous and glandular mucosa with minimal superficial acute inflammation.    • ENDOSCOPY N/A 12/12/2017    Z line irregular, gastritis, duodenitis, chronic inflammation   • EPIDURAL BLOCK     • KNEE SURGERY Left     BROKEN   • LUMBAR DISCECTOMY FUSION INSTRUMENTATION N/A 12/3/2018    Procedure: L4-5 laminectomy and fusion with instrumentation;  Surgeon: Austin Keith MD;  Location: [facility] OR;  Service: Orthopedic Spine   • MAMMO US BREAST BIOPSY ADDITIONAL W WO DEVICE Left 02/21/2002    2:00 position left breast, Infiltrating Ductal Carcinoma, [facility]   • MANDIBLE FRACTURE  "SURGERY     • MASTECTOMY Left 04/11/2002    Left Total Mastectomy and Left Axillary [place] Node [place], Dr. Indu Akins, [facility]   • OTHER SURGICAL HISTORY      CARDIAC LOOP DEVICE, LEFT CHEST   • TENSION FREE VAGINAL TAPING WITH MINI ARC SLING N/A 10/26/2009    Dr. Gina Castillo, [facility]   • TOTAL KNEE ARTHROPLASTY Left 10/21/2019    Procedure: TOTAL KNEE ARTHROPLASTY;  Surgeon: Anurag Serrano MD;  Location: [facility] OR;  Service: Orthopedics   • UPPER GASTROINTESTINAL ENDOSCOPY  09/16/2014    z-line irreg, grade a reflux, gastritis       PT Ortho     Row Name 11/14/19 1100       Left Lower Ext    Lt Knee Extension/Flexion AROM   -8 to 100  -SI    Lt Knee Extension/Flexion PROM  105  -SI    LT Lower Extremity Comments  real inconsistent on her active bends.104 then 90 the wroked up to 100....lots encouragement  -SI    Row Name 11/14/19 1000       Subjective Comments    Subjective Comments  Managing fairly well at home with help of .  -SI       Subjective Pain    Able to rate subjective pain?  yes  -SI    Subjective Pain Comment  posterior knee pain.  3 pain  pills per day  -SI       Posture/Observations    Observations  Incision healing 1 inch long black scab anterior left leg below knee noted  -SI       Gait/Stairs Assessment/Training    Gait/Stairs Assessment/Training  gait/ambulation assistive device  -SI    [place] Level (Gait)  conditional independence  -SI    Assistive Device (Gait)  cane, straight  -SI    Distance in Feet (Gait)  50  -SI    Pattern (Gait)  step-to;step-through step through gait instruction  -SI    Comment (Gait/Stairs)  \"Cautious\" gait with cane.  -SI    Row Name 11/12/19 1300       Posture/Observations    Observations  Incision healing;Edema  -CJ    Posture/Observations Comments  baker's cyst palpated  -CJ       Left Lower Ext    Lt Knee Extension/Flexion AROM  (8)-98  -CJ    Lt Knee Extension/Flexion PROM  (5)-100  -CJ       MMT Left Lower Ext    Lt Knee Extension MMT, Gross " Movement  (4-/5) good minus  -CJ    Lt Knee Flexion MMT, Gross Movement  (4-/5) good minus  -CJ       Flexibility    Flexibility Tested?  Lower Extremity  -CJ       Lower Extremity Flexibility    Hamstrings  Bilateral:;Mildly limited  -CJ    Quadriceps  Left:;Moderately limited  -CJ    Gastrocnemius  Bilateral:;Mildly limited  -CJ       Balance Skills Training    SLS  Unable on L  -CJ       Gait/Stairs Assessment/Training    [place] Level (Gait)  independent  -CJ    Left Sided Gait Deviations  heel strike decreased;weight shift ability decreased  -CJ    Handrail Location (Stairs)  both sides  -CJ    Number of Steps (Stairs)  4  -CJ    Ascending Technique (Stairs)  step-to-step  -CJ    Descending Technique (Stairs)  step-to-step  -CJ      User Key  (r) = Recorded By, (t) = Taken By, (c) = Cosigned By    Initials Name Provider Type    Sharonda Sanon, PAULINO Physical Therapy Assistant    CJ Ricardo Guevara, PT Physical Therapist                      PT Assessment/Plan     Row Name 11/14/19 1133          PT Assessment    Assessment Comments  Pt admits to having trouble pushing herself with the ex at home.  She has pain and naaturally wants to stop.  Ice after PT helped.  -SI       User Key  (r) = Recorded By, (t) = Taken By, (c) = Cosigned By    Initials Name Provider Type    Sharonda Sanon PTA Physical Therapy Assistant          Modalities     Row Name 11/14/19 1100             Ice    Ice Applied  Yes  -SI      Location  left knee elevated 2 pillows  -SI      Rx Minutes  10 mins  -SI      Ice S/P Rx  Yes  -SI        User Key  (r) = Recorded By, (t) = Taken By, (c) = Cosigned By    Initials Name Provider Type    Sharonda Sanon PTA Physical Therapy Assistant        OP Exercises     Row Name 11/14/19 1000             Subjective Comments    Subjective Comments  Managing fairly well at home with help of .  -SI         Subjective Pain    Able to rate subjective pain?  yes  -SI      Subjective Pain  Comment  posterior knee pain.  3 pain  pills per day  -SI         Total Minutes    00738 - PT Therapeutic Exercise Minutes  45  -SI         Exercise 1    Exercise Name 1  Nustep-L4  -SI      Time 1  5 min  -SI         Exercise 2    Exercise Name 2  calf stretch  -SI      Reps 2  3  -SI      Time 2  20 sec  -SI         Exercise 3    Exercise Name 3  HS stretch on 2nd step  -SI      Reps 3  2  -SI      Time 3  20 sec  -SI         Exercise 4    Exercise Name 4  Quad set  -SI      Reps 4  20  -SI      Time 4  5 sec  -SI         Exercise 5    Exercise Name 5  Heel slides long sitting with back support  -SI      Reps 5  10  -SI      Time 5  10 sec  -SI         Exercise 6    Exercise Name 6  LAQ  -SI      Reps 6  20  -SI      Time 6  5 sec  -SI         Exercise 7    Exercise Name 7  SAQ 2 lbs  -SI      Reps 7  20  -SI      Time 7  5 sec  -SI         Exercise 8    Exercise Name 8  sidestepping  -SI      Reps 8  3 laps  -SI      Additional Comments  ------------  -SI         Exercise 9    Exercise Name 9  tamera tamera  -SI      Reps 9  10 B  -SI      Additional Comments  -------------------  -SI         Exercise 10    Exercise Name 10  HS strec sit side of mat  -SI      Reps 10  5  -SI      Time 10  20  -SI        User Key  (r) = Recorded By, (t) = Taken By, (c) = Cosigned By    Initials Name Provider Type    Sharonda Sanon, PTA Physical Therapy Assistant                           Therapy Education  Given: HEP, Symptoms/condition management, Pain management, Edema management(discussed ice and elevation.  Pushing herself a little with HEP and do stretching BID)  Program: Progressed  How Provided: Verbal, Demonstration, Written  Provided to: Patient  Level of Understanding: Teach back education performed              Time Calculation:   Start Time: 1045  Stop Time: 1145  Time Calculation (min): 60 min  Total Timed Code Minutes- PT: 45 minute(s)  Therapy Charges for Today     Code Description Service Date Service Provider  Modifiers Qty    [id number]  PT THER PROC EA 15 MIN 11/14/2019 Sharonda Lala, PTA GP 3                    Sharonda Lala, PAULINO  11/14/2019

## 2019-11-18 RX ORDER — HYDROCODONE BITARTRATE AND ACETAMINOPHEN 7.5; 325 MG/1; MG/1
2 TABLET ORAL EVERY 4 HOURS PRN
Qty: 30 TABLET | Refills: 0 | Status: SHIPPED | OUTPATIENT
Start: 2019-11-18 | End: 2019-12-18

## 2019-11-21 ENCOUNTER — HOSPITAL ENCOUNTER (OUTPATIENT)
Dept: PHYSICAL THERAPY | Facility: HOSPITAL | Age: 74
Setting detail: THERAPIES SERIES
Discharge: HOME | End: 2019-11-21

## 2019-11-21 DIAGNOSIS — Z96.652 STATUS POST LEFT KNEE REPLACEMENT: Primary | ICD-10-CM

## 2019-11-21 DIAGNOSIS — Z47.89 ORTHOPEDIC AFTERCARE: ICD-10-CM

## 2019-11-21 DIAGNOSIS — Z74.09 IMPAIRED MOBILITY: ICD-10-CM

## 2019-11-21 PROCEDURE — 97110 THERAPEUTIC EXERCISES: CPT

## 2019-11-21 NOTE — THERAPY TREATMENT NOTE
Outpatient Physical Therapy Ortho Treatment Note  [facility]     Patient Name: Davida Anderson  : 1945  MRN: [id number]  Today's Date: 2019      Visit Date: 2019    Visit Dx:    ICD-10-CM ICD-9-CM   1. Status post left knee replacement Z96.652 V43.65   2. Orthopedic aftercare Z47.89 V54.9   3. Impaired mobility Z74.09 799.89       Patient Active Problem List   Diagnosis   • BP (high blood pressure)   • History of left breast infiltrating ductal cancer 2cm s/p mastectomy with reconstruction  s/p chemo   • H/O ETOH abuse   • Gtz's esophagus   • Colon polyps   • Bipolar 1 disorder (CMS/HCC)   • Arthritis   • Raynaud's disease   • Neuropathy   • Lumbar spine pain   • Spondylolisthesis at L4-L5 level   • Right lumbar radiculopathy   • Gtz's esophagus without dysplasia   • Hx of adenomatous colonic polyps   • Dysphagia   • Diarrhea   • Primary osteoarthritis of left knee   • Primary localized osteoarthrosis of the knee, right   • Chronic pain of left knee   • Spinal stenosis of lumbar region with neurogenic claudication   • Spondylolisthesis of lumbar region   • Acute pain of left knee        Past Medical History:   Diagnosis Date   • Acid reflux    • Arthritis    • Atrial fibrillation (CMS/HCC)    • Gtz esophagus    • Bipolar 2 disorder (CMS/HCC)    • Bradycardia     WITH SURGERY   • COPD (chronic obstructive pulmonary disease) (CMS/HCC)     MILD, USES AINHALER PRN   • DDD (degenerative disc disease), lumbar    • Diverticulosis    • Dizziness    • Drug therapy    • Fibrocystic breast    • Frequent UTI     SEES DR GOFF FOR FREQUENT UTI'S, ON KEFLEX   • H/O Infiltrating ductal carcinoma of left breast     Stage IIA, Grade 3 ER/[place] +, HER2 -, treated with 5 years of tamoxifen, 5 years of Femara, completed in    • High cholesterol    • History of alcoholism (CMS/HCC)     40 YRS AGO   • History of Clostridium difficile colitis    • History of gastric ulcer    • History of  loop recorder    • Hypertension    • Iron deficiency    • Irregular heartbeat     a fib   • Low back pain    • Lumbar herniated disc    • Mass of right breast on mammogram 03/17/2016    10 o'clock position, 4 cm from the nipple,   • Neuropathy    • PONV (postoperative nausea and vomiting)    • Raynaud disease    • Sleep apnea     MILD, NO NEED FOR CPAP   • Tremors of nervous system    • Urinary incontinence         Past Surgical History:   Procedure Laterality Date   • ANKLE OPEN REDUCTION INTERNAL FIXATION Right 08/14/2015    Dr. Dandre Camacho, [facility]   • BACK SURGERY      LUMBAR   • BREAST RECONSTRUCTION, BREAST TISSUE EXPANDER INSERTION Left 04/11/2002    [place] Contour Profile expander was placed 550 cc size, filled with 200 cc of saline, Dr. Herbert Napoles, [facility]   • COLONOSCOPY N/A 09/16/2014    Dr. Darci Kerns, [facility]; torts, tics, stool, polyp   • COLONOSCOPY N/A 9/27/2016    NTEH, diverticulosis, tortuous colon, Two 3-5mm polyps in the descending colon and the transverse colon, IH.  PATH: Tubular adenoma   • COLONOSCOPY N/A 12/12/2017    NTEH, tics, torts, IH, TA w/low grade dysplasia   • CYSTOSCOPY N/A 05/07/2010    with TVT takedown, Dr. Simon Wall, [facility]   • ENDOSCOPY N/A 9/27/2016    z line irreg, bilious gastric fluid- fluid aspiration preformed, gastritis.  PATH: Squamous and glandular mucosa with minimal superficial acute inflammation.    • ENDOSCOPY N/A 12/12/2017    Z line irregular, gastritis, duodenitis, chronic inflammation   • EPIDURAL BLOCK     • KNEE SURGERY Left     BROKEN   • LUMBAR DISCECTOMY FUSION INSTRUMENTATION N/A 12/3/2018    Procedure: L4-5 laminectomy and fusion with instrumentation;  Surgeon: Austin Keith MD;  Location: [facility];  Service: Orthopedic Spine   • MAMMO US BREAST BIOPSY ADDITIONAL W WO DEVICE Left 02/21/2002    2:00 position left breast, Infiltrating Ductal Carcinoma, [facility]   • MANDIBLE FRACTURE SURGERY     • MASTECTOMY Left 04/11/2002    Left Total Mastectomy and  Left Axillary [place] Node [place], Dr. Indu Akins, [facility]   • OTHER SURGICAL HISTORY      CARDIAC LOOP DEVICE, LEFT CHEST   • TENSION FREE VAGINAL TAPING WITH MINI ARC SLING N/A 10/26/2009    Dr. Gina Castillo, [facility]   • TOTAL KNEE ARTHROPLASTY Left 10/21/2019    Procedure: TOTAL KNEE ARTHROPLASTY;  Surgeon: Anurag Serrano MD;  Location: [facility];  Service: Orthopedics   • UPPER GASTROINTESTINAL ENDOSCOPY  09/16/2014    z-line irreg, grade a reflux, gastritis       PT Ortho     Row Name 11/21/19 0900       Subjective Comments    Subjective Comments  Still taking pain meds but 1/2 tablet 3 times a day...  -SI       Subjective Pain    Able to rate subjective pain?  yes  -SI    Subjective Pain Comment  more generalized knee pain reported when has it  -SI       Posture/Observations    Observations  Incision healing  -SI       Left Lower Ext    Lt Knee Extension/Flexion AROM  -4 to 108 supine to   -SI    Lt Knee Extension/Flexion PROM  120  -SI    LT Lower Extremity Comments  inconsistent with active flexion....HS weak  -SI       Gait/Stairs Assessment/Training    Gait/Stairs Assessment/Training  gait/ambulation independence  -SI    Assistive Device (Gait)  -- no assist device  -SI    Distance in Feet (Gait)  150  -SI    Number of Steps (Stairs)  curb step x 2 and 5 stairs with railing  -SI    Ascending Technique (Stairs)  step-over-step  -SI    Descending Technique (Stairs)  step-over-step  -SI    Comment (Gait/Stairs)  Ind gait with or without cane.  Recip on stairs with railing  -SI      User Key  (r) = Recorded By, (t) = Taken By, (c) = Cosigned By    Initials Name Provider Type    SI Sharonda Lala, PTA Physical Therapy Assistant                      PT Assessment/Plan     Row Name 11/21/19 0997          PT Assessment    Assessment Comments  standing HS curls painful, switched to mini squats.  PROM excellent, active weak and or little inhibited.  -SI       User Key  (r) = Recorded By, (t) = Taken By, (c) =  Cosigned By    Initials Name Provider Type    Niya Sanonfernando ENCISO PTA Physical Therapy Assistant          Modalities     Row Name 11/21/19 0900             Ice    Ice Applied  Yes  -SI      Location  left knee elevated 2 pillows  -SI      Rx Minutes  10 mins  -SI      Ice S/P Rx  Yes  -SI        User Key  (r) = Recorded By, (t) = Taken By, (c) = Cosigned By    Initials Name Provider Type    Sharonda SanonPAULINO Physical Therapy Assistant        OP Exercises     Row Name 11/21/19 0900             Subjective Comments    Subjective Comments  Still taking pain meds but 1/2 tablet 3 times a day...  -SI         Subjective Pain    Able to rate subjective pain?  yes  -SI      Subjective Pain Comment  more generalized knee pain reported when has it  -SI         Total Minutes    26031 - PT Therapeutic Exercise Minutes  45  -SI         Exercise 1    Exercise Name 1  Nustep-L4  -SI      Time 1  5 min  -SI         Exercise 2    Exercise Name 2  calf stretch  -SI      Reps 2  3  -SI      Time 2  20 sec  -SI         Exercise 3    Exercise Name 3  HS stretch sit side of mat  -SI      Reps 3  2  -SI      Time 3  20 sec  -SI         Exercise 4    Exercise Name 4  Quad set  -SI      Reps 4  20  -SI      Time 4  5 sec  -SI         Exercise 5    Exercise Name 5  Heel slides long sitting with back support  -SI      Reps 5  10  -SI      Time 5  10 sec  -SI         Exercise 6    Exercise Name 6  LAQ  -SI      Reps 6  20  -SI      Time 6  5 sec  -SI         Exercise 7    Exercise Name 7  SAQ 2 lbs  -SI      Reps 7  20  -SI      Time 7  5 sec  -SI         Exercise 8    Exercise Name 8  sidestepping  -SI      Reps 8  3 laps  -SI         Exercise 9    Exercise Name 9  tamera tamera  -SI      Reps 9  10 B  -SI      Additional Comments  -------------------  -SI         Exercise 10    Exercise Name 10  mini squat  -SI      Reps 10  10  -SI      Time 10  5  -SI        User Key  (r) = Recorded By, (t) = Taken By, (c) = Cosigned By    Initials Name  Provider Type    SI Sharonda Lala PTA Physical Therapy Assistant                                          Time Calculation:   Start Time: 0915  Stop Time: 1010  Time Calculation (min): 55 min  Total Timed Code Minutes- PT: 45 minute(s)  Therapy Charges for Today     Code Description Service Date Service Provider Modifiers Qty    [id number] HC PT THER PROC EA 15 MIN 11/21/2019 Sharonda Lala PTA GP 3                    Sharonda Lala PTA  11/21/2019

## 2019-11-25 ENCOUNTER — HOSPITAL ENCOUNTER (OUTPATIENT)
Dept: PHYSICAL THERAPY | Facility: HOSPITAL | Age: 74
Setting detail: THERAPIES SERIES
Discharge: HOME | End: 2019-11-25

## 2019-11-25 DIAGNOSIS — Z47.89 ORTHOPEDIC AFTERCARE: ICD-10-CM

## 2019-11-25 DIAGNOSIS — Z74.09 IMPAIRED MOBILITY: ICD-10-CM

## 2019-11-25 DIAGNOSIS — M17.12 OSTEOARTHRITIS OF LEFT KNEE, UNSPECIFIED OSTEOARTHRITIS TYPE: ICD-10-CM

## 2019-11-25 DIAGNOSIS — G89.29 CHRONIC PAIN OF LEFT KNEE: ICD-10-CM

## 2019-11-25 DIAGNOSIS — Z96.652 STATUS POST LEFT KNEE REPLACEMENT: Primary | ICD-10-CM

## 2019-11-25 DIAGNOSIS — M25.562 CHRONIC PAIN OF LEFT KNEE: ICD-10-CM

## 2019-11-25 PROCEDURE — 97110 THERAPEUTIC EXERCISES: CPT | Performed by: PHYSICAL THERAPIST

## 2019-11-25 PROCEDURE — 97112 NEUROMUSCULAR REEDUCATION: CPT | Performed by: PHYSICAL THERAPIST

## 2019-11-25 NOTE — THERAPY TREATMENT NOTE
Outpatient Physical Therapy Ortho Treatment Note  [facility]     Patient Name: Davida Anderson  : 1945  MRN: [id number]  Today's Date: 2019      Visit Date: 2019    Visit Dx:    ICD-10-CM ICD-9-CM   1. Status post left knee replacement Z96.652 V43.65   2. Orthopedic aftercare Z47.89 V54.9   3. Impaired mobility Z74.09 799.89   4. Chronic pain of left knee M25.562 719.46    G89.29 338.29   5. Osteoarthritis of left knee, unspecified osteoarthritis type M17.12 715.96       Patient Active Problem List   Diagnosis   • BP (high blood pressure)   • History of left breast infiltrating ductal cancer 2cm s/p mastectomy with reconstruction  s/p chemo   • H/O ETOH abuse   • Gtz's esophagus   • Colon polyps   • Bipolar 1 disorder (CMS/HCC)   • Arthritis   • Raynaud's disease   • Neuropathy   • Lumbar spine pain   • Spondylolisthesis at L4-L5 level   • Right lumbar radiculopathy   • Gtz's esophagus without dysplasia   • Hx of adenomatous colonic polyps   • Dysphagia   • Diarrhea   • Primary osteoarthritis of left knee   • Primary localized osteoarthrosis of the knee, right   • Chronic pain of left knee   • Spinal stenosis of lumbar region with neurogenic claudication   • Spondylolisthesis of lumbar region   • Acute pain of left knee        Past Medical History:   Diagnosis Date   • Acid reflux    • Arthritis    • Atrial fibrillation (CMS/HCC)    • Gtz esophagus    • Bipolar 2 disorder (CMS/HCC)    • Bradycardia     WITH SURGERY   • COPD (chronic obstructive pulmonary disease) (CMS/HCC)     MILD, USES AINHALER PRN   • DDD (degenerative disc disease), lumbar    • Diverticulosis    • Dizziness    • Drug therapy    • Fibrocystic breast    • Frequent UTI     SEES DR GOFF FOR FREQUENT UTI'S, ON KEFLEX   • H/O Infiltrating ductal carcinoma of left breast     Stage IIA, Grade 3 ER/SD +, HER2 -, treated with 5 years of tamoxifen, 5 years of Femara, completed in    • High cholesterol     • History of alcoholism (CMS/HCC)     40 YRS AGO   • History of Clostridium difficile colitis 2014   • History of gastric ulcer    • History of loop recorder    • Hypertension    • Iron deficiency    • Irregular heartbeat     a fib   • Low back pain    • Lumbar herniated disc    • Mass of right breast on mammogram 03/17/2016    10 o'clock position, 4 cm from the nipple,   • Neuropathy    • PONV (postoperative nausea and vomiting)    • Raynaud disease    • Sleep apnea     MILD, NO NEED FOR CPAP   • Tremors of nervous system    • Urinary incontinence         Past Surgical History:   Procedure Laterality Date   • ANKLE OPEN REDUCTION INTERNAL FIXATION Right 08/14/2015    Dr. Dandre Camacho, [facility]   • BACK SURGERY      LUMBAR   • BREAST RECONSTRUCTION, BREAST TISSUE EXPANDER INSERTION Left 04/11/2002    [place] Contour Profile expander was placed 550 cc size, filled with 200 cc of saline, Dr. Herbert Napoles, [facility]   • COLONOSCOPY N/A 09/16/2014    Dr. Darci Kerns, [facility]; torts, tics, stool, polyp   • COLONOSCOPY N/A 9/27/2016    NTEH, diverticulosis, tortuous colon, Two 3-5mm polyps in the descending colon and the transverse colon, IH.  PATH: Tubular adenoma   • COLONOSCOPY N/A 12/12/2017    NTEH, tics, torts, IH, TA w/low grade dysplasia   • CYSTOSCOPY N/A 05/07/2010    with TVT takedown, Dr. Simon Wall, [facility]   • ENDOSCOPY N/A 9/27/2016    z line irreg, bilious gastric fluid- fluid aspiration preformed, gastritis.  PATH: Squamous and glandular mucosa with minimal superficial acute inflammation.    • ENDOSCOPY N/A 12/12/2017    Z line irregular, gastritis, duodenitis, chronic inflammation   • EPIDURAL BLOCK     • KNEE SURGERY Left     BROKEN   • LUMBAR DISCECTOMY FUSION INSTRUMENTATION N/A 12/3/2018    Procedure: L4-5 laminectomy and fusion with instrumentation;  Surgeon: Austin Keith MD;  Location: [facility] OR;  Service: Orthopedic Spine   • MAMMO US BREAST BIOPSY ADDITIONAL W WO DEVICE Left 02/21/2002    2:00  position left breast, Infiltrating Ductal Carcinoma, [facility]   • MANDIBLE FRACTURE SURGERY     • MASTECTOMY Left 04/11/2002    Left Total Mastectomy and Left Axillary [place] Node [place], Dr. Indu Akins, [facility]   • OTHER SURGICAL HISTORY      CARDIAC LOOP DEVICE, LEFT CHEST   • TENSION FREE VAGINAL TAPING WITH MINI ARC SLING N/A 10/26/2009    Dr. Gina Castillo, [facility]   • TOTAL KNEE ARTHROPLASTY Left 10/21/2019    Procedure: TOTAL KNEE ARTHROPLASTY;  Surgeon: Anurag Serrano MD;  Location: [facility];  Service: Orthopedics   • UPPER GASTROINTESTINAL ENDOSCOPY  09/16/2014    z-line irreg, grade a reflux, gastritis                       PT Assessment/Plan     Row Name 11/25/19 1048          PT Assessment    Assessment Comments  Continued with TKR protocol, progressed tp 3 lb ankle weights. Added stair work to address concerns. Progressing nicely but remains appropriate to progress program in future sessions.   -CJ       User Key  (r) = Recorded By, (t) = Taken By, (c) = Cosigned By    Initials Name Provider Type    Ricardo Joaquin, PT Physical Therapist          Modalities     Row Name 11/25/19 1000             Ice    Ice Applied  Yes  -CJ      Location  left knee elevated 2 pillows  -CJ      Rx Minutes  15 mins  -CJ      Ice S/P Rx  Yes  -CJ        User Key  (r) = Recorded By, (t) = Taken By, (c) = Cosigned By    Initials Name Provider Type    Ricardo Joaquin, PT Physical Therapist        OP Exercises     Row Name 11/25/19 1000             Subjective Comments    Subjective Comments  pain is tolerable. I am having difficulty with stairs  -         Subjective Pain    Able to rate subjective pain?  yes  -CJ      Pre-Treatment Pain Level  1  -      Post-Treatment Pain Level  1  -CJ         Total Minutes    27715 - PT Therapeutic Exercise Minutes  30  -CJ      71612 -  PT Neuromuscular Reeducation Minutes  15  -CJ         Exercise 1    Exercise Name 1  Nustep-L4  -CJ      Time 1  4 min  -         Exercise 2     Exercise Name 2  calf stretch, step  -CJ      Reps 2  4  -CJ      Time 2  30 sec  -CJ         Exercise 3    Exercise Name 3  HS stretch step/lunge knee flexion stretch step  -CJ      Reps 3  3  -CJ      Time 3  30 sec  -CJ      Additional Comments  3rd step/2nd step respectively  -CJ         Exercise 4    Exercise Name 4  CKC quad set  -CJ      Reps 4  10  -CJ      Time 4  10 sec  -CJ         Exercise 5    Exercise Name 5  step downs/return  -CJ      Reps 5  12  -CJ      Additional Comments  from bottom step, BUE support  -CJ         Exercise 6    Exercise Name 6  LAQ  -CJ      Reps 6  15  -CJ      Time 6  5 sec  -CJ         Exercise 7    Exercise Name 7  SAQ, 3 lbs  -CJ      Reps 7  15  -CJ      Time 7  5 sec  -CJ         Exercise 8    Exercise Name 8  sidestepping, YTB  -CJ      Reps 8  3 laps  -CJ         Exercise 9    Exercise Name 9  step ups, bottom step  -CJ      Cueing 9  Verbal;Demo  -CJ      Reps 9  12  -CJ      Additional Comments  Squeeze L thigh and gluteal at top  -CJ         Exercise 10    Exercise Name 10  heel raises  -CJ      Reps 10  15  -CJ         Exercise 11    Exercise Name 11  HS curls, B  -CJ      Reps 11  15  -CJ      Additional Comments  #3  -CJ         Exercise 12    Exercise Name 12  alternating step taps  -CJ      Time 12  1 min  -CJ      Additional Comments  No UE support  -CJ        User Key  (r) = Recorded By, (t) = Taken By, (c) = Cosigned By    Initials Name Provider Type    Ricardo Joaquin S, PT Physical Therapist                       PT OP Goals     Row Name 11/25/19 1000          PT Short Term Goals    STG 1  Patient will be independent and compliant with initial home exercise program  -CJ     STG 1 Progress  Met  -CJ     STG 2  Patient will be independent with education for symptom management, posture, body mechanics, and strategies to minimize stress on affected tissues  -CJ     STG 2 Progress  Progressing  -CJ     STG 3  pt. to ambulate with near normal heel to toe gait  pattern with SC to facilitate ease/safety with community mobility  -     STG 3 Progress  Progressing  -        Long Term Goals    LTG 1  Patient will be independent and compliant with advanced home exercise program to facilitate self-management of symptoms once discharged from formal therapy.  -     LTG 1 Progress  Ongoing  -     LTG 2  Patient will ambulate independently up/down one flight of stairs with reciprocal technique for improved household and community mobility  -CJ     LTG 2 Progress  Ongoing  -     LTG 2 Progress Comments  initiated stair work today  -     LTG 3  Patient will demonstrate 5/5 L knee strength for improved function and ease with return to deficit free ambulation and stair negotiation.  -CJ     LTG 3 Progress  Progressing  -     LTG 4  Patient will demonstrate improved knee flexion to at least 120 degrees and full knee extension for improved AROM and ease with ADLs and mobility.  -     LTG 4 Progress  Ongoing  -     LTG 5  Patient will be able to get in/out of the tub independently.  -     LTG 5 Progress  Progressing  -     LTG 5 Progress Comments  attempted yesterday. scary and limited flexibility  -     LTG 6  Pt. will report L knee pain </= 1-2/10 with all daily activities and sleeping.  -     LTG 6 Progress  Ongoing  -     LTG 6 Progress Comments  1/10  -       User Key  (r) = Recorded By, (t) = Taken By, (c) = Cosigned By    Initials Name Provider Type    Ricardo Joaquin PT Physical Therapist                         Time Calculation:   Start Time: 1000  Stop Time: 1100  Time Calculation (min): 60 min  Total Timed Code Minutes- PT: 45 minute(s)  Therapy Charges for Today     Code Description Service Date Service Provider Modifiers Qty    38646210893  PT NEUROMUSC RE EDUCATION EA 15 MIN 11/25/2019 Ricardo Guevara, PT GP 1    [id number]  PT THER PROC EA 15 MIN 11/25/2019 Ricardo Guevara, PT GP 2    [id number]  PT HOT OR COLD PACK TREAT MCARE  11/25/2019 Ricardo Guevara, PT GP 1                    Ricardo Guevara, PT  11/25/2019

## 2019-12-02 ENCOUNTER — HOSPITAL ENCOUNTER (OUTPATIENT)
Dept: PHYSICAL THERAPY | Facility: HOSPITAL | Age: 74
Setting detail: THERAPIES SERIES
Discharge: HOME | End: 2019-12-02

## 2019-12-02 DIAGNOSIS — Z96.652 STATUS POST LEFT KNEE REPLACEMENT: Primary | ICD-10-CM

## 2019-12-02 DIAGNOSIS — Z74.09 IMPAIRED MOBILITY: ICD-10-CM

## 2019-12-02 DIAGNOSIS — Z47.89 ORTHOPEDIC AFTERCARE: ICD-10-CM

## 2019-12-02 PROCEDURE — 97110 THERAPEUTIC EXERCISES: CPT

## 2019-12-02 PROCEDURE — 97112 NEUROMUSCULAR REEDUCATION: CPT

## 2019-12-02 NOTE — THERAPY TREATMENT NOTE
Outpatient Physical Therapy Ortho Treatment Note  [facility]     Patient Name: Davida Anderson  : 1945  MRN: [id number]  Today's Date: 2019      Visit Date: 2019    Visit Dx:    ICD-10-CM ICD-9-CM   1. Status post left knee replacement Z96.652 V43.65   2. Orthopedic aftercare Z47.89 V54.9   3. Impaired mobility Z74.09 799.89       Patient Active Problem List   Diagnosis   • BP (high blood pressure)   • History of left breast infiltrating ductal cancer 2cm s/p mastectomy with reconstruction  s/p chemo   • H/O ETOH abuse   • Gtz's esophagus   • Colon polyps   • Bipolar 1 disorder (CMS/HCC)   • Arthritis   • Raynaud's disease   • Neuropathy   • Lumbar spine pain   • Spondylolisthesis at L4-L5 level   • Right lumbar radiculopathy   • Gtz's esophagus without dysplasia   • Hx of adenomatous colonic polyps   • Dysphagia   • Diarrhea   • Primary osteoarthritis of left knee   • Primary localized osteoarthrosis of the knee, right   • Chronic pain of left knee   • Spinal stenosis of lumbar region with neurogenic claudication   • Spondylolisthesis of lumbar region   • Acute pain of left knee        Past Medical History:   Diagnosis Date   • Acid reflux    • Arthritis    • Atrial fibrillation (CMS/HCC)    • Gtz esophagus    • Bipolar 2 disorder (CMS/HCC)    • Bradycardia     WITH SURGERY   • COPD (chronic obstructive pulmonary disease) (CMS/HCC)     MILD, USES AINHALER PRN   • DDD (degenerative disc disease), lumbar    • Diverticulosis    • Dizziness    • Drug therapy    • Fibrocystic breast    • Frequent UTI     SEES DR GOFF FOR FREQUENT UTI'S, ON KEFLEX   • H/O Infiltrating ductal carcinoma of left breast     Stage IIA, Grade 3 ER/MD +, HER2 -, treated with 5 years of tamoxifen, 5 years of Femara, completed in    • High cholesterol    • History of alcoholism (CMS/HCC)     40 YRS AGO   • History of Clostridium difficile colitis    • History of gastric ulcer    • History of  loop recorder    • Hypertension    • Iron deficiency    • Irregular heartbeat     a fib   • Low back pain    • Lumbar herniated disc    • Mass of right breast on mammogram 03/17/2016    10 o'clock position, 4 cm from the nipple,   • Neuropathy    • PONV (postoperative nausea and vomiting)    • Raynaud disease    • Sleep apnea     MILD, NO NEED FOR CPAP   • Tremors of nervous system    • Urinary incontinence         Past Surgical History:   Procedure Laterality Date   • ANKLE OPEN REDUCTION INTERNAL FIXATION Right 08/14/2015    Dr. Dandre Camacho, [facility]   • BACK SURGERY      LUMBAR   • BREAST RECONSTRUCTION, BREAST TISSUE EXPANDER INSERTION Left 04/11/2002    [place] Contour Profile expander was placed 550 cc size, filled with 200 cc of saline, Dr. Herbert Napoles, [facility]   • COLONOSCOPY N/A 09/16/2014    Dr. Darci Kerns, [facility]; torts, tics, stool, polyp   • COLONOSCOPY N/A 9/27/2016    NTEH, diverticulosis, tortuous colon, Two 3-5mm polyps in the descending colon and the transverse colon, IH.  PATH: Tubular adenoma   • COLONOSCOPY N/A 12/12/2017    NTEH, tics, torts, IH, TA w/low grade dysplasia   • CYSTOSCOPY N/A 05/07/2010    with TVT takedown, Dr. Simon Wall, [facility]   • ENDOSCOPY N/A 9/27/2016    z line irreg, bilious gastric fluid- fluid aspiration preformed, gastritis.  PATH: Squamous and glandular mucosa with minimal superficial acute inflammation.    • ENDOSCOPY N/A 12/12/2017    Z line irregular, gastritis, duodenitis, chronic inflammation   • EPIDURAL BLOCK     • KNEE SURGERY Left     BROKEN   • LUMBAR DISCECTOMY FUSION INSTRUMENTATION N/A 12/3/2018    Procedure: L4-5 laminectomy and fusion with instrumentation;  Surgeon: Austin Keith MD;  Location: [facility];  Service: Orthopedic Spine   • MAMMO US BREAST BIOPSY ADDITIONAL W WO DEVICE Left 02/21/2002    2:00 position left breast, Infiltrating Ductal Carcinoma, [facility]   • MANDIBLE FRACTURE SURGERY     • MASTECTOMY Left 04/11/2002    Left Total Mastectomy and  Left Axillary [place] Node [place], Dr. Indu Akins, [facility]   • OTHER SURGICAL HISTORY      CARDIAC LOOP DEVICE, LEFT CHEST   • TENSION FREE VAGINAL TAPING WITH MINI ARC SLING N/A 10/26/2009    Dr. Gina Castillo, [facility]   • TOTAL KNEE ARTHROPLASTY Left 10/21/2019    Procedure: TOTAL KNEE ARTHROPLASTY;  Surgeon: Anurag Serrano MD;  Location: [facility];  Service: Orthopedics   • UPPER GASTROINTESTINAL ENDOSCOPY  09/16/2014    z-line irreg, grade a reflux, gastritis                       PT Assessment/Plan     Row Name 12/02/19 1040          PT Assessment    Assessment Comments  Pt progressing well with strengthening and tolerating 2-3# ankle weights. Readministered handouts with updated weights and progressed standing strengthening with mild complain of low back pain with MS. Pt progressing well with TKA protocol and would benefit from continued strengthening and balance training.   -CN        PT Plan    PT Plan Comments  Assess response to added exercises and progress strengthening as tolerated.   -CN       User Key  (r) = Recorded By, (t) = Taken By, (c) = Cosigned By    Initials Name Provider Type    Shawna Hayes, PT Physical Therapist          Modalities     Row Name 12/02/19 0900             Ice    Ice Applied  Yes  -CN      Location  left knee elevated 2 pillows  -CN      Rx Minutes  15 mins  -CN      Ice S/P Rx  Yes  -CN        User Key  (r) = Recorded By, (t) = Taken By, (c) = Cosigned By    Initials Name Provider Type    Shawna Hayes, PT Physical Therapist        OP Exercises     Row Name 12/02/19 0900             Subjective Comments    Subjective Comments  It has been pretty good. There was one night that the pain was higher and it affected my sleep.   -CN         Subjective Pain    Able to rate subjective pain?  yes  -CN      Pre-Treatment Pain Level  1  -CN         Total Minutes    02501 - PT Therapeutic Exercise Minutes  30  -CN      08090 -  PT Neuromuscular Reeducation  Minutes  15  -CN         Exercise 1    Exercise Name 1  Nustep-L4  -CN      Time 1  5 min  -CN         Exercise 2    Exercise Name 2  calf stretch, step  -CN      Reps 2  4  -CN      Time 2  30 sec  -CN         Exercise 3    Exercise Name 3  HS stretch step/lunge knee flexion stretch step  -CN      Reps 3  3  -CN      Time 3  30 sec  -CN      Additional Comments  3rd step/2nd step respectively  -CN         Exercise 4    Exercise Name 4  CKC quad set  -CN      Reps 4  15  -CN      Time 4  5 sec  -CN         Exercise 5    Exercise Name 5  step downs/return  -CN      Reps 5  12  -CN      Additional Comments  from bottom step, BUE support  -CN         Exercise 6    Exercise Name 6  LAQ  -CN      Sets 6  2  -CN      Reps 6  10  -CN      Additional Comments  2#  -CN         Exercise 7    Exercise Name 7  SAQ, 3 lbs  -CN      Sets 7  2  -CN      Reps 7  10  -CN         Exercise 8    Exercise Name 8  sidestepping, YTB  -CN      Reps 8  3 laps  -CN         Exercise 9    Exercise Name 9  step ups, bottom step  -CN      Cueing 9  Verbal;Demo  -CN      Reps 9  12  -CN      Additional Comments  Squeeze L thigh and gluteal at top  -CN         Exercise 10    Exercise Name 10  heel raises  -CN      Reps 10  15  -CN         Exercise 11    Exercise Name 11  HS curls, B  -CN      Reps 11  15  -CN      Additional Comments  3#  -CN         Exercise 12    Exercise Name 12  alternating step taps  -CN      Time 12  1 min  -CN      Additional Comments  No UE support  -CN         Exercise 13    Exercise Name 13  SLS at ballet bar  -CN      Cueing 13  Demo  -CN      Reps 13  3 B  -CN      Time 13  20 sec  -CN         Exercise 14    Exercise Name 14  MS at ballet bar with chair behind  -CN      Cueing 14  Demo  -CN      Reps 14  10  -CN      Additional Comments  Stopped after 10 due to pinching in the low back  -CN        User Key  (r) = Recorded By, (t) = Taken By, (c) = Cosigned By    Initials Name Provider Type    Shawna Hayes  Rita, STEPHEN Physical Therapist                       PT OP Goals     Row Name 12/02/19 0900          PT Short Term Goals    STG 1  Patient will be independent and compliant with initial home exercise program  -CN     STG 1 Progress  Met  -CN     STG 2  Patient will be independent with education for symptom management, posture, body mechanics, and strategies to minimize stress on affected tissues  -CN     STG 2 Progress  Partially Met  -CN     STG 3  pt. to ambulate with near normal heel to toe gait pattern with SC to facilitate ease/safety with community mobility  -CN     STG 3 Progress  Progressing  -CN        Long Term Goals    LTG 1  Patient will be independent and compliant with advanced home exercise program to facilitate self-management of symptoms once discharged from formal therapy.  -CN     LTG 1 Progress  Ongoing  -CN     LTG 2  Patient will ambulate independently up/down one flight of stairs with reciprocal technique for improved household and community mobility  -CN     LTG 2 Progress  Ongoing  -CN     LTG 2 Progress Comments  Continued diego/glute strengthening for improved stair negociation.   -CN     LTG 3  Patient will demonstrate 5/5 L knee strength for improved function and ease with return to deficit free ambulation and stair negotiation.  -CN     LTG 3 Progress  Progressing  -CN     LTG 4  Patient will demonstrate improved knee flexion to at least 120 degrees and full knee extension for improved AROM and ease with ADLs and mobility.  -CN     LTG 4 Progress  Ongoing  -CN     LTG 5  Patient will be able to get in/out of the tub independently.  -CN     LTG 5 Progress  Progressing  -CN     LTG 6  Pt. will report L knee pain </= 1-2/10 with all daily activities and sleeping.  -CN     LTG 6 Progress  Ongoing  -CN       User Key  (r) = Recorded By, (t) = Taken By, (c) = Cosigned By    Initials Name Provider Type    Shawna Hayes, STEPHEN Physical Therapist          Therapy Education  Given: HEP,  Symptoms/condition management, Pain management, Edema management  Program: Progressed  How Provided: Verbal, Demonstration  Provided to: Patient  Level of Understanding: Teach back education performed              Time Calculation:   Start Time: 0945  Stop Time: 1040  Time Calculation (min): 55 min  Therapy Charges for Today     Code Description Service Date Service Provider Modifiers Qty    97080714182 HC PT NEUROMUSC RE EDUCATION EA 15 MIN 12/2/2019 Shawna Steele, PT GP 1    [id number] HC PT THER PROC EA 15 MIN 12/2/2019 Shawna Steele, PT GP 2    [id number] HC PT HOT OR COLD PACK TREAT MCARE 12/2/2019 Shawna Steele, PT GP 1                    Shawna Steele PT  12/2/2019

## 2019-12-05 ENCOUNTER — HOSPITAL ENCOUNTER (OUTPATIENT)
Dept: PHYSICAL THERAPY | Facility: HOSPITAL | Age: 74
Setting detail: THERAPIES SERIES
Discharge: HOME | End: 2019-12-05

## 2019-12-05 DIAGNOSIS — Z47.89 ORTHOPEDIC AFTERCARE: ICD-10-CM

## 2019-12-05 DIAGNOSIS — Z74.09 IMPAIRED MOBILITY: ICD-10-CM

## 2019-12-05 DIAGNOSIS — M25.562 CHRONIC PAIN OF LEFT KNEE: ICD-10-CM

## 2019-12-05 DIAGNOSIS — G89.29 CHRONIC PAIN OF LEFT KNEE: ICD-10-CM

## 2019-12-05 DIAGNOSIS — Z96.652 STATUS POST LEFT KNEE REPLACEMENT: Primary | ICD-10-CM

## 2019-12-05 DIAGNOSIS — M17.12 OSTEOARTHRITIS OF LEFT KNEE, UNSPECIFIED OSTEOARTHRITIS TYPE: ICD-10-CM

## 2019-12-05 PROCEDURE — 97110 THERAPEUTIC EXERCISES: CPT

## 2019-12-05 PROCEDURE — 97112 NEUROMUSCULAR REEDUCATION: CPT

## 2019-12-05 NOTE — THERAPY TREATMENT NOTE
Outpatient Physical Therapy Ortho Treatment Note  [facility]     Patient Name: Davida Anderson  : 1945  MRN: [id number]  Today's Date: 2019      Visit Date: 2019    Visit Dx:    ICD-10-CM ICD-9-CM   1. Status post left knee replacement Z96.652 V43.65   2. Orthopedic aftercare Z47.89 V54.9   3. Impaired mobility Z74.09 799.89   4. Chronic pain of left knee M25.562 719.46    G89.29 338.29   5. Osteoarthritis of left knee, unspecified osteoarthritis type M17.12 715.96       Patient Active Problem List   Diagnosis   • BP (high blood pressure)   • History of left breast infiltrating ductal cancer 2cm s/p mastectomy with reconstruction  s/p chemo   • H/O ETOH abuse   • Gtz's esophagus   • Colon polyps   • Bipolar 1 disorder (CMS/HCC)   • Arthritis   • Raynaud's disease   • Neuropathy   • Lumbar spine pain   • Spondylolisthesis at L4-L5 level   • Right lumbar radiculopathy   • Gtz's esophagus without dysplasia   • Hx of adenomatous colonic polyps   • Dysphagia   • Diarrhea   • Primary osteoarthritis of left knee   • Primary localized osteoarthrosis of the knee, right   • Chronic pain of left knee   • Spinal stenosis of lumbar region with neurogenic claudication   • Spondylolisthesis of lumbar region   • Acute pain of left knee        Past Medical History:   Diagnosis Date   • Acid reflux    • Arthritis    • Atrial fibrillation (CMS/HCC)    • Gtz esophagus    • Bipolar 2 disorder (CMS/HCC)    • Bradycardia     WITH SURGERY   • COPD (chronic obstructive pulmonary disease) (CMS/HCC)     MILD, USES AINHALER PRN   • DDD (degenerative disc disease), lumbar    • Diverticulosis    • Dizziness    • Drug therapy    • Fibrocystic breast    • Frequent UTI     SEES DR GOFF FOR FREQUENT UTI'S, ON KEFLEX   • H/O Infiltrating ductal carcinoma of left breast     Stage IIA, Grade 3 ER/GA +, HER2 -, treated with 5 years of tamoxifen, 5 years of Femara, completed in    • High cholesterol    •  History of alcoholism (CMS/HCC)     40 YRS AGO   • History of Clostridium difficile colitis 2014   • History of gastric ulcer    • History of loop recorder    • Hypertension    • Iron deficiency    • Irregular heartbeat     a fib   • Low back pain    • Lumbar herniated disc    • Mass of right breast on mammogram 03/17/2016    10 o'clock position, 4 cm from the nipple,   • Neuropathy    • PONV (postoperative nausea and vomiting)    • Raynaud disease    • Sleep apnea     MILD, NO NEED FOR CPAP   • Tremors of nervous system    • Urinary incontinence         Past Surgical History:   Procedure Laterality Date   • ANKLE OPEN REDUCTION INTERNAL FIXATION Right 08/14/2015    Dr. Dandre Camacho, [facility]   • BACK SURGERY      LUMBAR   • BREAST RECONSTRUCTION, BREAST TISSUE EXPANDER INSERTION Left 04/11/2002    [place] Contour Profile expander was placed 550 cc size, filled with 200 cc of saline, Dr. Herbert Napoles, [facility]   • COLONOSCOPY N/A 09/16/2014    Dr. Darci Kerns, [facility]; torts, tics, stool, polyp   • COLONOSCOPY N/A 9/27/2016    NTEH, diverticulosis, tortuous colon, Two 3-5mm polyps in the descending colon and the transverse colon, IH.  PATH: Tubular adenoma   • COLONOSCOPY N/A 12/12/2017    NTEH, tics, torts, IH, TA w/low grade dysplasia   • CYSTOSCOPY N/A 05/07/2010    with TVT takedown, Dr. Simon Wall, [facility]   • ENDOSCOPY N/A 9/27/2016    z line irreg, bilious gastric fluid- fluid aspiration preformed, gastritis.  PATH: Squamous and glandular mucosa with minimal superficial acute inflammation.    • ENDOSCOPY N/A 12/12/2017    Z line irregular, gastritis, duodenitis, chronic inflammation   • EPIDURAL BLOCK     • KNEE SURGERY Left     BROKEN   • LUMBAR DISCECTOMY FUSION INSTRUMENTATION N/A 12/3/2018    Procedure: L4-5 laminectomy and fusion with instrumentation;  Surgeon: Austin Keith MD;  Location: [facility] OR;  Service: Orthopedic Spine   • MAMMO US BREAST BIOPSY ADDITIONAL W WO DEVICE Left 02/21/2002    2:00  position left breast, Infiltrating Ductal Carcinoma, [facility]   • MANDIBLE FRACTURE SURGERY     • MASTECTOMY Left 04/11/2002    Left Total Mastectomy and Left Axillary [place] Node Biobs[place], Dr. Indu Akins, formerly [facility]   • OTHER SURGICAL HISTORY      CARDIAC LOOP DEVICE, LEFT CHEST   • TENSION FREE VAGINAL TAPING WITH MINI ARC SLING N/A 10/26/2009    Dr. Gina Castillo, formerly [facility]   • TOTAL KNEE ARTHROPLASTY Left 10/21/2019    Procedure: TOTAL KNEE ARTHROPLASTY;  Surgeon: Anurag Serrano MD;  Location: [facility];  Service: Orthopedics   • UPPER GASTROINTESTINAL ENDOSCOPY  09/16/2014    z-line irreg, grade a reflux, gastritis                       PT Assessment/Plan     Row Name 12/05/19 1050          PT Assessment    Assessment Comments  Pt with increased symptoms today and held on several standing exercises due to reports of increased pain. Pt reports continued edema in knee, calf and thigh and assessed today for signs of DVT. Edema and warmth noted surrounding knee, however no reddness or tenderness noted in thigh. Pt educated on signs/symptoms of DVT and encouraged to call MD if current symptoms worsen.   -CN        PT Plan    PT Plan Comments  Return to previous exercises if pain allows.   -CN       User Key  (r) = Recorded By, (t) = Taken By, (c) = Cosigned By    Initials Name Provider Type    Shawna Hayes, STEPHNE Physical Therapist          Modalities     Row Name 12/05/19 0900             Ice    Ice Applied  Yes  -CN      Location  left knee elevated 2 pillows  -CN      Rx Minutes  15 mins  -CN      Ice S/P Rx  Yes  -CN        User Key  (r) = Recorded By, (t) = Taken By, (c) = Cosigned By    Initials Name Provider Type    Shawna Hayes, PT Physical Therapist        OP Exercises     Row Name 12/05/19 1000             Subjective Comments    Subjective Comments  It is stiff today. I added weights to home exercises yesterday and I think it was a little too much.   -CN         Subjective Pain    Able to  rate subjective pain?  yes  -CN      Pre-Treatment Pain Level  5  -CN         Total Minutes    26895 - PT Therapeutic Exercise Minutes  20  -CN      22479 -  PT Neuromuscular Reeducation Minutes  10  -CN         Exercise 1    Exercise Name 1  Nustep-L4  -CN      Time 1  5 min  -CN         Exercise 2    Exercise Name 2  calf stretch, step  -CN      Reps 2  4  -CN      Time 2  30 sec  -CN         Exercise 3    Exercise Name 3  HS stretch step/lunge knee flexion stretch step  -CN      Reps 3  3  -CN      Time 3  30 sec  -CN      Additional Comments  3rd step/2nd step respectively  -CN         Exercise 4    Exercise Name 4  CKC quad set  -CN      Reps 4  15  -CN      Time 4  5 sec  -CN         Exercise 5    Exercise Name 5  step downs/return  -CN      Reps 5  10  -CN      Additional Comments  from bottom step, BUE support  -CN         Exercise 6    Exercise Name 6  LAQ  -CN      Sets 6  1  -CN      Reps 6  10  -CN      Time 6  Held on second set due to pain  -CN      Additional Comments  2#  -CN         Exercise 7    Exercise Name 7  SAQ, 3 lbs  -CN      Reps 7  10  -CN      Additional Comments  Held second set due to pain  -CN         Exercise 8    Exercise Name 8  sidestepping, YTB  -CN      Reps 8  3 laps  -CN         Exercise 9    Exercise Name 9  step ups, bottom step  -CN      Cueing 9  Verbal;Demo  -CN      Reps 9  10  -CN      Additional Comments  Squeeze L thigh and gluteal at top  -CN         Exercise 12    Exercise Name 12  alternating step taps  -CN      Time 12  1 min  -CN      Additional Comments  No UE support  -CN        User Key  (r) = Recorded By, (t) = Taken By, (c) = Cosigned By    Initials Name Provider Type    Shawna Hayes, PT Physical Therapist                           Therapy Education  Given: HEP, Symptoms/condition management, Pain management, Edema management  Program: Reinforced  How Provided: Verbal, Demonstration  Provided to: Patient  Level of Understanding: Teach back  education performed              Time Calculation:   Start Time: 1000  Stop Time: 1045  Time Calculation (min): 45 min  Therapy Charges for Today     Code Description Service Date Service Provider Modifiers Qty    34701135163 HC PT HOT OR COLD PACK TREAT MCARE 12/5/2019 Shawna Steele, PT GP 1    19912172951 HC PT NEUROMUSC RE EDUCATION EA 15 MIN 12/5/2019 Shawna Steele, PT GP 1    45890907205 HC PT THER PROC EA 15 MIN 12/5/2019 Shawna Steele, PT GP 1                    Shawna Steele, PT  12/5/2019

## 2019-12-06 ENCOUNTER — OFFICE VISIT (OUTPATIENT)
Dept: ORTHOPEDIC SURGERY | Facility: CLINIC | Age: 74
End: 2019-12-06

## 2019-12-06 VITALS — BODY MASS INDEX: 19.93 KG/M2 | TEMPERATURE: 99.1 F | HEIGHT: 66 IN | WEIGHT: 124 LBS

## 2019-12-06 DIAGNOSIS — Z96.652 S/P TOTAL KNEE ARTHROPLASTY, LEFT: Primary | ICD-10-CM

## 2019-12-06 PROCEDURE — 99024 POSTOP FOLLOW-UP VISIT: CPT | Performed by: ORTHOPAEDIC SURGERY

## 2019-12-06 PROCEDURE — 73562 X-RAY EXAM OF KNEE 3: CPT | Performed by: ORTHOPAEDIC SURGERY

## 2019-12-06 NOTE — PROGRESS NOTES
Davida Anderson : 1945 MRN: [id number] DATE: 2019    DIAGNOSIS: 6 week follow up left total knee      SUBJECTIVE:Patient returns today for 6 week follow up of left total knee replacement. Patient reports doing well with no unusual complaints, however she seemed to have a flare up yesterday with warmth and swelling- almost completely better today. Appears to be progressing appropriately.    OBJECTIVE:   Exam:. The incision is well healed. No sign of infection. Range of motion is measured at 0 to 125. The calf is soft and nontender with a negative Homans sign. Strength is progressing and the patient is ambulating appropriately.    DIAGNOSTIC STUDIES  Xrays: 3 views of the left knee (AP, lateral, and sunrise) were ordered and reviewed for evaluation of recent knee replacement. They demonstrate a well positioned, well aligned knee replacement without complicating factors noted. In comparison with previous films there has been no change.    ASSESSMENT: 6 week status post left  knee replacement.    PLAN: 1) Continue with PT exercises as prescribed, ice , tylenol prn   2) Follow up as scheduled    Anurag Serrano MD  2019

## 2019-12-09 ENCOUNTER — HOSPITAL ENCOUNTER (OUTPATIENT)
Dept: PHYSICAL THERAPY | Facility: HOSPITAL | Age: 74
Setting detail: THERAPIES SERIES
Discharge: HOME | End: 2019-12-09

## 2019-12-09 DIAGNOSIS — Z47.89 ORTHOPEDIC AFTERCARE: ICD-10-CM

## 2019-12-09 DIAGNOSIS — Z74.09 IMPAIRED MOBILITY: ICD-10-CM

## 2019-12-09 DIAGNOSIS — Z96.652 STATUS POST LEFT KNEE REPLACEMENT: Primary | ICD-10-CM

## 2019-12-09 PROCEDURE — 97110 THERAPEUTIC EXERCISES: CPT | Performed by: PHYSICAL THERAPIST

## 2019-12-09 PROCEDURE — 97112 NEUROMUSCULAR REEDUCATION: CPT | Performed by: PHYSICAL THERAPIST

## 2019-12-09 NOTE — THERAPY PROGRESS REPORT/RE-CERT
Outpatient Physical Therapy Ortho Progress Note  [facility]     Patient Name: Davida Anderson  : 1945  MRN: [id number]  Today's Date: 2019      Visit Date: 2019    Visit Dx:    ICD-10-CM ICD-9-CM   1. Status post left knee replacement Z96.652 V43.65   2. Orthopedic aftercare Z47.89 V54.9   3. Impaired mobility Z74.09 799.89       Patient Active Problem List   Diagnosis   • BP (high blood pressure)   • History of left breast infiltrating ductal cancer 2cm s/p mastectomy with reconstruction  s/p chemo   • H/O ETOH abuse   • Tgz's esophagus   • Colon polyps   • Bipolar 1 disorder (CMS/HCC)   • Arthritis   • Raynaud's disease   • Neuropathy   • Lumbar spine pain   • Spondylolisthesis at L4-L5 level   • Right lumbar radiculopathy   • Gtz's esophagus without dysplasia   • Hx of adenomatous colonic polyps   • Dysphagia   • Diarrhea   • Primary osteoarthritis of left knee   • Primary localized osteoarthrosis of the knee, right   • Chronic pain of left knee   • Spinal stenosis of lumbar region with neurogenic claudication   • Spondylolisthesis of lumbar region   • Acute pain of left knee        Past Medical History:   Diagnosis Date   • Acid reflux    • Arthritis    • Atrial fibrillation (CMS/HCC)    • Gtz esophagus    • Bipolar 2 disorder (CMS/HCC)    • Bradycardia     WITH SURGERY   • COPD (chronic obstructive pulmonary disease) (CMS/HCC)     MILD, USES AINHALER PRN   • DDD (degenerative disc disease), lumbar    • Diverticulosis    • Dizziness    • Drug therapy    • Fibrocystic breast    • Frequent UTI     SEES DR GOFF FOR FREQUENT UTI'S, ON KEFLEX   • H/O Infiltrating ductal carcinoma of left breast     Stage IIA, Grade 3 ER/IA +, HER2 -, treated with 5 years of tamoxifen, 5 years of Femara, completed in    • High cholesterol    • History of alcoholism (CMS/HCC)     40 YRS AGO   • History of Clostridium difficile colitis    • History of gastric ulcer    • History of  loop recorder    • Hypertension    • Iron deficiency    • Irregular heartbeat     a fib   • Low back pain    • Lumbar herniated disc    • Mass of right breast on mammogram 03/17/2016    10 o'clock position, 4 cm from the nipple,   • Neuropathy    • PONV (postoperative nausea and vomiting)    • Raynaud disease    • Sleep apnea     MILD, NO NEED FOR CPAP   • Tremors of nervous system    • Urinary incontinence         Past Surgical History:   Procedure Laterality Date   • ANKLE OPEN REDUCTION INTERNAL FIXATION Right 08/14/2015    Dr. Dandre Camacho, [facility]   • BACK SURGERY      LUMBAR   • BREAST RECONSTRUCTION, BREAST TISSUE EXPANDER INSERTION Left 04/11/2002    [place] Contour Profile expander was placed 550 cc size, filled with 200 cc of saline, Dr. Herbert Napoles, [facility]   • COLONOSCOPY N/A 09/16/2014    Dr. Darci Kerns, [facility]; torts, tics, stool, polyp   • COLONOSCOPY N/A 9/27/2016    NTEH, diverticulosis, tortuous colon, Two 3-5mm polyps in the descending colon and the transverse colon, IH.  PATH: Tubular adenoma   • COLONOSCOPY N/A 12/12/2017    NTEH, tics, torts, IH, TA w/low grade dysplasia   • CYSTOSCOPY N/A 05/07/2010    with TVT takedown, Dr. Simon Wall, [facility]   • ENDOSCOPY N/A 9/27/2016    z line irreg, bilious gastric fluid- fluid aspiration preformed, gastritis.  PATH: Squamous and glandular mucosa with minimal superficial acute inflammation.    • ENDOSCOPY N/A 12/12/2017    Z line irregular, gastritis, duodenitis, chronic inflammation   • EPIDURAL BLOCK     • KNEE SURGERY Left     BROKEN   • LUMBAR DISCECTOMY FUSION INSTRUMENTATION N/A 12/3/2018    Procedure: L4-5 laminectomy and fusion with instrumentation;  Surgeon: Austin Keith MD;  Location: [facility];  Service: Orthopedic Spine   • MAMMO US BREAST BIOPSY ADDITIONAL W WO DEVICE Left 02/21/2002    2:00 position left breast, Infiltrating Ductal Carcinoma, [facility]   • MANDIBLE FRACTURE SURGERY     • MASTECTOMY Left 04/11/2002    Left Total Mastectomy and  Left Axillary [place] Node [place], Dr. Indu Akins, [facility]   • OTHER SURGICAL HISTORY      CARDIAC LOOP DEVICE, LEFT CHEST   • TENSION FREE VAGINAL TAPING WITH MINI ARC SLING N/A 10/26/2009    Dr. Gina Castillo, [facility]   • TOTAL KNEE ARTHROPLASTY Left 10/21/2019    Procedure: TOTAL KNEE ARTHROPLASTY;  Surgeon: Anurag Serrano MD;  Location: [facility];  Service: Orthopedics   • UPPER GASTROINTESTINAL ENDOSCOPY  09/16/2014    z-line irreg, grade a reflux, gastritis       PT Ortho     Row Name 12/09/19 1200       Left Lower Ext    Lt Knee Extension/Flexion AROM  0-125  -CJ       MMT Left Lower Ext    Lt Knee Extension MMT, Gross Movement  (4+/5) good plus  -CJ    Lt Knee Flexion MMT, Gross Movement  (4+/5) good plus  -CJ    Lt Lower Extremity Comments   fatigues quickly with repeated testing  -CJ      User Key  (r) = Recorded By, (t) = Taken By, (c) = Cosigned By    Initials Name Provider Type    Ricardo Joaquin, PT Physical Therapist                      PT Assessment/Plan     Row Name 12/09/19 1235          PT Assessment    Assessment Comments  Ms. Anderson has been seen for 6 skilled therapy sessions since initiating treatment s/p L TKR. She is reporting improvement in pain and all functional mobility tasks to date.  She continues to report difficulty with extended walking, stair negotiation, squatting, and in/out of bath tub (has not attempted to date but that is her long term goal). She is appropriate for progressive strengthening and mobility training in order to meet all her goals.   -CJ       User Key  (r) = Recorded By, (t) = Taken By, (c) = Cosigned By    Initials Name Provider Type    Ricardo Joaquin, PT Physical Therapist          Modalities     Row Name 12/09/19 1100             Ice    Ice Applied  Yes  -CJ      Location  left knee elevated 2 pillows  -CJ      Rx Minutes  10 mins  -CJ      Ice S/P Rx  Yes  -CJ        User Key  (r) = Recorded By, (t) = Taken By, (c) = Cosigned By    Initials Name Provider  Type    CJ Ricardo Guevara, PT Physical Therapist        OP Exercises     Row Name 12/09/19 1100             Subjective Comments    Subjective Comments  I think I am walking too much. It hurts after I finish and I am walking about a mile.  -CJ         Subjective Pain    Able to rate subjective pain?  yes  -CJ      Pre-Treatment Pain Level  1  -CJ         Total Minutes    72319 - PT Therapeutic Exercise Minutes  30  -CJ      29177 -  PT Neuromuscular Reeducation Minutes  15  -CJ         Exercise 1    Exercise Name 1  Nustep-L5  -CJ      Time 1  4  min  -CJ         Exercise 2    Exercise Name 2  calf stretch, step  -CJ      Reps 2  3  -CJ      Time 2  30 sec  -CJ         Exercise 3    Exercise Name 3  HS stretch step/lunge knee flexion stretch step  -CJ      Reps 3  3  -CJ      Time 3  30 sec  -CJ      Additional Comments  3rd step/2nd step respectively  -CJ         Exercise 4    Exercise Name 4  CKC quad set  -CJ      Reps 4  15  -CJ      Time 4  5 sec  -CJ         Exercise 5    Exercise Name 5  squat to chair and return to standing  -CJ      Sets 5  2  -CJ      Reps 5  5  -CJ      Additional Comments  gluteal squeeze at top. use hands PRN  -CJ         Exercise 6    Exercise Name 6  LAQ  -CJ      Sets 6  1  -CJ      Reps 6  15  -CJ      Time 6  3 sec  -CJ      Additional Comments  #3  -CJ         Exercise 7    Exercise Name 7  SAQ, 4 lbs  -CJ      Reps 7  15  -CJ      Time 7  5 sec  -CJ         Exercise 9    Exercise Name 9  step ups, bottom step  -CJ      Cueing 9  Verbal;Demo  -CJ      Reps 9  12  -CJ      Additional Comments  squeeze L thigh/buttock  -CJ         Exercise 10    Exercise Name 10  heel raises  -CJ      Reps 10  15  -CJ         Exercise 11    Exercise Name 11  HS curls, B  -CJ      Reps 11  15  -CJ      Additional Comments  #4  -CJ         Exercise 13    Exercise Name 13  B hip abduction  -CJ      Reps 13  10 B, #4  -CJ      Additional Comments  small range, no back pain  -CJ         Exercise 14     "Exercise Name 14  step down, return  -      Reps 14  12  -      Additional Comments  LLE on step; ruben step- 6\"  -        User Key  (r) = Recorded By, (t) = Taken By, (c) = Cosigned By    Initials Name Provider Type    Ricardo Joaquin S, PT Physical Therapist                       PT OP Goals     Row Name 12/09/19 1200          PT Short Term Goals    STG 1  Patient will be independent and compliant with initial home exercise program  -     STG 1 Progress  Met  -     STG 2  Patient will be independent with education for symptom management, posture, body mechanics, and strategies to minimize stress on affected tissues  -     STG 2 Progress  Met  -     STG 3  pt. to ambulate with near normal heel to toe gait pattern with SC to facilitate ease/safety with community mobility  -     STG 3 Progress  Met  -        Long Term Goals    LTG 1  Patient will be independent and compliant with advanced home exercise program to facilitate self-management of symptoms once discharged from formal therapy.  -     LTG 1 Progress  Progressing  -     LTG 2  Patient will ambulate independently up/down one flight of stairs with reciprocal technique for improved household and community mobility  -     LTG 2 Progress  Ongoing  -     LTG 2 Progress Comments  non reciprocal but painful at times, working on strength component  -     LTG 3  Patient will demonstrate 5/5 L knee strength for improved function and ease with return to deficit free ambulation and stair negotiation.  -     LTG 3 Progress  Progressing  -     LTG 3 Progress Comments  4+/5 at this time  -     LTG 4  Patient will demonstrate improved knee flexion to at least 120 degrees and full knee extension for improved AROM and ease with ADLs and mobility.  -     LTG 4 Progress  Met  -     LTG 5  Patient will be able to get in/out of the tub independently.  -     LTG 5 Progress  Progressing  -     LTG 5 Progress Comments  not attempted yet  " -MALU     LTG 6  Pt. will report L knee pain </= 1-2/10 with all daily activities and sleeping.  -     LTG 6 Progress  Progressing  -MALU       User Key  (r) = Recorded By, (t) = Taken By, (c) = Cosigned By    Initials Name Provider Type    Ricardo Joaquin, PT Physical Therapist               Outcome Measure Options: Knee Outcome Score- ADL  Knee Outcome Score  Knee Outcome Score Comments: 57/80      Time Calculation:   Start Time: 1130  Stop Time: 1225  Time Calculation (min): 55 min  Total Timed Code Minutes- PT: 45 minute(s)  Therapy Charges for Today     Code Description Service Date Service Provider Modifiers Qty    01786858073  PT NEUROMUSC RE EDUCATION EA 15 MIN 12/9/2019 Ricardo Guevara, PT GP 1    64134565405 HC PT THER PROC EA 15 MIN 12/9/2019 Ricardo Guevara, PT GP 2    [id number]  PT HOT OR COLD PACK TREAT MCARE 12/9/2019 Ricardo Guevara, PT GP 1          PT G-Codes  Outcome Measure Options: Knee Outcome Score- ADL         Ricardo Guevara PT  12/9/2019

## 2019-12-12 ENCOUNTER — HOSPITAL ENCOUNTER (OUTPATIENT)
Dept: PHYSICAL THERAPY | Facility: HOSPITAL | Age: 74
Setting detail: THERAPIES SERIES
Discharge: HOME | End: 2019-12-12

## 2019-12-12 DIAGNOSIS — Z47.89 ORTHOPEDIC AFTERCARE: ICD-10-CM

## 2019-12-12 DIAGNOSIS — Z96.652 STATUS POST LEFT KNEE REPLACEMENT: Primary | ICD-10-CM

## 2019-12-12 DIAGNOSIS — Z74.09 IMPAIRED MOBILITY: ICD-10-CM

## 2019-12-12 PROCEDURE — 97112 NEUROMUSCULAR REEDUCATION: CPT

## 2019-12-12 PROCEDURE — 97110 THERAPEUTIC EXERCISES: CPT

## 2019-12-12 NOTE — THERAPY TREATMENT NOTE
Outpatient Physical Therapy Ortho Treatment Note  [facility]     Patient Name: Davida Anderson  : 1945  MRN: [id number]  Today's Date: 2019      Visit Date: 2019    Visit Dx:    ICD-10-CM ICD-9-CM   1. Status post left knee replacement Z96.652 V43.65   2. Orthopedic aftercare Z47.89 V54.9   3. Impaired mobility Z74.09 799.89       Patient Active Problem List   Diagnosis   • BP (high blood pressure)   • History of left breast infiltrating ductal cancer 2cm s/p mastectomy with reconstruction  s/p chemo   • H/O ETOH abuse   • Gtz's esophagus   • Colon polyps   • Bipolar 1 disorder (CMS/HCC)   • Arthritis   • Raynaud's disease   • Neuropathy   • Lumbar spine pain   • Spondylolisthesis at L4-L5 level   • Right lumbar radiculopathy   • Gtz's esophagus without dysplasia   • Hx of adenomatous colonic polyps   • Dysphagia   • Diarrhea   • Primary osteoarthritis of left knee   • Primary localized osteoarthrosis of the knee, right   • Chronic pain of left knee   • Spinal stenosis of lumbar region with neurogenic claudication   • Spondylolisthesis of lumbar region   • Acute pain of left knee        Past Medical History:   Diagnosis Date   • Acid reflux    • Arthritis    • Atrial fibrillation (CMS/HCC)    • Gtz esophagus    • Bipolar 2 disorder (CMS/HCC)    • Bradycardia     WITH SURGERY   • COPD (chronic obstructive pulmonary disease) (CMS/HCC)     MILD, USES AINHALER PRN   • DDD (degenerative disc disease), lumbar    • Diverticulosis    • Dizziness    • Drug therapy    • Fibrocystic breast    • Frequent UTI     SEES DR GOFF FOR FREQUENT UTI'S, ON KEFLEX   • H/O Infiltrating ductal carcinoma of left breast     Stage IIA, Grade 3 ER/OH +, HER2 -, treated with 5 years of tamoxifen, 5 years of Femara, completed in    • High cholesterol    • History of alcoholism (CMS/HCC)     40 YRS AGO   • History of Clostridium difficile colitis    • History of gastric ulcer    • History of  loop recorder    • Hypertension    • Iron deficiency    • Irregular heartbeat     a fib   • Low back pain    • Lumbar herniated disc    • Mass of right breast on mammogram 03/17/2016    10 o'clock position, 4 cm from the nipple,   • Neuropathy    • PONV (postoperative nausea and vomiting)    • Raynaud disease    • Sleep apnea     MILD, NO NEED FOR CPAP   • Tremors of nervous system    • Urinary incontinence         Past Surgical History:   Procedure Laterality Date   • ANKLE OPEN REDUCTION INTERNAL FIXATION Right 08/14/2015    Dr. Dandre Camacho, [facility]   • BACK SURGERY      LUMBAR   • BREAST RECONSTRUCTION, BREAST TISSUE EXPANDER INSERTION Left 04/11/2002    [place] Contour Profile expander was placed 550 cc size, filled with 200 cc of saline, Dr. Herbert Napoles, [facility]   • COLONOSCOPY N/A 09/16/2014    Dr. Darci Kerns, [facility]; torts, tics, stool, polyp   • COLONOSCOPY N/A 9/27/2016    NTEH, diverticulosis, tortuous colon, Two 3-5mm polyps in the descending colon and the transverse colon, IH.  PATH: Tubular adenoma   • COLONOSCOPY N/A 12/12/2017    NTEH, tics, torts, IH, TA w/low grade dysplasia   • CYSTOSCOPY N/A 05/07/2010    with TVT takedown, Dr. Simon Wall, [facility]   • ENDOSCOPY N/A 9/27/2016    z line irreg, bilious gastric fluid- fluid aspiration preformed, gastritis.  PATH: Squamous and glandular mucosa with minimal superficial acute inflammation.    • ENDOSCOPY N/A 12/12/2017    Z line irregular, gastritis, duodenitis, chronic inflammation   • EPIDURAL BLOCK     • KNEE SURGERY Left     BROKEN   • LUMBAR DISCECTOMY FUSION INSTRUMENTATION N/A 12/3/2018    Procedure: L4-5 laminectomy and fusion with instrumentation;  Surgeon: Austin Keith MD;  Location: [facility];  Service: Orthopedic Spine   • MAMMO US BREAST BIOPSY ADDITIONAL W WO DEVICE Left 02/21/2002    2:00 position left breast, Infiltrating Ductal Carcinoma, [facility]   • MANDIBLE FRACTURE SURGERY     • MASTECTOMY Left 04/11/2002    Left Total Mastectomy and  Left Axillary [place] Node [place], Dr. Indu Akins, [facility]   • OTHER SURGICAL HISTORY      CARDIAC LOOP DEVICE, LEFT CHEST   • TENSION FREE VAGINAL TAPING WITH MINI ARC SLING N/A 10/26/2009    Dr. Gina Castillo, [facility]   • TOTAL KNEE ARTHROPLASTY Left 10/21/2019    Procedure: TOTAL KNEE ARTHROPLASTY;  Surgeon: Anurag Serrano MD;  Location: [facility];  Service: Orthopedics   • UPPER GASTROINTESTINAL ENDOSCOPY  09/16/2014    z-line irreg, grade a reflux, gastritis                       PT Assessment/Plan     Row Name 12/12/19 1524          PT Assessment    Assessment Comments  Pt progressing well toward goals and able to progress balance exercises in // bars today. Pt with good stability and minimal lateral shifting noted in SLS. Pt continues to be candidate for skilled PT services.   -CN        PT Plan    PT Plan Comments  Continue to progress balance and strengthening as able.   -CN       User Key  (r) = Recorded By, (t) = Taken By, (c) = Cosigned By    Initials Name Provider Type    Shawna Hayes, PT Physical Therapist          Modalities     Row Name 12/12/19 0900             Ice    Ice Applied  Yes  -CN      Location  left knee elevated 2 pillows  -CN      Rx Minutes  10 mins  -CN      Ice S/P Rx  Yes  -CN        User Key  (r) = Recorded By, (t) = Taken By, (c) = Cosigned By    Initials Name Provider Type    Shawna Hayes, PT Physical Therapist        OP Exercises     Row Name 12/12/19 0900             Subjective Comments    Subjective Comments  It feels ok, the swelling comes and goes.   -CN         Subjective Pain    Able to rate subjective pain?  yes  -CN      Pre-Treatment Pain Level  3  -CN         Total Minutes    62785 - PT Therapeutic Exercise Minutes  30  -CN      76566 -  PT Neuromuscular Reeducation Minutes  15  -CN         Exercise 1    Exercise Name 1  Nustep-L5  -CN      Time 1  4  min  -CN         Exercise 2    Exercise Name 2  calf stretch, step  -CN      Reps 2  3  -CN  "     Time 2  30 sec  -CN         Exercise 3    Exercise Name 3  HS stretch step/lunge knee flexion stretch step  -CN      Reps 3  3  -CN      Time 3  30 sec  -CN      Additional Comments  3rd step/2nd step respectively  -CN         Exercise 4    Exercise Name 4  CKC quad set  -CN      Reps 4  15  -CN      Time 4  5 sec  -CN         Exercise 5    Exercise Name 5  squat to chair and return to standing  -CN      Sets 5  2  -CN      Reps 5  5  -CN      Additional Comments  gluteal squeeze at top. use hands PRN  -CN         Exercise 6    Exercise Name 6  LAQ  -CN      Sets 6  2  -CN      Reps 6  10  -CN      Time 6  3 sec  -CN      Additional Comments  3#  -CN         Exercise 7    Exercise Name 7  SAQ, 4 lbs  -CN      Sets 7  2  -CN      Reps 7  10  -CN      Time 7  5 sec  -CN         Exercise 8    Exercise Name 8  Tandem gait forward/retro  -CN      Cueing 8  Demo  -CN      Reps 8  3 laps  -CN         Exercise 9    Exercise Name 9  step ups, bottom step  -CN      Cueing 9  Verbal;Demo  -CN      Reps 9  12  -CN      Additional Comments  squeeze L thigh/buttock  -CN         Exercise 10    Exercise Name 10  heel raises  -CN      Reps 10  15  -CN         Exercise 11    Exercise Name 11  HS curls, B  -CN      Reps 11  15  -CN         Exercise 12    Exercise Name 12  Seated HS curls  -CN      Cueing 12  Demo  -CN      Reps 12  10  -CN      Additional Comments  GTB  -CN         Exercise 13    Exercise Name 13  B hip abduction  -CN      Reps 13  10 B, #4  -CN      Additional Comments  small range, no back pain  -CN         Exercise 14    Exercise Name 14  step down, return  -CN      Reps 14  12  -CN      Additional Comments  LLE on step; jazzersize step- 6\"  -CN         Exercise 15    Exercise Name 15  SLS in // bars  -CN      Cueing 15  Demo  -CN      Reps 15  3  -CN      Time 15  20 sec  -CN        User Key  (r) = Recorded By, (t) = Taken By, (c) = Cosigned By    Initials Name Provider Type    Shawna Hayes, PT " Physical Therapist                           Therapy Education  Given: HEP, Symptoms/condition management, Pain management, Edema management  Program: Reinforced  How Provided: Verbal, Demonstration  Provided to: Patient  Level of Understanding: Teach back education performed              Time Calculation:   Start Time: 0949  Stop Time: 1044  Time Calculation (min): 55 min  Therapy Charges for Today     Code Description Service Date Service Provider Modifiers Qty    08427907783 HC PT NEUROMUSC RE EDUCATION EA 15 MIN 12/12/2019 Shawna Steele, PT GP 1    52466578709 HC PT THER PROC EA 15 MIN 12/12/2019 Shawna Steeel, PT GP 2    [id number]  PT HOT OR COLD PACK TREAT MCARE 12/12/2019 Shawna Steele, PT GP 1                    Shawna Stelee PT  12/12/2019

## 2019-12-16 ENCOUNTER — HOSPITAL ENCOUNTER (OUTPATIENT)
Dept: PHYSICAL THERAPY | Facility: HOSPITAL | Age: 74
Setting detail: THERAPIES SERIES
Discharge: HOME | End: 2019-12-16

## 2019-12-16 PROCEDURE — 97110 THERAPEUTIC EXERCISES: CPT | Performed by: PHYSICAL THERAPIST

## 2019-12-16 NOTE — THERAPY TREATMENT NOTE
Outpatient Physical Therapy Ortho Treatment Note  [facility]     Patient Name: Davida Anderson  : 1945  MRN: [id number]  Today's Date: 2019      Visit Date: 2019    Visit Dx:  No diagnosis found.    Patient Active Problem List   Diagnosis   • BP (high blood pressure)   • History of left breast infiltrating ductal cancer 2cm s/p mastectomy with reconstruction  s/p chemo   • H/O ETOH abuse   • Gtz's esophagus   • Colon polyps   • Bipolar 1 disorder (CMS/HCC)   • Arthritis   • Raynaud's disease   • Neuropathy   • Lumbar spine pain   • Spondylolisthesis at L4-L5 level   • Right lumbar radiculopathy   • Gtz's esophagus without dysplasia   • Hx of adenomatous colonic polyps   • Dysphagia   • Diarrhea   • Primary osteoarthritis of left knee   • Primary localized osteoarthrosis of the knee, right   • Chronic pain of left knee   • Spinal stenosis of lumbar region with neurogenic claudication   • Spondylolisthesis of lumbar region   • Acute pain of left knee        Past Medical History:   Diagnosis Date   • Acid reflux    • Arthritis    • Atrial fibrillation (CMS/HCC)    • Gtz esophagus    • Bipolar 2 disorder (CMS/HCC)    • Bradycardia     WITH SURGERY   • COPD (chronic obstructive pulmonary disease) (CMS/HCC)     MILD, USES AINHALER PRN   • DDD (degenerative disc disease), lumbar    • Diverticulosis    • Dizziness    • Drug therapy    • Fibrocystic breast    • Frequent UTI     SEES DR GOFF FOR FREQUENT UTI'S, ON KEFLEX   • H/O Infiltrating ductal carcinoma of left breast     Stage IIA, Grade 3 ER/RI +, HER2 -, treated with 5 years of tamoxifen, 5 years of Femara, completed in    • High cholesterol    • History of alcoholism (CMS/HCC)     40 YRS AGO   • History of Clostridium difficile colitis    • History of gastric ulcer    • History of loop recorder    • Hypertension    • Iron deficiency    • Irregular heartbeat     a fib   • Low back pain    • Lumbar herniated disc     • Mass of right breast on mammogram 03/17/2016    10 o'clock position, 4 cm from the nipple,   • Neuropathy    • PONV (postoperative nausea and vomiting)    • Raynaud disease    • Sleep apnea     MILD, NO NEED FOR CPAP   • Tremors of nervous system    • Urinary incontinence         Past Surgical History:   Procedure Laterality Date   • ANKLE OPEN REDUCTION INTERNAL FIXATION Right 08/14/2015    Dr. Dandre Camacho, [facility]   • BACK SURGERY      LUMBAR   • BREAST RECONSTRUCTION, BREAST TISSUE EXPANDER INSERTION Left 04/11/2002    [place] Contour Profile expander was placed 550 cc size, filled with 200 cc of saline, Dr. Herbert Napoles, [facility]   • COLONOSCOPY N/A 09/16/2014    Dr. Darci Kerns, [facility]; torts, tics, stool, polyp   • COLONOSCOPY N/A 9/27/2016    NTEH, diverticulosis, tortuous colon, Two 3-5mm polyps in the descending colon and the transverse colon, IH.  PATH: Tubular adenoma   • COLONOSCOPY N/A 12/12/2017    NTEH, tics, torts, IH, TA w/low grade dysplasia   • CYSTOSCOPY N/A 05/07/2010    with TVT takedown, Dr. Simon Wlal, [facility]   • ENDOSCOPY N/A 9/27/2016    z line irreg, bilious gastric fluid- fluid aspiration preformed, gastritis.  PATH: Squamous and glandular mucosa with minimal superficial acute inflammation.    • ENDOSCOPY N/A 12/12/2017    Z line irregular, gastritis, duodenitis, chronic inflammation   • EPIDURAL BLOCK     • KNEE SURGERY Left     BROKEN   • LUMBAR DISCECTOMY FUSION INSTRUMENTATION N/A 12/3/2018    Procedure: L4-5 laminectomy and fusion with instrumentation;  Surgeon: Austin Keith MD;  Location: [facility];  Service: Orthopedic Spine   • MAMMO US BREAST BIOPSY ADDITIONAL W WO DEVICE Left 02/21/2002    2:00 position left breast, Infiltrating Ductal Carcinoma, [facility]   • MANDIBLE FRACTURE SURGERY     • MASTECTOMY Left 04/11/2002    Left Total Mastectomy and Left Axillary [place] Node [place], Dr. Indu Akins, [facility]   • OTHER SURGICAL HISTORY      CARDIAC LOOP DEVICE, LEFT CHEST   • TENSION  FREE VAGINAL TAPING WITH MINI ARC SLING N/A 10/26/2009    Dr. Gina Castillo, [facility]   • TOTAL KNEE ARTHROPLASTY Left 10/21/2019    Procedure: TOTAL KNEE ARTHROPLASTY;  Surgeon: Anurag Serrano MD;  Location: [facility];  Service: Orthopedics   • UPPER GASTROINTESTINAL ENDOSCOPY  09/16/2014    z-line irreg, grade a reflux, gastritis                       PT Assessment/Plan     Row Name 12/16/19 1052          PT Assessment    Assessment Comments  Difficulty observed with sit to stand transition with proper knee alignment (and not allowing hip adduction). Added hip strengthening exercise to address. Will add clamshell next session as well.   -CJ       User Key  (r) = Recorded By, (t) = Taken By, (c) = Cosigned By    Initials Name Provider Type    Ricardo Joaquin, PT Physical Therapist          Modalities     Row Name 12/16/19 1000             Ice    Ice Applied  Yes  -CJ      Location  left knee elevated 1 pillows  -CJ      Rx Minutes  10 mins  -CJ      Ice S/P Rx  Yes  -CJ        User Key  (r) = Recorded By, (t) = Taken By, (c) = Cosigned By    Initials Name Provider Type    Ricardo Joaquin, PT Physical Therapist        OP Exercises     Row Name 12/16/19 1000             Subjective Comments    Subjective Comments  I did a lot of walking yesterday to shop and I did well.  -CJ         Subjective Pain    Able to rate subjective pain?  yes  -CJ      Pre-Treatment Pain Level  1  -CJ      Post-Treatment Pain Level  1  -CJ         Total Minutes    77906 - PT Therapeutic Exercise Minutes  45  -CJ         Exercise 1    Exercise Name 1  Nustep-L5  -CJ      Time 1  4  min  -CJ         Exercise 2    Exercise Name 2  calf stretch, step  -CJ      Reps 2  2  -CJ      Time 2  30 sec  -CJ         Exercise 3    Exercise Name 3  HS stretch step/lunge knee flexion stretch step  -CJ      Reps 3  2  -CJ      Time 3  30 sec  -CJ         Exercise 4    Exercise Name 4  Monster walk: fwd/back  -CJ      Time 4  3 laps, RTB  -CJ          Exercise 5    Exercise Name 5  squat to chair and return to standing  -CJ      Reps 5  8  -CJ      Additional Comments  keep knees apart  -CJ         Exercise 6    Exercise Name 6  LAQ, #4  -CJ      Reps 6  15  -CJ      Time 6  5 sec  -CJ         Exercise 7    Exercise Name 7  SAQ, 4 lbs  -CJ      Reps 7  15  -CJ      Time 7  5 sec  -CJ         Exercise 8    Exercise Name 8  Tandem gait forward/retro  -CJ      Cueing 8  Demo  -CJ      Reps 8  3 laps  -CJ         Exercise 9    Exercise Name 9  H/L hip abduction  -CJ      Reps 9  15/15 B and Uni  -CJ      Additional Comments  GTB  -CJ         Exercise 10    Exercise Name 10  heel walking/toe walking  -CJ      Reps 10  3 laps  -CJ         Exercise 11    Exercise Name 11  HS curls, B  -CJ      Reps 11  20  -CJ      Additional Comments  #5  -CJ         Exercise 12    Exercise Name 12  resisted sidestepping  -CJ      Reps 12  3 laps  -CJ      Additional Comments  RTB  -CJ         Exercise 13    Exercise Name 13  B hip abduction  -CJ      Reps 13  10 B, #5  -CJ      Additional Comments  small range, no back pain  -CJ        User Key  (r) = Recorded By, (t) = Taken By, (c) = Cosigned By    Initials Name Provider Type     Ricardo Guevara, PT Physical Therapist                                          Time Calculation:   Start Time: 1000  Stop Time: 1055  Time Calculation (min): 55 min  Total Timed Code Minutes- PT: 45 minute(s)  Therapy Charges for Today     Code Description Service Date Service Provider Modifiers Qty    24215549170 HC PT THER PROC EA 15 MIN 12/16/2019 Ricardo Guevara, PT GP 3    49546908289 HC PT HOT OR COLD PACK TREAT MCARE 12/16/2019 Ricardo Guevara, PT GP 1                    Ricardo Guevara, PT  12/16/2019

## 2019-12-19 ENCOUNTER — HOSPITAL ENCOUNTER (OUTPATIENT)
Dept: PHYSICAL THERAPY | Facility: HOSPITAL | Age: 74
Setting detail: THERAPIES SERIES
Discharge: HOME | End: 2019-12-19

## 2019-12-19 ENCOUNTER — OFFICE VISIT (OUTPATIENT)
Dept: ORTHOPEDIC SURGERY | Facility: CLINIC | Age: 74
End: 2019-12-19

## 2019-12-19 VITALS — HEIGHT: 66 IN | WEIGHT: 123 LBS | TEMPERATURE: 98.6 F | BODY MASS INDEX: 19.77 KG/M2

## 2019-12-19 DIAGNOSIS — Z47.89 ORTHOPEDIC AFTERCARE: ICD-10-CM

## 2019-12-19 DIAGNOSIS — Z96.652 STATUS POST LEFT KNEE REPLACEMENT: Primary | ICD-10-CM

## 2019-12-19 DIAGNOSIS — Z96.652 STATUS POST TOTAL LEFT KNEE REPLACEMENT: Primary | ICD-10-CM

## 2019-12-19 DIAGNOSIS — Z74.09 IMPAIRED MOBILITY: ICD-10-CM

## 2019-12-19 PROCEDURE — 97110 THERAPEUTIC EXERCISES: CPT

## 2019-12-19 PROCEDURE — 99024 POSTOP FOLLOW-UP VISIT: CPT | Performed by: ORTHOPAEDIC SURGERY

## 2019-12-19 PROCEDURE — 73562 X-RAY EXAM OF KNEE 3: CPT | Performed by: ORTHOPAEDIC SURGERY

## 2019-12-19 NOTE — THERAPY TREATMENT NOTE
Outpatient Physical Therapy Ortho Treatment Note  [facility]     Patient Name: Davida Anderson  : 1945  MRN: [id number]  Today's Date: 2019      Visit Date: 2019    Visit Dx:    ICD-10-CM ICD-9-CM   1. Status post left knee replacement Z96.652 V43.65   2. Orthopedic aftercare Z47.89 V54.9   3. Impaired mobility Z74.09 799.89       Patient Active Problem List   Diagnosis   • BP (high blood pressure)   • History of left breast infiltrating ductal cancer 2cm s/p mastectomy with reconstruction  s/p chemo   • H/O ETOH abuse   • Gtz's esophagus   • Colon polyps   • Bipolar 1 disorder (CMS/HCC)   • Arthritis   • Raynaud's disease   • Neuropathy   • Lumbar spine pain   • Spondylolisthesis at L4-L5 level   • Right lumbar radiculopathy   • Gtz's esophagus without dysplasia   • Hx of adenomatous colonic polyps   • Dysphagia   • Diarrhea   • Primary osteoarthritis of left knee   • Primary localized osteoarthrosis of the knee, right   • Chronic pain of left knee   • Spinal stenosis of lumbar region with neurogenic claudication   • Spondylolisthesis of lumbar region   • Acute pain of left knee        Past Medical History:   Diagnosis Date   • Acid reflux    • Arthritis    • Atrial fibrillation (CMS/HCC)    • Gtz esophagus    • Bipolar 2 disorder (CMS/HCC)    • Bradycardia     WITH SURGERY   • COPD (chronic obstructive pulmonary disease) (CMS/HCC)     MILD, USES AINHALER PRN   • DDD (degenerative disc disease), lumbar    • Diverticulosis    • Dizziness    • Drug therapy    • Fibrocystic breast    • Frequent UTI     SEES DR GOFF FOR FREQUENT UTI'S, ON KEFLEX   • H/O Infiltrating ductal carcinoma of left breast     Stage IIA, Grade 3 ER/DE +, HER2 -, treated with 5 years of tamoxifen, 5 years of Femara, completed in    • High cholesterol    • History of alcoholism (CMS/HCC)     40 YRS AGO   • History of Clostridium difficile colitis    • History of gastric ulcer    • History of  loop recorder    • Hypertension    • Iron deficiency    • Irregular heartbeat     a fib   • Low back pain    • Lumbar herniated disc    • Mass of right breast on mammogram 03/17/2016    10 o'clock position, 4 cm from the nipple,   • Neuropathy    • PONV (postoperative nausea and vomiting)    • Raynaud disease    • Sleep apnea     MILD, NO NEED FOR CPAP   • Tremors of nervous system    • Urinary incontinence         Past Surgical History:   Procedure Laterality Date   • ANKLE OPEN REDUCTION INTERNAL FIXATION Right 08/14/2015    Dr. Dandre Camacho, [facility]   • BACK SURGERY      LUMBAR   • BREAST RECONSTRUCTION, BREAST TISSUE EXPANDER INSERTION Left 04/11/2002    [employer] Contour Profile expander was placed 550 cc size, filled with 200 cc of saline, Dr. Herbert Napoles, [facility]   • COLONOSCOPY N/A 09/16/2014    Dr. Darci Kerns, [facility]; torts, tics, stool, polyp   • COLONOSCOPY N/A 9/27/2016    NTEH, diverticulosis, tortuous colon, Two 3-5mm polyps in the descending colon and the transverse colon, IH.  PATH: Tubular adenoma   • COLONOSCOPY N/A 12/12/2017    NTEH, tics, torts, IH, TA w/low grade dysplasia   • CYSTOSCOPY N/A 05/07/2010    with TVT takedown, Dr. Simon Wall, [facility]   • ENDOSCOPY N/A 9/27/2016    z line irreg, bilious gastric fluid- fluid aspiration preformed, gastritis.  PATH: Squamous and glandular mucosa with minimal superficial acute inflammation.    • ENDOSCOPY N/A 12/12/2017    Z line irregular, gastritis, duodenitis, chronic inflammation   • EPIDURAL BLOCK     • KNEE SURGERY Left     BROKEN   • LUMBAR DISCECTOMY FUSION INSTRUMENTATION N/A 12/3/2018    Procedure: L4-5 laminectomy and fusion with instrumentation;  Surgeon: Austin Keith MD;  Location: [facility];  Service: Orthopedic Spine   • MAMMO US BREAST BIOPSY ADDITIONAL W WO DEVICE Left 02/21/2002    2:00 position left breast, Infiltrating Ductal Carcinoma, [facility]   • MANDIBLE FRACTURE SURGERY     • MASTECTOMY Left 04/11/2002    Left Total Mastectomy and  Left Axillary [place] Node [place], Dr. Indu Akins, [facility]   • OTHER SURGICAL HISTORY      CARDIAC LOOP DEVICE, LEFT CHEST   • TENSION FREE VAGINAL TAPING WITH MINI ARC SLING N/A 10/26/2009    Dr. Gina Castillo, [facility]   • TOTAL KNEE ARTHROPLASTY Left 10/21/2019    Procedure: TOTAL KNEE ARTHROPLASTY;  Surgeon: Anurag Serrano MD;  Location: [facility];  Service: Orthopedics   • UPPER GASTROINTESTINAL ENDOSCOPY  09/16/2014    z-line irreg, grade a reflux, gastritis                       PT Assessment/Plan     Row Name 12/19/19 0955          PT Assessment    Assessment Comments  Pt reports incresed R ankle pain due to history of R ankle fracture with hardware placement. Modified several exercises to decrease stress on affected tissues. Pt able to tolerate addition of Sl clamshells without pain and progressing well toward goals.   -CN        PT Plan    PT Plan Comments  Continue to progress strengthening as tolerated.   -CN       User Key  (r) = Recorded By, (t) = Taken By, (c) = Cosigned By    Initials Name Provider Type    Shawna Hayes, PT Physical Therapist          Modalities     Row Name 12/19/19 0900             Ice    Ice Applied  Yes  -CN      Location  left knee elevated 1 pillows  -CN      Rx Minutes  10 mins  -CN      Ice S/P Rx  Yes  -CN        User Key  (r) = Recorded By, (t) = Taken By, (c) = Cosigned By    Initials Name Provider Type    Shawna Hayes, PT Physical Therapist        OP Exercises     Row Name 12/19/19 0900             Subjective Comments    Subjective Comments  I have hardware in my right ankle and I think some of the exercises are bothering it. I think the calf stretch at the stairs and the ones with weights are hurting it.   -CN         Subjective Pain    Able to rate subjective pain?  yes  -CN         Total Minutes    90954 - PT Therapeutic Exercise Minutes  40  -CN         Exercise 1    Exercise Name 1  Nustep-L5  -CN      Time 1  4  min  -CN         Exercise 2     Exercise Name 2  Long sitting calf stretch  -CN      Cueing 2  Demo  -CN      Reps 2  2  -CN      Time 2  30 sec  -CN         Exercise 3    Exercise Name 3  HS stretch step/lunge knee flexion stretch step  -CN      Reps 3  2  -CN      Time 3  30 sec  -CN         Exercise 4    Exercise Name 4  Monster walk: fwd/back  -CN      Time 4  3 laps, RTB  -CN         Exercise 5    Exercise Name 5  squat to chair and return to standing  -CN      Reps 5  15  -CN         Exercise 6    Exercise Name 6  LAQ, #4  -CN      Reps 6  15  -CN      Time 6  5 sec  -CN         Exercise 7    Exercise Name 7  SAQ, 4 lbs  -CN      Sets 7  2  -CN      Reps 7  10  -CN      Time 7  5 sec  -CN         Exercise 8    Exercise Name 8  Tandem gait forward/retro  -CN      Cueing 8  Demo  -CN      Reps 8  3 laps  -CN         Exercise 9    Exercise Name 9  H/L hip abduction  -CN      Reps 9  15/15 B and Uni  -CN      Additional Comments  GTB  -CN         Exercise 11    Exercise Name 11  HS curls, L only  -CN      Additional Comments  Held on R LE due to ankle pain with weight  -CN         Exercise 12    Exercise Name 12  resisted sidestepping  -CN      Reps 12  3 laps  -CN      Additional Comments  RTB  -CN         Exercise 13    Exercise Name 13  L hip abduction, standing  -CN      Sets 13  2  -CN      Reps 13  10  -CN      Additional Comments  5#, small range  -CN         Exercise 16    Exercise Name 16  SL clamshells  -CN      Cueing 16  Demo  -CN      Reps 16  15 B  -CN        User Key  (r) = Recorded By, (t) = Taken By, (c) = Cosigned By    Initials Name Provider Type    Shawna Hayes, PT Physical Therapist                       PT OP Goals     Row Name 12/19/19 0900          PT Short Term Goals    STG 1  Patient will be independent and compliant with initial home exercise program  -CN     STG 1 Progress  Met  -CN     STG 2  Patient will be independent with education for symptom management, posture, body mechanics, and strategies to  minimize stress on affected tissues  -CN     STG 2 Progress  Met  -CN     STG 3  pt. to ambulate with near normal heel to toe gait pattern with SC to facilitate ease/safety with community mobility  -CN     STG 3 Progress  Met  -CN        Long Term Goals    LTG 1  Patient will be independent and compliant with advanced home exercise program to facilitate self-management of symptoms once discharged from formal therapy.  -CN     LTG 1 Progress  Progressing  -CN     LTG 2  Patient will ambulate independently up/down one flight of stairs with reciprocal technique for improved household and community mobility  -CN     LTG 2 Progress  Ongoing  -CN     LTG 3  Patient will demonstrate 5/5 L knee strength for improved function and ease with return to deficit free ambulation and stair negotiation.  -CN     LTG 3 Progress  Progressing  -CN     LTG 3 Progress Comments  Continuing to progress strengthening as tolerated.   -CN     LTG 4  Patient will demonstrate improved knee flexion to at least 120 degrees and full knee extension for improved AROM and ease with ADLs and mobility.  -CN     LTG 4 Progress  Met  -CN     LTG 5  Patient will be able to get in/out of the tub independently.  -CN     LTG 5 Progress  Progressing  -CN     LTG 6  Pt. will report L knee pain </= 1-2/10 with all daily activities and sleeping.  -CN     LTG 6 Progress  Progressing  -CN       User Key  (r) = Recorded By, (t) = Taken By, (c) = Cosigned By    Initials Name Provider Type    Shawna Hayes, PT Physical Therapist          Therapy Education  Given: HEP, Symptoms/condition management, Pain management, Edema management  Program: Reinforced  How Provided: Verbal, Demonstration  Provided to: Patient  Level of Understanding: Teach back education performed              Time Calculation:   Start Time: 0900  Stop Time: 0950  Time Calculation (min): 50 min  Therapy Charges for Today     Code Description Service Date Service Provider Modifiers Qty     02215657354 HC PT THER PROC EA 15 MIN 12/19/2019 Shawna Steele, PT GP 3    06289263279 HC PT HOT OR COLD PACK TREAT MCARE 12/19/2019 Shawna Steele, PT GP 1                    Shawna Steele, PT  12/19/2019

## 2019-12-19 NOTE — PROGRESS NOTES
Davida Anderson : 1945 MRN: [id number] DATE: 2019    DIAGNOSIS: 8 week follow up left total knee      SUBJECTIVE:Patient returns today for 8 week follow up of left total knee replacement. Patient reports doing well with no unusual complaints. Appears to be progressing appropriately.    OBJECTIVE:   Exam:. The incision is well healed. No sign of infection. Range of motion is measured at 0 to 125. The calf is soft and nontender with a negative Homans sign. Strength is progressing and the patient is ambulating appropriately.    DIAGNOSTIC STUDIES  Xrays: 3 views of the left knee (AP, lateral, and sunrise) were ordered and reviewed for evaluation of recent knee replacement. They demonstrate a well positioned, well aligned knee replacement without complicating factors noted. In comparison with previous films there has been no change.    ASSESSMENT: 8 week status post left knee replacement.    PLAN: 1) Continue with PT exercises as prescribed   2) Follow up in 10 months    Anurag Serrano MD  2019

## 2019-12-23 ENCOUNTER — HOSPITAL ENCOUNTER (OUTPATIENT)
Dept: PHYSICAL THERAPY | Facility: HOSPITAL | Age: 74
Setting detail: THERAPIES SERIES
Discharge: HOME | End: 2019-12-23

## 2019-12-23 DIAGNOSIS — Z47.89 ORTHOPEDIC AFTERCARE: ICD-10-CM

## 2019-12-23 DIAGNOSIS — Z96.652 STATUS POST LEFT KNEE REPLACEMENT: Primary | ICD-10-CM

## 2019-12-23 DIAGNOSIS — Z74.09 IMPAIRED MOBILITY: ICD-10-CM

## 2019-12-23 PROCEDURE — 97110 THERAPEUTIC EXERCISES: CPT | Performed by: PHYSICAL THERAPIST

## 2019-12-23 NOTE — THERAPY DISCHARGE NOTE
Outpatient Physical Therapy Ortho Treatment Note/Discharge Summary  [facility]     Patient Name: Davida Anderson  : 1945  MRN: [id number]  Today's Date: 2019      Visit Date: 2019    Visit Dx:    ICD-10-CM ICD-9-CM   1. Status post left knee replacement Z96.652 V43.65   2. Orthopedic aftercare Z47.89 V54.9   3. Impaired mobility Z74.09 799.89       Patient Active Problem List   Diagnosis   • BP (high blood pressure)   • History of left breast infiltrating ductal cancer 2cm s/p mastectomy with reconstruction  s/p chemo   • H/O ETOH abuse   • Gtz's esophagus   • Colon polyps   • Bipolar 1 disorder (CMS/HCC)   • Arthritis   • Raynaud's disease   • Neuropathy   • Lumbar spine pain   • Spondylolisthesis at L4-L5 level   • Right lumbar radiculopathy   • Gtz's esophagus without dysplasia   • Hx of adenomatous colonic polyps   • Dysphagia   • Diarrhea   • Primary osteoarthritis of left knee   • Primary localized osteoarthrosis of the knee, right   • Chronic pain of left knee   • Spinal stenosis of lumbar region with neurogenic claudication   • Spondylolisthesis of lumbar region   • Acute pain of left knee        Past Medical History:   Diagnosis Date   • Acid reflux    • Arthritis    • Atrial fibrillation (CMS/HCC)    • Gtz esophagus    • Bipolar 2 disorder (CMS/HCC)    • Bradycardia     WITH SURGERY   • COPD (chronic obstructive pulmonary disease) (CMS/HCC)     MILD, USES AINHALER PRN   • DDD (degenerative disc disease), lumbar    • Diverticulosis    • Dizziness    • Drug therapy    • Fibrocystic breast    • Frequent UTI     SEES DR GOFF FOR FREQUENT UTI'S, ON KEFLEX   • H/O Infiltrating ductal carcinoma of left breast     Stage IIA, Grade 3 ER/DC +, HER2 -, treated with 5 years of tamoxifen, 5 years of Femara, completed in    • High cholesterol    • History of alcoholism (CMS/HCC)     40 YRS AGO   • History of Clostridium difficile colitis    • History of gastric  ulcer    • History of loop recorder    • Hypertension    • Iron deficiency    • Irregular heartbeat     a fib   • Low back pain    • Lumbar herniated disc    • Mass of right breast on mammogram 03/17/2016    10 o'clock position, 4 cm from the nipple,   • Neuropathy    • PONV (postoperative nausea and vomiting)    • Raynaud disease    • Sleep apnea     MILD, NO NEED FOR CPAP   • Tremors of nervous system    • Urinary incontinence         Past Surgical History:   Procedure Laterality Date   • ANKLE OPEN REDUCTION INTERNAL FIXATION Right 08/14/2015    Dr. Dandre Camacho, [facility]   • BACK SURGERY      LUMBAR   • BREAST RECONSTRUCTION, BREAST TISSUE EXPANDER INSERTION Left 04/11/2002    [place] Contour Profile expander was placed 550 cc size, filled with 200 cc of saline, Dr. Herbert Napoles, [facility]   • COLONOSCOPY N/A 09/16/2014    Dr. Darci Kerns, [facility]; torts, tics, stool, polyp   • COLONOSCOPY N/A 9/27/2016    NTEH, diverticulosis, tortuous colon, Two 3-5mm polyps in the descending colon and the transverse colon, IH.  PATH: Tubular adenoma   • COLONOSCOPY N/A 12/12/2017    NTEH, tics, torts, IH, TA w/low grade dysplasia   • CYSTOSCOPY N/A 05/07/2010    with TVT takedown, Dr. Simon Wall, [facility]   • ENDOSCOPY N/A 9/27/2016    z line irreg, bilious gastric fluid- fluid aspiration preformed, gastritis.  PATH: Squamous and glandular mucosa with minimal superficial acute inflammation.    • ENDOSCOPY N/A 12/12/2017    Z line irregular, gastritis, duodenitis, chronic inflammation   • EPIDURAL BLOCK     • KNEE SURGERY Left     BROKEN   • LUMBAR DISCECTOMY FUSION INSTRUMENTATION N/A 12/3/2018    Procedure: L4-5 laminectomy and fusion with instrumentation;  Surgeon: Austin Keith MD;  Location: [facility] OR;  Service: Orthopedic Spine   • MAMMO US BREAST BIOPSY ADDITIONAL W WO DEVICE Left 02/21/2002    2:00 position left breast, Infiltrating Ductal Carcinoma, [facility]   • MANDIBLE FRACTURE SURGERY     • MASTECTOMY Left 04/11/2002    Left  Total Mastectomy and Left Axillary [place] Node [place], Dr. Indu Akins, [facility]   • OTHER SURGICAL HISTORY      CARDIAC LOOP DEVICE, LEFT CHEST   • TENSION FREE VAGINAL TAPING WITH MINI ARC SLING N/A 10/26/2009    Dr. Gina Castillo, [facility]   • TOTAL KNEE ARTHROPLASTY Left 10/21/2019    Procedure: TOTAL KNEE ARTHROPLASTY;  Surgeon: Anurag Serrano MD;  Location: [facility];  Service: Orthopedics   • UPPER GASTROINTESTINAL ENDOSCOPY  09/16/2014    z-line irreg, grade a reflux, gastritis                       PT Assessment/Plan     Row Name 12/23/19 1151          PT Assessment    Assessment Comments  Ms. Anderson is reporting ease with functional mobility tasks. She is ambulating without AD with nice gait pattern. Patient has met all her goals at this time and is capable of self management of HEP at this time. She is agreeable to discharge,  -CJ       User Key  (r) = Recorded By, (t) = Taken By, (c) = Cosigned By    Initials Name Provider Type    CJ Ricardo Guevara, PT Physical Therapist              OP Exercises     Row Name 12/23/19 1000             Subjective Comments    Subjective Comments  The MD released me. I think I am ready to be done. I am doing well.   -CJ         Subjective Pain    Able to rate subjective pain?  yes  -CJ      Pre-Treatment Pain Level  1  -CJ      Post-Treatment Pain Level  1  -CJ         Total Minutes    53718 - PT Therapeutic Exercise Minutes  42  -CJ         Exercise 1    Exercise Name 1  Nustep-L5  -CJ      Time 1  4  min  -CJ         Exercise 2    Exercise Name 2  Long sitting calf stretch  -CJ      Cueing 2  Verbal  -CJ      Reps 2  3  -CJ      Time 2  30 sec  -CJ         Exercise 4    Exercise Name 4  Monster walk: fwd/back  -CJ      Time 4  3 laps, GTB  -CJ         Exercise 5    Exercise Name 5  squat to chair and return to standing  -CJ      Reps 5  15  -CJ         Exercise 6    Exercise Name 6  LAQ, #4  -CJ      Reps 6  15  -CJ      Time 6  5 sec  -CJ         Exercise 7    Exercise  Name 7  SAQ, 4 lbs  -CJ      Sets 7  2  -CJ      Reps 7  10  -CJ      Time 7  5 sec  -CJ         Exercise 8    Exercise Name 8  Tandem gait forward/retro  -CJ      Cueing 8  Demo  -CJ      Reps 8  3 laps  -CJ         Exercise 10    Exercise Name 10  Recumbent bike- seat 5  -CJ      Time 10  4 min  -CJ         Exercise 11    Exercise Name 11  HS curls, L only  -CJ      Additional Comments  #5  -CJ         Exercise 12    Exercise Name 12  resisted sidestepping  -CJ      Reps 12  3 laps  -CJ      Additional Comments  GTB  -CJ         Exercise 13    Exercise Name 13  L hip abduction, standing  -CJ      Sets 13  2  -CJ      Reps 13  10  -CJ      Additional Comments  #5, small range  -CJ         Exercise 14    Exercise Name 14  step down, return  -CJ      Reps 14  12  -CJ        User Key  (r) = Recorded By, (t) = Taken By, (c) = Cosigned By    Initials Name Provider Type    CJ Ricardo Guevara, PT Physical Therapist                         PT OP Goals     Row Name 12/23/19 1100          PT Short Term Goals    STG 1  Patient will be independent and compliant with initial home exercise program  -     STG 1 Progress  Met  -     STG 2  Patient will be independent with education for symptom management, posture, body mechanics, and strategies to minimize stress on affected tissues  -     STG 2 Progress  Met  -     STG 3  pt. to ambulate with near normal heel to toe gait pattern with SC to facilitate ease/safety with community mobility  -     STG 3 Progress  Met  -        Long Term Goals    LTG 1  Patient will be independent and compliant with advanced home exercise program to facilitate self-management of symptoms once discharged from formal therapy.  -     LTG 1 Progress  Met  -     LTG 2  Patient will ambulate independently up/down one flight of stairs with reciprocal technique for improved household and community mobility  -     LTG 2 Progress  Met  -     LTG 2 Progress Comments  discomfort at times  -      LTG 3  Patient will demonstrate 5/5 L knee strength for improved function and ease with return to deficit free ambulation and stair negotiation.  -     LTG 3 Progress  Met  -     LTG 4  Patient will demonstrate improved knee flexion to at least 120 degrees and full knee extension for improved AROM and ease with ADLs and mobility.  -     LTG 4 Progress  Met  -     LTG 5  Patient will be able to get in/out of the tub independently.  -     LTG 5 Progress  Met  -     LTG 6  Pt. will report L knee pain </= 1-2/10 with all daily activities and sleeping.  -     LTG 6 Progress  Met  -       User Key  (r) = Recorded By, (t) = Taken By, (c) = Cosigned By    Initials Name Provider Type    Ricardo Joaquin PT Physical Therapist                         Time Calculation:   Start Time: 1000  Stop Time: 1042  Time Calculation (min): 42 min  Total Timed Code Minutes- PT: 42 minute(s)  Therapy Charges for Today     Code Description Service Date Service Provider Modifiers Qty    36620170646 HC PT THER PROC EA 15 MIN 12/23/2019 Ricardo Guevara PT GP 3                OP PT Discharge Summary  Date of Discharge: 12/23/19  Reason for Discharge: All goals achieved, Independent  Outcomes Achieved: Able to achieve all goals within established timeline  Discharge Destination: Home with home program      Ricardo Guevara PT  12/23/2019

## 2019-12-26 ENCOUNTER — APPOINTMENT (OUTPATIENT)
Dept: PHYSICAL THERAPY | Facility: HOSPITAL | Age: 74
End: 2019-12-26

## 2020-01-28 ENCOUNTER — TELEPHONE (OUTPATIENT)
Dept: ORTHOPEDIC SURGERY | Facility: CLINIC | Age: 75
End: 2020-01-28

## 2020-02-20 ENCOUNTER — TRANSCRIBE ORDERS (OUTPATIENT)
Dept: ADMINISTRATIVE | Facility: HOSPITAL | Age: 75
End: 2020-02-20

## 2020-02-20 DIAGNOSIS — Z12.31 BREAST CANCER SCREENING BY MAMMOGRAM: Primary | ICD-10-CM

## 2020-02-25 ENCOUNTER — TELEPHONE (OUTPATIENT)
Dept: ORTHOPEDIC SURGERY | Facility: CLINIC | Age: 75
End: 2020-02-25

## 2020-04-07 ENCOUNTER — HOSPITAL ENCOUNTER (OUTPATIENT)
Dept: MAMMOGRAPHY | Facility: HOSPITAL | Age: 75
End: 2020-04-07

## 2020-06-01 RX ORDER — LINACLOTIDE 72 UG/1
CAPSULE, GELATIN COATED ORAL
Qty: 90 CAPSULE | Refills: 3 | OUTPATIENT
Start: 2020-06-01

## 2020-06-08 ENCOUNTER — TRANSCRIBE ORDERS (OUTPATIENT)
Dept: ADMINISTRATIVE | Facility: HOSPITAL | Age: 75
End: 2020-06-08

## 2020-06-08 DIAGNOSIS — Z78.0 ASYMPTOMATIC MENOPAUSAL STATE: Primary | ICD-10-CM

## 2020-06-15 ENCOUNTER — HOSPITAL ENCOUNTER (OUTPATIENT)
Dept: MAMMOGRAPHY | Facility: HOSPITAL | Age: 75
Discharge: HOME | End: 2020-06-15
Admitting: SPECIALIST

## 2020-06-15 DIAGNOSIS — Z12.31 BREAST CANCER SCREENING BY MAMMOGRAM: ICD-10-CM

## 2020-06-15 PROCEDURE — 77067 SCR MAMMO BI INCL CAD: CPT

## 2020-06-15 PROCEDURE — 77063 BREAST TOMOSYNTHESIS BI: CPT

## 2020-07-09 RX ORDER — LINACLOTIDE 72 UG/1
CAPSULE, GELATIN COATED ORAL
Qty: 90 CAPSULE | Refills: 3 | OUTPATIENT
Start: 2020-07-09

## 2020-07-21 ENCOUNTER — HOSPITAL ENCOUNTER (OUTPATIENT)
Dept: BONE DENSITY | Facility: HOSPITAL | Age: 75
Discharge: HOME | End: 2020-07-21
Admitting: SPECIALIST

## 2020-07-21 DIAGNOSIS — Z78.0 ASYMPTOMATIC MENOPAUSAL STATE: ICD-10-CM

## 2020-07-21 PROCEDURE — 77080 DXA BONE DENSITY AXIAL: CPT

## 2020-08-13 ENCOUNTER — OFFICE VISIT (OUTPATIENT)
Dept: GASTROENTEROLOGY | Facility: CLINIC | Age: 75
End: 2020-08-13

## 2020-08-13 VITALS — BODY MASS INDEX: 20.25 KG/M2 | HEIGHT: 66 IN | TEMPERATURE: 99 F | WEIGHT: 126 LBS

## 2020-08-13 DIAGNOSIS — D12.6 ADENOMATOUS POLYP OF COLON, UNSPECIFIED PART OF COLON: ICD-10-CM

## 2020-08-13 DIAGNOSIS — K58.1 IRRITABLE BOWEL SYNDROME WITH CONSTIPATION: ICD-10-CM

## 2020-08-13 DIAGNOSIS — K22.70 BARRETT'S ESOPHAGUS WITHOUT DYSPLASIA: ICD-10-CM

## 2020-08-13 DIAGNOSIS — K21.9 GASTROESOPHAGEAL REFLUX DISEASE, ESOPHAGITIS PRESENCE NOT SPECIFIED: Primary | ICD-10-CM

## 2020-08-13 DIAGNOSIS — R13.10 DYSPHAGIA, UNSPECIFIED TYPE: ICD-10-CM

## 2020-08-13 PROCEDURE — 99214 OFFICE O/P EST MOD 30 MIN: CPT | Performed by: INTERNAL MEDICINE

## 2020-08-13 NOTE — PROGRESS NOTES
Chief Complaint   Patient presents with   • Gtz's esophagus   • discuss possible needed scope        Davida Anderson is a  74 y.o. female here for a follow up visit for GERD, IBS C, history of polyps    HPI this 74-year-old white female patient of Dr. Marek Nguyễn presents in follow-up since last seen in July 2019.  She has a history of reflux as well as irritable bowel syndrome with constipation and a history of polyps.  She had been given a diagnosis of Gtz's esophagus in the distant past but studies performed in 2012, -14, -16, and -17 were all negative for any metaplasia.  She has been having recent issues with dysphasia on a daily basis as well as exacerbation of her reflux.  I advised we treat her reflux more aggressively and if she does not show symptomatic improvement of her swallowing we would entertain upper endoscopy for that purpose.  She is due for colonoscopic examination in December of this year as it will been 3 years since her last colonoscopy when an adenomatous polyp was removed from her cecum.  She is amenable to this approach.    Past Medical History:   Diagnosis Date   • Acid reflux    • Arthritis    • Atrial fibrillation (CMS/HCC)    • Gtz esophagus    • Bipolar 2 disorder (CMS/HCC)    • Bradycardia     WITH SURGERY   • COPD (chronic obstructive pulmonary disease) (CMS/HCC)     MILD, USES AINHALER PRN   • DDD (degenerative disc disease), lumbar    • Diverticulosis    • Dizziness    • Drug therapy    • Fibrocystic breast    • Frequent UTI     SEES DR GOFF FOR FREQUENT UTI'S, ON KEFLEX   • H/O Infiltrating ductal carcinoma of left breast 2002    Stage IIA, Grade 3 ER/DE +, HER2 -, treated with 5 years of tamoxifen, 5 years of Femara, completed in 2012   • High cholesterol    • History of alcoholism (CMS/HCC)     40 YRS AGO   • History of Clostridium difficile colitis 2014   • History of gastric ulcer    • History of loop recorder    • Hypertension    • Iron deficiency    •  Irregular heartbeat     a fib   • Low back pain    • Lumbar herniated disc    • Mass of right breast on mammogram 03/17/2016    10 o'clock position, 4 cm from the nipple,   • Neuropathy    • PONV (postoperative nausea and vomiting)    • Raynaud disease    • Sleep apnea     MILD, NO NEED FOR CPAP   • Tremors of nervous system    • Urinary incontinence        Current Outpatient Medications   Medication Sig Dispense Refill   • ALBUTEROL IN Inhale 2 puffs Every 4 (Four) Hours As Needed.     • apixaban (ELIQUIS) 5 MG tablet tablet Take 1 tablet by mouth every 12 (Twelve) hours. 60 tablet 0   • ARIPiprazole (ABILIFY) 10 MG tablet Take 10 mg by mouth Every Morning.     • aspirin 325 MG tablet Take 325 mg by mouth.     • atorvastatin (LIPITOR) 20 MG tablet Take 20 mg by mouth Every Morning.     • B COMPLEX VITAMINS ER PO Take 1 tablet by mouth Daily.     • baclofen (LIORESAL) 10 MG tablet Take 10 mg by mouth 3 (Three) Times a Day.     • BIOTIN PO Take 5,000 mcg by mouth Daily.     • Calcium Carb-Cholecalciferol (CALCIUM 600 + D) 600-200 MG-UNIT tablet Take 2 tablets by mouth Every Morning.     • cephalexin (KEFLEX) 500 MG capsule Take 500 mg by mouth 4 (Four) Times a Day. PREVENTIVE FOR UTI'S     • DIGESTIVE ENZYMES PO Take 2 tablets by mouth.     • dilTIAZem CD (CARTIA XT) 120 MG 24 hr capsule Take 120 mg by mouth Every Morning.     • diphenhydrAMINE (BENADRYL) 25 mg capsule Take 25 mg by mouth Every 6 (Six) Hours As Needed for Itching.     • diphenhydrAMINE-acetaminophen (TYLENOL PM)  MG tablet per tablet Take 1 tablet by mouth Every Night.     • Docusate Calcium (STOOL SOFTENER PO) Take 1 capsule by mouth As Needed.     • Ferrous Sulfate (IRON PO) Take 65 mg by mouth Daily.     • lamoTRIgine (LaMICtal) 100 MG tablet Take 100 mg by mouth 2 (two) times a day.     • linaclotide (LINZESS) 72 MCG capsule capsule Take 1 capsule by mouth Every Morning Before Breakfast. 90 capsule 3   • lithium carbonate 300 MG capsule  Take 300 mg by mouth Every Morning.     • magnesium oxide (MAGOX) 400 (241.3 MG) MG tablet tablet Take 400 mg by mouth Every Morning.     • melatonin 5 MG tablet tablet Take 5 mg by mouth At Night As Needed.     • ondansetron (ZOFRAN) 4 MG tablet Take 4 mg by mouth.     • pantoprazole (PROTONIX) 40 MG EC tablet Take 40 mg by mouth 2 (Two) Times a Day.     • pregabalin (LYRICA) 50 MG capsule Take 150 mg by mouth Every Night.     • Probiotic Product (SOLUBLE FIBER/PROBIOTICS PO) Take 1 tablet by mouth Every Morning.     • solifenacin (VESICARE) 10 MG tablet Take 10 mg by mouth Every Morning.     • Unable to find Med Name: THYROID COUMPOUND ONCE A DAY .       No current facility-administered medications for this visit.      Facility-Administered Medications Ordered in Other Visits   Medication Dose Route Frequency Provider Last Rate Last Dose   • mupirocin (BACTROBAN) 2 % nasal ointment   Nasal BID Anurag Serrano MD           PRN Meds:.    Allergies   Allergen Reactions   • Morphine Hives, Itching and Rash       Social History     Socioeconomic History   • Marital status:      Spouse name: Not on file   • Number of children: Not on file   • Years of education: Not on file   • Highest education level: Not on file   Tobacco Use   • Smoking status: Former Smoker     Packs/day: 3.00     Years: 25.00     Pack years: 75.00     Types: Cigarettes     Last attempt to quit: 1983     Years since quittin.3   • Smokeless tobacco: Never Used   Substance and Sexual Activity   • Alcohol use: No     Comment: RECOVERYING ,  LAST DRINK 40 YRS AGO   • Drug use: No   • Sexual activity: Defer       Family History   Problem Relation Age of Onset   • Breast cancer Mother 35   • Colon cancer Mother 64   • Heart disease Father    • Cancer Father         throat and lung   • Colon polyps Father    • Malig Hyperthermia Neg Hx        Review of Systems   Constitutional: Negative for activity change, appetite change, fatigue and  unexpected weight change.   HENT: Negative for congestion, facial swelling, sore throat, trouble swallowing and voice change.    Eyes: Negative for photophobia and visual disturbance.   Respiratory: Negative for cough and choking.    Cardiovascular: Negative for chest pain.   Gastrointestinal: Negative for abdominal distention, abdominal pain, anal bleeding, blood in stool, constipation, diarrhea, nausea, rectal pain and vomiting.        GERD  Dysphagia   Endocrine: Negative for polyphagia.   Musculoskeletal: Negative for arthralgias, gait problem and joint swelling.   Skin: Negative for color change, pallor and rash.   Allergic/Immunologic: Negative for food allergies.   Neurological: Negative for speech difficulty and headaches.   Hematological: Does not bruise/bleed easily.   Psychiatric/Behavioral: Negative for agitation, confusion and sleep disturbance.       Vitals:    08/13/20 1437   Temp: 99 °F (37.2 °C)       Physical Exam   Constitutional: She is oriented to person, place, and time. She appears well-developed and well-nourished.   HENT:   Head: Normocephalic.   Mouth/Throat: Oropharynx is clear and moist.   Eyes: Conjunctivae and EOM are normal.   Neck: Normal range of motion.   Cardiovascular: Normal rate and regular rhythm.   Pulmonary/Chest: Breath sounds normal.   Abdominal: Soft. Bowel sounds are normal.   Musculoskeletal: Normal range of motion.   Neurological: She is alert and oriented to person, place, and time.   Skin: Skin is warm and dry.   Psychiatric: She has a normal mood and affect. Her behavior is normal.       ASSESSMENT   #1 GERD: Recent exacerbation  #2 dysphagia: Progressive  #3 IBS-C  #4 history of polyps      PLAN  Patient to initiate nighttime PPI in addition to pantoprazole  She will call with a progress report in 2 to 3 weeks, if still symptomatic will entertain upper endoscopy  Otherwise will anticipate EGD and colonoscopy in December of this year      ICD-10-CM ICD-9-CM   1.  Gastroesophageal reflux disease, esophagitis presence not specified K21.9 530.81   2. Dysphagia, unspecified type R13.10 787.20   3. Irritable bowel syndrome with constipation K58.1 564.1   4. Adenomatous polyp of colon, unspecified part of colon D12.6 211.3   5. Gtz's esophagus without dysplasia K22.70 530.85

## 2020-09-08 ENCOUNTER — TELEPHONE (OUTPATIENT)
Dept: GASTROENTEROLOGY | Facility: CLINIC | Age: 75
End: 2020-09-08

## 2020-09-08 NOTE — TELEPHONE ENCOUNTER
Called [facility] pharmacy and they report that the pt is requesting a refill on linzess 72mcg po every am.  She reports that they can not take a script over the phone due to computers down. ADvised will escribe script to them.   Script sent.

## 2020-09-08 NOTE — TELEPHONE ENCOUNTER
----- Message from Prince Waters sent at 9/8/2020 12:49 PM EDT -----  Regarding: FOLLOWING UP ON PRESCRIPTION  [facility] pharmacy called to follow up on prescription order of Linzess from 8/31/20. Please call pharmacy at [phone]

## 2020-09-15 ENCOUNTER — TELEPHONE (OUTPATIENT)
Dept: ORTHOPEDIC SURGERY | Facility: CLINIC | Age: 75
End: 2020-09-15

## 2020-09-15 RX ORDER — CEPHALEXIN 500 MG/1
CAPSULE ORAL
Qty: 4 CAPSULE | Refills: 2 | Status: SHIPPED | OUTPATIENT
Start: 2020-09-15 | End: 2021-09-02

## 2020-09-15 NOTE — TELEPHONE ENCOUNTER
New prescription has been sent to [facility] at [phone] for Keflex 500 mg, #4, directions are to take all 4 capsules 1 hour prior to her procedure.  Refill x2 per RBB

## 2020-10-22 ENCOUNTER — OFFICE VISIT (OUTPATIENT)
Dept: ORTHOPEDIC SURGERY | Facility: CLINIC | Age: 75
End: 2020-10-22

## 2020-10-22 VITALS — HEIGHT: 66 IN | BODY MASS INDEX: 20.25 KG/M2 | WEIGHT: 126 LBS | TEMPERATURE: 96 F

## 2020-10-22 DIAGNOSIS — Z96.652 S/P TKR (TOTAL KNEE REPLACEMENT), LEFT: Primary | ICD-10-CM

## 2020-10-22 PROCEDURE — 99212 OFFICE O/P EST SF 10 MIN: CPT | Performed by: ORTHOPAEDIC SURGERY

## 2020-10-22 PROCEDURE — 73562 X-RAY EXAM OF KNEE 3: CPT | Performed by: ORTHOPAEDIC SURGERY

## 2020-10-22 RX ORDER — NITROFURANTOIN 25; 75 MG/1; MG/1
CAPSULE ORAL
COMMUNITY
Start: 2020-10-15 | End: 2021-09-02

## 2020-10-22 NOTE — PROGRESS NOTES
"Davida Anderson : 1945 MRN: [id number] DATE: 10/22/2020    Chief Complaint:  Follow up left total knee      SUBJECTIVE:Patient returns today for  1 year follow up of left total knee replacement. Patient reports doing well with no unusual complaints. Denies any limitations due to the knee.    OBJECTIVE:    Temp 96 °F (35.6 °C)   Ht 167.6 cm (66\")   Wt 57.2 kg (126 lb)   BMI 20.34 kg/m²   Family History   Problem Relation Age of Onset   • Breast cancer Mother 35   • Colon cancer Mother 64   • Heart disease Father    • Cancer Father         throat and lung   • Colon polyps Father    • Malig Hyperthermia Neg Hx      Past Medical History:   Diagnosis Date   • Acid reflux    • Arthritis    • Atrial fibrillation (CMS/HCC)    • Gtz esophagus    • Bipolar 2 disorder (CMS/HCC)    • Bradycardia     WITH SURGERY   • COPD (chronic obstructive pulmonary disease) (CMS/HCC)     MILD, USES AINHALER PRN   • DDD (degenerative disc disease), lumbar    • Diverticulosis    • Dizziness    • Drug therapy    • Fibrocystic breast    • Frequent UTI     SEES DR GOFF FOR FREQUENT UTI'S, ON KEFLEX   • H/O Infiltrating ductal carcinoma of left breast 2002    Stage IIA, Grade 3 ER/VA +, HER2 -, treated with 5 years of tamoxifen, 5 years of Femara, completed in    • High cholesterol    • History of alcoholism (CMS/HCC)     40 YRS AGO   • History of Clostridium difficile colitis    • History of gastric ulcer    • History of loop recorder    • Hypertension    • Iron deficiency    • Irregular heartbeat     a fib   • Low back pain    • Lumbar herniated disc    • Mass of right breast on mammogram 2016    10 o'clock position, 4 cm from the nipple,   • Neuropathy    • PONV (postoperative nausea and vomiting)    • Raynaud disease    • Sleep apnea     MILD, NO NEED FOR CPAP   • Tremors of nervous system    • Urinary incontinence      Past Surgical History:   Procedure Laterality Date   • ANKLE OPEN REDUCTION INTERNAL FIXATION " Right 08/14/2015    Dr. Dandre Camacho, [facility]   • BACK SURGERY      LUMBAR   • BREAST BIOPSY     • BREAST RECONSTRUCTION, BREAST TISSUE EXPANDER INSERTION Left 04/11/2002    [place] Contour Profile expander was placed 550 cc size, filled with 200 cc of saline, Dr. Herbert Napoles, [facility]   • COLONOSCOPY N/A 09/16/2014    Dr. Darci Kerns, [facility]; torts, tics, stool, polyp   • COLONOSCOPY N/A 9/27/2016    NTEH, diverticulosis, tortuous colon, Two 3-5mm polyps in the descending colon and the transverse colon, IH.  PATH: Tubular adenoma   • COLONOSCOPY N/A 12/12/2017    NTEH, tics, torts, IH, TA w/low grade dysplasia   • CYSTOSCOPY N/A 05/07/2010    with TVT takedown, Dr. Simon Wall, [facility]   • ENDOSCOPY N/A 9/27/2016    z line irreg, bilious gastric fluid- fluid aspiration preformed, gastritis.  PATH: Squamous and glandular mucosa with minimal superficial acute inflammation.    • ENDOSCOPY N/A 12/12/2017    Z line irregular, gastritis, duodenitis, chronic inflammation   • EPIDURAL BLOCK     • KNEE SURGERY Left     BROKEN   • LUMBAR DISCECTOMY FUSION INSTRUMENTATION N/A 12/3/2018    Procedure: L4-5 laminectomy and fusion with instrumentation;  Surgeon: Austin Keith MD;  Location: [facility] OR;  Service: Orthopedic Spine   • MAMMO US BREAST BIOPSY ADDITIONAL W WO DEVICE Left 02/21/2002    2:00 position left breast, Infiltrating Ductal Carcinoma, [facility]   • MANDIBLE FRACTURE SURGERY     • MASTECTOMY Left 04/11/2002    Left Total Mastectomy and Left Axillary [place] Node Biobsy, Dr. Indu Akins, [facility]   • OTHER SURGICAL HISTORY      CARDIAC LOOP DEVICE, LEFT CHEST   • TENSION FREE VAGINAL TAPING WITH MINI ARC SLING N/A 10/26/2009    Dr. Gina Castillo, [facility]   • TOTAL KNEE ARTHROPLASTY Left 10/21/2019    Procedure: TOTAL KNEE ARTHROPLASTY;  Surgeon: Anurag Serrano MD;  Location: [facility] OR;  Service: Orthopedics   • UPPER GASTROINTESTINAL ENDOSCOPY  09/16/2014    z-line irreg, grade a reflux, gastritis     Social History      Socioeconomic History   • Marital status:      Spouse name: Not on file   • Number of children: Not on file   • Years of education: Not on file   • Highest education level: Not on file   Tobacco Use   • Smoking status: Former Smoker     Packs/day: 3.00     Years: 25.00     Pack years: 75.00     Types: Cigarettes     Quit date: 1983     Years since quittin.5   • Smokeless tobacco: Never Used   Substance and Sexual Activity   • Alcohol use: No     Comment: RECOVERYING ,  LAST DRINK 40 YRS AGO   • Drug use: No   • Sexual activity: Defer       Review of Systems: 14 point review of systems performed pertinent positives and negatives discussed above, all other systems are negative    Exam:. The incision is well healed. Range of motion is measured at 0 to 130. The calf is soft and nontender with a negative Homans sign. Alignment is neutral. Good quad strength. There is no evidence of varus/valgus or flexion instability. No effusion. Intact to light touch with palpable distal pulses.     DIAGNOSTIC STUDIES  Xrays: 3 views(AP bilateral knees, lateral left, and sunrise bilateral knees) were ordered and reviewed for evaluation of left knee replacement. They demonstrate a well positioned, well aligned knee replacement without complicating factors noted. In comparison with previous films there has been no change.    ASSESSMENT:    Follow up left knee replacement. doing well       PLAN:    Continue activities as tolerated  Follow up PALAK Serrano MD  10/22/2020

## 2020-11-06 ENCOUNTER — TRANSCRIBE ORDERS (OUTPATIENT)
Dept: SLEEP MEDICINE | Facility: HOSPITAL | Age: 75
End: 2020-11-06

## 2020-11-06 DIAGNOSIS — Z01.818 OTHER SPECIFIED PRE-OPERATIVE EXAMINATION: Primary | ICD-10-CM

## 2020-11-28 ENCOUNTER — LAB (OUTPATIENT)
Dept: LAB | Facility: HOSPITAL | Age: 75
End: 2020-11-28

## 2020-11-28 DIAGNOSIS — Z01.818 OTHER SPECIFIED PRE-OPERATIVE EXAMINATION: ICD-10-CM

## 2020-11-28 PROCEDURE — C9803 HOPD COVID-19 SPEC COLLECT: HCPCS

## 2020-11-28 PROCEDURE — U0004 COV-19 TEST NON-CDC HGH THRU: HCPCS

## 2020-11-30 ENCOUNTER — TELEPHONE (OUTPATIENT)
Dept: ORTHOPEDIC SURGERY | Facility: CLINIC | Age: 75
End: 2020-11-30

## 2020-11-30 LAB — SARS-COV-2 RNA RESP QL NAA+PROBE: NOT DETECTED

## 2020-11-30 NOTE — TELEPHONE ENCOUNTER
Pt called stating she is having a colonscopy done tomorrow. Had a complete knee replacement done 1 year ago. Wants to know if she needs to take the antibiotic before the procedure.      Call back number [phone]

## 2020-11-30 NOTE — TELEPHONE ENCOUNTER
Call returned to the patient.  Have advised her that she will require premedication prior to her colonoscopy however have asked her to check with her gastroenterologist to see if he is okay with her taking oral antibiotics prior to the procedure or if you would prefer IV.  Patient does have a refill on her oral antibiotics.  If the gastroenterologist is okay with oral medication she will go ahead and have that refilled.  Have left it open for her to call if she has any other questions or concerns

## 2020-12-01 ENCOUNTER — ANESTHESIA (OUTPATIENT)
Dept: GASTROENTEROLOGY | Facility: HOSPITAL | Age: 75
End: 2020-12-01

## 2020-12-01 ENCOUNTER — ANESTHESIA EVENT (OUTPATIENT)
Dept: GASTROENTEROLOGY | Facility: HOSPITAL | Age: 75
End: 2020-12-01

## 2020-12-01 ENCOUNTER — HOSPITAL ENCOUNTER (OUTPATIENT)
Facility: HOSPITAL | Age: 75
Setting detail: HOSPITAL OUTPATIENT SURGERY
Discharge: HOME | End: 2020-12-01
Attending: INTERNAL MEDICINE | Admitting: INTERNAL MEDICINE

## 2020-12-01 VITALS
OXYGEN SATURATION: 99 % | TEMPERATURE: 98.2 F | DIASTOLIC BLOOD PRESSURE: 67 MMHG | SYSTOLIC BLOOD PRESSURE: 151 MMHG | RESPIRATION RATE: 16 BRPM | HEART RATE: 62 BPM

## 2020-12-01 DIAGNOSIS — K21.9 GASTROESOPHAGEAL REFLUX DISEASE: ICD-10-CM

## 2020-12-01 DIAGNOSIS — K58.1 IRRITABLE BOWEL SYNDROME WITH CONSTIPATION: ICD-10-CM

## 2020-12-01 DIAGNOSIS — K22.70 BARRETT'S ESOPHAGUS WITHOUT DYSPLASIA: ICD-10-CM

## 2020-12-01 DIAGNOSIS — R13.10 DYSPHAGIA, UNSPECIFIED TYPE: ICD-10-CM

## 2020-12-01 DIAGNOSIS — D12.6 ADENOMATOUS POLYP OF COLON, UNSPECIFIED PART OF COLON: ICD-10-CM

## 2020-12-01 DIAGNOSIS — K21.9 GASTROESOPHAGEAL REFLUX DISEASE, ESOPHAGITIS PRESENCE NOT SPECIFIED: ICD-10-CM

## 2020-12-01 PROCEDURE — 25010000002 PROPOFOL 10 MG/ML EMULSION: Performed by: ANESTHESIOLOGY

## 2020-12-01 PROCEDURE — 88305 TISSUE EXAM BY PATHOLOGIST: CPT | Performed by: INTERNAL MEDICINE

## 2020-12-01 PROCEDURE — G0105 COLORECTAL SCRN; HI RISK IND: HCPCS | Performed by: INTERNAL MEDICINE

## 2020-12-01 PROCEDURE — S0260 H&P FOR SURGERY: HCPCS | Performed by: INTERNAL MEDICINE

## 2020-12-01 PROCEDURE — 87081 CULTURE SCREEN ONLY: CPT | Performed by: INTERNAL MEDICINE

## 2020-12-01 PROCEDURE — 43239 EGD BIOPSY SINGLE/MULTIPLE: CPT | Performed by: INTERNAL MEDICINE

## 2020-12-01 RX ORDER — PROPOFOL 10 MG/ML
VIAL (ML) INTRAVENOUS CONTINUOUS PRN
Status: DISCONTINUED | OUTPATIENT
Start: 2020-12-01 | End: 2020-12-01 | Stop reason: SURG

## 2020-12-01 RX ORDER — SODIUM CHLORIDE, SODIUM LACTATE, POTASSIUM CHLORIDE, CALCIUM CHLORIDE 600; 310; 30; 20 MG/100ML; MG/100ML; MG/100ML; MG/100ML
INJECTION, SOLUTION INTRAVENOUS CONTINUOUS PRN
Status: DISCONTINUED | OUTPATIENT
Start: 2020-12-01 | End: 2020-12-01 | Stop reason: SURG

## 2020-12-01 RX ORDER — ONDANSETRON 2 MG/ML
4 INJECTION INTRAMUSCULAR; INTRAVENOUS ONCE AS NEEDED
Status: DISCONTINUED | OUTPATIENT
Start: 2020-12-01 | End: 2020-12-01 | Stop reason: HOSPADM

## 2020-12-01 RX ORDER — PROPOFOL 10 MG/ML
VIAL (ML) INTRAVENOUS AS NEEDED
Status: DISCONTINUED | OUTPATIENT
Start: 2020-12-01 | End: 2020-12-01 | Stop reason: SURG

## 2020-12-01 RX ORDER — SODIUM CHLORIDE, SODIUM LACTATE, POTASSIUM CHLORIDE, CALCIUM CHLORIDE 600; 310; 30; 20 MG/100ML; MG/100ML; MG/100ML; MG/100ML
30 INJECTION, SOLUTION INTRAVENOUS CONTINUOUS PRN
Status: DISCONTINUED | OUTPATIENT
Start: 2020-12-01 | End: 2020-12-01 | Stop reason: HOSPADM

## 2020-12-01 RX ORDER — LIDOCAINE HYDROCHLORIDE 20 MG/ML
INJECTION, SOLUTION INFILTRATION; PERINEURAL AS NEEDED
Status: DISCONTINUED | OUTPATIENT
Start: 2020-12-01 | End: 2020-12-01 | Stop reason: SURG

## 2020-12-01 RX ADMIN — SODIUM CHLORIDE, POTASSIUM CHLORIDE, SODIUM LACTATE AND CALCIUM CHLORIDE 30 ML/HR: 600; 310; 30; 20 INJECTION, SOLUTION INTRAVENOUS at 12:55

## 2020-12-01 RX ADMIN — PROPOFOL 120 MCG/KG/MIN: 10 INJECTION, EMULSION INTRAVENOUS at 13:09

## 2020-12-01 RX ADMIN — SODIUM CHLORIDE, POTASSIUM CHLORIDE, SODIUM LACTATE AND CALCIUM CHLORIDE: 600; 310; 30; 20 INJECTION, SOLUTION INTRAVENOUS at 13:01

## 2020-12-01 RX ADMIN — PROPOFOL 30 MG: 10 INJECTION, EMULSION INTRAVENOUS at 13:13

## 2020-12-01 RX ADMIN — LIDOCAINE HYDROCHLORIDE 60 MG: 20 INJECTION, SOLUTION INFILTRATION; PERINEURAL at 13:01

## 2020-12-01 RX ADMIN — PROPOFOL 30 MG: 10 INJECTION, EMULSION INTRAVENOUS at 13:07

## 2020-12-01 RX ADMIN — PROPOFOL 30 MG: 10 INJECTION, EMULSION INTRAVENOUS at 13:09

## 2020-12-01 NOTE — ANESTHESIA PREPROCEDURE EVALUATION
Anesthesia Evaluation     history of anesthetic complications: PONV               Airway   Mallampati: II  TM distance: >3 FB  Neck ROM: full  No difficulty expected  Dental    (+) partials    Pulmonary - normal exam   (+) COPD mild, sleep apnea,   Cardiovascular - normal exam    ECG reviewed  PT is on anticoagulation therapy    (+) hypertension less than 2 medications, dysrhythmias Atrial Fib, hyperlipidemia,       Neuro/Psych  (+) dizziness/light headedness, tremors, numbness, psychiatric history Bipolar,     GI/Hepatic/Renal/Endo    (+)  GERD, PUD,      Musculoskeletal     (+) back pain, radiculopathy Right lower extremity  Abdominal  - normal exam   Substance History      OB/GYN          Other   arthritis,    history of cancer remission    ROS/Med Hx Other: HR drops with sedation and anesthesia per patient                 Anesthesia Plan    ASA 3     MAC       Anesthetic plan, all risks, benefits, and alternatives have been provided, discussed and informed consent has been obtained with: patient.

## 2020-12-01 NOTE — ANESTHESIA POSTPROCEDURE EVALUATION
Patient: Davida Anderson    Procedure Summary     Date: 12/01/20 Room / Location:  [facility] /  [facility]    Anesthesia Start: 1300 Anesthesia Stop: 1354    Procedures:       COLONOSCOPY TO CECUM AND TI  (N/A )      ESOPHAGOGASTRODUODENOSCOPY WITH BIOPSIES (N/A Esophagus) Diagnosis:       Gastroesophageal reflux disease, esophagitis presence not specified      Dysphagia, unspecified type      Irritable bowel syndrome with constipation      Adenomatous polyp of colon, unspecified part of colon      Gtz's esophagus without dysplasia      (Gastroesophageal reflux disease, esophagitis presence not specified [K21.9])      (Dysphagia, unspecified type [R13.10])      (Irritable bowel syndrome with constipation [K58.1])      (Adenomatous polyp of colon, unspecified part of colon [D12.6])      (Gtz's esophagus without dysplasia [K22.70])    Surgeon: Darci Kerns MD Provider: Tasha Jeninngs MD    Anesthesia Type: MAC ASA Status: 3          Anesthesia Type: MAC    Vitals  No vitals data found for the desired time range.          Post Anesthesia Care and Evaluation    Patient location during evaluation: bedside  Patient participation: complete - patient participated  Level of consciousness: sleepy but conscious  Pain management: adequate  Airway patency: patent  Anesthetic complications: No anesthetic complications  PONV Status: none  Cardiovascular status: acceptable  Respiratory status: acceptable  Hydration status: acceptable  Post Neuraxial Block status: Motor and sensory function returned to baseline

## 2020-12-01 NOTE — DISCHARGE INSTRUCTIONS
For the next 24 hours patient needs to be with a responsible adult.    For 24 hours DO NOT drive, operate machinery, appliances, drink alcohol, make important decisions or sign legal documents.    Start with a light or bland diet if you are feeling sick to your stomach otherwise advance to regular diet as tolerated.    Follow recommendations on procedure report if provided by your doctor.    Call Dr Kerns for problems [phone]    Problems may include but not limited to: large amounts of bleeding, trouble breathing, repeated vomiting, severe unrelieved pain, fever or chills.

## 2020-12-01 NOTE — H&P
[facility]  Pre Procedure History & Physical    Chief Complaint:   GERD, dysphagia, IBS C, history of polyps    Subjective     HPI:   This 75-year-old female presents the endoscopy suite for upper lower endoscopic evaluations.  She has had reflux issues and recent progressive dysphagia.  She also has a history of IBS-C and known history of polyps.  Last colonoscopy performed 2017.    Past Medical History:   Past Medical History:   Diagnosis Date   • Acid reflux    • Arthritis    • Atrial fibrillation (CMS/HCC)    • Gtz esophagus    • Bipolar 2 disorder (CMS/HCC)    • Bradycardia     WITH SURGERY   • COPD (chronic obstructive pulmonary disease) (CMS/HCC)     MILD, USES AINHALER PRN   • DDD (degenerative disc disease), lumbar    • Diverticulosis    • Dizziness    • Drug therapy    • Fibrocystic breast    • Frequent UTI     SEES DR GOFF FOR FREQUENT UTI'S, ON KEFLEX   • H/O Infiltrating ductal carcinoma of left breast 2002    Stage IIA, Grade 3 ER/OH +, HER2 -, treated with 5 years of tamoxifen, 5 years of Femara, completed in 2012   • High cholesterol    • History of alcoholism (CMS/HCC)     40 YRS AGO   • History of Clostridium difficile colitis 2014   • History of gastric ulcer    • History of loop recorder    • Hypertension    • Iron deficiency    • Irregular heartbeat     a fib   • Low back pain    • Lumbar herniated disc    • Mass of right breast on mammogram 03/17/2016    10 o'clock position, 4 cm from the nipple,   • Neuropathy    • PONV (postoperative nausea and vomiting)    • Raynaud disease    • Sleep apnea     MILD, NO NEED FOR CPAP   • Tremors of nervous system    • Urinary incontinence        Past Surgical History:  Past Surgical History:   Procedure Laterality Date   • ANKLE OPEN REDUCTION INTERNAL FIXATION Right 08/14/2015    Dr. Dandre Camacho, [facility]   • BACK SURGERY      LUMBAR   • BREAST BIOPSY     • BREAST RECONSTRUCTION, BREAST TISSUE EXPANDER INSERTION Left 04/11/2002     [place] Contour Profile expander was placed 550 cc size, filled with 200 cc of saline, Dr. Herbert Napoles, [facility]   • COLONOSCOPY N/A 09/16/2014    Dr. Darci Kerns, [facility]; torts, tics, stool, polyp   • COLONOSCOPY N/A 9/27/2016    NTEH, diverticulosis, tortuous colon, Two 3-5mm polyps in the descending colon and the transverse colon, IH.  PATH: Tubular adenoma   • COLONOSCOPY N/A 12/12/2017    NTEH, tics, torts, IH, TA w/low grade dysplasia   • CYSTOSCOPY N/A 05/07/2010    with TVT takedown, Dr. Simon Wall, [facility]   • ENDOSCOPY N/A 9/27/2016    z line irreg, bilious gastric fluid- fluid aspiration preformed, gastritis.  PATH: Squamous and glandular mucosa with minimal superficial acute inflammation.    • ENDOSCOPY N/A 12/12/2017    Z line irregular, gastritis, duodenitis, chronic inflammation   • EPIDURAL BLOCK     • KNEE SURGERY Left     BROKEN   • LUMBAR DISCECTOMY FUSION INSTRUMENTATION N/A 12/3/2018    Procedure: L4-5 laminectomy and fusion with instrumentation;  Surgeon: Austin Keith MD;  Location: [facility] OR;  Service: Orthopedic Spine   • MAMMO US BREAST BIOPSY ADDITIONAL W WO DEVICE Left 02/21/2002    2:00 position left breast, Infiltrating Ductal Carcinoma, [facility]   • MANDIBLE FRACTURE SURGERY     • MASTECTOMY Left 04/11/2002    Left Total Mastectomy and Left Axillary [place] Node Biobsy, Dr. Indu Akins, [facility]   • OTHER SURGICAL HISTORY      CARDIAC LOOP DEVICE, LEFT CHEST   • TENSION FREE VAGINAL TAPING WITH MINI ARC SLING N/A 10/26/2009    Dr. Gina Castillo, [facility]   • TOTAL KNEE ARTHROPLASTY Left 10/21/2019    Procedure: TOTAL KNEE ARTHROPLASTY;  Surgeon: Anurag Serrano MD;  Location: [facility] OR;  Service: Orthopedics   • UPPER GASTROINTESTINAL ENDOSCOPY  09/16/2014    z-line irreg, grade a reflux, gastritis       Family History:  Family History   Problem Relation Age of Onset   • Breast cancer Mother 35   • Colon cancer Mother 64   • Heart disease Father    • Cancer Father         throat and lung    • Colon polyps Father    • Malig Hyperthermia Neg Hx        Social History:   reports that she quit smoking about 37 years ago. Her smoking use included cigarettes. She has a 75.00 pack-year smoking history. She has never used smokeless tobacco. She reports that she does not drink alcohol or use drugs.    Medications:   Medications Prior to Admission   Medication Sig Dispense Refill Last Dose   • ALBUTEROL IN Inhale 2 puffs Every 4 (Four) Hours As Needed.      • apixaban (ELIQUIS) 5 MG tablet tablet Take 1 tablet by mouth every 12 (Twelve) hours. 60 tablet 0    • ARIPiprazole (ABILIFY) 10 MG tablet Take 10 mg by mouth Every Morning.      • aspirin 325 MG tablet Take 325 mg by mouth.      • atorvastatin (LIPITOR) 20 MG tablet Take 20 mg by mouth Every Morning.      • B COMPLEX VITAMINS ER PO Take 1 tablet by mouth Daily.      • baclofen (LIORESAL) 10 MG tablet Take 10 mg by mouth 3 (Three) Times a Day.      • BIOTIN PO Take 5,000 mcg by mouth Daily.      • Calcium Carb-Cholecalciferol (CALCIUM 600 + D) 600-200 MG-UNIT tablet Take 2 tablets by mouth Every Morning.      • cephalexin (KEFLEX) 500 MG capsule Take 500 mg by mouth 4 (Four) Times a Day. PREVENTIVE FOR UTI'S      • cephalexin (Keflex) 500 MG capsule Take all 4 caps 1 hour prior to procedure 4 capsule 2    • DIGESTIVE ENZYMES PO Take 2 tablets by mouth.      • dilTIAZem CD (CARTIA XT) 120 MG 24 hr capsule Take 120 mg by mouth Every Morning.      • diphenhydrAMINE (BENADRYL) 25 mg capsule Take 25 mg by mouth Every 6 (Six) Hours As Needed for Itching.      • diphenhydrAMINE-acetaminophen (TYLENOL PM)  MG tablet per tablet Take 1 tablet by mouth Every Night.      • Docusate Calcium (STOOL SOFTENER PO) Take 1 capsule by mouth As Needed.      • Ferrous Sulfate (IRON PO) Take 65 mg by mouth Daily.      • lamoTRIgine (LaMICtal) 100 MG tablet Take 100 mg by mouth 2 (two) times a day.      • linaclotide (Linzess) 72 MCG capsule capsule Take 1 capsule by mouth  Every Morning Before Breakfast. 90 capsule 3    • lithium carbonate 300 MG capsule Take 300 mg by mouth Every Morning.      • magnesium oxide (MAGOX) 400 (241.3 MG) MG tablet tablet Take 400 mg by mouth Every Morning.      • melatonin 5 MG tablet tablet Take 5 mg by mouth At Night As Needed.      • nitrofurantoin, macrocrystal-monohydrate, (MACROBID) 100 MG capsule TK ONE C PO QHS      • ondansetron (ZOFRAN) 4 MG tablet Take 4 mg by mouth.      • pantoprazole (PROTONIX) 40 MG EC tablet Take 40 mg by mouth 2 (Two) Times a Day.      • pregabalin (LYRICA) 50 MG capsule Take 150 mg by mouth Every Night.      • Probiotic Product (SOLUBLE FIBER/PROBIOTICS PO) Take 1 tablet by mouth Every Morning.      • solifenacin (VESICARE) 10 MG tablet Take 10 mg by mouth Every Morning.      • Unable to find Med Name: THYROID COUMPOUND ONCE A DAY .          Allergies:  Morphine    ROS:    Pertinent items are noted in HPI, all other systems reviewed and negative     Objective     Blood pressure 158/88, pulse 80, temperature 98.2 °F (36.8 °C), temperature source Oral, resp. rate 16, SpO2 99 %.    Physical Exam   Constitutional: Pt is oriented to person, place, and time and well-developed, well-nourished, and in no distress.   Mouth/Throat: Oropharynx is clear and moist.   Neck: Normal range of motion.   Cardiovascular: Normal rate, regular rhythm and normal heart sounds.    Pulmonary/Chest: Effort normal and breath sounds normal.   Abdominal: Soft. Nontender  Skin: Skin is warm and dry.   Psychiatric: Mood, memory, affect and judgment normal.     Assessment/Plan     Diagnosis:  GERD  Dysphagia  IBS-C  Polyps    Anticipated Surgical Procedure:  EGD, colonoscopy    The risks, benefits, and alternatives of this procedure have been discussed with the patient or the responsible party- the patient understands and agrees to proceed.

## 2020-12-02 LAB
CYTO UR: NORMAL
LAB AP CASE REPORT: NORMAL
PATH REPORT.FINAL DX SPEC: NORMAL
PATH REPORT.GROSS SPEC: NORMAL
UREASE TISS QL: NEGATIVE

## 2020-12-03 NOTE — TELEPHONE ENCOUNTER
CALLED FROM [place] IN REGARDS TO DG, I DID NOT ANYTHING ABOUT IT, SHE DM'S VOICE MAIL SAID TO CALL, YOU CANNOT CALL AT 2:45 ON FRIDAY AND GET AN AUTH FROM REGGIE SANON,MARISAR

## 2020-12-14 ENCOUNTER — TELEPHONE (OUTPATIENT)
Dept: GASTROENTEROLOGY | Facility: CLINIC | Age: 75
End: 2020-12-14

## 2020-12-14 DIAGNOSIS — R13.10 DYSPHAGIA, UNSPECIFIED TYPE: Primary | ICD-10-CM

## 2020-12-14 NOTE — TELEPHONE ENCOUNTER
----- Message from Darci ANAYA MD sent at 12/2/2020  4:43 PM EST -----  Regarding: Biopsy results  Okay to call results, can continue/start PPI.  If swallowing difficulties persist can offer video fluoroscopy swallow study and esophageal manometry if needed.  ----- Message -----  From: Lab, Background User  Sent: 12/2/2020   2:07 PM EST  To: Darci ANAYA MD

## 2020-12-14 NOTE — TELEPHONE ENCOUNTER
----- Message from Prince Mccray sent at 12/11/2020  3:25 PM EST -----  Regarding: results  Contact: [phone]  PT is calling for results

## 2020-12-14 NOTE — TELEPHONE ENCOUNTER
Call to pt.  Advise per path report that duodenal bx bengin.  Stomach body with mild, nonspecific inflammation.  GE junction with minimal chronic inflammation.  Mid esophageal bx benign.     Advise per DR Kerns note.  Verb understanding.  States does continue to have swallowing issues.  Advise that [facility] will contact to arrange swallow study.      Order placed - message to DR Kerns.

## 2020-12-21 ENCOUNTER — TRANSCRIBE ORDERS (OUTPATIENT)
Dept: SLEEP MEDICINE | Facility: HOSPITAL | Age: 75
End: 2020-12-21

## 2020-12-21 DIAGNOSIS — Z01.818 OTHER SPECIFIED PRE-OPERATIVE EXAMINATION: Primary | ICD-10-CM

## 2020-12-28 ENCOUNTER — LAB (OUTPATIENT)
Dept: LAB | Facility: HOSPITAL | Age: 75
End: 2020-12-28

## 2020-12-28 DIAGNOSIS — Z01.818 OTHER SPECIFIED PRE-OPERATIVE EXAMINATION: ICD-10-CM

## 2020-12-28 PROCEDURE — U0004 COV-19 TEST NON-CDC HGH THRU: HCPCS

## 2020-12-28 PROCEDURE — C9803 HOPD COVID-19 SPEC COLLECT: HCPCS

## 2020-12-29 LAB — SARS-COV-2 RNA RESP QL NAA+PROBE: NOT DETECTED

## 2020-12-30 ENCOUNTER — HOSPITAL ENCOUNTER (OUTPATIENT)
Dept: GENERAL RADIOLOGY | Facility: HOSPITAL | Age: 75
Discharge: HOME | End: 2020-12-30
Admitting: INTERNAL MEDICINE

## 2020-12-30 DIAGNOSIS — R13.19 ESOPHAGEAL DYSPHAGIA: Primary | ICD-10-CM

## 2020-12-30 DIAGNOSIS — R13.10 DYSPHAGIA, UNSPECIFIED TYPE: ICD-10-CM

## 2020-12-30 PROCEDURE — A9270 NON-COVERED ITEM OR SERVICE: HCPCS | Performed by: INTERNAL MEDICINE

## 2020-12-30 PROCEDURE — 92611 MOTION FLUOROSCOPY/SWALLOW: CPT | Performed by: SPEECH-LANGUAGE PATHOLOGIST

## 2020-12-30 PROCEDURE — 63710000001 BARIUM SULFATE 98 % RECONSTITUTED SUSPENSION: Performed by: INTERNAL MEDICINE

## 2020-12-30 PROCEDURE — 63710000001 BARIUM SULFATE 40 % RECONSTITUTED SUSPENSION: Performed by: INTERNAL MEDICINE

## 2020-12-30 PROCEDURE — 74230 X-RAY XM SWLNG FUNCJ C+: CPT

## 2020-12-30 RX ADMIN — BARIUM SULFATE 55 ML: 0.81 POWDER, FOR SUSPENSION ORAL at 09:52

## 2020-12-30 RX ADMIN — BARIUM SULFATE 4 ML: 980 POWDER, FOR SUSPENSION ORAL at 09:52

## 2020-12-30 NOTE — MBS/VFSS/FEES
Acute Care - Speech Language Pathology   Swallow Initial Evaluation [facility]     Patient Name: Davida Anderson  : 1945  MRN: [id number]  Today's Date: 2020               Admit Date: 2020    Visit Dx:     ICD-10-CM ICD-9-CM   1. Esophageal dysphagia  R13.10 787.20   2. Dysphagia, unspecified type  R13.10 787.20     Patient Active Problem List   Diagnosis   • BP (high blood pressure)   • History of left breast infiltrating ductal cancer 2cm s/p mastectomy with reconstruction  s/p chemo   • H/O ETOH abuse   • Gtz's esophagus   • Colon polyps   • Bipolar 1 disorder (CMS/HCC)   • Arthritis   • Raynaud's disease   • Neuropathy   • Lumbar spine pain   • Spondylolisthesis at L4-L5 level   • Right lumbar radiculopathy   • Gtz's esophagus without dysplasia   • Hx of adenomatous colonic polyps   • Dysphagia   • Diarrhea   • Primary osteoarthritis of left knee   • Primary localized osteoarthrosis of the knee, right   • Chronic pain of left knee   • Spinal stenosis of lumbar region with neurogenic claudication   • Spondylolisthesis of lumbar region   • Acute pain of left knee   • Gastroesophageal reflux disease   • Irritable bowel syndrome with constipation   • Adenomatous polyp of colon     Past Medical History:   Diagnosis Date   • Acid reflux    • Arthritis    • Atrial fibrillation (CMS/HCC)    • Gtz esophagus    • Bipolar 2 disorder (CMS/HCC)    • Bradycardia     WITH SURGERY   • COPD (chronic obstructive pulmonary disease) (CMS/HCC)     MILD, USES AINHALER PRN   • DDD (degenerative disc disease), lumbar    • Diverticulosis    • Dizziness    • Drug therapy    • Fibrocystic breast    • Frequent UTI     SEES DR GOFF FOR FREQUENT UTI'S, ON KEFLEX   • H/O Infiltrating ductal carcinoma of left breast     Stage IIA, Grade 3 ER/MI +, HER2 -, treated with 5 years of tamoxifen, 5 years of Femara, completed in    • High cholesterol    • History of alcoholism (CMS/HCC)     40 YRS AGO    • History of Clostridium difficile colitis 2014   • History of gastric ulcer    • History of loop recorder    • Hypertension    • Iron deficiency    • Irregular heartbeat     a fib   • Low back pain    • Lumbar herniated disc    • Mass of right breast on mammogram 03/17/2016    10 o'clock position, 4 cm from the nipple,   • Neuropathy    • PONV (postoperative nausea and vomiting)    • Raynaud disease    • Sleep apnea     MILD, NO NEED FOR CPAP   • Tremors of nervous system    • Urinary incontinence      Past Surgical History:   Procedure Laterality Date   • ANKLE OPEN REDUCTION INTERNAL FIXATION Right 08/14/2015    Dr. Dandre Camacho, [facility]   • BACK SURGERY      LUMBAR   • BREAST BIOPSY     • BREAST RECONSTRUCTION, BREAST TISSUE EXPANDER INSERTION Left 04/11/2002    [place] Contour Profile expander was placed 550 cc size, filled with 200 cc of saline, Dr. Herbert Napoles, [facility]   • COLONOSCOPY N/A 09/16/2014    Dr. Darci Kerns, [facility]; torts, tics, stool, polyp   • COLONOSCOPY N/A 9/27/2016    NTEH, diverticulosis, tortuous colon, Two 3-5mm polyps in the descending colon and the transverse colon, IH.  PATH: Tubular adenoma   • COLONOSCOPY N/A 12/12/2017    NTEH, tics, torts, IH, TA w/low grade dysplasia   • COLONOSCOPY N/A 12/1/2020    Procedure: COLONOSCOPY TO CECUM AND TI ;  Surgeon: Darci Kerns MD;  Location: [facility] ENDOSCOPY;  Service: Gastroenterology;  Laterality: N/A;  PRE- IBS, CONSTIPATION, HX POLYPS  POST- DEMINISHED SPHINCTER TONE, DIVERTICULOSIS, POORLY PREPPED COLON   • CYSTOSCOPY N/A 05/07/2010    with TVT takedown, Dr. Simon Wall, [facility]   • ENDOSCOPY N/A 9/27/2016    z line irreg, bilious gastric fluid- fluid aspiration preformed, gastritis.  PATH: Squamous and glandular mucosa with minimal superficial acute inflammation.    • ENDOSCOPY N/A 12/12/2017    Z line irregular, gastritis, duodenitis, chronic inflammation   • ENDOSCOPY N/A 12/1/2020    Procedure: ESOPHAGOGASTRODUODENOSCOPY WITH BIOPSIES;   Surgeon: Darci Kerns MD;  Location: [facility] ENDOSCOPY;  Service: Gastroenterology;  Laterality: N/A;  PRE- REFLUX, DYSPHAGIA  POST- ESOPHAGITIS, GASTRITIS, SMALL HIATAL HERNIA, BILE REFLUX   • EPIDURAL BLOCK     • KNEE SURGERY Left     BROKEN   • LUMBAR DISCECTOMY FUSION INSTRUMENTATION N/A 12/3/2018    Procedure: L4-5 laminectomy and fusion with instrumentation;  Surgeon: Austin Keith MD;  Location: [facility] MAIN OR;  Service: Orthopedic Spine   • MAMMO US BREAST BIOPSY ADDITIONAL W WO DEVICE Left 02/21/2002    2:00 position left breast, Infiltrating Ductal Carcinoma, [facility]   • MANDIBLE FRACTURE SURGERY     • MASTECTOMY Left 04/11/2002    Left Total Mastectomy and Left Axillary [place] Node Biobsy, Dr. Indu Akins, [facility]   • OTHER SURGICAL HISTORY      CARDIAC LOOP DEVICE, LEFT CHEST   • TENSION FREE VAGINAL TAPING WITH MINI ARC SLING N/A 10/26/2009    Dr. Gina Castillo, [facility]   • TOTAL KNEE ARTHROPLASTY Left 10/21/2019    Procedure: TOTAL KNEE ARTHROPLASTY;  Surgeon: Anurag Serrano MD;  Location: [facility] MAIN OR;  Service: Orthopedics   • UPPER GASTROINTESTINAL ENDOSCOPY  09/16/2014    z-line irreg, grade a reflux, gastritis   Patient was not wearing a face mask during this therapy encounter. Therapist used appropriate personal protective equipment including mask, eye protection and gloves.  Mask used was standard procedure mask. Appropriate PPE was worn during the entire therapy session. Hand hygiene was completed before and after therapy session. Patient is not in enhanced droplet precautions.                SWALLOW EVALUATION (last 72 hours)      SLP Adult Swallow Evaluation     Row Name 12/30/20 1000                   Rehab Evaluation    Document Type  evaluation  -KA        Subjective Information  no complaints  -KA        Patient Observations  alert;cooperative  -KA        Care Plan Review  evaluation/treatment results reviewed  -KA        Patient Effort  excellent  -KA        Symptoms Noted  During/After Treatment  none  -KA           General Information    Patient Profile Reviewed  yes  -KA        Pertinent History Of Current Problem  Patient referred to OP VFSS due to c/o difficulty swallowing. Hx of GERD and Barretts esophagus. Patient reports foods, especially meats and breads, get stuck in her throat. She reports when this occurs liquid wash helps. She denies hx of PNA and weight loss. Recent EGD, pt reports results were normal. She reports no hx of esophageal dilation  -KA        Current Method of Nutrition  regular textures;thin liquids  -KA        Precautions/Limitations, Vision  WFL  -KA        Precautions/Limitations, Hearing  WFL  -KA        Prior Level of Function-Communication  WFL  -KA        Prior Level of Function-Swallowing  no diet consistency restrictions;esophageal concerns  -KA        Plans/Goals Discussed with  patient  -KA        Barriers to Rehab  none identified  -KA        Patient's Goals for Discharge  -- eat without difficulty   -KA           MBS/VFSS    Utensils Used  spoon;cup;straw  -KA        Consistencies Trialed  regular textures;soft textures;chopped;mixed consistency;pureed;thin liquids  -KA           MBS/VFSS Interpretation    Oral Prep Phase  WFL  -KA        Oral Transit Phase  WFL  -KA        Oral Residue  WFL  -KA        VFSS Summary  Patient demonstrated normal oropharyngeal swallow and esophageal dysphagia. No penetration/aspiration with all tested consistencies with adequate hyolaryngeal excursion/elevation and epiglottic deflection. Patient demonstrated reduced motility and slow clearance of solids through upper esophagus, especially with cracker with stasis in upper esophagus. Majority cleared with subsequent swallow and liquid wash.    -KA           Esophageal Phase    Esophageal Phase  esophageal retention;see radiology report for further details  -KA        Esophageal Phase, Comment  reduced upper esophageal clearance, with solids, signficiant with  cracker/dry solids.  -           SLP Communication to Radiology    Summary Statement  Patient demonstrated normal oropharyngeal swallow and esophageal dysphagia. No penetration/aspiration with all tested consistencies with adequate hyolaryngeal excursion/elevation and epiglottic deflection. Patient demonstrated reduced motility and slow clearance of solids through upper esophagus, especially with cracker with stasis in upper esophagus. Majority cleared with subsequent swallow and liquid wash.    -KA           Clinical Impression    SLP Swallowing Diagnosis  swallow WFL;esophageal dysphagia  -        Functional Impact  risk of aspiration/pneumonia  -           Recommendations    Therapy Frequency (Swallow)  evaluation only  -        SLP Diet Recommendation  regular textures;thin liquids;other (see comments) avoid dry textures   -KA        Recommended Precautions and Strategies  upright posture during/after eating;small bites of food and sips of liquid;multiple swallows per bite of food;alternate between small bites of food and sips of liquid;reflux precautions  -        Oral Care Recommendations  Oral Care BID/PRN  -        SLP Rec. for Method of Medication Administration  meds whole;with thin liquids  -        Monitor for Signs of Aspiration  yes;notify SLP if any concerns  -        Anticipated Discharge Disposition (SLP)  home  -KA        Demonstrates Need for Referral to Another Service  gastroenterology;other (see comments) f/u with GI regarding results   -          User Key  (r) = Recorded By, (t) = Taken By, (c) = Cosigned By    Initials Name Effective Dates    Pj Lenz MA,[facility]-SLP 06/08/18 -           EDUCATION  The patient has been educated in the following areas:   Dysphagia (Swallowing Impairment).    SLP Recommendation and Plan  SLP Swallowing Diagnosis: swallow WFL, esophageal dysphagia  SLP Diet Recommendation: regular textures, thin liquids, other (see comments)(avoid dry  textures )  Recommended Precautions and Strategies: upright posture during/after eating, small bites of food and sips of liquid, multiple swallows per bite of food, alternate between small bites of food and sips of liquid, reflux precautions  SLP Rec. for Method of Medication Administration: meds whole, with thin liquids     Monitor for Signs of Aspiration: yes, notify SLP if any concerns        Anticipated Discharge Disposition (SLP): home     Therapy Frequency (Swallow): evaluation only     Demonstrates Need for Referral to Another Service: gastroenterology, other (see comments)(f/u with GI regarding results )                                SLP Outcome Measures (last 72 hours)      SLP Outcome Measures     Row Name 12/30/20 1000             SLP Outcome Measures    Outcome Measure Used?  Adult NOMS  -         Adult FCM Scores    FCM Chosen  Swallowing  -      Swallowing FCM Score  7  -        User Key  (r) = Recorded By, (t) = Taken By, (c) = Cosigned By    Initials Name Effective Dates    Pj Lenz MA,SHERIN-SLP 06/08/18 -            Time Calculation:   Time Calculation- SLP     Row Name 12/30/20 1047             Time Calculation- SLP    SLP Start Time  0930  -      SLP Received On  12/30/20  -OMERO        User Key  (r) = Recorded By, (t) = Taken By, (c) = Cosigned By    Initials Name Provider Type    Pj Lenz MA,CCC-SLP Speech and Language Pathologist          Therapy Charges for Today     Code Description Service Date Service Provider Modifiers Qty    [id number] HC ST MOTION FLUORO EVAL SWALLOW 5 12/30/2020 Pj Paris MA,CCC-SLP GN 1               Pj Paris MA,SHERIN-SLP  12/30/2020

## 2021-01-11 ENCOUNTER — TELEPHONE (OUTPATIENT)
Dept: GASTROENTEROLOGY | Facility: CLINIC | Age: 76
End: 2021-01-11

## 2021-01-11 DIAGNOSIS — R13.10 DYSPHAGIA, UNSPECIFIED TYPE: Primary | ICD-10-CM

## 2021-01-11 NOTE — TELEPHONE ENCOUNTER
Call to pt.  Advise per DR Kerns note.  Verb understanding.  States continues to have swallowing issues - would like to proceed with manometry.  Advise Scheduling will contact to arrange.     Case request placed - message to Dr Kerns.

## 2021-01-11 NOTE — TELEPHONE ENCOUNTER
----- Message from Darci ANAYA MD sent at 1/5/2021  3:33 PM EST -----  Regarding: VFSS results  Okay to call results, essentially esophageal dysmotility.  Would advise she follow speech pathology instructions.  If still symptomatic can offer esophageal manometry study to better define the process although this may not change treatment options.  She can follow-up to discuss if she wishes.  ----- Message -----  From: Interface, Rad Results [place] In  Sent: 12/31/2020   3:46 PM EST  To: Darci ANAYA MD

## 2021-01-20 ENCOUNTER — TRANSCRIBE ORDERS (OUTPATIENT)
Dept: SLEEP MEDICINE | Facility: HOSPITAL | Age: 76
End: 2021-01-20

## 2021-01-20 DIAGNOSIS — Z01.818 OTHER SPECIFIED PRE-OPERATIVE EXAMINATION: Primary | ICD-10-CM

## 2021-01-30 ENCOUNTER — LAB (OUTPATIENT)
Dept: LAB | Facility: HOSPITAL | Age: 76
End: 2021-01-30

## 2021-01-30 DIAGNOSIS — Z01.818 OTHER SPECIFIED PRE-OPERATIVE EXAMINATION: ICD-10-CM

## 2021-01-30 PROCEDURE — C9803 HOPD COVID-19 SPEC COLLECT: HCPCS

## 2021-01-30 PROCEDURE — U0004 COV-19 TEST NON-CDC HGH THRU: HCPCS

## 2021-02-01 LAB — SARS-COV-2 RNA RESP QL NAA+PROBE: NOT DETECTED

## 2021-02-02 ENCOUNTER — HOSPITAL ENCOUNTER (OUTPATIENT)
Facility: HOSPITAL | Age: 76
Setting detail: HOSPITAL OUTPATIENT SURGERY
Discharge: HOME | End: 2021-02-02
Attending: INTERNAL MEDICINE | Admitting: INTERNAL MEDICINE

## 2021-02-02 VITALS
SYSTOLIC BLOOD PRESSURE: 157 MMHG | DIASTOLIC BLOOD PRESSURE: 91 MMHG | RESPIRATION RATE: 15 BRPM | BODY MASS INDEX: 20.73 KG/M2 | WEIGHT: 129 LBS | HEART RATE: 69 BPM | HEIGHT: 66 IN

## 2021-02-02 DIAGNOSIS — R13.10 DYSPHAGIA, UNSPECIFIED TYPE: ICD-10-CM

## 2021-02-02 PROCEDURE — 91010 ESOPHAGUS MOTILITY STUDY: CPT

## 2021-02-02 RX ORDER — OMEPRAZOLE 20 MG/1
20 CAPSULE, DELAYED RELEASE ORAL 2 TIMES DAILY
COMMUNITY

## 2021-02-04 ENCOUNTER — IMMUNIZATION (OUTPATIENT)
Dept: VACCINE CLINIC | Facility: HOSPITAL | Age: 76
End: 2021-02-04

## 2021-02-04 PROCEDURE — 0001A: CPT | Performed by: INTERNAL MEDICINE

## 2021-02-04 PROCEDURE — 91300 HC SARSCOV02 VAC 30MCG/0.3ML IM: CPT | Performed by: INTERNAL MEDICINE

## 2021-02-08 ENCOUNTER — TELEPHONE (OUTPATIENT)
Dept: GASTROENTEROLOGY | Facility: CLINIC | Age: 76
End: 2021-02-08

## 2021-02-08 NOTE — TELEPHONE ENCOUNTER
----- Message from Prince Malagon sent at 2/8/2021  3:11 PM EST -----  Regarding: Result  Contact: [phone]  Pt is calling for her results

## 2021-02-09 NOTE — TELEPHONE ENCOUNTER
Initial evaluation reveals reduced pressures in the esophagus with evidence of dysmotility.  Wish to confer with my associates before establishing a final diagnosis, suspect this will be available next week.

## 2021-02-11 NOTE — TELEPHONE ENCOUNTER
Gina Varela k Gastro [facility] Clinical [address] [place]   Phone Number: [phone]             Patient is requesting results.  Please call.      **call to pt.  Advise per Dr Kerns note.  Verb understanding.  States will anticipate final dx next wk.  Update to Dr Kerns.  Roya Browning RN.

## 2021-02-16 ENCOUNTER — TELEPHONE (OUTPATIENT)
Dept: GASTROENTEROLOGY | Facility: CLINIC | Age: 76
End: 2021-02-16

## 2021-02-16 NOTE — PROCEDURES
Procedure: High resolution esophageal motility study  Indication: Dysphagia  Findings: Lower esophageal sphincter-hypotensive pressure, normal residual                  Esophageal body-decreased amplitude, decreased velocity, normal duration.  90% peristaltic waves 10% failed wife                   Upper esophageal sphincter-hypotensive pressure, prolonged relaxation.    Impression: Parameters for polymyositis (decreased UES, decreased amplitude, decreased LES)    Recommendation: Schedule video fluoroscopy swallow study to assess for pharyngeal component.  Office follow-up after completion to discuss findings.

## 2021-02-16 NOTE — TELEPHONE ENCOUNTER
Okay to notify patient of esophageal manometry results.  After review findings seem consistent with connective tissue disorder or polymyositis.  Would encourage VFSS and office follow-up to discuss results.

## 2021-02-19 ENCOUNTER — TELEPHONE (OUTPATIENT)
Dept: GASTROENTEROLOGY | Facility: CLINIC | Age: 76
End: 2021-02-19

## 2021-02-19 DIAGNOSIS — K22.70 BARRETT'S ESOPHAGUS WITHOUT DYSPLASIA: ICD-10-CM

## 2021-02-19 DIAGNOSIS — R13.10 DYSPHAGIA, UNSPECIFIED TYPE: Primary | ICD-10-CM

## 2021-02-19 NOTE — TELEPHONE ENCOUNTER
----- Message from Davida Anderson sent at 2/18/2021  7:52 PM EST -----  Regarding: Test Results Question  Contact: [phone]  It has been 16 days. When will Luis F's office call me with my results?

## 2021-02-19 NOTE — TELEPHONE ENCOUNTER
Okay to notify patient of esophageal manometry results.  After review findings seem consistent with connective tissue disorder or polymyositis.  Would encourage VFSS and office follow-up to discuss results.    Called pt and advised of the above.  Advised we will order VFSS and she can call [facility] at [phone] to schedule. Pt verb understanding.     Message sent to Dr Kerns to cosign order.

## 2021-02-24 ENCOUNTER — TRANSCRIBE ORDERS (OUTPATIENT)
Dept: LAB | Facility: HOSPITAL | Age: 76
End: 2021-02-24

## 2021-02-24 ENCOUNTER — LAB (OUTPATIENT)
Dept: LAB | Facility: HOSPITAL | Age: 76
End: 2021-02-24

## 2021-02-24 DIAGNOSIS — Z01.818 OTHER SPECIFIED PRE-OPERATIVE EXAMINATION: ICD-10-CM

## 2021-02-24 DIAGNOSIS — Z01.818 OTHER SPECIFIED PRE-OPERATIVE EXAMINATION: Primary | ICD-10-CM

## 2021-02-24 PROCEDURE — C9803 HOPD COVID-19 SPEC COLLECT: HCPCS

## 2021-02-24 PROCEDURE — U0004 COV-19 TEST NON-CDC HGH THRU: HCPCS

## 2021-02-25 ENCOUNTER — IMMUNIZATION (OUTPATIENT)
Dept: VACCINE CLINIC | Facility: HOSPITAL | Age: 76
End: 2021-02-25

## 2021-02-25 LAB — SARS-COV-2 RNA RESP QL NAA+PROBE: NOT DETECTED

## 2021-02-25 PROCEDURE — 0002A: CPT | Performed by: INTERNAL MEDICINE

## 2021-02-25 PROCEDURE — 91300 HC SARSCOV02 VAC 30MCG/0.3ML IM: CPT | Performed by: INTERNAL MEDICINE

## 2021-02-26 ENCOUNTER — HOSPITAL ENCOUNTER (OUTPATIENT)
Dept: GENERAL RADIOLOGY | Facility: HOSPITAL | Age: 76
Discharge: HOME | End: 2021-02-26
Admitting: INTERNAL MEDICINE

## 2021-02-26 DIAGNOSIS — R13.10 DYSPHAGIA, UNSPECIFIED TYPE: ICD-10-CM

## 2021-02-26 DIAGNOSIS — K22.70 BARRETT'S ESOPHAGUS WITHOUT DYSPLASIA: ICD-10-CM

## 2021-02-26 PROCEDURE — 92611 MOTION FLUOROSCOPY/SWALLOW: CPT

## 2021-02-26 PROCEDURE — 63710000001 BARIUM SULFATE 40 % SUSPENSION: Performed by: INTERNAL MEDICINE

## 2021-02-26 PROCEDURE — 63710000001 BARIUM SULFATE 40 % RECONSTITUTED SUSPENSION: Performed by: INTERNAL MEDICINE

## 2021-02-26 PROCEDURE — A9270 NON-COVERED ITEM OR SERVICE: HCPCS | Performed by: INTERNAL MEDICINE

## 2021-02-26 PROCEDURE — 74230 X-RAY XM SWLNG FUNCJ C+: CPT

## 2021-02-26 PROCEDURE — 63710000001 BARIUM SULFATE 98 % RECONSTITUTED SUSPENSION: Performed by: INTERNAL MEDICINE

## 2021-02-26 RX ADMIN — BARIUM SULFATE 50 ML: 400 SUSPENSION ORAL at 13:34

## 2021-02-26 RX ADMIN — BARIUM SULFATE 55 ML: 0.81 POWDER, FOR SUSPENSION ORAL at 13:34

## 2021-02-26 RX ADMIN — BARIUM SULFATE 4 ML: 980 POWDER, FOR SUSPENSION ORAL at 13:34

## 2021-02-26 NOTE — MBS/VFSS/FEES
Outpatient Speech Language Pathology   Adult Swallow Initial Evaluation  [facility]     Patient Name: Davida Anderson  : 1945  MRN: [id number]  Today's Date: 2021         Visit Date: 2021   Patient Active Problem List   Diagnosis   • BP (high blood pressure)   • History of left breast infiltrating ductal cancer 2cm s/p mastectomy with reconstruction  s/p chemo   • H/O ETOH abuse   • Gtz's esophagus   • Colon polyps   • Bipolar 1 disorder (CMS/HCC)   • Arthritis   • Raynaud's disease   • Neuropathy   • Lumbar spine pain   • Spondylolisthesis at L4-L5 level   • Right lumbar radiculopathy   • Gtz's esophagus without dysplasia   • Hx of adenomatous colonic polyps   • Dysphagia   • Diarrhea   • Primary osteoarthritis of left knee   • Primary localized osteoarthrosis of the knee, right   • Chronic pain of left knee   • Spinal stenosis of lumbar region with neurogenic claudication   • Spondylolisthesis of lumbar region   • Acute pain of left knee   • Gastroesophageal reflux disease   • Irritable bowel syndrome with constipation   • Adenomatous polyp of colon        Past Medical History:   Diagnosis Date   • Acid reflux    • Arthritis    • Atrial fibrillation (CMS/HCC)    • Gtz esophagus    • Bipolar 2 disorder (CMS/HCC)    • Bradycardia     WITH SURGERY   • COPD (chronic obstructive pulmonary disease) (CMS/HCC)     MILD, USES AINHALER PRN   • DDD (degenerative disc disease), lumbar    • Diverticulosis    • Dizziness    • Drug therapy    • Fibrocystic breast    • Frequent UTI     SEES DR GOFF FOR FREQUENT UTI'S, ON KEFLEX   • H/O Infiltrating ductal carcinoma of left breast     Stage IIA, Grade 3 ER/NH +, HER2 -, treated with 5 years of tamoxifen, 5 years of Femara, completed in    • High cholesterol    • History of alcoholism (CMS/HCC)     40 YRS AGO   • History of Clostridium difficile colitis    • History of gastric ulcer    • History of loop recorder    • Hypertension     • Iron deficiency    • Irregular heartbeat     a fib   • Low back pain    • Lumbar herniated disc    • Mass of right breast on mammogram 03/17/2016    10 o'clock position, 4 cm from the nipple,   • Neuropathy    • PONV (postoperative nausea and vomiting)    • Raynaud disease    • Sleep apnea     MILD, NO NEED FOR CPAP   • Tremors of nervous system    • Urinary incontinence         Past Surgical History:   Procedure Laterality Date   • ANKLE OPEN REDUCTION INTERNAL FIXATION Right 08/14/2015    Dr. Dandre Camacho, [facility]   • BACK SURGERY      LUMBAR   • BREAST BIOPSY     • BREAST RECONSTRUCTION, BREAST TISSUE EXPANDER INSERTION Left 04/11/2002    [place] Contour Profile expander was placed 550 cc size, filled with 200 cc of saline, Dr. Herbert Napoles, [facility]   • COLONOSCOPY N/A 09/16/2014    Dr. Darci Kerns, [facility]; torts, tics, stool, polyp   • COLONOSCOPY N/A 9/27/2016    NTEH, diverticulosis, tortuous colon, Two 3-5mm polyps in the descending colon and the transverse colon, IH.  PATH: Tubular adenoma   • COLONOSCOPY N/A 12/12/2017    NTEH, tics, torts, IH, TA w/low grade dysplasia   • COLONOSCOPY N/A 12/1/2020    Procedure: COLONOSCOPY TO CECUM AND TI ;  Surgeon: Darci Kerns MD;  Location: [facility] ENDOSCOPY;  Service: Gastroenterology;  Laterality: N/A;  PRE- IBS, CONSTIPATION, HX POLYPS  POST- DEMINISHED SPHINCTER TONE, DIVERTICULOSIS, POORLY PREPPED COLON   • CYSTOSCOPY N/A 05/07/2010    with TVT takedown, Dr. Simon Wall, [facility]   • ENDOSCOPY N/A 9/27/2016    z line irreg, bilious gastric fluid- fluid aspiration preformed, gastritis.  PATH: Squamous and glandular mucosa with minimal superficial acute inflammation.    • ENDOSCOPY N/A 12/12/2017    Z line irregular, gastritis, duodenitis, chronic inflammation   • ENDOSCOPY N/A 12/1/2020    Procedure: ESOPHAGOGASTRODUODENOSCOPY WITH BIOPSIES;  Surgeon: Darci Kerns MD;  Location: [facility] ENDOSCOPY;  Service: Gastroenterology;  Laterality: N/A;  PRE- REFLUX,  DYSPHAGIA  POST- ESOPHAGITIS, GASTRITIS, SMALL HIATAL HERNIA, BILE REFLUX   • EPIDURAL BLOCK     • ESOPHAGEAL MANOMETRY N/A 2/2/2021    Procedure: ESOPHAGEAL MANOMETRY;  Surgeon: Tran, Nurse Performed;  Location: [facility] ENDOSCOPY;  Service: Gastroenterology;  Laterality: N/A;  DYSPHAGIA   • KNEE SURGERY Left     BROKEN   • LUMBAR DISCECTOMY FUSION INSTRUMENTATION N/A 12/3/2018    Procedure: L4-5 laminectomy and fusion with instrumentation;  Surgeon: Austin Keith MD;  Location: [facility] OR;  Service: Orthopedic Spine   • MAMMO US BREAST BIOPSY ADDITIONAL W WO DEVICE Left 02/21/2002    2:00 position left breast, Infiltrating Ductal Carcinoma, [facility]   • MANDIBLE FRACTURE SURGERY     • MASTECTOMY Left 04/11/2002    Left Total Mastectomy and Left Axillary [place] Node Biobsy, Dr. Indu Akins, [facility]   • OTHER SURGICAL HISTORY      CARDIAC LOOP DEVICE, LEFT CHEST   • TENSION FREE VAGINAL TAPING WITH MINI ARC SLING N/A 10/26/2009    Dr. Gina Castillo, [facility]   • TOTAL KNEE ARTHROPLASTY Left 10/21/2019    Procedure: TOTAL KNEE ARTHROPLASTY;  Surgeon: Anurag Serrano MD;  Location: [facility] OR;  Service: Orthopedics   • UPPER GASTROINTESTINAL ENDOSCOPY  09/16/2014    z-line irreg, grade a reflux, gastritis         Visit Dx:     ICD-10-CM ICD-9-CM   1. Dysphagia, unspecified type  R13.10 787.20   2. Gtz's esophagus without dysplasia  K22.70 530.85     Patient was not wearing a face mask during this therapy encounter. Therapist used appropriate personal protective equipment including mask, eye protection and gloves.  Mask used was standard procedure mask. Appropriate PPE was worn during the entire therapy session. Hand hygiene was completed before and after therapy session. Patient is not in enhanced droplet precautions.       SLP Adult Swallow Evaluation     Row Name 02/26/21 1600       Rehab Evaluation    Document Type  evaluation  -AW    Subjective Information  no complaints  -AW    Patient Observations   alert;cooperative;agree to therapy  -AW    Patient/Family/Caregiver Comments/Observations  Pt oriented x4, able to provide history.  -AW    Care Plan Review  evaluation/treatment results reviewed;patient/other agree to care plan  -AW    Patient Effort  excellent  -AW    Symptoms Noted During/After Treatment  none  -AW       General Information    Patient Profile Reviewed  yes  -AW    Pertinent History Of Current Problem  Pt has a h/o GERD and Gtz's esophagus. Pt c/o certain things getting stuck in her throat like meat, bread, sweets. Pt reported occasional coughing during meals. Pt has dry mouth. She sips on water during the day and uses Biotene at night. Pt has no h/o pneumonia. A recent esophageal motility study on 2/2/21 gave concern for a connective tissue disorder or polymyositis. VFSS this date is to assess for changes in pharyngeal swallow.   -AW    Current Method of Nutrition  regular textures;thin liquids  -AW    Precautions/Limitations, Vision  WFL  -AW    Precautions/Limitations, Hearing  WFL  -AW    Prior Level of Function-Communication  WFL  -AW    Prior Level of Function-Swallowing  no diet consistency restrictions;esophageal concerns  -AW    Plans/Goals Discussed with  patient;agreed upon  -AW    Barriers to Rehab  none identified  -AW    Patient's Goals for Discharge  -- eat without difficulty  -AW       Pain    Additional Documentation  Pain Scale: Numbers Pre/Post-Treatment (Group)  -AW       Pain Scale: Numbers Pre/Post-Treatment    Pretreatment Pain Rating  0/10 - no pain  -AW    Posttreatment Pain Rating  0/10 - no pain  -AW       Oral Motor Structure and Function    Dentition Assessment  natural, present and adequate  -AW    Secretion Management  WNL/WFL  -AW    Mucosal Quality  moist, healthy  -AW       Oral Musculature and Cranial Nerve Assessment    Oral Motor General Assessment  WFL  -AW       General Eating/Swallowing Observations    Respiratory Support Currently in Use  room air  -AW     Eating/Swallowing Skills  self-fed;fed by SLP  -AW    Positioning During Eating  upright in chair  -AW       MBS/VFSS    Utensils Used  spoon;cup;straw  -AW    Consistencies Trialed  regular textures;soft textures;mixed consistency;pureed;thin liquids;nectar/syrup-thick liquids  -AW       MBS/VFSS Interpretation    Oral Prep Phase  WFL  -AW    Oral Transit Phase  WFL  -AW    Oral Residue  WFL  -AW    VFSS Summary  Pt exhibited a functional oropharyngeal swallow with no penetration or aspiration on any tested consistency including thin, nectar, pureed, soft solids, mixed consistency, and regular solids. Pt had adequate hyolaryngeal excursion and airway protection. An esophageal scan showed some retention. The cracker stuck in the upper esophagus and was cleared with a liquid wash.   -AW       Initiation of Pharyngeal Swallow    Pharyngeal Phase  functional pharyngeal phase of swallowing  -AW       Esophageal Phase    Esophageal Phase  esophageal retention;see radiology report for further details  -AW       SLP Communication to Radiology    Summary Statement  Pt exhibited a functional oropharyngeal swallow with no penetration or aspiration on any tested consistency including thin, nectar, pureed, soft solids, mixed consistency, and regular solids. Pt had adequate hyolaryngeal excursion and airway protection. An esophageal scan showed some retention. The cracker stuck in the upper esophagus and was cleared with a liquid wash.   -AW       Clinical Impression    SLP Swallowing Diagnosis  functional oral phase;functional pharyngeal phase;esophageal dysphagia  -AW    Functional Impact  risk of aspiration/pneumonia  -AW    Swallow Criteria for Skilled Therapeutic Interventions Met  no problems identified which require skilled intervention  -AW       Recommendations    Therapy Frequency (Swallow)  evaluation only  -AW    SLP Diet Recommendation  regular textures;thin liquids;other (see comments) avoid dry foods  -AW     Recommended Precautions and Strategies  upright posture during/after eating;small bites of food and sips of liquid;multiple swallows per bite of food;multiple swallows per sip of liquid;alternate between small bites of food and sips of liquid  -AW    Oral Care Recommendations  Oral Care BID/PRN  -AW    SLP Rec. for Method of Medication Administration  meds whole;with thin liquids  -AW    Monitor for Signs of Aspiration  yes  -AW    Anticipated Discharge Disposition (SLP)  home  -AW      User Key  (r) = Recorded By, (t) = Taken By, (c) = Cosigned By    Initials Name Provider Type    Karissa Rangel MS CCC-SLP Speech and Language Pathologist                        OP SLP Education     Row Name 02/26/21 1701       Education    Barriers to Learning  No barriers identified  -AW    Education Provided  Described results of evaluation;Patient expressed understanding of evaluation;Patient participated in establishing goals and treatment plan;Patient demonstrated recommended strategies  -AW    Assessed  Learning needs;Learning motivation;Learning preferences;Learning readiness  -AW    Learning Motivation  Strong  -AW    Learning Method  Explanation;Demonstration;Written materials;Teach back  -AW    Teaching Response  Verbalized understanding;Demonstrated understanding  -AW    Education Comments  Pt verbalized understanding of all results and recommended strategies.  -AW      User Key  (r) = Recorded By, (t) = Taken By, (c) = Cosigned By    Initials Name Effective Dates    Karissa Rangel MS CCC-SLP 06/08/18 -               OP SLP Assessment/Plan - 02/26/21 1700        SLP Assessment    Functional Problems  Swallowing   -AW    Impact on Function: Swallowing  Risk of aspiration   -AW    Clinical Impression: Swallowing  WNL;esophageal dysphagia   -AW    Prognosis  Good (comment)   -AW    Patient/caregiver participated in establishment of treatment plan and goals  Yes   -AW    Patient would benefit from skilled therapy  intervention  No   -AW      User Key  (r) = Recorded By, (t) = Taken By, (c) = Cosigned By    Initials Name Provider Type    Karissa Rangel MS CCC-SLP Speech and Language Pathologist              SLP Outcome Measures (last 72 hours)      SLP Outcome Measures     Row Name 02/26/21 1700             SLP Outcome Measures    Outcome Measure Used?  Adult NOMS  -AW         Adult FCM Scores    FCM Chosen  Swallowing  -AW      Swallowing FCM Score  7  -AW        User Key  (r) = Recorded By, (t) = Taken By, (c) = Cosigned By    Initials Name Effective Dates    Karissa Rangel MS CCC-SLP 06/08/18 -                Time Calculation:   SLP Start Time: 1300  SLP Stop Time: 1415  SLP Time Calculation (min): 75 min    Therapy Charges for Today     Code Description Service Date Service Provider Modifiers Qty    96836498138 HC ST MOTION FLUORO EVAL SWALLOW 5 2/26/2021 Karissa Malhotra MS CCC-SLP GN 1                   Karissa Malhotra MS CCC-SLP  2/26/2021

## 2021-03-09 ENCOUNTER — OFFICE VISIT (OUTPATIENT)
Dept: GASTROENTEROLOGY | Facility: CLINIC | Age: 76
End: 2021-03-09

## 2021-03-09 VITALS — TEMPERATURE: 97 F | HEIGHT: 66 IN | BODY MASS INDEX: 21.21 KG/M2 | WEIGHT: 132 LBS

## 2021-03-09 DIAGNOSIS — K21.9 GASTROESOPHAGEAL REFLUX DISEASE, UNSPECIFIED WHETHER ESOPHAGITIS PRESENT: Primary | ICD-10-CM

## 2021-03-09 DIAGNOSIS — K58.1 IRRITABLE BOWEL SYNDROME WITH CONSTIPATION: ICD-10-CM

## 2021-03-09 DIAGNOSIS — K63.5 POLYP OF COLON, UNSPECIFIED PART OF COLON, UNSPECIFIED TYPE: ICD-10-CM

## 2021-03-09 DIAGNOSIS — R13.10 DYSPHAGIA, UNSPECIFIED TYPE: ICD-10-CM

## 2021-03-09 PROCEDURE — 99213 OFFICE O/P EST LOW 20 MIN: CPT | Performed by: INTERNAL MEDICINE

## 2021-03-09 NOTE — PROGRESS NOTES
Chief Complaint   Patient presents with   • Heartburn   • Difficulty Swallowing   • Irritable Bowel Syndrome        Davida Anderson is a  75 y.o. female here for a follow up visit for GERD, dysphagia, IBS with constipation    HPI this 75-year-old white female patient of Dr. Marek Nguyễn presents in follow-up since she had undergone esophageal manometry and video fluoroscopy swallow evaluation.  Her manometric study suggested hypotensive LES, UES, and diminished body amplitude.  These findings are consistent with polymyositis.  The video fluoroscopy swallow study commented on tertiary contractions in the esophagus consistent with presbyesophagus.  She has had other neurologic issues in the past and was evaluated by Dr. Criss Tafoya.  She describes some lateral myopathic changes as well.  I explained these findings are not amenable to any medical intervention from a GI perspective but encouraged her to see a neurologist to see if there were any new options available for management purposes.  She is amenable to this and will discuss this with her primary care tender for referral.  We will continue to follow her for her IBS on an annual basis.    Past Medical History:   Diagnosis Date   • Acid reflux    • Arthritis    • Atrial fibrillation (CMS/HCC)    • Gtz esophagus    • Bipolar 2 disorder (CMS/HCC)    • Bradycardia     WITH SURGERY   • COPD (chronic obstructive pulmonary disease) (CMS/HCC)     MILD, USES AINHALER PRN   • DDD (degenerative disc disease), lumbar    • Diverticulosis    • Dizziness    • Drug therapy    • Fibrocystic breast    • Frequent UTI     SEES DR GOFF FOR FREQUENT UTI'S, ON KEFLEX   • H/O Infiltrating ductal carcinoma of left breast 2002    Stage IIA, Grade 3 ER/CO +, HER2 -, treated with 5 years of tamoxifen, 5 years of Femara, completed in 2012   • High cholesterol    • History of alcoholism (CMS/HCC)     40 YRS AGO   • History of Clostridium difficile colitis 2014   • History of  gastric ulcer    • History of loop recorder    • Hypertension    • Iron deficiency    • Irregular heartbeat     a fib   • Low back pain    • Lumbar herniated disc    • Mass of right breast on mammogram 03/17/2016    10 o'clock position, 4 cm from the nipple,   • Neuropathy    • PONV (postoperative nausea and vomiting)    • Raynaud disease    • Sleep apnea     MILD, NO NEED FOR CPAP   • Tremors of nervous system    • Urinary incontinence        Current Outpatient Medications   Medication Sig Dispense Refill   • ALBUTEROL IN Inhale 2 puffs Every 4 (Four) Hours As Needed.     • apixaban (ELIQUIS) 5 MG tablet tablet Take 1 tablet by mouth every 12 (Twelve) hours. 60 tablet 0   • ARIPiprazole (ABILIFY) 10 MG tablet Take 10 mg by mouth Every Morning.     • aspirin 325 MG tablet Take 325 mg by mouth.     • atorvastatin (LIPITOR) 20 MG tablet Take 20 mg by mouth Every Morning.     • B COMPLEX VITAMINS ER PO Take 1 tablet by mouth Daily.     • baclofen (LIORESAL) 10 MG tablet Take 10 mg by mouth 3 (Three) Times a Day.     • BIOTIN PO Take 5,000 mcg by mouth Daily.     • Calcium Carb-Cholecalciferol (CALCIUM 600 + D) 600-200 MG-UNIT tablet Take 2 tablets by mouth Every Morning.     • cephalexin (KEFLEX) 500 MG capsule Take 500 mg by mouth 4 (Four) Times a Day. PREVENTIVE FOR UTI'S     • cephalexin (Keflex) 500 MG capsule Take all 4 caps 1 hour prior to procedure 4 capsule 2   • DIGESTIVE ENZYMES PO Take 2 tablets by mouth.     • dilTIAZem CD (CARTIA XT) 120 MG 24 hr capsule Take 120 mg by mouth Every Morning.     • diphenhydrAMINE (BENADRYL) 25 mg capsule Take 25 mg by mouth Every 6 (Six) Hours As Needed for Itching.     • diphenhydrAMINE-acetaminophen (TYLENOL PM)  MG tablet per tablet Take 1 tablet by mouth Every Night.     • Docusate Calcium (STOOL SOFTENER PO) Take 1 capsule by mouth As Needed.     • Ferrous Sulfate (IRON PO) Take 65 mg by mouth Daily.     • lamoTRIgine (LaMICtal) 100 MG tablet Take 100 mg by mouth  2 (two) times a day.     • linaclotide (Linzess) 72 MCG capsule capsule Take 1 capsule by mouth Every Morning Before Breakfast. 90 capsule 3   • lithium carbonate 300 MG capsule Take 300 mg by mouth Every Morning.     • magnesium oxide (MAGOX) 400 (241.3 MG) MG tablet tablet Take 400 mg by mouth Every Morning.     • melatonin 5 MG tablet tablet Take 5 mg by mouth At Night As Needed.     • nitrofurantoin, macrocrystal-monohydrate, (MACROBID) 100 MG capsule TK ONE C PO QHS     • omeprazole (priLOSEC) 20 MG capsule Take 20 mg by mouth 2 (Two) Times a Day.     • ondansetron (ZOFRAN) 4 MG tablet Take 4 mg by mouth.     • pantoprazole (PROTONIX) 40 MG EC tablet Take 40 mg by mouth 2 (Two) Times a Day.     • pregabalin (LYRICA) 50 MG capsule Take 150 mg by mouth Every Night.     • Probiotic Product (SOLUBLE FIBER/PROBIOTICS PO) Take 1 tablet by mouth Every Morning.     • solifenacin (VESICARE) 10 MG tablet Take 10 mg by mouth Every Morning.     • Unable to find Med Name: THYROID COUMPOUND ONCE A DAY .       No current facility-administered medications for this visit.     Facility-Administered Medications Ordered in Other Visits   Medication Dose Route Frequency Provider Last Rate Last Admin   • mupirocin (BACTROBAN) 2 % nasal ointment   Nasal BID Anurag Serrano MD           PRN Meds:.    Allergies   Allergen Reactions   • Morphine Hives, Itching and Rash       Social History     Socioeconomic History   • Marital status:      Spouse name: Not on file   • Number of children: Not on file   • Years of education: Not on file   • Highest education level: Not on file   Tobacco Use   • Smoking status: Former Smoker     Packs/day: 3.00     Years: 25.00     Pack years: 75.00     Types: Cigarettes     Quit date: 1983     Years since quittin.9   • Smokeless tobacco: Never Used   Vaping Use   • Vaping Use: Never used   Substance and Sexual Activity   • Alcohol use: No     Comment: RECOVERYING ,  LAST DRINK 40 YRS AGO    • Drug use: No   • Sexual activity: Defer       Family History   Problem Relation Age of Onset   • Breast cancer Mother 35   • Colon cancer Mother 64   • Heart disease Father    • Cancer Father         throat and lung   • Colon polyps Father    • Malig Hyperthermia Neg Hx        Review of Systems   Constitutional: Negative for activity change, appetite change, fatigue and unexpected weight change.   HENT: Negative for congestion, facial swelling, sore throat, trouble swallowing and voice change.    Eyes: Negative for photophobia and visual disturbance.   Respiratory: Negative for cough and choking.    Cardiovascular: Negative for chest pain.   Gastrointestinal: Negative for abdominal distention, abdominal pain, anal bleeding, blood in stool, constipation, diarrhea, nausea, rectal pain and vomiting.        GERD  Dysphagia  IBS   Endocrine: Negative for polyphagia.   Musculoskeletal: Negative for arthralgias, gait problem and joint swelling.   Skin: Negative for color change, pallor and rash.   Allergic/Immunologic: Negative for food allergies.   Neurological: Negative for speech difficulty and headaches.   Hematological: Does not bruise/bleed easily.   Psychiatric/Behavioral: Negative for agitation, confusion and sleep disturbance.       Vitals:    03/09/21 1118   Temp: 97 °F (36.1 °C)       Physical Exam  Constitutional:       Appearance: She is well-developed.   HENT:      Head: Normocephalic.   Eyes:      Conjunctiva/sclera: Conjunctivae normal.   Cardiovascular:      Rate and Rhythm: Normal rate and regular rhythm.   Pulmonary:      Breath sounds: Normal breath sounds.   Abdominal:      General: Bowel sounds are normal.      Palpations: Abdomen is soft.   Musculoskeletal:         General: Normal range of motion.      Cervical back: Normal range of motion.   Skin:     General: Skin is warm and dry.   Neurological:      Mental Status: She is alert and oriented to person, place, and time.   Psychiatric:          Behavior: Behavior normal.         ASSESSMENT   #1 GERD  #2 dysphagia: Manometric studies consistent with polymyositis  #3 IBS  #4 history of polyps      PLAN  Patient to discuss referral to neurologist with her primary care tender  Follow-up annually for IBS  Call sooner as needed for any GI related complaints      ICD-10-CM ICD-9-CM   1. Gastroesophageal reflux disease, unspecified whether esophagitis present  K21.9 530.81   2. Dysphagia, unspecified type  R13.10 787.20   3. Irritable bowel syndrome with constipation  K58.1 564.1   4. Polyp of colon, unspecified part of colon, unspecified type  K63.5 211.3

## 2021-03-30 ENCOUNTER — TELEPHONE (OUTPATIENT)
Dept: GASTROENTEROLOGY | Facility: CLINIC | Age: 76
End: 2021-03-30

## 2021-03-30 NOTE — TELEPHONE ENCOUNTER
----- Message from Darci ANAYA MD sent at 3/4/2021 12:46 PM EST -----  Regarding: VFSS results  Okay to call results, there was some comment made by speech pathologist of slow clearance of solid food which did eventually clear with liquid.  Would follow speech pathology recommendations regarding dietary intake.  ----- Message -----  From: Narcisa, Rad Results [place] In  Sent: 2/26/2021   7:12 PM EST  To: Darci ANAYA MD

## 2021-06-04 RX ORDER — LINACLOTIDE 72 UG/1
CAPSULE, GELATIN COATED ORAL
Qty: 90 CAPSULE | Refills: 3 | Status: SHIPPED | OUTPATIENT
Start: 2021-06-04 | End: 2021-09-02

## 2021-06-10 ENCOUNTER — TRANSCRIBE ORDERS (OUTPATIENT)
Dept: ADMINISTRATIVE | Facility: HOSPITAL | Age: 76
End: 2021-06-10

## 2021-06-10 DIAGNOSIS — Z12.31 ENCOUNTER FOR SCREENING MAMMOGRAM FOR MALIGNANT NEOPLASM OF BREAST: Primary | ICD-10-CM

## 2021-07-13 ENCOUNTER — HOSPITAL ENCOUNTER (OUTPATIENT)
Dept: MAMMOGRAPHY | Facility: HOSPITAL | Age: 76
Discharge: HOME | End: 2021-07-13
Admitting: SPECIALIST

## 2021-07-13 DIAGNOSIS — Z12.31 ENCOUNTER FOR SCREENING MAMMOGRAM FOR MALIGNANT NEOPLASM OF BREAST: ICD-10-CM

## 2021-07-13 PROCEDURE — 77067 SCR MAMMO BI INCL CAD: CPT

## 2021-07-13 PROCEDURE — 77063 BREAST TOMOSYNTHESIS BI: CPT

## 2021-08-23 ENCOUNTER — TELEPHONE (OUTPATIENT)
Dept: GASTROENTEROLOGY | Facility: CLINIC | Age: 76
End: 2021-08-23

## 2021-08-23 DIAGNOSIS — R19.7 DIARRHEA, UNSPECIFIED TYPE: Primary | ICD-10-CM
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
## 2021-08-23 NOTE — TELEPHONE ENCOUNTER
----- Message from Davida Anderson sent at 8/23/2021 10:35 AM EDT -----  Regarding: Non-Urgent Medical Question  Contact: [phone]  I am having diarrhea for the past 3 weeks, sometimes severe. I am taking imodium, but the relief is temporary.  ([phone]) Do I need an appointment, a prescription or what. Please have one of your staff call me. I have a call in to your office, but it takes days to get an answer.

## 2021-08-24 NOTE — TELEPHONE ENCOUNTER
Would have patient submit stool for GI panel with PCR.  Can schedule follow-up with myself or nurse practitioner once study complete.  If symptoms progress or worsen she may need to present to the emergency room.

## 2021-08-24 NOTE — TELEPHONE ENCOUNTER
Called pt and advised of Dr Kerns's note. Advised will leave stool kit at .   Verb understanding and appt made for 09/02 at 0803x with Diane MALDONADO.     Gi Pcr ordered and message sent to Dr Kerns to sami.

## 2021-09-01 ENCOUNTER — TELEPHONE (OUTPATIENT)
Dept: GASTROENTEROLOGY | Facility: CLINIC | Age: 76
End: 2021-09-01

## 2021-09-01 LAB

## 2021-09-01 RX ORDER — AZITHROMYCIN 500 MG/1
TABLET, FILM COATED ORAL
Qty: 3 TABLET | Refills: 0 | Status: SHIPPED | OUTPATIENT
Start: 2021-09-01 | End: 2023-02-02

## 2021-09-01 NOTE — TELEPHONE ENCOUNTER
----- Message from Darci ANAYA MD sent at 9/1/2021  9:49 AM EDT -----  Regarding: GI panel results  Okay to call results, positive for enteropathogenic E. coli. With persistence of her diarrhea would treat with a azithromycin 500 mg daily for 3 days. Would also encourage she take a probiotic at the same time.  ----- Message -----  From: Interface, [facility]lab Results In  Sent: 9/1/2021   5:08 AM EDT  To: Darci ANAYA MD

## 2021-09-01 NOTE — TELEPHONE ENCOUNTER
Called pt and spoke with pt's  on hippa and advised of Dr Kerns's note.  Advised will send script to her [facility].Verb understanding.     Azithromycin escribed as ordered below.

## 2021-09-02 ENCOUNTER — OFFICE VISIT (OUTPATIENT)
Dept: GASTROENTEROLOGY | Facility: CLINIC | Age: 76
End: 2021-09-02

## 2021-09-02 VITALS — BODY MASS INDEX: 19.93 KG/M2 | WEIGHT: 124 LBS | TEMPERATURE: 97.5 F | HEIGHT: 66 IN

## 2021-09-02 DIAGNOSIS — K92.1 MELENA: ICD-10-CM

## 2021-09-02 DIAGNOSIS — K21.9 GASTROESOPHAGEAL REFLUX DISEASE, UNSPECIFIED WHETHER ESOPHAGITIS PRESENT: ICD-10-CM

## 2021-09-02 DIAGNOSIS — K58.1 IRRITABLE BOWEL SYNDROME WITH CONSTIPATION: ICD-10-CM

## 2021-09-02 DIAGNOSIS — R19.7 DIARRHEA, UNSPECIFIED TYPE: Primary | ICD-10-CM

## 2021-09-02 DIAGNOSIS — K62.5 RECTAL BLEEDING: ICD-10-CM

## 2021-09-02 PROCEDURE — 99214 OFFICE O/P EST MOD 30 MIN: CPT | Performed by: PHYSICIAN ASSISTANT

## 2021-09-02 PROCEDURE — 82272 OCCULT BLD FECES 1-3 TESTS: CPT | Performed by: PHYSICIAN ASSISTANT

## 2021-09-02 RX ORDER — MULTIVIT WITH MINERALS/LUTEIN
500 TABLET ORAL DAILY
COMMUNITY

## 2021-09-02 RX ORDER — UBIDECARENONE 100 MG
100 CAPSULE ORAL DAILY
COMMUNITY

## 2021-09-02 NOTE — PROGRESS NOTES
"Chief Complaint  Heartburn and Constipation    Subjective          Davida Anderson presents to [facility]  History of Present Illness   75 y.o. female here for a follow up visit for diarrhea.  She has history of GERD, dysphagia, IBS with constipation.  She is a patient of Dr. Kerns and is new to me.    She called the office with a 3-week history of diarrhea.  At the time she had 2 loose stools per day and then she had one big \"explosion\" and an episode of stool incontinence.  She does report black stool with the explosive diarrhea but took Pepto-Bismol prior to this.  She also had some bright red blood on the toilet paper throughout her diarrhea episodes.  Diarrhea was preceded by constipation despite taking Linzess 72 mcg daily.  She started MiraLAX every other day and then the diarrhea started.  She did not stop the Linzess that she was not aware that this was for constipation.  She is now with constipation with her last bowel movement on 8/29.  She has no diarrhea for at least a week and a half.  She also admits to upper and lower abdominal pain that was more severe with the diarrhea but now is improving.  Her abdominal pain with the diarrhea was not relieved the bowel at the time.  She denies sick contacts, recent antibiotic use, recent travel.    Stool studies on 8/29 showed EPEC Ecoli and she started on azithromycin 500mg for 3 days and probiotic.  She is on day 2/3 of 8 of the myosin.    She also takes Omeprazole 20mg BID for GERD and is doing well on this.    Last EGD and colonoscopy on 12/1/2020 Dr. Kerns.  Colonoscopy showed diverticulosis but was otherwise normal.  EGD showed gastritis, hiatal hernia, bilious gastric fluid.  Path showed mild chronic inflammation.    Objective   Vital Signs:   Temp 97.5 °F (36.4 °C)   Ht 167.6 cm (66\")   Wt 56.2 kg (124 lb)   BMI 20.01 kg/m²     Physical Exam  Vitals reviewed.   Constitutional:       General: She is awake. She is " not in acute distress.     Appearance: Normal appearance. She is well-developed and well-groomed.   HENT:      Head: Normocephalic and atraumatic.   Pulmonary:      Effort: Pulmonary effort is normal. No respiratory distress.   Abdominal:      General: Abdomen is flat.      Comments: Rectal exam: Normal external perianal exam.  Decreased rectal tone.  No rectal masses or overt blood on SHIRLEY.  Hemoccult negative.   Skin:     Coloration: Skin is not pale.   Neurological:      Mental Status: She is alert and oriented to person, place, and time.      Gait: Gait normal.   Psychiatric:         Mood and Affect: Mood and affect normal.         Speech: Speech normal.         Behavior: Behavior is cooperative.         Judgment: Judgment normal.        Result Review :          Assessment and Plan    Diagnoses and all orders for this visit:    1. Diarrhea, unspecified type (Primary)  -     CBC & Differential  -     Comprehensive Metabolic Panel  -     C-reactive Protein    2. Melena  -     CBC & Differential  -     Comprehensive Metabolic Panel  -     C-reactive Protein    3. Rectal bleeding  -     CBC & Differential  -     Comprehensive Metabolic Panel  -     C-reactive Protein    4. Irritable bowel syndrome with constipation  -     CBC & Differential  -     Comprehensive Metabolic Panel  -     C-reactive Protein    5. Gastroesophageal reflux disease, unspecified whether esophagitis present    Other orders  -     linaclotide (Linzess) 145 MCG capsule capsule; Take 1 capsule by mouth Every Morning Before Breakfast.  Dispense: 12 capsule; Refill: 0      Her diarrhea is now resolved and return to constipation.  I suspect that she flared up her IB and went into diarrhea with the MiraLAX.  She also did not stop her Linzess throughout her diarrhea episode.  She is now constipated, recommend increasing Linzess to 145 mcg daily.  I gave her samples today.  We will check labs given her rectal bleeding and black stools.  Suspect the  black stools related to the Pepto-Bismol.  And rectal bleeding was minimal and likely related to internal hemorrhoid.  Negative Hemoccult today.    Call sooner if symptoms worsen or persist.        Follow Up   Return in about 6 weeks (around 10/14/2021) for Dr. Kerns or Diane.  Patient was given instructions and counseling regarding her condition or for health maintenance advice. Please see specific information pulled into the AVS if appropriate.

## 2021-09-03 ENCOUNTER — TELEPHONE (OUTPATIENT)
Dept: GASTROENTEROLOGY | Facility: CLINIC | Age: 76
End: 2021-09-03

## 2021-09-03 LAB
ALBUMIN SERPL-MCNC: 4.9 G/DL (ref 3.7–4.7)
ALBUMIN/GLOB SERPL: 2.1 {RATIO} (ref 1.2–2.2)
ALP SERPL-CCNC: 84 IU/L (ref 48–121)
ALT SERPL-CCNC: 11 IU/L (ref 0–32)
AST SERPL-CCNC: 17 IU/L (ref 0–40)
BASOPHILS # BLD AUTO: 0.1 X10E3/UL (ref 0–0.2)
BASOPHILS NFR BLD AUTO: 1 %
BILIRUB SERPL-MCNC: 0.3 MG/DL (ref 0–1.2)
BUN SERPL-MCNC: 13 MG/DL (ref 8–27)
BUN/CREAT SERPL: 16 (ref 12–28)
CALCIUM SERPL-MCNC: 9.9 MG/DL (ref 8.7–10.3)
CHLORIDE SERPL-SCNC: 101 MMOL/L (ref 96–106)
CO2 SERPL-SCNC: 23 MMOL/L (ref 20–29)
CREAT SERPL-MCNC: 0.81 MG/DL (ref 0.57–1)
CRP SERPL-MCNC: <1 MG/L (ref 0–10)
EOSINOPHIL # BLD AUTO: 0.1 X10E3/UL (ref 0–0.4)
EOSINOPHIL NFR BLD AUTO: 1 %
ERYTHROCYTE [DISTWIDTH] IN BLOOD BY AUTOMATED COUNT: 11.9 % (ref 11.7–15.4)
GLOBULIN SER CALC-MCNC: 2.3 G/DL (ref 1.5–4.5)
GLUCOSE SERPL-MCNC: 86 MG/DL (ref 65–99)
HCT VFR BLD AUTO: 39.1 % (ref 34–46.6)
HGB BLD-MCNC: 12.5 G/DL (ref 11.1–15.9)
IMM GRANULOCYTES # BLD AUTO: 0 X10E3/UL (ref 0–0.1)
IMM GRANULOCYTES NFR BLD AUTO: 0 %
LYMPHOCYTES # BLD AUTO: 1.7 X10E3/UL (ref 0.7–3.1)
LYMPHOCYTES NFR BLD AUTO: 21 %
MCH RBC QN AUTO: 28.1 PG (ref 26.6–33)
MCHC RBC AUTO-ENTMCNC: 32 G/DL (ref 31.5–35.7)
MCV RBC AUTO: 88 FL (ref 79–97)
MONOCYTES # BLD AUTO: 0.6 X10E3/UL (ref 0.1–0.9)
MONOCYTES NFR BLD AUTO: 7 %
NEUTROPHILS # BLD AUTO: 5.8 X10E3/UL (ref 1.4–7)
NEUTROPHILS NFR BLD AUTO: 70 %
PLATELET # BLD AUTO: 244 X10E3/UL (ref 150–450)
POTASSIUM SERPL-SCNC: 4.4 MMOL/L (ref 3.5–5.2)
PROT SERPL-MCNC: 7.2 G/DL (ref 6–8.5)
RBC # BLD AUTO: 4.45 X10E6/UL (ref 3.77–5.28)
SODIUM SERPL-SCNC: 138 MMOL/L (ref 134–144)
WBC # BLD AUTO: 8.3 X10E3/UL (ref 3.4–10.8)

## 2021-09-03 NOTE — TELEPHONE ENCOUNTER
----- Message from Tracie Roberts PA-C sent at 9/3/2021  8:08 AM EDT -----  Please let patient know that labs are normal. Proceed with plan as discussed and follow up as scheduled.

## 2021-10-19 ENCOUNTER — TELEPHONE (OUTPATIENT)
Dept: ORTHOPEDIC SURGERY | Facility: CLINIC | Age: 76
End: 2021-10-19

## 2021-10-19 NOTE — TELEPHONE ENCOUNTER
I called and spoke with the patient's  and notified him that she is at her 2-year laury and is no longer required antibiotics for dental procedures.  He voices understanding.

## 2021-10-19 NOTE — TELEPHONE ENCOUNTER
Provider:  DR. NINO ALBRIGHT  Caller:  SILVA VELAZQUEZ  Relationship to Patient:  SELF  Pharmacy: [facility] [phone]  Phone Number:  [phone]  Reason for Call: PATIENT HAD LEFT TKA BY DR. ALBRIGHT 10/21/19. PATIENT HAS DENTAL APPT. TOMORROW AT 11:00am FOR CLEANING/CHECK UP. ASKING IF SHE NEEDS PREMEDICATION.

## 2022-04-13 RX ORDER — LINACLOTIDE 72 UG/1
CAPSULE, GELATIN COATED ORAL
Qty: 90 CAPSULE | Refills: 3 | OUTPATIENT
Start: 2022-04-13

## 2022-05-11 RX ORDER — LINACLOTIDE 72 UG/1
CAPSULE, GELATIN COATED ORAL
Qty: 90 CAPSULE | Refills: 3 | OUTPATIENT
Start: 2022-05-11

## 2022-06-10 ENCOUNTER — TRANSCRIBE ORDERS (OUTPATIENT)
Dept: ADMINISTRATIVE | Facility: HOSPITAL | Age: 77
End: 2022-06-10

## 2022-06-10 DIAGNOSIS — Z78.0 MENOPAUSE: ICD-10-CM

## 2022-06-10 DIAGNOSIS — Z12.31 VISIT FOR SCREENING MAMMOGRAM: Primary | ICD-10-CM

## 2022-08-31 ENCOUNTER — HOSPITAL ENCOUNTER (OUTPATIENT)
Dept: MAMMOGRAPHY | Facility: HOSPITAL | Age: 77
Discharge: HOME | End: 2022-08-31
Admitting: SPECIALIST

## 2022-08-31 DIAGNOSIS — Z12.31 VISIT FOR SCREENING MAMMOGRAM: ICD-10-CM

## 2022-08-31 PROCEDURE — 77063 BREAST TOMOSYNTHESIS BI: CPT

## 2022-08-31 PROCEDURE — 77067 SCR MAMMO BI INCL CAD: CPT

## 2022-11-19 NOTE — ANESTHESIA PROCEDURE NOTES
ANESTHESIA INTUBATION  Urgency: elective    Date/Time: 12/3/2018 7:45 AM  End Time:12/3/2018 7:45 AM  Airway not difficult    General Information and Staff    Patient location during procedure: OR  Anesthesiologist: Rolanda Hernandez MD  CRNA: Citlalli Garcia CRNA    Indications and Patient Condition  Indications for airway management: airway protection    Preoxygenated: yes  MILS maintained throughout  Mask difficulty assessment: 1 - vent by mask    Final Airway Details  Final airway type: endotracheal airway      Successful airway: ETT  Cuffed: yes   Successful intubation technique: direct laryngoscopy  Facilitating devices/methods: intubating stylet  Endotracheal tube insertion site: oral  Blade: Lee  Blade size: 2  ETT size (mm): 7.0  Cormack-Lehane Classification: grade I - full view of glottis  Placement verified by: chest auscultation and capnometry   Measured from: lips  Number of attempts at approach: 1    Additional Comments  Atraumatic, Secured after verification of placement             31

## 2022-12-20 ENCOUNTER — HOSPITAL ENCOUNTER (OUTPATIENT)
Dept: BONE DENSITY | Facility: HOSPITAL | Age: 77
Discharge: HOME | End: 2022-12-20
Admitting: SPECIALIST

## 2022-12-20 DIAGNOSIS — Z78.0 MENOPAUSE: ICD-10-CM

## 2022-12-20 PROCEDURE — 77080 DXA BONE DENSITY AXIAL: CPT

## 2022-12-23 NOTE — TELEPHONE ENCOUNTER
Caller: Davida Anderson    Relationship: Self    Best call back number: [phone]    Requested Prescriptions:   Requested Prescriptions     Pending Prescriptions Disp Refills   • linaclotide (Linzess) 145 MCG capsule capsule 12 capsule 0     Sig: Take 1 capsule by mouth Every Morning Before Breakfast.        Pharmacy where request should be sent: [id number] [address]     Additional details provided by patient: PT STATES THAT SHE DOES NOT HAVE ANY MEDS LEFT. INFORMED HER THERE WERE NO MORE REFILLS. PLEASE CALL BACK TO LET HER KNOW IF IT CANNOT BE FILLED.     Does the patient have less than a 3 day supply:  [x] Yes  [] No    Would you like a call back once the refill request has been completed: [x] Yes [] No    If the office needs to give you a call back, can they leave a voicemail: [x] Yes [] No    Prince Galindo Rep   12/23/22 10:50 EST

## 2022-12-27 ENCOUNTER — TRANSCRIBE ORDERS (OUTPATIENT)
Dept: ADMINISTRATIVE | Facility: HOSPITAL | Age: 77
End: 2022-12-27

## 2022-12-27 DIAGNOSIS — Z12.31 VISIT FOR SCREENING MAMMOGRAM: Primary | ICD-10-CM

## 2022-12-27 DIAGNOSIS — Z78.0 MENOPAUSE: ICD-10-CM

## 2023-02-02 ENCOUNTER — OFFICE VISIT (OUTPATIENT)
Dept: GASTROENTEROLOGY | Facility: CLINIC | Age: 78
End: 2023-02-02
Payer: MEDICARE

## 2023-02-02 ENCOUNTER — TELEPHONE (OUTPATIENT)
Dept: GASTROENTEROLOGY | Facility: CLINIC | Age: 78
End: 2023-02-02
Payer: MEDICARE

## 2023-02-02 VITALS
HEIGHT: 66 IN | OXYGEN SATURATION: 94 % | HEART RATE: 76 BPM | BODY MASS INDEX: 20.76 KG/M2 | TEMPERATURE: 96.9 F | WEIGHT: 129.2 LBS | SYSTOLIC BLOOD PRESSURE: 152 MMHG | DIASTOLIC BLOOD PRESSURE: 79 MMHG

## 2023-02-02 DIAGNOSIS — K58.1 IRRITABLE BOWEL SYNDROME WITH CONSTIPATION: ICD-10-CM

## 2023-02-02 DIAGNOSIS — K21.9 GASTROESOPHAGEAL REFLUX DISEASE, UNSPECIFIED WHETHER ESOPHAGITIS PRESENT: Primary | ICD-10-CM

## 2023-02-02 DIAGNOSIS — D12.6 ADENOMATOUS POLYP OF COLON, UNSPECIFIED PART OF COLON: ICD-10-CM

## 2023-02-02 DIAGNOSIS — Z87.19 HISTORY OF BARRETT'S ESOPHAGUS: ICD-10-CM

## 2023-02-02 PROCEDURE — 99214 OFFICE O/P EST MOD 30 MIN: CPT | Performed by: INTERNAL MEDICINE

## 2023-02-02 RX ORDER — HYDRALAZINE HYDROCHLORIDE 25 MG/1
25 TABLET, FILM COATED ORAL 2 TIMES DAILY
COMMUNITY
Start: 2023-01-26

## 2023-02-02 RX ORDER — SOLIFENACIN SUCCINATE 10 MG/1
10 TABLET, FILM COATED ORAL DAILY
COMMUNITY
Start: 2023-01-12

## 2023-02-02 RX ORDER — PREGABALIN 50 MG/1
50 CAPSULE ORAL DAILY
COMMUNITY
Start: 2022-09-21 | End: 2023-02-02

## 2023-02-02 RX ORDER — VIT C/B6/B5/MAGNESIUM/HERB 173 50-5-6-5MG
CAPSULE ORAL
COMMUNITY

## 2023-02-02 RX ORDER — SODIUM CHLORIDE, SODIUM LACTATE, POTASSIUM CHLORIDE, CALCIUM CHLORIDE 600; 310; 30; 20 MG/100ML; MG/100ML; MG/100ML; MG/100ML
30 INJECTION, SOLUTION INTRAVENOUS CONTINUOUS
Status: CANCELLED | OUTPATIENT
Start: 2023-03-15

## 2023-02-02 RX ORDER — DILTIAZEM HYDROCHLORIDE 180 MG/1
180 CAPSULE, EXTENDED RELEASE ORAL DAILY
COMMUNITY
Start: 2022-07-19 | End: 2023-07-19

## 2023-02-02 NOTE — PROGRESS NOTES
Chief Complaint   Patient presents with   • Difficulty Swallowing     HX Gtz's esophagus   • Irritable Bowel Syndrome        Davida Anderson is a  77 y.o. female here for a follow up visit for GERD, history of Gtz's, IBS with constipation, history of polyps    HPI this 77-year-old white female patient of Dr. Marek Nguyễn presents in follow-up since undergoing upper and lower endoscopy in December 2020.  Upper endoscopy revealed esophagitis but no Gtz's changes noted.  She was given this diagnosis by Dr. العراقي from previous evaluations.  Colonoscopy was negative for any polyps although she has a prior history of adenomatous polyps.  She has IBS with constipation and had been on Linzess in the past but now currently manages her bowel pattern with smooth lax taken daily but keeps her bowel regular.  She has some breakthrough symptoms at night with her reflux despite the use of omeprazole 20 mg twice daily.  We talked about additional use of famotidine at bedtime and she is going to try this.  She would be due for follow-up EGD in December of this year.  Her next colonoscopy follow-up would be in January 2025.    Past Medical History:   Diagnosis Date   • Acid reflux    • Arthritis    • Atrial fibrillation (CMS/HCC)    • Gtz esophagus    • Bipolar 2 disorder (CMS/HCC)    • Bradycardia     WITH SURGERY   • COPD (chronic obstructive pulmonary disease) (CMS/HCC)     MILD, USES AINHALER PRN   • DDD (degenerative disc disease), lumbar    • Diverticulosis    • Dizziness    • Drug therapy    • Fibrocystic breast    • Frequent UTI     SEES DR GOFF FOR FREQUENT UTI'S, ON KEFLEX   • H/O Infiltrating ductal carcinoma of left breast 2002    Stage IIA, Grade 3 ER/SC +, HER2 -, treated with 5 years of tamoxifen, 5 years of Femara, completed in 2012   • High cholesterol    • History of alcoholism (CMS/HCC)     40 YRS AGO   • History of Clostridium difficile colitis 2014   • History of gastric ulcer    • History of  loop recorder    • Hypertension    • Iron deficiency    • Irregular heartbeat     a fib   • Low back pain    • Lumbar herniated disc    • Mass of right breast on mammogram 03/17/2016    10 o'clock position, 4 cm from the nipple,   • Neuropathy    • PONV (postoperative nausea and vomiting)    • Raynaud disease    • Sleep apnea     MILD, NO NEED FOR CPAP   • Tremors of nervous system    • Urinary incontinence        Current Outpatient Medications   Medication Sig Dispense Refill   • ALBUTEROL IN Inhale 2 puffs Every 4 (Four) Hours As Needed.     • apixaban (ELIQUIS) 5 MG tablet tablet TAKE 1 TABLET TWICE DAILY     • ARIPiprazole (ABILIFY) 10 MG tablet Take 10 mg by mouth Every Morning.     • aspirin 325 MG tablet Take 325 mg by mouth.     • atorvastatin (LIPITOR) 20 MG tablet Take 20 mg by mouth Every Morning.     • B COMPLEX VITAMINS ER PO Take 1 tablet by mouth Daily.     • BIOTIN PO Take 5,000 mcg by mouth Daily.     • Calcium Carb-Cholecalciferol 600-200 MG-UNIT tablet Take 2 tablets by mouth Every Morning.     • coenzyme Q10 100 MG capsule Take 100 mg by mouth Daily.     • DIGESTIVE ENZYMES PO Take 2 tablets by mouth.     • dilTIAZem (TIAZAC) 180 MG 24 hr capsule Take 180 mg by mouth Daily.     • dilTIAZem CD (CARDIZEM CD) 120 MG 24 hr capsule Take 120 mg by mouth Every Morning.     • diphenhydrAMINE (BENADRYL) 25 mg capsule Take 25 mg by mouth Every 6 (Six) Hours As Needed for Itching.     • Docusate Calcium (STOOL SOFTENER PO) Take 1 capsule by mouth As Needed.     • lamoTRIgine (LaMICtal) 100 MG tablet Take 100 mg by mouth 2 (two) times a day.     • linaclotide (Linzess) 145 MCG capsule capsule Take 1 capsule by mouth Every Morning Before Breakfast. 12 capsule 0   • lithium carbonate 300 MG capsule Take 300 mg by mouth Every Morning.     • magnesium oxide (MAGOX) 400 (241.3 MG) MG tablet tablet Take 400 mg by mouth Every Morning.     • melatonin 5 MG tablet tablet Take 5 mg by mouth At Night As Needed.     •  Multiple Vitamins-Minerals (ZINC PO) Take  by mouth.     • omeprazole (priLOSEC) 20 MG capsule Take 20 mg by mouth 2 (Two) Times a Day.     • pregabalin (LYRICA) 50 MG capsule Take 150 mg by mouth Every Night.     • Probiotic Product (SOLUBLE FIBER/PROBIOTICS PO) Take 1 tablet by mouth Every Morning.     • solifenacin (VESICARE) 10 MG tablet      • Turmeric 500 MG capsule Take  by mouth.     • vitamin C (ASCORBIC ACID) 250 MG tablet Take 500 mg by mouth Daily.     • baclofen (LIORESAL) 10 MG tablet Take 10 mg by mouth 3 (Three) Times a Day.     • hydrALAZINE (APRESOLINE) 25 MG tablet      • MAGNESIUM PO Take 400 mg by mouth Daily.     • ondansetron (ZOFRAN) 4 MG tablet Take 4 mg by mouth.       No current facility-administered medications for this visit.     Facility-Administered Medications Ordered in Other Visits   Medication Dose Route Frequency Provider Last Rate Last Admin   • mupirocin (BACTROBAN) 2 % nasal ointment   Nasal BID Anurag Serrano MD           PRN Meds:.    Allergies   Allergen Reactions   • Morphine Hives, Itching and Rash       Social History     Socioeconomic History   • Marital status:    Tobacco Use   • Smoking status: Former     Packs/day: 3.00     Years: 25.00     Pack years: 75.00     Types: Cigarettes     Quit date: 1983     Years since quittin.8   • Smokeless tobacco: Never   Vaping Use   • Vaping Use: Never used   Substance and Sexual Activity   • Alcohol use: No     Comment: RECOVERYING ,  LAST DRINK 40 YRS AGO   • Drug use: No   • Sexual activity: Defer       Family History   Problem Relation Age of Onset   • Breast cancer Mother 35   • Colon cancer Mother 64   • Heart disease Father    • Cancer Father         throat and lung   • Colon polyps Father    • Malig Hyperthermia Neg Hx        Review of Systems   Constitutional: Negative for activity change, appetite change, fatigue and unexpected weight change.   HENT: Negative for congestion, facial swelling, sore throat,  trouble swallowing and voice change.    Eyes: Negative for photophobia and visual disturbance.   Respiratory: Negative for cough and choking.    Cardiovascular: Negative for chest pain.   Gastrointestinal: Positive for constipation. Negative for abdominal distention, abdominal pain, anal bleeding, blood in stool, diarrhea, nausea, rectal pain and vomiting.        GERD   Endocrine: Negative for polyphagia.   Musculoskeletal: Negative for arthralgias, gait problem and joint swelling.   Skin: Negative for color change, pallor and rash.   Allergic/Immunologic: Negative for food allergies.   Neurological: Negative for speech difficulty and headaches.   Hematological: Does not bruise/bleed easily.   Psychiatric/Behavioral: Negative for agitation, confusion and sleep disturbance.       Vitals:    02/02/23 1257   BP: 152/79   Pulse: 76   Temp: 96.9 °F (36.1 °C)   SpO2: 94%       Physical Exam  Constitutional:       Appearance: She is well-developed.   HENT:      Head: Normocephalic.   Eyes:      Conjunctiva/sclera: Conjunctivae normal.   Cardiovascular:      Rate and Rhythm: Normal rate and regular rhythm.   Pulmonary:      Breath sounds: Normal breath sounds.   Abdominal:      General: Bowel sounds are normal.      Palpations: Abdomen is soft.   Musculoskeletal:         General: Normal range of motion.      Cervical back: Normal range of motion.   Skin:     General: Skin is warm and dry.   Neurological:      Mental Status: She is alert and oriented to person, place, and time.   Psychiatric:         Behavior: Behavior normal.         ASSESSMENT   #1 GERD: On omeprazole 20 mg twice daily with occasional breakthrough symptoms.  #2 history of Gtz's esophagus  #3 IBS with constipation  #4 history of colon polyps      PLAN  Schedule EGD for December of this year  Continue PPI, may add famotidine 20 mg at bedtime  Anticipate follow-up colonoscopy in January 2025  Office follow-up annually, call sooner as needed.       ICD-10-CM ICD-9-CM   1. Gastroesophageal reflux disease, unspecified whether esophagitis present  K21.9 530.81   2. History of Gtz's esophagus  Z87.19 V12.79   3. Irritable bowel syndrome with constipation  K58.1 564.1   4. Adenomatous polyp of colon, unspecified part of colon  D12.6 211.3

## 2023-02-02 NOTE — TELEPHONE ENCOUNTER
OK FOR HUB TO READ  BRITTA patient via telephone for EGD. Scheduled 3/15/23 with arrival time of 0200PM. Prep paperwork mailed to verified address on file. Patient advised arrival time may change based on [facility] guidelines. BRITTA ALBRIGHT

## 2023-02-20 ENCOUNTER — TELEPHONE (OUTPATIENT)
Dept: GASTROENTEROLOGY | Facility: CLINIC | Age: 78
End: 2023-02-20
Payer: MEDICARE

## 2023-02-27 NOTE — TELEPHONE ENCOUNTER
I tried calling pt back but had to leave a VM.   Ear Star Wedge Flap Text: The defect edges were debeveled with a #15 blade scalpel.  Given the location of the defect and the proximity to free margins (helical rim) an ear star wedge flap was deemed most appropriate.  Using a sterile surgical marker, the appropriate flap was drawn incorporating the defect and placing the expected incisions between the helical rim and antihelix where possible.  The area thus outlined was incised through and through with a #15 scalpel blade.

## 2023-03-15 ENCOUNTER — ANESTHESIA (OUTPATIENT)
Dept: GASTROENTEROLOGY | Facility: HOSPITAL | Age: 78
End: 2023-03-15
Payer: MEDICARE

## 2023-03-15 ENCOUNTER — ANESTHESIA EVENT (OUTPATIENT)
Dept: GASTROENTEROLOGY | Facility: HOSPITAL | Age: 78
End: 2023-03-15
Payer: MEDICARE

## 2023-03-15 ENCOUNTER — HOSPITAL ENCOUNTER (OUTPATIENT)
Facility: HOSPITAL | Age: 78
Setting detail: HOSPITAL OUTPATIENT SURGERY
Discharge: HOME | End: 2023-03-15
Attending: INTERNAL MEDICINE | Admitting: INTERNAL MEDICINE
Payer: MEDICARE

## 2023-03-15 VITALS
HEIGHT: 65 IN | BODY MASS INDEX: 20.79 KG/M2 | RESPIRATION RATE: 14 BRPM | HEART RATE: 58 BPM | WEIGHT: 124.8 LBS | TEMPERATURE: 97.8 F | SYSTOLIC BLOOD PRESSURE: 137 MMHG | DIASTOLIC BLOOD PRESSURE: 95 MMHG | OXYGEN SATURATION: 98 %

## 2023-03-15 DIAGNOSIS — Z87.19 HISTORY OF BARRETT'S ESOPHAGUS: ICD-10-CM

## 2023-03-15 DIAGNOSIS — K21.9 GASTROESOPHAGEAL REFLUX DISEASE, UNSPECIFIED WHETHER ESOPHAGITIS PRESENT: ICD-10-CM

## 2023-03-15 PROCEDURE — 25010000002 PROPOFOL 10 MG/ML EMULSION: Performed by: ANESTHESIOLOGY

## 2023-03-15 PROCEDURE — 43239 EGD BIOPSY SINGLE/MULTIPLE: CPT | Performed by: INTERNAL MEDICINE

## 2023-03-15 PROCEDURE — 88305 TISSUE EXAM BY PATHOLOGIST: CPT | Performed by: INTERNAL MEDICINE

## 2023-03-15 PROCEDURE — 87081 CULTURE SCREEN ONLY: CPT | Performed by: INTERNAL MEDICINE

## 2023-03-15 PROCEDURE — S0260 H&P FOR SURGERY: HCPCS | Performed by: INTERNAL MEDICINE

## 2023-03-15 RX ORDER — SODIUM CHLORIDE, SODIUM LACTATE, POTASSIUM CHLORIDE, CALCIUM CHLORIDE 600; 310; 30; 20 MG/100ML; MG/100ML; MG/100ML; MG/100ML
30 INJECTION, SOLUTION INTRAVENOUS CONTINUOUS
Status: DISCONTINUED | OUTPATIENT
Start: 2023-03-15 | End: 2023-03-15 | Stop reason: HOSPADM

## 2023-03-15 RX ORDER — PROPOFOL 10 MG/ML
VIAL (ML) INTRAVENOUS CONTINUOUS PRN
Status: DISCONTINUED | OUTPATIENT
Start: 2023-03-15 | End: 2023-03-15 | Stop reason: SURG

## 2023-03-15 RX ORDER — LIDOCAINE HYDROCHLORIDE 20 MG/ML
INJECTION, SOLUTION INFILTRATION; PERINEURAL AS NEEDED
Status: DISCONTINUED | OUTPATIENT
Start: 2023-03-15 | End: 2023-03-15 | Stop reason: SURG

## 2023-03-15 RX ORDER — PROPOFOL 10 MG/ML
VIAL (ML) INTRAVENOUS AS NEEDED
Status: DISCONTINUED | OUTPATIENT
Start: 2023-03-15 | End: 2023-03-15 | Stop reason: SURG

## 2023-03-15 RX ADMIN — Medication 200 MCG/KG/MIN: at 15:02

## 2023-03-15 RX ADMIN — SODIUM CHLORIDE, POTASSIUM CHLORIDE, SODIUM LACTATE AND CALCIUM CHLORIDE 30 ML/HR: 600; 310; 30; 20 INJECTION, SOLUTION INTRAVENOUS at 14:48

## 2023-03-15 RX ADMIN — PROPOFOL 80 MG: 10 INJECTION, EMULSION INTRAVENOUS at 15:02

## 2023-03-15 RX ADMIN — LIDOCAINE HYDROCHLORIDE 60 MG: 20 INJECTION, SOLUTION INFILTRATION; PERINEURAL at 15:01

## 2023-03-15 NOTE — DISCHARGE INSTRUCTIONS
For the next 24 hours patient needs to be with a responsible adult.    For 24 hours DO NOT drive, operate machinery, appliances, drink alcohol, make important decisions or sign legal documents.    Start with a light or bland diet if you are feeling sick to your stomach otherwise advance to regular diet as tolerated.    Follow recommendations on procedure report if provided by your doctor.    Call Dr. Kerns for problems [phone].    Problems may include but not limited to: large amounts of bleeding, trouble breathing, repeated vomiting, severe unrelieved pain, fever or chills.

## 2023-03-15 NOTE — ANESTHESIA POSTPROCEDURE EVALUATION
"Patient: Davida Anderson    Procedure Summary     Date: 03/15/23 Room / Location:  [facility] /  [facility]    Anesthesia Start: 1456 Anesthesia Stop: 1512    Procedure: ESOPHAGOGASTRODUODENOSCOPY with biopsies (Esophagus) Diagnosis:       Esophagitis      Gastritis      (Gastroesophageal reflux disease, unspecified whether esophagitis present [K21.9])      (History of Gtz's esophagus [Z87.19])    Surgeons: Darci Kerns MD Provider: Dioni Mendenhall MD    Anesthesia Type: MAC ASA Status: 3          Anesthesia Type: MAC    Vitals  Vitals Value Taken Time   /95 03/15/23 1535   Temp     Pulse 58 03/15/23 1535   Resp 14 03/15/23 1535   SpO2 98 % 03/15/23 1535           Post Anesthesia Care and Evaluation    Level of consciousness: awake and alert  Pain management: adequate    Airway patency: patent  Anesthetic complications: No anesthetic complications  PONV Status: none  Cardiovascular status: acceptable  Respiratory status: acceptable  Hydration status: acceptable    Comments: /95 (BP Location: Right arm, Patient Position: Sitting)   Pulse 58   Temp 36.6 °C (97.8 °F) (Oral)   Resp 14   Ht 165.1 cm (65\")   Wt 56.6 kg (124 lb 12.8 oz)   SpO2 98%   BMI 20.77 kg/m²         "

## 2023-03-15 NOTE — ANESTHESIA PREPROCEDURE EVALUATION
Anesthesia Evaluation     Patient summary reviewed and Nursing notes reviewed   history of anesthetic complications:  NPO Solid Status: > 8 hours  NPO Liquid Status: > 8 hours           Airway   Mallampati: II  TM distance: >3 FB  Neck ROM: full  No difficulty expected  Dental - normal exam   (+) partials    Pulmonary    (+) a smoker Former, COPD, sleep apnea,   (-) asthma, rhonchi, decreased breath sounds, wheezes  Cardiovascular   Exercise tolerance: good (4-7 METS)    PT is on anticoagulation therapy  Rhythm: regular  Rate: normal    (+) hypertension, dysrhythmias Atrial Fib, hyperlipidemia,   (-) CAD, angina, URBAN, murmur    ROS comment: Last eliquis gayle    Neuro/Psych  (+) tremors, psychiatric history Bipolar,    (-) seizures, CVA  GI/Hepatic/Renal/Endo    (+)  GERD,    (-) liver disease, no renal disease, diabetes, no thyroid disorder    Musculoskeletal     Abdominal     Abdomen: soft.   Substance History      OB/GYN          Other   arthritis,                    Anesthesia Plan    ASA 3     MAC   total IV anesthesia  intravenous induction     Anesthetic plan, risks, benefits, and alternatives have been provided, discussed and informed consent has been obtained with: patient.        CODE STATUS:

## 2023-03-15 NOTE — H&P
[facility]  Pre Procedure History & Physical    Chief Complaint:   GERD, history of Gtz's    Subjective     HPI:   77-year-old female presents to endoscopy suite for upper endoscopic evaluation.  She has issues with Gtz's esophagus as well as reflux.  Last upper endoscopy performed in 2020.    Past Medical History:   Past Medical History:   Diagnosis Date   • Acid reflux    • Arthritis    • Atrial fibrillation (HCC)    • Gtz esophagus    • Bipolar 2 disorder (HCC)    • Bradycardia     WITH SURGERY   • COPD (chronic obstructive pulmonary disease) (HCC)     MILD, USES AINHALER PRN   • DDD (degenerative disc disease), lumbar    • Diverticulosis    • Dizziness    • Drug therapy    • Fibrocystic breast    • Frequent UTI     SEES DR GOFF FOR FREQUENT UTI'S, ON KEFLEX   • H/O Infiltrating ductal carcinoma of left breast 2002    Stage IIA, Grade 3 ER/SD +, HER2 -, treated with 5 years of tamoxifen, 5 years of Femara, completed in 2012   • High cholesterol    • History of alcoholism (HCC)     40 YRS AGO   • History of Clostridium difficile colitis 2014   • History of gastric ulcer    • History of loop recorder    • Hypertension    • Iron deficiency    • Irregular heartbeat     a fib   • Low back pain    • Lumbar herniated disc    • Mass of right breast on mammogram 03/17/2016    10 o'clock position, 4 cm from the nipple,   • Neuropathy    • PONV (postoperative nausea and vomiting)    • Raynaud disease    • Sleep apnea     MILD, NO NEED FOR CPAP   • Tremors of nervous system    • Urinary incontinence        Past Surgical History:  Past Surgical History:   Procedure Laterality Date   • ANKLE OPEN REDUCTION INTERNAL FIXATION Right 08/14/2015    Dr. Dandre Camacho, [facility]   • BACK SURGERY      LUMBAR   • BREAST BIOPSY     • BREAST RECONSTRUCTION, BREAST TISSUE EXPANDER INSERTION Left 04/11/2002    Milo Contour Profile expander was placed 550 cc size, filled with 200 cc of saline, Dr. Godinez  Dony, [facility]   • COLONOSCOPY N/A 09/16/2014    Dr. Darci Kerns, [facility]; torts, tics, stool, polyp   • COLONOSCOPY N/A 9/27/2016    NTEH, diverticulosis, tortuous colon, Two 3-5mm polyps in the descending colon and the transverse colon, IH.  PATH: Tubular adenoma   • COLONOSCOPY N/A 12/12/2017    NTEH, tics, torts, IH, TA w/low grade dysplasia   • COLONOSCOPY N/A 12/1/2020    Procedure: COLONOSCOPY TO CECUM AND TI ;  Surgeon: Darci Kerns MD;  Location: [facility] ENDOSCOPY;  Service: Gastroenterology;  Laterality: N/A;  PRE- IBS, CONSTIPATION, HX POLYPS  POST- DEMINISHED SPHINCTER TONE, DIVERTICULOSIS, POORLY PREPPED COLON   • CYSTOSCOPY N/A 05/07/2010    with TVT takedown, Dr. Simon Wall, [facility]   • ENDOSCOPY N/A 9/27/2016    z line irreg, bilious gastric fluid- fluid aspiration preformed, gastritis.  PATH: Squamous and glandular mucosa with minimal superficial acute inflammation.    • ENDOSCOPY N/A 12/12/2017    Z line irregular, gastritis, duodenitis, chronic inflammation   • ENDOSCOPY N/A 12/1/2020    Procedure: ESOPHAGOGASTRODUODENOSCOPY WITH BIOPSIES;  Surgeon: Darci Kerns MD;  Location[facility];  Service: Gastroenterology;  Laterality: N/A;  PRE- REFLUX, DYSPHAGIA  POST- ESOPHAGITIS, GASTRITIS, SMALL HIATAL HERNIA, BILE REFLUX   • EPIDURAL BLOCK     • ESOPHAGEAL MANOMETRY N/A 2/2/2021    Procedure: ESOPHAGEAL MANOMETRY;  Surgeon: Tran, Nurse Performed;  Location: [facility] ENDOSCOPY;  Service: Gastroenterology;  Laterality: N/A;  DYSPHAGIA   • KNEE SURGERY Left     BROKEN   • LUMBAR DISCECTOMY FUSION INSTRUMENTATION N/A 12/3/2018    Procedure: L4-5 laminectomy and fusion with instrumentation;  Surgeon: Austin Keith MD;  Location: [facility] MAIN OR;  Service: Orthopedic Spine   • MAMMO US BREAST BIOPSY ADDITIONAL W WO DEVICE Left 02/21/2002    2:00 position left breast, Infiltrating Ductal Carcinoma, [facility]   • MANDIBLE FRACTURE SURGERY     • MASTECTOMY Left 04/11/2002    Left Total  Mastectomy and Left Axillary [place] Node Biobsy, Dr. Indu Akins, [facility]   • OTHER SURGICAL HISTORY      CARDIAC LOOP DEVICE, LEFT CHEST   • TENSION FREE VAGINAL TAPING WITH MINI ARC SLING N/A 10/26/2009    Dr. Gina Castillo, [facility]   • TOTAL KNEE ARTHROPLASTY Left 10/21/2019    Procedure: TOTAL KNEE ARTHROPLASTY;  Surgeon: Anurag Serrano MD;  Location: [facility];  Service: Orthopedics   • UPPER GASTROINTESTINAL ENDOSCOPY  09/16/2014    z-line irreg, grade a reflux, gastritis       Family History:  Family History   Problem Relation Age of Onset   • Breast cancer Mother 35   • Colon cancer Mother 64   • Heart disease Father    • Cancer Father         throat and lung   • Colon polyps Father    • Malig Hyperthermia Neg Hx        Social History:   reports that she quit smoking about 39 years ago. Her smoking use included cigarettes. She has a 75.00 pack-year smoking history. She has never used smokeless tobacco. She reports that she does not drink alcohol and does not use drugs.    Medications:   No medications prior to admission.       Allergies:  Morphine    ROS:    Pertinent items are noted in HPI, all other systems reviewed and negative     Objective     There were no vitals taken for this visit.    Physical Exam   Constitutional: Pt is oriented to person, place, and time and well-developed, well-nourished, and in no distress.   Mouth/Throat: Oropharynx is clear and moist.   Neck: Normal range of motion.   Cardiovascular: Normal rate, regular rhythm and normal heart sounds.    Pulmonary/Chest: Effort normal and breath sounds normal.   Abdominal: Soft. Nontender  Skin: Skin is warm and dry.   Psychiatric: Mood, memory, affect and judgment normal.     Assessment & Plan     Diagnosis:  GERD  Gtz's esophagus    Anticipated Surgical Procedure:  EGD      The risks, benefits, and alternatives of this procedure have been discussed with the patient or the responsible party- the patient understands and agrees to  proceed.

## 2023-03-16 LAB
LAB AP CASE REPORT: NORMAL
PATH REPORT.FINAL DX SPEC: NORMAL
PATH REPORT.GROSS SPEC: NORMAL

## 2023-03-18 LAB — UREASE TISS QL: NEGATIVE

## 2023-03-28 ENCOUNTER — TELEPHONE (OUTPATIENT)
Dept: ORTHOPEDIC SURGERY | Facility: CLINIC | Age: 78
End: 2023-03-28

## 2023-03-28 ENCOUNTER — OFFICE VISIT (OUTPATIENT)
Dept: ORTHOPEDIC SURGERY | Facility: CLINIC | Age: 78
End: 2023-03-28
Payer: MEDICARE

## 2023-03-28 VITALS — HEIGHT: 65 IN | BODY MASS INDEX: 20.96 KG/M2 | TEMPERATURE: 97.5 F | WEIGHT: 125.8 LBS

## 2023-03-28 DIAGNOSIS — M54.41 CHRONIC BILATERAL LOW BACK PAIN WITH BILATERAL SCIATICA: ICD-10-CM

## 2023-03-28 DIAGNOSIS — Z98.1 S/P LUMBAR FUSION: ICD-10-CM

## 2023-03-28 DIAGNOSIS — M43.16 SPONDYLOLISTHESIS OF LUMBAR REGION: Primary | ICD-10-CM

## 2023-03-28 DIAGNOSIS — G89.29 CHRONIC BILATERAL LOW BACK PAIN WITH BILATERAL SCIATICA: ICD-10-CM

## 2023-03-28 DIAGNOSIS — M54.42 CHRONIC BILATERAL LOW BACK PAIN WITH BILATERAL SCIATICA: ICD-10-CM

## 2023-03-28 PROCEDURE — 99213 OFFICE O/P EST LOW 20 MIN: CPT | Performed by: NURSE PRACTITIONER

## 2023-03-28 PROCEDURE — 1159F MED LIST DOCD IN RCRD: CPT | Performed by: NURSE PRACTITIONER

## 2023-03-28 PROCEDURE — 72100 X-RAY EXAM L-S SPINE 2/3 VWS: CPT | Performed by: NURSE PRACTITIONER

## 2023-03-28 PROCEDURE — 1160F RVW MEDS BY RX/DR IN RCRD: CPT | Performed by: NURSE PRACTITIONER

## 2023-03-28 NOTE — TELEPHONE ENCOUNTER
Caller: Davida Anderson    Relationship to patient: Self    Best call back number: [phone]    Patient is needing: PT WAS INFORMED THAT OFFICE NEEDS TO CONTACT CARDIOLOGIST TO GET CARDIAC CLEARANCE. OFFICE  [phone]/ [phone]

## 2023-03-28 NOTE — PROGRESS NOTES
Patient Name: Davida Anderson   YOB: 1945  Referring Primary Care Physician: Marek Nguyễn MD      Chief Complaint:    Chief Complaint   Patient presents with   • Lumbar Spine - Initial Evaluation, Pain        HPI:  Davida Anderson is a 77 y.o. female who presents to [facility] for evaluation of bilateral low back pain referring into right greater than left hips and lower extremities.  She has had history of L4-5 fusion with Dr. Keith in 2019.  She has done well since that time however states she has had intermittent back pain for the past year.  Pain has been much more significant over the past 2 days.  She reports history of intermittent urinary retention as well as loss of control of bowel and bladder.  She sees GI and urology for these complaints but has a sense that it is getting worse.  She is coming by her  today.  She has been doing therapy exercises since her surgery on a consistent basis.  Prior pertinent records were reviewed.    PFSH:  See attached    ROS: As per HPI, otherwise negative    Objective:    Vitals:    23 1101   Temp: 97.5 °F (36.4 °C)     Body mass index is 20.93 kg/m².      Neurologic Exam     Mental Status   Oriented to person, place, and time.   Speech: speech is normal     Motor Exam   Muscle bulk: normal  Overall muscle tone: normal    Strength   Strength 5/5 except as noted.   Right iliopsoas: 4/5  Right quadriceps: 4/5  Right hamstrin/5    Sensory Exam   Right leg light touch: normal  Left leg light touch: normal    Gait, Coordination, and Reflexes     Reflexes   Right patellar: 0  Left patellar: 0  Right achilles: 0  Left achilles: 0     Back Exam     Muscle Strength   Right Quadriceps:  4/5   Right Hamstrings:  4/5     Tests   Straight leg raise right: positive  Straight leg raise left: negative          Physical Exam  Musculoskeletal:      Lumbar back: Positive right straight leg raise test. Negative left  straight leg raise test.   Neurological:      Mental Status: She is oriented to person, place, and time.      Deep Tendon Reflexes:      Reflex Scores:       Patellar reflexes are 0 on the right side and 0 on the left side.       Achilles reflexes are 0 on the right side and 0 on the left side.  Psychiatric:         Speech: Speech normal.           IMAGING:     Indication: pain related symptoms,  Views: 2V AP&LAT lumbar  Findings: Reviewed with patient and reveals expected L4-5 pedicle screws and fixation, retrolisthesis L1 on L2 new from prior imaging, stable retrolisthesis L3 on L4 and stable anterolisthesis L4 on L5.  Multilevel disc space narrowing advanced since previous imaging.        Assessment:           Diagnoses and all orders for this visit:    1. Spondylolisthesis of lumbar region (Primary)  Overview:  Added automatically from request for surgery [id number]    Orders:  -     Cancel: MRI Lumbar Spine With & Without Contrast; Future  -     Ambulatory Referral to Physical Therapy Evaluate and treat  -     MRI Lumbar Spine With & Without Contrast; Future    2. S/P lumbar fusion  -     Cancel: MRI Lumbar Spine With & Without Contrast; Future  -     Ambulatory Referral to Physical Therapy Evaluate and treat  -     MRI Lumbar Spine With & Without Contrast; Future    3. Chronic bilateral low back pain with bilateral sciatica  -     Cancel: MRI Lumbar Spine With & Without Contrast; Future  -     Ambulatory Referral to Physical Therapy Evaluate and treat  -     MRI Lumbar Spine With & Without Contrast; Future           Plan:  For her pattern of pain progressing over the past year despite physical therapy and sense of worsening bowel and bladder dysfunction, will get MRI ASAP.  We will also go ahead and refer her to physical therapy for refresher.  We will plan on following up after PT and MRI in 3 months but will call if MRI shows anything of urgency.  Depending on how she progresses with therapy, we will either try  injection therapy versus referral to neurosurgery.  Patient understands and agrees with the plan.      Addendum 3/31/23  Reviewed MRI with Dr Carr. No severe central stenosis to explain the bowel and bladder symptoms. He recommends proceeding with PT and try injections. If still symptomatic will get scoliosis films and bone scan next visit then refer to him.   Return in about 3 months (around 6/28/2023).

## 2023-03-28 NOTE — TELEPHONE ENCOUNTER
Caller: SILVA    Relationship: SELF    Best call back number: [phone]    What is the best time to reach you: ANY    Who are you requesting to speak with (clinical staff, provider,  specific staff member): CHRISTINA    Do you know the name of the person who called: CHRISTINA    What was the call regarding: CARDIAC CLEARANCE    Do you require a callback: YES

## 2023-03-30 DIAGNOSIS — M43.16 SPONDYLOLISTHESIS OF LUMBAR REGION: ICD-10-CM

## 2023-03-30 DIAGNOSIS — G89.29 CHRONIC BILATERAL LOW BACK PAIN WITH BILATERAL SCIATICA: ICD-10-CM

## 2023-03-30 DIAGNOSIS — M54.41 CHRONIC BILATERAL LOW BACK PAIN WITH BILATERAL SCIATICA: ICD-10-CM

## 2023-03-30 DIAGNOSIS — M54.42 CHRONIC BILATERAL LOW BACK PAIN WITH BILATERAL SCIATICA: ICD-10-CM

## 2023-03-30 DIAGNOSIS — Z98.1 S/P LUMBAR FUSION: ICD-10-CM

## 2023-03-30 NOTE — TELEPHONE ENCOUNTER
"I tried calling patient again no answer.  I will be sending out a my chart msg per Lolita msg \"I do not know anything about cardiac clearance for any reason.  If she needs 1 for pain management, they will arrange it\".     "

## 2023-03-31 ENCOUNTER — TELEPHONE (OUTPATIENT)
Dept: ORTHOPEDIC SURGERY | Facility: CLINIC | Age: 78
End: 2023-03-31
Payer: MEDICARE

## 2023-03-31 NOTE — PROGRESS NOTES
Please let her know the MRI does not show any severe central stenosis to explain the bowel and bladder symptoms, but she does have narrowing around several of the nerves to explain leg pain as well as arthritis to explain back pain. She should be starting therapy and if she would like, we can also refer her for injections and if those do not work I will get her in to see Dr Carr. If she wants pain management, refer to [place]

## 2023-03-31 NOTE — TELEPHONE ENCOUNTER
Follow Up Note     Date: 2023   Patient Name: Phani Paulson  MRN: [id number]  : 1970     Referring Physician: Annabelle Jarvis MD    Chief Complaint:    Chief Complaint   Patient presents with   • EoE   • Heartburn       Interval History:   2023  Phani Paulson is a 52 y.o. male who is here today for follow up for follow-up after his recent EGD colonoscopy.  Denies any swallowing issues now no reflux symptoms.    2020  Phani Paulson is a 50 y.o. male who is here today to establish care with Gastroenterology for evaluation of dysphagia.  The patient has difficulty swallowing off and on for the last six months. The symptom is moderate in severity, occurs occasionally once in few weeks mostly associated with solid foods.  The symptoms are progressive now.  Recently he had choking with spagetti and he had to literally cough it out after some time. Whe these happens he will drink water and walk around and gets better he says. The patient points towards the upper substernal area. He deny any odynophagia. He has history of acid reflux on and off few years. Takes occasional Tums otherwise not on any medication. He may have reflux episodes once a week or so.  Deny any nausea or vomiting.  There is no associated weight loss. No h/o food allergy.   No associated abdominal pain or distension. Deny  any change in bowel habit, hematochezia or melena.   There is no history of anemia. No prior history of EGD. He had a colonoscopy 2 years ago and few polyps removed. Family history of colon cancer- Mother had colon CA at 65yrs of age. No other any GI malignancy.   Non smoker  and no ETOH abuse    Subjective      Past Medical History:   Past Medical History:   Diagnosis Date   • Acute suppurative otitis media of both ears without spontaneous rupture of tympanic membranes    • Chicken pox    • Esophageal stricture    • GERD (gastroesophageal reflux disease)    • Lesion of skin of face      MRI SCANNED INTO Alter-G    benign   • Seasonal allergies    • Wears glasses      Past Surgical History:   Past Surgical History:   Procedure Laterality Date   • COLONOSCOPY      4yrs    • COLONOSCOPY N/A 10/7/2022    Procedure: COLONOSCOPY WITH POLYPECTOMY X1;  Surgeon: Manuel Ann MD;  Location: [facility] ENDOSCOPY;  Service: Gastroenterology;  Laterality: N/A;   • ENDOSCOPY N/A 6/15/2020    Procedure: ESOPHAGOGASTRODUODENOSCOPY WITH BIOPSY AND DILATATION;  Surgeon: Manuel Ann MD;  Location: [facility] ENDOSCOPY;  Service: Gastroenterology;  Laterality: N/A;   • ENDOSCOPY N/A 8/21/2020    Procedure: ESOPHAGOGASTRODUODENOSCOPY WITH BIOPSY;  Surgeon: Manuel Ann MD;  Location: [facility] ENDOSCOPY;  Service: Gastroenterology;  Laterality: N/A;   • ENDOSCOPY N/A 10/30/2020    Procedure: ESOPHAGOGASTRODUODENOSCOPY WITH COLD FORCEP BIOPSY;  Surgeon: Manuel Ann MD;  Location: [facility] ENDOSCOPY;  Service: Gastroenterology;  Laterality: N/A;   • ENDOSCOPY N/A 10/7/2022    Procedure: ESOPHAGOGASTRODUODENOSCOPY WITH BIOPSY;  Surgeon: Manuel Ann MD;  Location: [facility] ENDOSCOPY;  Service: Gastroenterology;  Laterality: N/A;   • SKIN BIOPSY      face-benign       Family History:   Family History   Problem Relation Age of Onset   • Colon cancer Mother    • Hypertension Mother    • Skin cancer Father    • Cirrhosis Neg Hx    • Liver cancer Neg Hx    • Liver disease Neg Hx        Social History:   Social History     Socioeconomic History   • Marital status:    Tobacco Use   • Smoking status: Never   • Smokeless tobacco: Never   Vaping Use   • Vaping Use: Never used   Substance and Sexual Activity   • Alcohol use: No   • Drug use: Never   • Sexual activity: Never       Medications:     Current Outpatient Medications:   •  Loratadine 10 MG capsule, Take 1 capsule by mouth Daily., Disp: , Rfl:   •  pantoprazole (PROTONIX) 40 MG EC tablet, TAKE 1 TABLET DAILY, Disp: 90 tablet, Rfl: 3  •  valACYclovir  (VALTREX) 500 MG tablet, Take 500 mg by mouth 2 (Two) Times a Day., Disp: , Rfl:     Allergies:   No Known Allergies    Review of Systems:   Review of Systems   Constitutional: Negative for appetite change, fatigue, fever and unexpected weight loss.   HENT: Negative for trouble swallowing.    Gastrointestinal: Negative for abdominal distention, abdominal pain, anal bleeding, blood in stool, constipation, diarrhea, nausea, rectal pain, vomiting, GERD and indigestion.       The following portions of the patient's history were reviewed and updated as appropriate: allergies, current medications, past family history, past medical history, past social history, past surgical history and problem list.    Objective     Physical Exam:  Vital Signs:   Vitals:    01/09/23 1511   BP: 117/85   Pulse: 71   Resp: 16   Temp: 98.2 °F (36.8 °C)   TempSrc: Infrared   Weight: 63 kg (139 lb)   Height: 167.6 cm (66\")       Physical Exam  Constitutional:       Appearance: Normal appearance.   HENT:      Head: Normocephalic and atraumatic.   Eyes:      Conjunctiva/sclera: Conjunctivae normal.   Abdominal:      General: Abdomen is flat. There is no distension.      Palpations: There is no mass.      Tenderness: There is no abdominal tenderness. There is no guarding or rebound.      Hernia: No hernia is present.   Musculoskeletal:      Cervical back: Normal range of motion and neck supple.   Neurological:      Mental Status: He is alert.         Results Review:   I reviewed the patient's new clinical results.    No visits with results within 90 Day(s) from this visit.   Latest known visit with results is:   Admission on 10/07/2022, Discharged on 10/07/2022   Component Date Value Ref Range Status   • Reference Lab Report 10/07/2022    Final                    Value:[facility]  [address] [place] [address]  Phone: [phone] or [phone]  Fax: [phone]  Horace Theodore M.D., Medical  Director    PATIENT NAME                           LABORATORY NO.  [id number]  [address].                  [id number]  [id number]                         AGE              SEX  SSN           CLIENT REF #  [facility] CHANELL                  1970      [id number]   [id number]    [address]                REQUESTING MDangD.     ATTENDING M.D.     COPY TO.  [address]                        [place] [address]                 [facility]  DATE COLLECTED      DATE RECEIVED      DATE REPORTED  10/07/2022          10/07/2022         10/10/2022    DIAGNOSIS:  A.   PROXIMAL ESOPHAGUS, BIOPSY:  Reactive squamous mucosa  Negative for eosinophils  B.   DISTAL ESOPHAGUS, BIOPSIES:  Reactive squamous mucosa  Negative for eosinophils  C.   MID ESOPHAGUS, BIOPSIES:  Reactive                           squamous mucosa  Negative for eosinophils  D.   GASTRIC BIOPSIES:  Reactive gastropathy and mild chronic gastritis  E.   RECTOSIGMOID COLON, POLYP, BIOPSY:  Compatible with hyperplastic polyp    FLP    CLINICAL HISTORY:  Esophagitis, eosinophilic, personal history of colonic polyps, family history of  colon cancer in mother      SPECIMENS RECEIVED:  A.  PROXIMAL ESOPHAGUS, BIOPSY  B.  DISTAL ESOPHAGUS, BIOPSIES  C.  MID ESOPHAGUS, BIOPSIES  D.  GASTRIC BIOPSIES  E.  RECTOSIGMOID COLON, POLYP    MICROSCOPIC DESCRIPTION:  Sections of the first specimen labeled distal esophagus show squamous mucosa  with reactive changes including basal zone hyperplasia and elongation of  papillae with a patchy minimal mononuclear cell infiltrate.  No eosinophils are  seen.  No glandular epithelium is present.  There is no evidence of dysplasia or  carcinoma.    Sections of the second specimen labeled distal esophagus show squamous  mucosa with reactive changes including focal basal zone hyperplasia                           and  elongation of papillae.  There is a patchy minimal mononuclear cell  infiltrate.  No eosinophils are seen.  No glandular epithelium is present.  There is no  evidence of dysplasia or carcinoma.    Sections of the third specimen labeled mid esophagus show squamous mucosa  with reactive changes including elongation papillae and basal zone hyperplasia.  There is a patchy minimal mononuclear cell infiltrate.  No eosinophils are seen.  No glandular epithelium is present.  There is no evidence of dysplasia or  carcinoma.    Sections of the fourth specimen labeled gastric biopsies show antral and fundic  type gastric mucosa with an expansion of the lamina propria by fibrous and  fibromuscular stroma with fascicles of smooth muscle extending into the  superficial lamina propria with associated telangiectatic vessels and reactive  epithelial changes including mucin depletion and elongation increased tortuosity  and branching of superficial gastric pits.  Within the background is a                           patchy  chronic inflammatory cell infiltrate including scattered small collections of  plasma cells.  The parietal cells show changes compatible with a proton pump  inhibitor effect.  No activity is seen.  No Helicobacter is identified.  There is no  evidence of intestinal metaplasia, dysplasia or carcinoma.    Sections of the fifth specimen labeled rectosigmoid polyp show colonic mucosa  with elongated dilated colonic crypts lined almost exclusively by goblet cells  with bland basally oriented nuclei.  Focally the surface and the strips exhibit a  serrated contour.  There is also small lymphoid aggregate.  There is no evidence  of dysplasia or carcinoma.    Professional interpretation rendered by KEYA Roberts Jr., M.YOLANDA., F.C.A.P. at  [email or web address], [employer], [address] [place] [address].    PREVIOUS PATIENT HISTORY IN FILES:  [id number], DateCollected: 10/30/2020  ESOPHAGUS, BIOPSIES, PROXIMAL, MID, AND DISTAL:  Squamocolumnar mucosa with intestinal metaplasia and reactive                            changes;  Squamous mucosa with mixed inflammation and reactive changes  (approximately 10 intraepithelial eosinophils / HPF in area of greatest density);  Negative for dysplasia and malignancy    [id number], DateCollected: 08/21/2020  ESOPHAGUS, BIOPSY, DISTAL, MID AND PROXIMAL: Squamous mucosa  with features consistent with reflux esophagitis; Negative for glandular type  mucosa, intestinal metaplasia or dysplasia; Negative for specific microorganisms    [id number], DateCollected: 06/15/2020  ANTRUM, BIOPSY: Mild chronic inactive gastritis  No H. pylori organisms identified on routine stains  Negative for intestinal metaplasia, dysplasia, or carcinoma  ESOPHAGUS, BIOPSY: Benign squamous mucosa with increased  intraepithelial eosinophils (up-to 36 per high-power field); Negative for  dysplasia or carcinoma    GROSS DESCRIPTION:  A.  Specimen is received in 1 formalin filled container labeled \"proximal  esophagus biopsy\" and consists of 1 piece of tan soft tissue measuring 0.4  x 0.2 x                           0.2 cm.  Specimen is submitted entirely in 1 cassette.  JESU  B.  Specimen is received in 1 formalin filled container labeled \"distal  esophagus biopsy\" and consists of 2 pieces of tan soft tissue measuring 0.4  x 0.3 x 0.2 cm in aggregate.  The specimen is submitted entirely in 1  cassette.  C.  Specimen is received in 1 formalin filled container labeled \"mid esophagus  biopsy\" and consists of 2 pieces of tan soft tissue measuring 0.4 x 0.3 x 0.1  cm in aggregate.  Specimen is submitted entirely in 1 cassette.  D.  Specimen is received in 1 formalin filled container labeled \"gastric biopsy\"  and consists of multiple pieces of tan soft tissue measuring 0.5 x 0.4 x 0.2  cm in aggregate.  Specimen is submitted entirely in 1 cassette.  E.  Specimen is received in 1 formalin filled container labeled \"rectal sigmoid  polyp x1\" and consists of 1 piece of tan soft tissue measuring 0.9 x 0.3 x  0.2 cm.   The specimen is trisected and submitted entirely in 1 cassette.    REVIEWED, DIAGNOSED AND                           ELECTRONICALLY  SIGNED BY:    KEYA Roberts Jr., M.D., F.C.A.P.  CPT CODES:  88305x5        No radiology results for the last 90 days.    10/07/2022  - Normal oropharynx.  - Z-line regular, 38 cm from the incisors.  - Small hiatal hernia  - No gross lesions in the entire esophagus. Biopsied for surveillance. No endoscopic signs of EoE identified this  time  - Erosive gastropathy with stigmata of recent bleeding. Copuld NSAID related  - Erythematous mucosa in the posterior wall of the stomach, antrum and prepyloric region of the stomach.  Biopsied.  - Normal duodenal bulb, first portion of the duodenum, second portion of the duodenum and third portion of the  Duodenum.    Esophageal biopsy normal.  Gastric biopsy mild chronic gastritis H. pylori negative    Colonoscopy 10/7/2022  - One 2 to 3 mm polyp at the recto-sigmoid colon, removed with a cold snare. Resected and retrieved. clinically  hyperplastic.  - Small developing Diverticulosis in the sigmoid colon.  - Non-bleeding external hemorrhoids.  - the examined portion of the ileum was normal    Rectosigmoid polyp hyperplastic.    Assessment / Plan        1.  History of PPI responsive eosinophilic esophagitis  2.  History of esophageal stricture   3.  History of Schatzki's ring GE junction  4.  Gastroesophageal reflux disease without esophagitis  5.  Erosive gastropathy  1/9/2023  Patient does not have any reflux symptoms now or dysphagia.  Follow-up EGD done on 10/7/2022 revealed a small hiatal hernia.  Significant esophageal ring or obstruction.  Minimal erosive gastropathy and biopsies consistent with chronic gastritis.  H. pylori negative.  Esophageal biopsies were normal without any eosinophils.    Will reduce his Protonix dose to 20 mg p.o. daily  We will continue to monitor with follow-up yearly  Continue elimination  diet    12/10/2020  His EGD done on 10/30/2020 again revealed signs of EOE with a ERESS  score of 2.   His esophageal biopsies at this time showed a chronic inflammation and reactive changes with the eosinophils 10 per high-power field.  His pathology findings significant improved from prior finding of 38 eosinophils per high-power field. He had an EGD done on 6/15/2020.  EGD revealed a mild upper esophageal stricture.  He had a esophageal savory dilatation performed and had multiple biopsies obtained to rule out EOE.  His biopsies revealed a 38 eosinophils per high-power field suggesting eosinophilic esophagitis.  He did not have any classical signs of EOE and endoscopy however he did have a mild upper esophageal narrowing.  EGD also showed small hiatal hernia.  Gastric biopsies were negative for H. pylori.  Lab reports and pathology reports have been discussed with the patient.  His food allergy profile came out positive for allergy to cows milk, corn, sesame seed. I have advised to avoid about.  We will treat him with high-dose PPI for 8 weeks to rule out PPI responsive EOE     5. Personal history of colonic polyps  6. Family hx of colon cancer  1/9/2023  Colonoscopy done on 10/7/2022 revealed a small rectosigmoid polyp which was hyperplastic, reticulosis otherwise unremarkable.  Due to family history of colon cancer with her mother at age of 60 he needs a screening colonoscopy in 5 years time in October 2027        Follow Up:   No follow-ups on file.    Manuel Ann MD  [facility] [place]  1/9/2023  15:13 EST     Please note that portions of this note may have been completed with a voice recognition program.

## 2023-04-07 ENCOUNTER — TELEPHONE (OUTPATIENT)
Dept: ORTHOPEDIC SURGERY | Facility: CLINIC | Age: 78
End: 2023-04-07
Payer: MEDICARE

## 2023-04-07 DIAGNOSIS — Z98.1 S/P LUMBAR FUSION: ICD-10-CM

## 2023-04-07 DIAGNOSIS — M43.16 SPONDYLOLISTHESIS OF LUMBAR REGION: ICD-10-CM

## 2023-04-07 DIAGNOSIS — M54.42 CHRONIC BILATERAL LOW BACK PAIN WITH BILATERAL SCIATICA: Primary | ICD-10-CM

## 2023-04-07 DIAGNOSIS — G89.29 CHRONIC BILATERAL LOW BACK PAIN WITH BILATERAL SCIATICA: Primary | ICD-10-CM

## 2023-04-07 DIAGNOSIS — M54.41 CHRONIC BILATERAL LOW BACK PAIN WITH BILATERAL SCIATICA: Primary | ICD-10-CM

## 2023-04-07 NOTE — TELEPHONE ENCOUNTER
Please let her know I reviewed the MRI with neurosurgery and it does not show anything that explains the bowel or bladder symptoms, there is no severe narrowing around the canal but she does have areas of arthritis that can explain back pain.  Continue with the physical therapy plan and follow-up as scheduled.

## 2023-04-14 ENCOUNTER — TELEPHONE (OUTPATIENT)
Dept: GASTROENTEROLOGY | Facility: CLINIC | Age: 78
End: 2023-04-14
Payer: MEDICARE

## 2023-04-14 NOTE — TELEPHONE ENCOUNTER
----- Message from Darci ANAYA MD sent at 3/30/2023  2:15 PM EDT -----  Regarding: Biopsy result  Okay to call results, recommend continue current medical regimen.  Offer follow-up EGD in 3 years time.  Office follow-up sooner as needed.  ----- Message -----  From: Lab, Background User  Sent: 3/16/2023  11:29 AM EDT  To: Darci ANAYA MD

## 2023-04-14 NOTE — TELEPHONE ENCOUNTER
Call to pt.  Advise per path report that small bowel bx norml.  Stomach body bx benign.  Ge junction benign with evidence of reflux.     Advise per DR Kerns note.  Verb understanding.  On omeprazole 20 mg daily with good control of reflux.      EGD for 3/15/26 placed in recall and HM.

## 2023-04-18 NOTE — TELEPHONE ENCOUNTER
Please review and advise   I called pt and avised her that the referral was sent to [facility] pain management. Pt advised me that was to far of a drive for her and is asking if the referral can be sent to a closer pain management office

## 2023-04-18 NOTE — TELEPHONE ENCOUNTER
Caller: Davida Anderson    Relationship to patient: Self    Best call back number: [phone]    Patient is needing: PATIENT WAS CALLING TO DISCUSS SCHEDULING AN EPIDURAL. PATIENT STATED SHE RECEIVED A CALL WHILE SHE WAS OUT [place]OWN BUT IT WAS LOUD AND SHE HAD A HARD TIME HEARING THE CALL. PATIENT STATED SHE THOUGHT SOMEONE WAS GOING TO BE CALLING HER BUT SHE WAS CALLING TO CONFIRM THAT INFORMATION. THANK YOU!

## 2023-05-01 ENCOUNTER — TELEPHONE (OUTPATIENT)
Dept: ORTHOPEDIC SURGERY | Facility: CLINIC | Age: 78
End: 2023-05-01
Payer: MEDICARE

## 2023-05-01 NOTE — TELEPHONE ENCOUNTER
I called pt and advised her of msg per SRS. Pt verbally understanding. Pt was also advised to call PCP to see if PCP would be willing to write for pain medication until pt is seen by pain management as pt appt isn't until 05/15/2023. Pt was also advised to try OTC medication, Ice, heat and rest to help with pain.  Pt verbally understanding

## 2023-05-01 NOTE — TELEPHONE ENCOUNTER
Caller: Davida Anderson    Relationship: Self    Best call back number: [phone]    Requested Prescriptions:   Requested Prescriptions      No prescriptions requested or ordered in this encounter      PAIN MEDICATION    Pharmacy where request should be sent: [id number] [address] AT [facility] - [phone]  - [phone] FX     Last office visit with prescribing clinician: 3/28/2023   Last telemedicine visit with prescribing clinician: Visit date not found   Next office visit with prescribing clinician: 7/3/2023     Additional details provided by patient: PATIENT WOULD LIKE A PAIN MEDICATION CALLED IN    Does the patient have less than a 3 day supply:  [x] Yes  [] No    Would you like a call back once the refill request has been completed: [x] Yes [] No    If the office needs to give you a call back, can they leave a voicemail: [x] Yes [] No    Prince Singh Rep   05/01/23 11:23 EDT

## 2023-05-24 ENCOUNTER — HOSPITAL ENCOUNTER (OUTPATIENT)
Dept: PHYSICAL THERAPY | Facility: HOSPITAL | Age: 78
Setting detail: THERAPIES SERIES
Discharge: HOME | End: 2023-05-24
Payer: MEDICARE

## 2023-05-24 DIAGNOSIS — M54.41 CHRONIC RIGHT-SIDED LOW BACK PAIN WITH RIGHT-SIDED SCIATICA: Primary | ICD-10-CM

## 2023-05-24 DIAGNOSIS — G89.29 CHRONIC RIGHT-SIDED LOW BACK PAIN WITH RIGHT-SIDED SCIATICA: Primary | ICD-10-CM

## 2023-05-24 DIAGNOSIS — Z98.1 HISTORY OF FUSION OF LUMBAR SPINE: ICD-10-CM

## 2023-05-24 PROCEDURE — 97110 THERAPEUTIC EXERCISES: CPT | Performed by: PHYSICAL THERAPIST

## 2023-05-24 PROCEDURE — 97162 PT EVAL MOD COMPLEX 30 MIN: CPT | Performed by: PHYSICAL THERAPIST

## 2023-05-24 NOTE — THERAPY EVALUATION
Outpatient Physical Therapy Ortho Initial Evaluation  [facility]     Patient Name: Davida Anderson  : 1945  MRN: [id number]  Today's Date: 2023      Visit Date: 2023    Patient Active Problem List   Diagnosis   • BP (high blood pressure)   • History of left breast infiltrating ductal cancer 2cm s/p mastectomy with reconstruction  s/p chemo   • H/O ETOH abuse   • Gtz's esophagus   • Colon polyps   • Bipolar 1 disorder   • Arthritis   • Raynaud's disease   • Neuropathy   • Lumbar spine pain   • Spondylolisthesis at L4-L5 level   • Right lumbar radiculopathy   • Gtz's esophagus without dysplasia   • Hx of adenomatous colonic polyps   • Dysphagia   • Diarrhea   • Primary osteoarthritis of left knee   • Primary localized osteoarthrosis of the knee, right   • Chronic pain of left knee   • Spinal stenosis of lumbar region with neurogenic claudication   • Spondylolisthesis of lumbar region   • Acute pain of left knee   • Gastroesophageal reflux disease   • Irritable bowel syndrome with constipation   • Adenomatous polyp of colon   • History of Gtz's esophagus        Past Medical History:   Diagnosis Date   • Acid reflux    • Arthritis    • Atrial fibrillation    • Gtz esophagus    • Bipolar 2 disorder    • Bradycardia     WITH SURGERY   • COPD (chronic obstructive pulmonary disease)     MILD, USES AINHALER PRN   • DDD (degenerative disc disease), lumbar    • Diverticulosis    • Dizziness    • Drug therapy    • Fibrocystic breast    • Frequent UTI     SEES DR GOFF FOR FREQUENT UTI'S, ON KEFLEX   • H/O Infiltrating ductal carcinoma of left breast     Stage IIA, Grade 3 ER/ND +, HER2 -, treated with 5 years of tamoxifen, 5 years of Femara, completed in    • High cholesterol    • History of alcoholism     40 YRS AGO   • History of Clostridium difficile colitis    • History of gastric ulcer    • History of loop recorder    • Hypertension    • Iron deficiency    •  Irregular heartbeat     a fib   • Low back pain    • Lumbar herniated disc    • Mass of right breast on mammogram 03/17/2016    10 o'clock position, 4 cm from the nipple,   • Neuropathy    • PONV (postoperative nausea and vomiting)    • Raynaud disease    • Sleep apnea     MILD, NO NEED FOR CPAP   • Tremors of nervous system    • Urinary incontinence         Past Surgical History:   Procedure Laterality Date   • ANKLE OPEN REDUCTION INTERNAL FIXATION Right 08/14/2015    Dr. Dandre Camacho, [facility]   • BACK SURGERY      LUMBAR   • BREAST BIOPSY     • BREAST RECONSTRUCTION, BREAST TISSUE EXPANDER INSERTION Left 04/11/2002    [place] Contour Profile expander was placed 550 cc size, filled with 200 cc of saline, Dr. Herbert Napoles, [facility]   • COLONOSCOPY N/A 09/16/2014    Dr. Darci Kerns, [facility]; torts, tics, stool, polyp   • COLONOSCOPY N/A 09/27/2016    NTEH, diverticulosis, tortuous colon, Two 3-5mm polyps in the descending colon and the transverse colon, IH.  PATH: Tubular adenoma   • COLONOSCOPY N/A 12/12/2017    NTEH, tics, torts, IH, TA w/low grade dysplasia   • COLONOSCOPY N/A 12/01/2020    Procedure: COLONOSCOPY TO CECUM AND TI ;  Surgeon: Darci Kerns MD;  Location: [facility] ENDOSCOPY;  Service: Gastroenterology;  Laterality: N/A;  PRE- IBS, CONSTIPATION, HX POLYPS  POST- DEMINISHED SPHINCTER TONE, DIVERTICULOSIS, POORLY PREPPED COLON   • CYSTOSCOPY N/A 05/07/2010    with TVT takedown, Dr. Simon Wall, [facility]   • ENDOSCOPY N/A 09/27/2016    z line irreg, bilious gastric fluid- fluid aspiration preformed, gastritis.  PATH: Squamous and glandular mucosa with minimal superficial acute inflammation.    • ENDOSCOPY N/A 12/12/2017    Z line irregular, gastritis, duodenitis, chronic inflammation   • ENDOSCOPY N/A 12/01/2020    Procedure: ESOPHAGOGASTRODUODENOSCOPY WITH BIOPSIES;  Surgeon: Darci Kerns MD;  [facility];  Service: Gastroenterology;  Laterality: N/A;  PRE- REFLUX, DYSPHAGIA  POST-  ESOPHAGITIS, GASTRITIS, SMALL HIATAL HERNIA, BILE REFLUX   • ENDOSCOPY N/A 3/15/2023    Procedure: ESOPHAGOGASTRODUODENOSCOPY with biopsies;  Surgeon: Darci Kerns MD;  [facility];  Service: Gastroenterology;  Laterality: N/A;  pre:  reflux with breakthrough symptoms  post;  gastritis, mild esophagitis   • EPIDURAL BLOCK     • ESOPHAGEAL MANOMETRY N/A 02/02/2021    Procedure: ESOPHAGEAL MANOMETRY;  Surgeon: Tran, Nurse Performed;  [facility];  Service: Gastroenterology;  Laterality: N/A;  DYSPHAGIA   • KNEE SURGERY Left     BROKEN   • LUMBAR DISCECTOMY FUSION INSTRUMENTATION N/A 12/03/2018    Procedure: L4-5 laminectomy and fusion with instrumentation;  Surgeon: Austin Keith MD;  Location: [facility] MAIN OR;  Service: Orthopedic Spine   • MAMMO US BREAST BIOPSY ADDITIONAL W WO DEVICE Left 02/21/2002    2:00 position left breast, Infiltrating Ductal Carcinoma, [facility]   • MANDIBLE FRACTURE SURGERY     • MASTECTOMY Left 04/11/2002    Left Total Mastectomy and Left Axillary [place] Node Biobsy, Dr. Indu Akins, [facility]   • OTHER SURGICAL HISTORY      CARDIAC LOOP DEVICE, LEFT CHEST   • TENSION FREE VAGINAL TAPING WITH MINI ARC SLING N/A 10/26/2009    Dr. Gina Castillo, [facility]   • TOTAL KNEE ARTHROPLASTY Left 10/21/2019    Procedure: TOTAL KNEE ARTHROPLASTY;  Surgeon: Anurag Serrano MD;  Location: [facility] OR;  Service: Orthopedics   • UPPER GASTROINTESTINAL ENDOSCOPY  09/16/2014    z-line irreg, grade a reflux, gastritis       Visit Dx:     ICD-10-CM ICD-9-CM   1. Chronic right-sided low back pain with right-sided sciatica  M54.41 724.2    G89.29 724.3     338.29   2. History of fusion of lumbar spine  Z98.1 V45.4          Patient History     Row Name 05/24/23 1000             History    Chief Complaint Pain  -GR      Type of Pain Back pain;Lower Extremity / Leg  -GR      Brief Description of Current Complaint Had a lumbar fusion L4-5 years ago. Reports recently she has irritation  "of back and RLE pain, sometimes the RLE feels like it is broken. She c/o frequent cramping in both of her calves, pain with rolling over. Feels better crossing her LLE over her right in sitting. Symptoms worse in the AM. Takes hydrocodone for pain control every 6 hours with tylenol. Unable to garden or exercise x 1 year; states this has been since she moved into a new house and thinks this may have exacerbated her symptoms. She had 1 injection that did not help but is going to try again after MD visit. Dr. Keith did her spine surgery and Dr. Serrano did a TKA in 2019.  -GR      Patient/Caregiver Goals Comment Get out of bed and walk without limping, walk up to 3 miles a day and resume \"activities\"  -GR         Pain     Pain Location Back;Leg  -GR      Pain at Present 0  -GR      Pain at Best 0  -GR      Pain at Worst 10  -GR      Is your sleep disturbed? Yes  -GR      Is medication used to assist with sleep? Yes  -GR         Fall Risk Assessment    Any falls in the past year: Yes  states she is used to moving quickly  -GR      Number of falls reported in the last 12 months 3  -GR      Factors that contributed to the fall: Other (comment);Tripped;Uneven surface  twisted ankle on the sidewalk  -GR         Services    Do you plan to receive Home Health services in the near future No  -GR         Daily Activities    Primary Language English  -GR      How does patient learn best? Listening;Reading;Demonstration  -GR      Pt Participated in POC and Goals Yes  -GR         Safety    Are you being hurt, hit, or frightened by anyone at home or in your life? No  -GR      Are you being neglected by a caregiver No  -GR            User Key  (r) = Recorded By, (t) = Taken By, (c) = Cosigned By    Initials Name Provider Type    GR Jaswinder Goodwin, PT Physical Therapist                 PT Ortho     Row Name 05/24/23 1000       Quarter Clearing    Quarter Clearing Lower Quarter Clearing  -GR       Neural Tension Signs- Lower Quarter " Clearing    SLR Right:;Positive  -GR       Lumbar ROM Screen- Lower Quarter Clearing    Lumbar Flexion Normal  -GR    Lumbar Extension Normal  -GR    Lumbar Lateral Flexion Normal  -GR    Lumbar Rotation Impaired  25% limited bilaterally  -GR       SI/Hip Screen- Lower Quarter Clearing    Ely's/Lonnie's test Right:;Positive  -GR       MMT (Manual Muscle Testing)    Rt Lower Ext Rt Hip Flexion;Rt Knee Extension;Rt Knee Flexion;Rt Ankle Plantarflexion;Rt Ankle Dorsiflexion  -GR    Lt Lower Ext Lt Hip Flexion;Lt Knee Extension;Lt Knee Flexion;Lt Ankle Plantarflexion;Lt Ankle Dorsiflexion  -GR       MMT Right Lower Ext    Rt Hip Flexion MMT, Gross Movement (3+/5) fair plus  -GR    Rt Knee Extension MMT, Gross Movement (4/5) good  -GR    Rt Knee Flexion MMT, Gross Movement (4/5) good  -GR    Rt Ankle Plantarflexion MMT, Gross Movement (4/5) good  -GR    Rt Ankle Dorsiflexion MMT, Gross Movement (4/5) good  -GR       MMT Left Lower Ext    Lt Hip Flexion MMT, Gross Movement (4+/5) good plus  -GR    Lt Knee Extension MMT, Gross Movement (4+/5) good plus  -GR    Lt Knee Flexion MMT, Gross Movement (4+/5) good plus  -GR    Lt Ankle Plantarflexion MMT, Gross Movement (4+/5) good plus  -GR       Balance Skills Training    SLS intact to 2 sec R, 2 sec L  -GR       Gait/Stairs (Locomotion)    Comment, (Gait/Stairs) R lurch  -GR          User Key  (r) = Recorded By, (t) = Taken By, (c) = Cosigned By    Initials Name Provider Type    Jaswinder Hinson, PT Physical Therapist                            Therapy Education  Education Details: lina [id number], expectations, POC      PT OP Goals     Row Name 05/24/23 1100          PT Short Term Goals    STG Date to Achieve 06/23/23  -GR     STG 1 Patient will be independent with initial HEP.  -GR     STG 1 Progress New  -GR     STG 2 Patient will report 50% improvement in pain with supine<>sit bed transfers.  -GR     STG 2 Progress New  -GR        Long Term Goals    LTG Date  to Achieve 07/23/23  -GR     LTG 1 Patient will be independent with progressive HEP for long term condition management.  -GR     LTG 1 Progress New  -GR     LTG 2 Patient will score </=48% disability on the modified ILZ to indicate improved perceived ADL performance.  -GR     LTG 2 Progress New  -GR     LTG 3 Patient will resume recreational walking with well controlled pain to reintegrate into the community.  -GR     LTG 3 Progress New  -GR        Time Calculation    PT Goal Re-Cert Due Date 08/22/23  -GR           User Key  (r) = Recorded By, (t) = Taken By, (c) = Cosigned By    Initials Name Provider Type    GR Jaswinder Goodwin, PT Physical Therapist                 PT Assessment/Plan     Row Name 05/24/23 1100          PT Assessment    Functional Limitations Impaired gait;Limitation in home management;Limitations in community activities;Limitations in functional capacity and performance;Performance in leisure activities;Performance in self-care ADL;Performance in sport activities  -GR     Impairments Balance;Gait;Muscle strength;Pain;Peripheral nerve integrity;Posture;Range of motion;Joint integrity;Joint mobility  -GR     Assessment Comments 76 y/o F referred to outpatient PT for LBP/R hip pain and history of lumbar fusion >1 year ago.  She presents with near full lumbar AROM, impaired R glut/hip flexor strength, + R SLR and pain with functional transfers, prolonged gait. PMH pertinent for lumbar fusion, L TKR, falls. She would like to go a self guided route and return in 3 weeks for any necessary progressions.  -GR     Please refer to paper survey for additional self-reported information No  -GR     Rehab Potential Good  -GR     Patient/caregiver participated in establishment of treatment plan and goals Yes  -GR     Patient would benefit from skilled therapy intervention Yes  -GR        PT Plan    PT Frequency 1x/week  -GR     Predicted Duration of Therapy Intervention (PT) 6 visits  -GR     Planned CPT's? PT  EVAL MOD COMPLELITY: 69336;PT RE-EVAL: 91132;PT THER PROC EA 15 MIN: 07936;PT THER ACT EA 15 MIN: 83863;PT MANUAL THERAPY EA 15 MIN: 57608;PT NEUROMUSC RE-EDUCATION EA 15 MIN: 92296;PT GAIT TRAINING EA 15 MIN: 08338;PT SELF CARE/HOME MGMT/TRAIN EA 15: 52049;PT HOT OR COLD PACK TREAT MCARE;PT ELECTRICAL STIM UNATTEND: ;PT ELECTRICAL STIM ATTD EA 15 MIN: 28215;PT TRACTION LUMBAR: 59048  -GR     Physical Therapy Interventions (Optional Details) balance training;home exercise program;lumbar stabilization;manual therapy techniques;modalities;neuromuscular re-education;patient/family education;postural re-education;ROM (Range of Motion);strengthening;stretching;transfer training  -GR     PT Plan Comments Begin with nustep - review/progress HEP. Determine if any further visits necessary based of HEP performance and patient preference.  -GR           User Key  (r) = Recorded By, (t) = Taken By, (c) = Cosigned By    Initials Name Provider Type    Jaswinder Hinson, PT Physical Therapist                   OP Exercises     Row Name 05/24/23 1131             Total Minutes    84151 - PT Therapeutic Exercise Minutes 9  -GR         Exercise 1    Exercise Name 1 Nustep next visit  -GR         Exercise 2    Exercise Name 2 PPT  -GR      Cueing 2 Demo  -GR         Exercise 3    Exercise Name 3 LTR  -GR      Cueing 3 Demo  -GR         Exercise 4    Exercise Name 4 HL adduction  -GR      Cueing 4 Demo  -GR         Exercise 5    Exercise Name 5 HL clam RTB  -GR      Cueing 5 Demo  -GR         Exercise 6    Exercise Name 6 Piriformis stretch ER/IR, HL  -GR      Cueing 6 Demo  -GR         Exercise 7    Exercise Name 7 SL clam  -GR      Cueing 7 Demo  -GR            User Key  (r) = Recorded By, (t) = Taken By, (c) = Cosigned By    Initials Name Provider Type    Jaswinder Hinson, PT Physical Therapist                              Outcome Measure Options: Modified Oswestry  Modified Oswestry  Modified Oswestry Score/Comments: 58%  disability      Time Calculation:     Start Time: 1045  Stop Time: 1125  Time Calculation (min): 40 min  Total Timed Code Minutes- PT: 9 minute(s)  Timed Charges  43280 - PT Therapeutic Exercise Minutes: 9  Untimed Charges  PT Eval/Re-eval Minutes: 31  Total Minutes  Timed Charges Total Minutes: 9  Untimed Charges Total Minutes: 31   Total Minutes: 40     Therapy Charges for Today     Code Description Service Date Service Provider Modifiers Qty    76834336946 HC PT THER PROC EA 15 MIN 5/24/2023 Jaswinder Goodwin, PT GP 1    34457905938 HC PT EVAL MOD COMPLEXITY 2 5/24/2023 Jaswinder Goodwin, PT GP 1          PT G-Codes  Outcome Measure Options: Modified Oswestry  Modified Oswestry Score/Comments: 58% disability         Jaswinder Goodwin, PT  5/24/2023

## 2023-09-22 ENCOUNTER — TRANSCRIBE ORDERS (OUTPATIENT)
Dept: ADMINISTRATIVE | Facility: HOSPITAL | Age: 78
End: 2023-09-22
Payer: MEDICARE

## 2023-09-22 DIAGNOSIS — Z12.31 VISIT FOR SCREENING MAMMOGRAM: Primary | ICD-10-CM

## 2023-10-04 ENCOUNTER — TELEPHONE (OUTPATIENT)
Dept: GASTROENTEROLOGY | Facility: CLINIC | Age: 78
End: 2023-10-04
Payer: MEDICARE

## 2023-10-04 NOTE — TELEPHONE ENCOUNTER
CALLER: Davida Anderson    RELATIONSHIP TO PATIENT: Self    BEST CALL BACK NUMBER: [phone]    CALL REGARDING: Schedule EGD  Had last one in March and informed to call in October to have yearly EGD.    Informed once questionnaire is completed, returned to office, and reviewed by doctor then the EGD can be scheduled.      Currently Experiencing Loose Bowel for the Past 4 days. Informed that she has been taking Imodium and Regular Diet has not been working.    Patient Request a Call Back

## 2023-10-04 NOTE — TELEPHONE ENCOUNTER
Returned call to pt advised she is not due for an EGD until 2026, advised she is due her annual follow up and I have scheduled her a fu.

## 2023-10-17 ENCOUNTER — HOSPITAL ENCOUNTER (OUTPATIENT)
Dept: MAMMOGRAPHY | Facility: HOSPITAL | Age: 78
Discharge: HOME | End: 2023-10-17
Admitting: FAMILY MEDICINE
Payer: MEDICARE

## 2023-10-17 DIAGNOSIS — Z12.31 VISIT FOR SCREENING MAMMOGRAM: ICD-10-CM

## 2023-10-17 PROCEDURE — 77063 BREAST TOMOSYNTHESIS BI: CPT

## 2023-10-17 PROCEDURE — 77067 SCR MAMMO BI INCL CAD: CPT

## 2023-11-14 ENCOUNTER — OFFICE VISIT (OUTPATIENT)
Dept: GASTROENTEROLOGY | Facility: CLINIC | Age: 78
End: 2023-11-14
Payer: MEDICARE

## 2023-11-14 VITALS
SYSTOLIC BLOOD PRESSURE: 151 MMHG | BODY MASS INDEX: 20.57 KG/M2 | DIASTOLIC BLOOD PRESSURE: 73 MMHG | WEIGHT: 128 LBS | HEART RATE: 66 BPM | HEIGHT: 66 IN | OXYGEN SATURATION: 96 % | TEMPERATURE: 96.6 F

## 2023-11-14 DIAGNOSIS — R13.10 DYSPHAGIA, UNSPECIFIED TYPE: Primary | ICD-10-CM

## 2023-11-14 PROCEDURE — 1160F RVW MEDS BY RX/DR IN RCRD: CPT | Performed by: INTERNAL MEDICINE

## 2023-11-14 PROCEDURE — 1159F MED LIST DOCD IN RCRD: CPT | Performed by: INTERNAL MEDICINE

## 2023-11-14 PROCEDURE — 3078F DIAST BP <80 MM HG: CPT | Performed by: INTERNAL MEDICINE

## 2023-11-14 PROCEDURE — 3077F SYST BP >= 140 MM HG: CPT | Performed by: INTERNAL MEDICINE

## 2023-11-14 PROCEDURE — 99214 OFFICE O/P EST MOD 30 MIN: CPT | Performed by: INTERNAL MEDICINE

## 2023-11-14 RX ORDER — HYDROCODONE BITARTRATE AND ACETAMINOPHEN 7.5; 325 MG/1; MG/1
TABLET ORAL
COMMUNITY
Start: 2023-10-11

## 2023-11-14 RX ORDER — DILTIAZEM HYDROCHLORIDE 180 MG/1
180 CAPSULE, COATED, EXTENDED RELEASE ORAL DAILY
COMMUNITY
Start: 2023-04-26 | End: 2024-04-25

## 2023-11-14 NOTE — PROGRESS NOTES
Chief Complaint   Patient presents with    Heartburn    Vomiting    Difficulty Swallowing    Diarrhea    Constipation    Abdominal Pain        Davida Anderson is a  78 y.o. female here for a follow up visit for GERD, dysphagia, change in bowel pattern, IBS, history of Gtz's    HPI this 78-year-old white female patient of Ivania Vergara MD presents in follow-up since undergoing upper endoscopic evaluation in March of this year.  She continues to complain of difficulty swallowing as well as episodes of reflux.  We talked about treatment with her proton pump inhibitor and also addition of a histamine blocker at bedtime.  Due to her swallowing issues with negative findings on endoscopy I also offered a video fluoroscopy swallow study with speech pathology attendance to see if there is some component of transfer dysphagia.  If this is inconclusive we can also consider doing an esophageal manometry.  She continues to have bowel pattern changes and uses an laxative as needed but would prefer to use Linzess which she states has been used in the past.  We will start with a 72 mcg dose daily and advance as tolerated.  I did discuss further evaluation of her gastric function with a gastric emptying study but will defer this for the present.  She would be due for follow-up colonoscopy in 2025.    Past Medical History:   Diagnosis Date    Acid reflux     Arthritis     Atrial fibrillation     Gtz esophagus     Bipolar 2 disorder     Bradycardia     WITH SURGERY    COPD (chronic obstructive pulmonary disease)     MILD, USES AINHALER PRN    DDD (degenerative disc disease), lumbar     Diverticulosis     Dizziness     Drug therapy     Fibrocystic breast     Frequent UTI     SEES DR GOFF FOR FREQUENT UTI'S, ON KEFLEX    H/O Infiltrating ductal carcinoma of left breast 2002    Stage IIA, Grade 3 ER/[place] +, HER2 -, treated with 5 years of tamoxifen, 5 years of Femara, completed in 2012    High cholesterol     History of alcoholism      40 YRS AGO    History of Clostridium difficile colitis 2014    History of gastric ulcer     History of loop recorder     Hypertension     Iron deficiency     Irregular heartbeat     a fib    Low back pain     Lumbar herniated disc     Mass of right breast on mammogram 03/17/2016    10 o'clock position, 4 cm from the nipple,    Neuropathy     PONV (postoperative nausea and vomiting)     Raynaud disease     Sleep apnea     MILD, NO NEED FOR CPAP    Tremors of nervous system     Urinary incontinence        Current Outpatient Medications   Medication Sig Dispense Refill    apixaban (ELIQUIS) 5 MG tablet tablet TAKE 1 TABLET TWICE DAILY      ARIPiprazole (ABILIFY) 10 MG tablet Take 1 tablet by mouth Every Morning.      atorvastatin (LIPITOR) 20 MG tablet Take 1 tablet by mouth Every Morning.      B COMPLEX VITAMINS ER PO Take 1 tablet by mouth Daily.      BIOTIN PO Take 5,000 mcg by mouth Daily.      Calcium Carb-Cholecalciferol 600-200 MG-UNIT tablet Take 2 tablets by mouth Every Morning.      coenzyme Q10 100 MG capsule Take 1 capsule by mouth Daily.      DIGESTIVE ENZYMES PO Take 2 tablets by mouth.      dilTIAZem CD (CARDIZEM CD) 180 MG 24 hr capsule Take 1 capsule by mouth Daily.      diphenhydrAMINE (BENADRYL) 25 mg capsule Take 1 capsule by mouth Every 6 (Six) Hours As Needed for Itching.      hydrALAZINE (APRESOLINE) 25 MG tablet Take 1 tablet by mouth 2 (Two) Times a Day.      HYDROcodone-acetaminophen (NORCO) 7.5-325 MG per tablet TAKE 1 TO 2 TABLETS BY MOUTH DAILY AS NEEDED      lamoTRIgine (LaMICtal) 100 MG tablet Take 1 tablet by mouth 2 (Two) Times a Day.      lithium carbonate 300 MG capsule Take 1 capsule by mouth Every Morning.      MAGNESIUM PO Take 2 tablets by mouth Daily.      melatonin 5 MG tablet tablet Take 1 tablet by mouth At Night As Needed.      Multiple Vitamins-Minerals (ZINC PO) Take  by mouth.      omeprazole (priLOSEC) 20 MG capsule Take 1 capsule by mouth 2 (Two) Times a Day.       pregabalin (LYRICA) 50 MG capsule Take 3 capsules by mouth Every Night.      Probiotic Product (SOLUBLE FIBER/PROBIOTICS PO) Take 1 tablet by mouth Every Morning.      solifenacin (VESICARE) 10 MG tablet Take 1 tablet by mouth Daily.      Turmeric 500 MG capsule Take  by mouth.      vitamin C (ASCORBIC ACID) 250 MG tablet Take 2 tablets by mouth Daily.      ALBUTEROL IN Inhale 2 puffs Every 4 (Four) Hours As Needed. (Patient not taking: Reported on 2023)      dilTIAZem (TIAZAC) 180 MG 24 hr capsule Take 1 capsule by mouth Daily.       No current facility-administered medications for this visit.     Facility-Administered Medications Ordered in Other Visits   Medication Dose Route Frequency Provider Last Rate Last Admin    mupirocin (BACTROBAN) 2 % nasal ointment   Nasal BID Anurag Serrano MD           PRN Meds:.    Allergies   Allergen Reactions    Morphine Hives, Itching and Rash       Social History     Socioeconomic History    Marital status:    Tobacco Use    Smoking status: Former     Packs/day: 3.00     Years: 25.00     Additional pack years: 0.00     Total pack years: 75.00     Types: Cigarettes     Quit date: 1983     Years since quittin.6    Smokeless tobacco: Never   Vaping Use    Vaping Use: Never used   Substance and Sexual Activity    Alcohol use: No     Comment: RECOVERYING ,  LAST DRINK 40 YRS AGO    Drug use: No    Sexual activity: Defer       Family History   Problem Relation Age of Onset    Breast cancer Mother 35    Colon cancer Mother 64    Heart disease Father     Cancer Father         throat and lung    Colon polyps Father     Malig Hyperthermia Neg Hx        Review of Systems   Constitutional:  Negative for activity change, appetite change, fatigue and unexpected weight change.   HENT:  Negative for congestion, facial swelling, sore throat, trouble swallowing and voice change.    Eyes:  Negative for photophobia and visual disturbance.   Respiratory:  Negative for cough  and choking.    Cardiovascular:  Negative for chest pain.   Gastrointestinal:  Positive for abdominal pain and constipation. Negative for abdominal distention, anal bleeding, blood in stool, diarrhea, nausea, rectal pain and vomiting.        Dysphagia  GERD   Endocrine: Negative for polyphagia.   Musculoskeletal:  Negative for arthralgias, gait problem and joint swelling.   Skin:  Negative for color change, pallor and rash.   Allergic/Immunologic: Negative for food allergies.   Neurological:  Negative for speech difficulty and headaches.   Hematological:  Does not bruise/bleed easily.   Psychiatric/Behavioral:  Negative for agitation, confusion and sleep disturbance.        Vitals:    11/14/23 1044   BP: 151/73   Pulse: 66   Temp: 96.6 °F (35.9 °C)   SpO2: 96%       Physical Exam  Constitutional:       Appearance: She is well-developed.   HENT:      Head: Normocephalic.   Eyes:      Conjunctiva/sclera: Conjunctivae normal.   Cardiovascular:      Rate and Rhythm: Normal rate and regular rhythm.   Pulmonary:      Breath sounds: Normal breath sounds.   Abdominal:      General: Bowel sounds are normal.      Palpations: Abdomen is soft.   Musculoskeletal:         General: Normal range of motion.      Cervical back: Normal range of motion.   Skin:     General: Skin is warm and dry.   Neurological:      Mental Status: She is alert and oriented to person, place, and time.   Psychiatric:         Behavior: Behavior normal.         ASSESSMENT   #1 GERD: On double strength PPI with breakthrough symptoms  #2 dysphagia: Upper endoscopy negative for any anatomic changes  #3 change in bowel pattern with predominance of constipation: Managing with MiraLAX but would prefer Linzess  #4 abdominal pain      PLAN  Initiate over-the-counter H2 blocker at bedtime along with current PPI regimen  Prescribe Linzess 72 mcg daily with adjustment as needed  Schedule video fluoroscopy swallow study with speech pathology  We will call patient with  results as well as further recommendations once fluoroscopic study completed      No diagnosis found.

## 2023-11-21 ENCOUNTER — ANCILLARY ORDERS (OUTPATIENT)
Dept: SPEECH THERAPY | Facility: HOSPITAL | Age: 78
End: 2023-11-21
Payer: MEDICARE

## 2023-11-21 DIAGNOSIS — R13.10 DYSPHAGIA, UNSPECIFIED TYPE: Primary | ICD-10-CM

## 2023-12-07 ENCOUNTER — HOSPITAL ENCOUNTER (OUTPATIENT)
Dept: GENERAL RADIOLOGY | Facility: HOSPITAL | Age: 78
Discharge: HOME | End: 2023-12-07
Payer: MEDICARE

## 2023-12-07 DIAGNOSIS — R13.10 DYSPHAGIA, UNSPECIFIED TYPE: ICD-10-CM

## 2023-12-07 PROCEDURE — 92611 MOTION FLUOROSCOPY/SWALLOW: CPT

## 2023-12-07 PROCEDURE — A9270 NON-COVERED ITEM OR SERVICE: HCPCS | Performed by: INTERNAL MEDICINE

## 2023-12-07 PROCEDURE — 63710000001 BARIUM SULFATE 40 % RECONSTITUTED SUSPENSION: Performed by: INTERNAL MEDICINE

## 2023-12-07 PROCEDURE — 63710000001 BARIUM SULFATE 40 % SUSPENSION: Performed by: INTERNAL MEDICINE

## 2023-12-07 PROCEDURE — 74230 X-RAY XM SWLNG FUNCJ C+: CPT

## 2023-12-07 PROCEDURE — 63710000001 BARIUM SULFATE 98 % RECONSTITUTED SUSPENSION: Performed by: INTERNAL MEDICINE

## 2023-12-07 PROCEDURE — 63710000001 BARIUM SULFATE 60 % CREAM: Performed by: INTERNAL MEDICINE

## 2023-12-07 RX ADMIN — BARIUM SULFATE 55 ML: 0.81 POWDER, FOR SUSPENSION ORAL at 10:37

## 2023-12-07 RX ADMIN — BARIUM SULFATE 4 ML: 980 POWDER, FOR SUSPENSION ORAL at 10:38

## 2023-12-07 RX ADMIN — BARIUM SULFATE 1 TEASPOON(S): 0.6 CREAM ORAL at 10:37

## 2023-12-07 RX ADMIN — BARIUM SULFATE 50 ML: 400 SUSPENSION ORAL at 10:38

## 2023-12-08 NOTE — MBS/VFSS/FEES
Outpatient Speech Language Pathology   Adult Swallow Initial Evaluation  [facility]     Patient Name: Davida Anderson  : 1945  MRN: [id number]  Today's Date: 2023         Visit Date: 2023   Patient Active Problem List   Diagnosis    BP (high blood pressure)    History of left breast infiltrating ductal cancer 2cm s/p mastectomy with reconstruction  s/p chemo    H/O ETOH abuse    Gtz's esophagus    Colon polyps    Bipolar 1 disorder    Arthritis    Raynaud's disease    Neuropathy    Lumbar spine pain    Spondylolisthesis at L4-L5 level    Right lumbar radiculopathy    Gtz's esophagus without dysplasia    Hx of adenomatous colonic polyps    Dysphagia    Diarrhea    Primary osteoarthritis of left knee    Primary localized osteoarthrosis of the knee, right    Chronic pain of left knee    Spinal stenosis of lumbar region with neurogenic claudication    Spondylolisthesis of lumbar region    Acute pain of left knee    Gastroesophageal reflux disease    Irritable bowel syndrome with constipation    Adenomatous polyp of colon    History of Gtz's esophagus        Past Medical History:   Diagnosis Date    Acid reflux     Arthritis     Atrial fibrillation     Gtz esophagus     Bipolar 2 disorder     Bradycardia     WITH SURGERY    COPD (chronic obstructive pulmonary disease)     MILD, USES AINHALER PRN    DDD (degenerative disc disease), lumbar     Diverticulosis     Dizziness     Drug therapy     Fibrocystic breast     Frequent UTI     SEES DR GOFF FOR FREQUENT UTI'S, ON KEFLEX    H/O Infiltrating ductal carcinoma of left breast     Stage IIA, Grade 3 ER/VA +, HER2 -, treated with 5 years of tamoxifen, 5 years of Femara, completed in     High cholesterol     History of alcoholism     40 YRS AGO    History of Clostridium difficile colitis     History of gastric ulcer     History of loop recorder     Hypertension     Iron deficiency     Irregular heartbeat     a fib    Low  back pain     Lumbar herniated disc     Mass of right breast on mammogram 03/17/2016    10 o'clock position, 4 cm from the nipple,    Neuropathy     PONV (postoperative nausea and vomiting)     Raynaud disease     Sleep apnea     MILD, NO NEED FOR CPAP    Tremors of nervous system     Urinary incontinence         Past Surgical History:   Procedure Laterality Date    ANKLE OPEN REDUCTION INTERNAL FIXATION Right 08/14/2015    Dr. Dandre Camacho, [facility]    BACK SURGERY      LUMBAR    BREAST BIOPSY      BREAST RECONSTRUCTION, BREAST TISSUE EXPANDER INSERTION Left 04/11/2002    [place] Contour Profile expander was placed 550 cc size, filled with 200 cc of saline, Dr. Herbert Napoles, [facility]    COLONOSCOPY N/A 09/16/2014    Dr. Darci Kerns, [facility]; torts, tics, stool, polyp    COLONOSCOPY N/A 09/27/2016    NTEH, diverticulosis, tortuous colon, Two 3-5mm polyps in the descending colon and the transverse colon, IH.  PATH: Tubular adenoma    COLONOSCOPY N/A 12/12/2017    NTEH, tics, torts, IH, TA w/low grade dysplasia    COLONOSCOPY N/A 12/01/2020    Procedure: COLONOSCOPY TO CECUM AND TI ;  Surgeon: Darci Kerns MD;  Location: [facility] ENDOSCOPY;  Service: Gastroenterology;  Laterality: N/A;  PRE- IBS, CONSTIPATION, HX POLYPS  POST- DEMINISHED SPHINCTER TONE, DIVERTICULOSIS, POORLY PREPPED COLON    CYSTOSCOPY N/A 05/07/2010    with TVT takedown, Dr. Simon Wall, [facility]    ENDOSCOPY N/A 09/27/2016    z line irreg, bilious gastric fluid- fluid aspiration preformed, gastritis.  PATH: Squamous and glandular mucosa with minimal superficial acute inflammation.     ENDOSCOPY N/A 12/12/2017    Z line irregular, gastritis, duodenitis, chronic inflammation    ENDOSCOPY N/A 12/01/2020    Procedure: ESOPHAGOGASTRODUODENOSCOPY WITH BIOPSIES;  Surgeon: Darci Kerns MD;  Location:  [facility]OSCOPY;  Service: Gastroenterology;  Laterality: N/A;  PRE- REFLUX, DYSPHAGIA  POST- ESOPHAGITIS, GASTRITIS, SMALL HIATAL HERNIA, BILE REFLUX     ENDOSCOPY N/A 3/15/2023    Procedure: ESOPHAGOGASTRODUODENOSCOPY with biopsies;  Surgeon: Darci Kerns MD;  Location:  [facility];  Service: Gastroenterology;  Laterality: N/A;  pre:  reflux with breakthrough symptoms  post;  gastritis, mild esophagitis    EPIDURAL BLOCK      ESOPHAGEAL MANOMETRY N/A 02/02/2021    Procedure: ESOPHAGEAL MANOMETRY;  Surgeon: Tran, Nurse Performed;  Location:  [facility];  Service: Gastroenterology;  Laterality: N/A;  DYSPHAGIA    KNEE SURGERY Left     BROKEN    LUMBAR DISCECTOMY FUSION INSTRUMENTATION N/A 12/03/2018    Procedure: L4-5 laminectomy and fusion with instrumentation;  Surgeon: Austin Keith MD;  Location: [facility] MAIN OR;  Service: Orthopedic Spine    MAMMO US BREAST BIOPSY ADDITIONAL W WO DEVICE Left 02/21/2002    2:00 position left breast, Infiltrating Ductal Carcinoma, [facility]    MANDIBLE FRACTURE SURGERY      MASTECTOMY Left 04/11/2002    Left Total Mastectomy and Left Axillary [place] Node Biobsy, Dr. Indu Akins, [facility]    OTHER SURGICAL HISTORY      CARDIAC LOOP DEVICE, LEFT CHEST    TENSION FREE VAGINAL TAPING WITH MINI ARC SLING N/A 10/26/2009    Dr. Gina Castillo, [facility]    TOTAL KNEE ARTHROPLASTY Left 10/21/2019    Procedure: TOTAL KNEE ARTHROPLASTY;  Surgeon: Anurag Serrano MD;  Location: [facility] MAIN OR;  Service: Orthopedics    UPPER GASTROINTESTINAL ENDOSCOPY  09/16/2014    z-line irreg, grade a reflux, gastritis         Visit Dx:     ICD-10-CM ICD-9-CM   1. Dysphagia, unspecified type  R13.10 787.20            OP SLP Assessment/Plan - 12/07/23 1200          SLP Assessment    Functional Problems Swallowing  -AW    Impact on Function: Swallowing Risk of aspiration  -AW    Clinical Impression: Swallowing Mild:;oral phase dysphagia;pharyngeal phase dysphagia;esophageal dysphagia  -AW    SLP Diagnosis Mild oropharyngeal and esophageal dysphagia  -AW    Prognosis Good (comment)  -AW    Patient/caregiver participated in establishment of treatment  plan and goals Yes  -AW    Patient would benefit from skilled therapy intervention No  -AW              User Key  (r) = Recorded By, (t) = Taken By, (c) = Cosigned By      Initials Name Provider Type    Karissa Rangel SLP Speech and Language Pathologist                     SLP Adult Swallow Evaluation       Row Name 12/07/23 1700       Rehab Evaluation    Document Type evaluation  -AW    Subjective Information no complaints  -AW    Patient Observations alert;cooperative;agree to therapy  -AW    Patient/Family/Caregiver Comments/Observations Pt oriented x4.  -AW    Patient Effort good  -AW    Symptoms Noted During/After Treatment none  -AW       General Information    Patient Profile Reviewed yes  -AW    Pertinent History Of Current Problem Pt has a h/o Gtz's Esophagus, GERd, and difficulty swallowing meat, bread, and sweets. Pt c/o difficulty with tongue ROM. Pt feels pills get stuck at times. Pt has recently doubled her dose of her PPI with no improvement in symptoms  -AW    Current Method of Nutrition regular textures;thin liquids  -AW    Precautions/Limitations, Vision WFL  -AW    Precautions/Limitations, Hearing WFL  -AW    Prior Level of Function-Communication WFL  -AW    Prior Level of Function-Swallowing no diet consistency restrictions  -AW    Plans/Goals Discussed with patient;agreed upon  -AW    Barriers to Rehab none identified  -AW    Patient's Goals for Discharge --  r/o aspiration  -AW       Pain    Additional Documentation Pain Scale: Numbers Pre/Post-Treatment (Group)  -AW       Pain Scale: Numbers Pre/Post-Treatment    Pretreatment Pain Rating 0/10 - no pain  -AW    Posttreatment Pain Rating 0/10 - no pain  -AW       Oral Motor Structure and Function    Dentition Assessment natural, present and adequate  -AW    Secretion Management WNL/WFL  -AW    Mucosal Quality moist, healthy  -AW       Oral Musculature and Cranial Nerve Assessment    Lingual Impairment, Detail. Cranial Nerves IX, XII  (Glossopharyngeal and Hypoglossal) reduced lingual ROM  -AW       General Eating/Swallowing Observations    Respiratory Support Currently in Use room air  -AW    Eating/Swallowing Skills self-fed  -AW    Positioning During Eating upright in chair  -AW       MBS/VFSS    Utensils Used spoon;cup;straw  -AW    Consistencies Trialed regular textures;soft to chew textures;mixed consistency;pureed;thin liquids;nectar/syrup-thick liquids  -AW       MBS/VFSS Interpretation    VFSS Summary VFSS completed with Dr. Ward. Pt exhibited mild oropharyngeal and esophageal dysphagia characterized by tongue base weakness, mistiming, and esophageal retention. Pt had a prominent cricopharyngeus (CP) and an inward curvature of the spine at the level of the CP possibly contributing to some of the retention. Pt tolerated thins via cup and straw with penetration noted x1 when used as a liquid wash, cleared with subsequent swallow. No other pentration or aspiration was seen during the study. Pt tolerated trials of pureed, soft solids, and mixed consistencies with no s/s. Mastication was functional. Slow oral propulsion was noted with regular solids with bolus staying in upper esophagus. Additional swallows did not clear material from the upper esophagus, however, a liquid wash was successful in clearing.  An esophageal scan showed slow clearance with retrograde movement. Discussed compensatory strategies to assist with swallowing, provided strengthening exercises for tongue and tongue base. Recommend an esophagram to further assess esophageal function.  -AW       SLP Communication to Radiology    Summary Statement No penetration or aspiration occurred during the study. Pt had residue in the upper esphagus, cleared with liquid wash. An esphageal scan showed slow clearance with some reflux observed.  -AW       SLP Evaluation Clinical Impression    SLP Swallowing Diagnosis mild;oral dysphagia;pharyngeal dysphagia;esophageal dysphagia  -AW     Functional Impact risk of aspiration/pneumonia  -AW    Rehab Potential/Prognosis, Swallowing good, to achieve stated therapy goals  -AW    Swallow Criteria for Skilled Therapeutic Interventions Met demonstrates skilled criteria  -AW       Recommendations    Therapy Frequency (Swallow) evaluation only  -AW    SLP Diet Recommendation regular textures;soft to chew textures;thin liquids  -AW    Recommended Precautions and Strategies upright posture during/after eating;small bites of food and sips of liquid;alternate between small bites of food and sips of liquid  -AW    Oral Care Recommendations Oral Care BID/PRN  -AW    SLP Rec. for Method of Medication Administration meds whole;with thin liquids;with puree;as tolerated  -AW    Monitor for Signs of Aspiration yes  -AW    Anticipated Discharge Disposition (SLP) home  -AW    Demonstrates Need for Referral to Another Service gastroenterology;dedicated esophageal assessment  -AW              User Key  (r) = Recorded By, (t) = Taken By, (c) = Cosigned By      Initials Name Provider Type    Karissa Rangel, SLP Speech and Language Pathologist                                   OP SLP Education       Row Name 12/07/23 1200       Education    Barriers to Learning No barriers identified  -AW    Education Provided Described results of evaluation;Patient expressed understanding of evaluation;Patient participated in establishing goals and treatment plan;Patient demonstrated recommended strategies  -AW    Assessed Learning needs;Learning motivation;Learning preferences;Learning readiness  -AW    Learning Motivation Strong  -AW    Learning Method Explanation;Demonstration;Teach back;Written materials  -AW    Teaching Response Verbalized understanding;Demonstrated understanding  -AW    Education Comments Reviewed VFSS with pt. Pt was educated on compensatory strategies to improve safety of swallow. Exercises were provided and reviewed for tongue and base of tongue strengthening.  Discussed recommendation of dedicated esophageal study.  -ALBERT              User Key  (r) = Recorded By, (t) = Taken By, (c) = Cosigned By      Initials Name Effective Dates    Karissa Rangel SLP 08/28/23 -                              Time Calculation:   SLP Start Time: 1000  SLP Stop Time: 1115  SLP Time Calculation (min): 75 min    Therapy Charges for Today       Code Description Service Date Service Provider Modifiers Qty    [id number] HC ST MOTION FLUORO EVAL SWALLOW 5 12/7/2023 Karissa Malhotra SLP GN 1                     BENJAMIN Nick  12/8/2023

## 2023-12-18 ENCOUNTER — TELEPHONE (OUTPATIENT)
Dept: GASTROENTEROLOGY | Facility: CLINIC | Age: 78
End: 2023-12-18
Payer: MEDICARE

## 2023-12-18 DIAGNOSIS — R13.10 DYSPHAGIA, UNSPECIFIED TYPE: Primary | ICD-10-CM

## 2023-12-18 NOTE — TELEPHONE ENCOUNTER
----- Message from Darci ANAYA MD sent at 12/16/2023  3:26 PM EST -----  Regarding: VFSS results  OK to call results, Speech path reported mild oral and esophageal dysfunction and recommended an esophagram to further assess esophageal retention, can schedule esophagram and will call with results.  ----- Message -----  From: Interface, Rad Results [place] In  Sent: 12/11/2023  10:19 AM EST  To: Darci ANAYA MD

## 2023-12-18 NOTE — TELEPHONE ENCOUNTER
Hub staff attempted to follow warm transfer process and was unsuccessful     Caller: Davida Anderson    Relationship to patient: Self    Best call back number: [phone]    Patient is needing: PATIENT IS CALLING BACK TO GET TEST RESULTS.  TRIED TO TRANSFER TO OFFICE HOWEVER CALL WAS ANSWERED THEN RELEASED.  PLEASE REACH BACK OUT.  THANK YOU

## 2023-12-18 NOTE — TELEPHONE ENCOUNTER
Called pt and advised of Dr Kerns's note. Verb understanding.     Pt agreeable to esophagram. Order placed. Update sent to Dr Kerns.

## 2023-12-22 ENCOUNTER — TELEPHONE (OUTPATIENT)
Dept: GASTROENTEROLOGY | Facility: CLINIC | Age: 78
End: 2023-12-22

## 2023-12-22 NOTE — TELEPHONE ENCOUNTER
Hub staff attempted to follow warm transfer process and was unsuccessful     Caller: Davida Anderson    Relationship to patient: Self    Best call back number: [phone]    Patient is needing: PATIENT CALLED IN, SHE SAID SHE HAD A VOICEMAIL TO CALL BACK REGARDING TEST RESULTS. PLEASE CONTACT PATIENT BACK AND YOU CAN LEAVE A DETAILED VMAIL.

## 2023-12-22 NOTE — TELEPHONE ENCOUNTER
Called pt and advised that we have not reached out to her. Advised it was most likely [facility] calling her to schedule the esophagram.  She verb understanding.

## 2024-01-29 ENCOUNTER — HOSPITAL ENCOUNTER (OUTPATIENT)
Dept: GENERAL RADIOLOGY | Facility: HOSPITAL | Age: 79
Discharge: HOME | End: 2024-01-29
Admitting: INTERNAL MEDICINE
Payer: MEDICARE

## 2024-01-29 PROCEDURE — A9270 NON-COVERED ITEM OR SERVICE: HCPCS | Performed by: INTERNAL MEDICINE

## 2024-01-29 PROCEDURE — 74221 X-RAY XM ESOPHAGUS 2CNTRST: CPT

## 2024-01-29 PROCEDURE — 63710000001 SOD BICARB-CITRIC ACID-SIMETHICONE 2.21-1.53-0.04 G PACK: Performed by: INTERNAL MEDICINE

## 2024-01-29 PROCEDURE — 63710000001 BARIUM SULFATE 700 MG TABLET: Performed by: INTERNAL MEDICINE

## 2024-01-29 PROCEDURE — 63710000001 BARIUM SULFATE 96 % RECONSTITUTED SUSPENSION: Performed by: INTERNAL MEDICINE

## 2024-01-29 PROCEDURE — 63710000001 BARIUM SULFATE 98 % RECONSTITUTED SUSPENSION: Performed by: INTERNAL MEDICINE

## 2024-01-29 RX ADMIN — BARIUM SULFATE 135 ML: 980 POWDER, FOR SUSPENSION ORAL at 10:55

## 2024-01-29 RX ADMIN — BARIUM SULFATE 183 ML: 960 POWDER, FOR SUSPENSION ORAL at 10:55

## 2024-01-29 RX ADMIN — BARIUM SULFATE 700 MG: 700 TABLET ORAL at 10:55

## 2024-01-29 RX ADMIN — ANTACID/ANTIFLATULENT 1 PACKET: 380; 550; 10; 10 GRANULE, EFFERVESCENT ORAL at 10:55

## 2024-02-02 ENCOUNTER — TELEPHONE (OUTPATIENT)
Dept: GASTROENTEROLOGY | Facility: CLINIC | Age: 79
End: 2024-02-02
Payer: MEDICARE

## 2024-02-02 NOTE — TELEPHONE ENCOUNTER
----- Message from Darci ANAYA MD sent at 2/1/2024  3:26 PM EST -----  Regarding: Esophagram results  Okay to notify patient that the esophagram revealed no stenosis or narrowing but did show some problems with the mechanism of swallowing i.e. dysfunction or spasm in the esophagus that would explain some of her swallowing problems.  ----- Message -----  From: Interface, Rad Results [place] In  Sent: 1/29/2024   4:04 PM EST  To: Darci ANAYA MD

## 2024-02-05 NOTE — TELEPHONE ENCOUNTER
Called pt and advised of Dr Kerns's note and recommendations. Verb understanding.     Pt would like to try medication. Advised Dr Kerns is out of the office the next couple of days, but will send message to to him for when he comes back so we order med.  Verb understanding.

## 2024-02-05 NOTE — TELEPHONE ENCOUNTER
If not tried we can initiate a trial of anticholinergic such as Levsin or Bentyl to see if smooth muscle relaxation will improve her swallowing ability.  He can be taken before meals as needed for spasm.       Called pt and left  for pt to call back.      Pt needs the resilt note and the above message.

## 2024-03-20 ENCOUNTER — HOSPITAL ENCOUNTER (OUTPATIENT)
Dept: PHYSICAL THERAPY | Facility: HOSPITAL | Age: 79
Discharge: HOME | End: 2024-03-20
Admitting: NURSE PRACTITIONER
Payer: MEDICARE

## 2024-03-20 DIAGNOSIS — M54.41 CHRONIC RIGHT-SIDED LOW BACK PAIN WITH RIGHT-SIDED SCIATICA: Primary | ICD-10-CM

## 2024-03-20 DIAGNOSIS — G89.29 CHRONIC RIGHT-SIDED LOW BACK PAIN WITH RIGHT-SIDED SCIATICA: Primary | ICD-10-CM

## 2024-03-20 DIAGNOSIS — Z98.1 HISTORY OF FUSION OF LUMBAR SPINE: ICD-10-CM

## 2024-03-20 PROCEDURE — 97164 PT RE-EVAL EST PLAN CARE: CPT | Performed by: PHYSICAL THERAPIST

## 2024-03-20 PROCEDURE — 97110 THERAPEUTIC EXERCISES: CPT | Performed by: PHYSICAL THERAPIST

## 2024-03-20 NOTE — THERAPY RE-EVALUATION
Outpatient Physical Therapy Ortho Re-Evaluation  [facility]     Patient Name: Davida Anderson  : 1945  MRN: [id number]  Today's Date: 3/20/2024      Visit Date: 2024    Patient Active Problem List   Diagnosis    BP (high blood pressure)    History of left breast infiltrating ductal cancer 2cm s/p mastectomy with reconstruction  s/p chemo    H/O ETOH abuse    Gtz's esophagus    Colon polyps    Bipolar 1 disorder    Arthritis    Raynaud's disease    Neuropathy    Lumbar spine pain    Spondylolisthesis at L4-L5 level    Right lumbar radiculopathy    Gtz's esophagus without dysplasia    Hx of adenomatous colonic polyps    Dysphagia    Diarrhea    Primary osteoarthritis of left knee    Primary localized osteoarthrosis of the knee, right    Chronic pain of left knee    Spinal stenosis of lumbar region with neurogenic claudication    Spondylolisthesis of lumbar region    Acute pain of left knee    Gastroesophageal reflux disease    Irritable bowel syndrome with constipation    Adenomatous polyp of colon    History of Gtz's esophagus        Past Medical History:   Diagnosis Date    Acid reflux     Arthritis     Atrial fibrillation     Gtz esophagus     Bipolar 2 disorder     Bradycardia     WITH SURGERY    COPD (chronic obstructive pulmonary disease)     MILD, USES AINHALER PRN    DDD (degenerative disc disease), lumbar     Diverticulosis     Dizziness     Drug therapy     Fibrocystic breast     Frequent UTI     SEES DR GOFF FOR FREQUENT UTI'S, ON KEFLEX    H/O Infiltrating ductal carcinoma of left breast     Stage IIA, Grade 3 ER/RI +, HER2 -, treated with 5 years of tamoxifen, 5 years of Femara, completed in     High cholesterol     History of alcoholism     40 YRS AGO    History of Clostridium difficile colitis     History of gastric ulcer     History of loop recorder     Hypertension     Iron deficiency     Irregular heartbeat     a fib    Low back pain     Lumbar  herniated disc     Mass of right breast on mammogram 03/17/2016    10 o'clock position, 4 cm from the nipple,    Neuropathy     PONV (postoperative nausea and vomiting)     Raynaud disease     Sleep apnea     MILD, NO NEED FOR CPAP    Tremors of nervous system     Urinary incontinence         Past Surgical History:   Procedure Laterality Date    ANKLE OPEN REDUCTION INTERNAL FIXATION Right 08/14/2015    Dr. Dandre Camacho, [facility]    BACK SURGERY      LUMBAR    BREAST BIOPSY      BREAST RECONSTRUCTION, BREAST TISSUE EXPANDER INSERTION Left 04/11/2002    [place] Contour Profile expander was placed 550 cc size, filled with 200 cc of saline, Dr. Herbert Napoles, [facility]    COLONOSCOPY N/A 09/16/2014    Dr. Darci Kerns, [facility]; torts, tics, stool, polyp    COLONOSCOPY N/A 09/27/2016    NTEH, diverticulosis, tortuous colon, Two 3-5mm polyps in the descending colon and the transverse colon, IH.  PATH: Tubular adenoma    COLONOSCOPY N/A 12/12/2017    NTEH, tics, torts, IH, TA w/low grade dysplasia    COLONOSCOPY N/A 12/01/2020    Procedure: COLONOSCOPY TO CECUM AND TI ;  Surgeon: Darci Kerns MD;  Location: [facility] ENDOSCOPY;  Service: Gastroenterology;  Laterality: N/A;  PRE- IBS, CONSTIPATION, HX POLYPS  POST- DEMINISHED SPHINCTER TONE, DIVERTICULOSIS, POORLY PREPPED COLON    CYSTOSCOPY N/A 05/07/2010    with TVT takedown, Dr. Simon Wall, [facility]    ENDOSCOPY N/A 09/27/2016    z line irreg, bilious gastric fluid- fluid aspiration preformed, gastritis.  PATH: Squamous and glandular mucosa with minimal superficial acute inflammation.     ENDOSCOPY N/A 12/12/2017    Z line irregular, gastritis, duodenitis, chronic inflammation    ENDOSCOPY N/A 12/01/2020    Procedure: ESOPHAGOGASTRODUODENOSCOPY WITH BIOPSIES;  Surgeon: Darci Kerns MD;  Location:  [facility] ENDOSCOPY;  Service: Gastroenterology;  Laterality: N/A;  PRE- REFLUX, DYSPHAGIA  POST- ESOPHAGITIS, GASTRITIS, SMALL HIATAL HERNIA, BILE REFLUX    ENDOSCOPY N/A  3/15/2023    Procedure: ESOPHAGOGASTRODUODENOSCOPY with biopsies;  Surgeon: Darci Kerns MD;  Location:  [facility] ENDOSCOPY;  Service: Gastroenterology;  Laterality: N/A;  pre:  reflux with breakthrough symptoms  post;  gastritis, mild esophagitis    EPIDURAL BLOCK      ESOPHAGEAL MANOMETRY N/A 02/02/2021    Procedure: ESOPHAGEAL MANOMETRY;  Surgeon: Tran, Nurse Performed;  Location:  [facility] ENDOSCOPY;  Service: Gastroenterology;  Laterality: N/A;  DYSPHAGIA    KNEE SURGERY Left     BROKEN    LUMBAR DISCECTOMY FUSION INSTRUMENTATION N/A 12/03/2018    Procedure: L4-5 laminectomy and fusion with instrumentation;  Surgeon: Austin Keith MD;  Location: [facility] MAIN OR;  Service: Orthopedic Spine    MAMMO US BREAST BIOPSY ADDITIONAL W WO DEVICE Left 02/21/2002    2:00 position left breast, Infiltrating Ductal Carcinoma, [facility]    MANDIBLE FRACTURE SURGERY      MASTECTOMY Left 04/11/2002    Left Total Mastectomy and Left Axillary [place] Node Biobsy, Dr. Indu Akins, [facility]    OTHER SURGICAL HISTORY      CARDIAC LOOP DEVICE, LEFT CHEST    TENSION FREE VAGINAL TAPING WITH MINI ARC SLING N/A 10/26/2009    Dr. Gina Castillo, [facility]    TOTAL KNEE ARTHROPLASTY Left 10/21/2019    Procedure: TOTAL KNEE ARTHROPLASTY;  Surgeon: Anurag Serrano MD;  Location: [facility] MAIN OR;  Service: Orthopedics    UPPER GASTROINTESTINAL ENDOSCOPY  09/16/2014    z-line irreg, grade a reflux, gastritis       Visit Dx:     ICD-10-CM ICD-9-CM   1. Chronic right-sided low back pain with right-sided sciatica  M54.41 724.2    G89.29 724.3     338.29   2. History of fusion of lumbar spine  Z98.1 V45.4              PT Ortho       Row Name 03/20/24 1000       Subjective    Subjective Comments Patient returning to PT 10 months later seeking more formal PT. States she had some personal issues while trying to self guide her HEP including her son having heart surgery and her  getting sick. She continues to c/o pain in her back but primarily in her R  hip.  When she is in pain she is limited in her ability to complete a walk 1.5 miles or more.  She does get some relief from tylenol and ice. She continues to get epidurals every 3 months and has one scheduled for next week. She does have new onset intermittent in her thighs (both) if crossing her legs; occasional cramping in the calfs and occasional paresthesias in her feet.  -GR       Neural Tension Signs- Lower Quarter Clearing    SLR Right:;Positive  -GR       Myotomal Screen- Lower Quarter Clearing    Hip flexion (L2) Bilateral:;4 (Good)  -GR    Knee extension (L3) Bilateral:;4+ (Good +)  -GR    Ankle DF (L4) Bilateral:;4+ (Good +)  -GR    Knee flexion (S2) Bilateral:;4 (Good)  -GR       Lumbar ROM Screen- Lower Quarter Clearing    Lumbar Flexion Impaired  -GR    Lumbar Extension Normal  -GR    Lumbar Lateral Flexion Impaired  25% limited to L; WNL to R  -GR    Lumbar Rotation Impaired  25% impaired L  -GR              User Key  (r) = Recorded By, (t) = Taken By, (c) = Cosigned By      Initials Name Provider Type    GR Jaswinder Goodwin, PT Physical Therapist                                       PT OP Goals       Row Name 03/20/24 1000          PT Short Term Goals    STG Date to Achieve 04/19/24  -     STG 1 Patient will be independent with initial HEP.  -     STG 1 Progress Ongoing  -     STG 2 Patient will report 50% improvement in pain with supine<>sit bed transfers.  -     STG 2 Progress Ongoing  -        Long Term Goals    LTG Date to Achieve 05/19/24  -     LTG 1 Patient will be independent with progressive HEP for long term condition management.  -     LTG 1 Progress Ongoing  -     LTG 2 Patient will score </=36% disability on the modified LIZ to indicate improved perceived ADL performance.  -     LTG 2 Progress Goal Revised  -     LTG 3 Patient will resume recreational walking with well controlled pain to reintegrate into the community.  -     LTG 3 Progress Ongoing  -         Time Calculation    PT Goal Re-Cert Due Date 06/18/24  -GR               User Key  (r) = Recorded By, (t) = Taken By, (c) = Cosigned By      Initials Name Provider Type    Jaswinder Hinson, PT Physical Therapist                     PT Assessment/Plan       Row Name 03/20/24 1000          PT Assessment    Assessment Comments Patient re-evaluated today 10 months after evaluation with similar c/o pain and having difficulty with self guided HEP completion. She does present with a reduction in her lumbar AROM compared to her previous visit and could benefit from skilled PT intervention to achieve STG and LTGs related to her functional mobility. Recommend 2x/week x 4 weeks.  -GR        PT Plan    PT Plan Comments Progress core stabillity, add hs stretches.  -GR               User Key  (r) = Recorded By, (t) = Taken By, (c) = Cosigned By      Initials Name Provider Type    Jaswinder Hinosn, PT Physical Therapist                       OP Exercises       Row Name 03/20/24 1000             Subjective    Subjective Comments Patient returning to PT 10 months later seeking more formal PT. States she had some personal issues while trying to self guide her HEP including her son having heart surgery and her  getting sick. She continues to c/o pain in her back but primarily in her R hip.  When she is in pain she is limited in her ability to complete a walk 1.5 miles or more.  She does get some relief from tylenol and ice. She continues to get epidurals every 3 months and has one scheduled for next week. She does have new onset intermittent in her thighs (both) if crossing her legs; occasional cramping in the calfs and occasional paresthesias in her feet.  -GR         Subjective Pain    Able to rate subjective pain? yes  -GR      Pre-Treatment Pain Level 2  -GR      Post-Treatment Pain Level 2  -GR      Subjective Pain Comment 7/10 at worst, 2/10 at best  -GR         Total Minutes    18400 - PT Therapeutic Exercise  Minutes 33  -GR         Exercise 1    Exercise Name 1 Nustep  -GR      Time 1 5 min  -GR      Additional Comments L3  -GR         Exercise 2    Exercise Name 2 PPT  -GR      Cueing 2 Demo  -GR      Sets 2 1  -GR      Reps 2 20  -GR      Time 2 5 sec  -GR         Exercise 3    Exercise Name 3 LTR  -GR      Cueing 3 Demo  -GR      Sets 3 1  -GR      Reps 3 20  -GR      Additional Comments panfree range  -GR         Exercise 4    Exercise Name 4 HL adduction  -GR      Cueing 4 Demo  -GR      Sets 4 1  -GR      Reps 4 20  -GR      Time 4 ball  -GR         Exercise 5    Exercise Name 5 HL clam RTB  -GR      Cueing 5 Demo  -GR      Sets 5 1  -GR      Reps 5 20  -GR         Exercise 6    Exercise Name 6 Piriformis stretch ER/IR, HL  -GR      Cueing 6 Demo  -GR      Sets 6 1  -GR      Reps 6 3  -GR      Time 6 20 seconds  -GR         Exercise 7    Exercise Name 7 --  -GR      Cueing 7 --  -GR      Sets 7 --  -GR      Reps 7 --  -GR      Time 7 --  -GR                User Key  (r) = Recorded By, (t) = Taken By, (c) = Cosigned By      Initials Name Provider Type    GR Jaswinder Goodwin, PT Physical Therapist                                  Outcome Measure Options: Modified Oswestry  Modified Oswestry  Modified Oswestry Score/Comments: 46% ability      Time Calculation:     Start Time: 1000  Stop Time: 1045  Time Calculation (min): 45 min  Total Timed Code Minutes- PT: 33 minute(s)  Timed Charges  00974 - PT Therapeutic Exercise Minutes: 33  Untimed Charges  PT Eval/Re-eval Minutes: 12  Total Minutes  Timed Charges Total Minutes: 33  Untimed Charges Total Minutes: 12   Total Minutes: 12     Therapy Charges for Today       Code Description Service Date Service Provider Modifiers Qty    04594149089 HC PT THER PROC EA 15 MIN 3/20/2024 Jaswinder Goodwin, PT GP 2    04077984373 HC PT RE-EVAL ESTABLISHED PLAN 2 3/20/2024 Jaswinder Goodwin, PT GP 1            PT G-Codes  Outcome Measure Options: Modified Oswestry  Modified Oswestry  Score/Comments: 46% ability         Jaswinder Goodwin, PT  3/20/2024

## 2024-03-22 ENCOUNTER — HOSPITAL ENCOUNTER (OUTPATIENT)
Dept: PHYSICAL THERAPY | Facility: HOSPITAL | Age: 79
Setting detail: THERAPIES SERIES
Discharge: HOME | End: 2024-03-22
Payer: MEDICARE

## 2024-03-22 DIAGNOSIS — M54.41 CHRONIC RIGHT-SIDED LOW BACK PAIN WITH RIGHT-SIDED SCIATICA: Primary | ICD-10-CM

## 2024-03-22 DIAGNOSIS — Z98.1 HISTORY OF FUSION OF LUMBAR SPINE: ICD-10-CM

## 2024-03-22 DIAGNOSIS — G89.29 CHRONIC RIGHT-SIDED LOW BACK PAIN WITH RIGHT-SIDED SCIATICA: Primary | ICD-10-CM

## 2024-03-22 PROCEDURE — 97110 THERAPEUTIC EXERCISES: CPT

## 2024-03-22 NOTE — THERAPY TREATMENT NOTE
Outpatient Physical Therapy Ortho Treatment Note  [facility]     Patient Name: Davida Anderson  : 1945  MRN: [id number]  Today's Date: 3/22/2024      Visit Date: 2024    Visit Dx:    ICD-10-CM ICD-9-CM   1. Chronic right-sided low back pain with right-sided sciatica  M54.41 724.2    G89.29 724.3     338.29   2. History of fusion of lumbar spine  Z98.1 V45.4       Patient Active Problem List   Diagnosis    BP (high blood pressure)    History of left breast infiltrating ductal cancer 2cm s/p mastectomy with reconstruction  s/p chemo    H/O ETOH abuse    Gtz's esophagus    Colon polyps    Bipolar 1 disorder    Arthritis    Raynaud's disease    Neuropathy    Lumbar spine pain    Spondylolisthesis at L4-L5 level    Right lumbar radiculopathy    Gtz's esophagus without dysplasia    Hx of adenomatous colonic polyps    Dysphagia    Diarrhea    Primary osteoarthritis of left knee    Primary localized osteoarthrosis of the knee, right    Chronic pain of left knee    Spinal stenosis of lumbar region with neurogenic claudication    Spondylolisthesis of lumbar region    Acute pain of left knee    Gastroesophageal reflux disease    Irritable bowel syndrome with constipation    Adenomatous polyp of colon    History of Gtz's esophagus        Past Medical History:   Diagnosis Date    Acid reflux     Arthritis     Atrial fibrillation     Gtz esophagus     Bipolar 2 disorder     Bradycardia     WITH SURGERY    COPD (chronic obstructive pulmonary disease)     MILD, USES AINHALER PRN    DDD (degenerative disc disease), lumbar     Diverticulosis     Dizziness     Drug therapy     Fibrocystic breast     Frequent UTI     SEES DR GOFF FOR FREQUENT UTI'S, ON KEFLEX    H/O Infiltrating ductal carcinoma of left breast     Stage IIA, Grade 3 ER/DC +, HER2 -, treated with 5 years of tamoxifen, 5 years of Femara, completed in     High cholesterol     History of alcoholism     40 YRS AGO    History  of Clostridium difficile colitis 2014    History of gastric ulcer     History of loop recorder     Hypertension     Iron deficiency     Irregular heartbeat     a fib    Low back pain     Lumbar herniated disc     Mass of right breast on mammogram 03/17/2016    10 o'clock position, 4 cm from the nipple,    Neuropathy     PONV (postoperative nausea and vomiting)     Raynaud disease     Sleep apnea     MILD, NO NEED FOR CPAP    Tremors of nervous system     Urinary incontinence         Past Surgical History:   Procedure Laterality Date    ANKLE OPEN REDUCTION INTERNAL FIXATION Right 08/14/2015    Dr. Dandre Camacho, [facility]    BACK SURGERY      LUMBAR    BREAST BIOPSY      BREAST RECONSTRUCTION, BREAST TISSUE EXPANDER INSERTION Left 04/11/2002    [place] Contour Profile expander was placed 550 cc size, filled with 200 cc of saline, Dr. Herbert aNpoles, [facility]    COLONOSCOPY N/A 09/16/2014    Dr. Darci Kerns, [facility]; torts, tics, stool, polyp    COLONOSCOPY N/A 09/27/2016    NTEH, diverticulosis, tortuous colon, Two 3-5mm polyps in the descending colon and the transverse colon, IH.  PATH: Tubular adenoma    COLONOSCOPY N/A 12/12/2017    NTEH, tics, torts, IH, TA w/low grade dysplasia    COLONOSCOPY N/A 12/01/2020    Procedure: COLONOSCOPY TO CECUM AND TI ;  Surgeon: Darci Kerns MD;  Location: [facility] ENDOSCOPY;  Service: Gastroenterology;  Laterality: N/A;  PRE- IBS, CONSTIPATION, HX POLYPS  POST- DEMINISHED SPHINCTER TONE, DIVERTICULOSIS, POORLY PREPPED COLON    CYSTOSCOPY N/A 05/07/2010    with TVT takedown, Dr. Simon Wall, [facility]    ENDOSCOPY N/A 09/27/2016    z line irreg, bilious gastric fluid- fluid aspiration preformed, gastritis.  PATH: Squamous and glandular mucosa with minimal superficial acute inflammation.     ENDOSCOPY N/A 12/12/2017    Z line irregular, gastritis, duodenitis, chronic inflammation    ENDOSCOPY N/A 12/01/2020    Procedure: ESOPHAGOGASTRODUODENOSCOPY WITH BIOPSIES;  Surgeon: Darci Kerns  MD HÉCTOR;  Location: [facility] ENDOSCOPY;  Service: Gastroenterology;  Laterality: N/A;  PRE- REFLUX, DYSPHAGIA  POST- ESOPHAGITIS, GASTRITIS, SMALL HIATAL HERNIA, BILE REFLUX    ENDOSCOPY N/A 3/15/2023    Procedure: ESOPHAGOGASTRODUODENOSCOPY with biopsies;  Surgeon: Darci Kerns MD;  Location: [facility] ENDOSCOPY;  Service: Gastroenterology;  Laterality: N/A;  pre:  reflux with breakthrough symptoms  post;  gastritis, mild esophagitis    EPIDURAL BLOCK      ESOPHAGEAL MANOMETRY N/A 02/02/2021    Procedure: ESOPHAGEAL MANOMETRY;  Surgeon: Tran, Nurse Performed;  Location: [facility] ENDOSCOPY;  Service: Gastroenterology;  Laterality: N/A;  DYSPHAGIA    KNEE SURGERY Left     BROKEN    LUMBAR DISCECTOMY FUSION INSTRUMENTATION N/A 12/03/2018    Procedure: L4-5 laminectomy and fusion with instrumentation;  Surgeon: Austin Keith MD;  Location: [facility] OR;  Service: Orthopedic Spine    MAMMO US BREAST BIOPSY ADDITIONAL W WO DEVICE Left 02/21/2002    2:00 position left breast, Infiltrating Ductal Carcinoma, [facility]    MANDIBLE FRACTURE SURGERY      MASTECTOMY Left 04/11/2002    Left Total Mastectomy and Left Axillary [place] Node Biobsy, Dr. Indu Akins, [facility]    OTHER SURGICAL HISTORY      CARDIAC LOOP DEVICE, LEFT CHEST    TENSION FREE VAGINAL TAPING WITH MINI ARC SLING N/A 10/26/2009    Dr. Gina Castillo, [facility]    TOTAL KNEE ARTHROPLASTY Left 10/21/2019    Procedure: TOTAL KNEE ARTHROPLASTY;  Surgeon: Anurag Serrano MD;  Location: [facility] OR;  Service: Orthopedics    UPPER GASTROINTESTINAL ENDOSCOPY  09/16/2014    z-line irreg, grade a reflux, gastritis                        PT Assessment/Plan       Row Name 03/22/24 1600          PT Assessment    Assessment Comments Ms. Anderson returns to PT for her first f/u since re-evaluation for low back pain, reporting good adherence to HEP and denies any significant changes in symptoms. Reviewed all initial exercises as well as added TrA activation to all functional  activities with addition of bridges, supine march, and STS, all with great tolerance. Updated HEP this date, discussed daily stretching with strength every other day. Will continue to progress as appropriate.  -MO        PT Plan    PT Plan Comments how was injection? AR press, SLR with TrA, lateral walking, monster walk  -MO               User Key  (r) = Recorded By, (t) = Taken By, (c) = Cosigned By      Initials Name Provider Type    Cindy Irizarry, PT Physical Therapist                       OP Exercises       Row Name 03/22/24 1400             Subjective    Subjective Comments Feeling good  -MO         Total Minutes    81837 - PT Therapeutic Exercise Minutes 38  -MO         Exercise 1    Exercise Name 1 Nustep  -MO      Time 1 5 min  -MO      Additional Comments lvl 4  -MO         Exercise 2    Exercise Name 2 PPT w/ TrA  -MO      Cueing 2 Verbal;Tactile  -MO      Sets 2 1  -MO      Reps 2 15  -MO      Time 2 3s  -MO         Exercise 3    Exercise Name 3 LTR  -MO      Cueing 3 Verbal  -MO      Sets 3 1  -MO      Reps 3 10  -MO      Time 3 3s  -MO         Exercise 4    Exercise Name 4 HL adduction  -MO      Cueing 4 Verbal  -MO      Sets 4 1  -MO      Reps 4 20  -MO      Time 4 5s- ball  -MO         Exercise 5    Exercise Name 5 HL clam RTB  -MO      Cueing 5 Verbal  -MO      Sets 5 1  -MO      Reps 5 20  -MO         Exercise 6    Exercise Name 6 Piriformis stretch  -MO      Cueing 6 Verbal  -MO      Reps 6 3 BL  -MO      Time 6 20 seconds  -MO         Exercise 7    Exercise Name 7 PPT + bridge  -MO      Cueing 7 Verbal  -MO      Sets 7 2  -MO      Reps 7 10  -MO      Time 7 cu  -MO         Exercise 8    Exercise Name 8 supine march + PPT  -MO      Cueing 8 Verbal  -MO      Sets 8 2  -MO      Reps 8 20 (10e alternating)  -MO         Exercise 9    Exercise Name 9 STS  -MO      Cueing 9 Verbal;Demo  -MO      Sets 9 2  -MO      Reps 9 10  -MO      Time 9 cueing for TrA  -MO                User Key  (r) = Recorded  By, (t) = Taken By, (c) = Cosigned By      Initials Name Provider Type    Cindy Irizarry, PT Physical Therapist                                  PT OP Goals       Row Name 03/22/24 1600          PT Short Term Goals    STG Date to Achieve 04/19/24  -MO     STG 1 Patient will be independent with initial HEP.  -MO     STG 1 Progress Ongoing  -MO     STG 2 Patient will report 50% improvement in pain with supine<>sit bed transfers.  -MO     STG 2 Progress Ongoing  -MO        Long Term Goals    LTG Date to Achieve 05/19/24  -MO     LTG 1 Patient will be independent with progressive HEP for long term condition management.  -MO     LTG 1 Progress Ongoing  -MO     LTG 2 Patient will score </=36% disability on the modified LIZ to indicate improved perceived ADL performance.  -MO     LTG 2 Progress Goal Revised  -MO     LTG 3 Patient will resume recreational walking with well controlled pain to reintegrate into the community.  -MO     LTG 3 Progress Ongoing  -MO               User Key  (r) = Recorded By, (t) = Taken By, (c) = Cosigned By      Initials Name Provider Type    Cindy Irizarry, PT Physical Therapist                                   Time Calculation:   Start Time: 1452  Stop Time: 1530  Time Calculation (min): 38 min  Timed Charges  24436 - PT Therapeutic Exercise Minutes: 38  Total Minutes  Timed Charges Total Minutes: 38   Total Minutes: 38  Therapy Charges for Today       Code Description Service Date Service Provider Modifiers Qty    99506156327  PT THER PROC EA 15 MIN 3/22/2024 Cindy Hammonds, PT GP 3                      Cindy Hammonds PT  3/22/2024

## 2024-03-29 ENCOUNTER — HOSPITAL ENCOUNTER (OUTPATIENT)
Dept: PHYSICAL THERAPY | Facility: HOSPITAL | Age: 79
Setting detail: THERAPIES SERIES
Discharge: HOME | End: 2024-03-29
Payer: MEDICARE

## 2024-03-29 DIAGNOSIS — Z98.1 HISTORY OF FUSION OF LUMBAR SPINE: ICD-10-CM

## 2024-03-29 DIAGNOSIS — M54.41 CHRONIC RIGHT-SIDED LOW BACK PAIN WITH RIGHT-SIDED SCIATICA: Primary | ICD-10-CM

## 2024-03-29 DIAGNOSIS — G89.29 CHRONIC RIGHT-SIDED LOW BACK PAIN WITH RIGHT-SIDED SCIATICA: Primary | ICD-10-CM

## 2024-03-29 PROCEDURE — 97110 THERAPEUTIC EXERCISES: CPT

## 2024-03-29 NOTE — THERAPY TREATMENT NOTE
Outpatient Physical Therapy Ortho Treatment Note  [facility]     Patient Name: Davida Anderson  : 1945  MRN: [id number]  Today's Date: 3/29/2024      Visit Date: 2024    Visit Dx:    ICD-10-CM ICD-9-CM   1. Chronic right-sided low back pain with right-sided sciatica  M54.41 724.2    G89.29 724.3     338.29   2. History of fusion of lumbar spine  Z98.1 V45.4       Patient Active Problem List   Diagnosis    BP (high blood pressure)    History of left breast infiltrating ductal cancer 2cm s/p mastectomy with reconstruction  s/p chemo    H/O ETOH abuse    Gtz's esophagus    Colon polyps    Bipolar 1 disorder    Arthritis    Raynaud's disease    Neuropathy    Lumbar spine pain    Spondylolisthesis at L4-L5 level    Right lumbar radiculopathy    Gtz's esophagus without dysplasia    Hx of adenomatous colonic polyps    Dysphagia    Diarrhea    Primary osteoarthritis of left knee    Primary localized osteoarthrosis of the knee, right    Chronic pain of left knee    Spinal stenosis of lumbar region with neurogenic claudication    Spondylolisthesis of lumbar region    Acute pain of left knee    Gastroesophageal reflux disease    Irritable bowel syndrome with constipation    Adenomatous polyp of colon    History of Gtz's esophagus        Past Medical History:   Diagnosis Date    Acid reflux     Arthritis     Atrial fibrillation     Gtz esophagus     Bipolar 2 disorder     Bradycardia     WITH SURGERY    COPD (chronic obstructive pulmonary disease)     MILD, USES AINHALER PRN    DDD (degenerative disc disease), lumbar     Diverticulosis     Dizziness     Drug therapy     Fibrocystic breast     Frequent UTI     SEES DR GOFF FOR FREQUENT UTI'S, ON KEFLEX    H/O Infiltrating ductal carcinoma of left breast     Stage IIA, Grade 3 ER/MA +, HER2 -, treated with 5 years of tamoxifen, 5 years of Femara, completed in     High cholesterol     History of alcoholism     40 YRS AGO    History  of Clostridium difficile colitis 2014    History of gastric ulcer     History of loop recorder     Hypertension     Iron deficiency     Irregular heartbeat     a fib    Low back pain     Lumbar herniated disc     Mass of right breast on mammogram 03/17/2016    10 o'clock position, 4 cm from the nipple,    Neuropathy     PONV (postoperative nausea and vomiting)     Raynaud disease     Sleep apnea     MILD, NO NEED FOR CPAP    Tremors of nervous system     Urinary incontinence         Past Surgical History:   Procedure Laterality Date    ANKLE OPEN REDUCTION INTERNAL FIXATION Right 08/14/2015    Dr. Dandre Camacho, [facility]    BACK SURGERY      LUMBAR    BREAST BIOPSY      BREAST RECONSTRUCTION, BREAST TISSUE EXPANDER INSERTION Left 04/11/2002    [place] Contour Profile expander was placed 550 cc size, filled with 200 cc of saline, Dr. Herbert Napoles, [facility]    COLONOSCOPY N/A 09/16/2014    Dr. Darci Kerns, [facility]; torts, tics, stool, polyp    COLONOSCOPY N/A 09/27/2016    NTEH, diverticulosis, tortuous colon, Two 3-5mm polyps in the descending colon and the transverse colon, IH.  PATH: Tubular adenoma    COLONOSCOPY N/A 12/12/2017    NTEH, tics, torts, IH, TA w/low grade dysplasia    COLONOSCOPY N/A 12/01/2020    Procedure: COLONOSCOPY TO CECUM AND TI ;  Surgeon: Darci Kerns MD;  Location: [facility] ENDOSCOPY;  Service: Gastroenterology;  Laterality: N/A;  PRE- IBS, CONSTIPATION, HX POLYPS  POST- DEMINISHED SPHINCTER TONE, DIVERTICULOSIS, POORLY PREPPED COLON    CYSTOSCOPY N/A 05/07/2010    with TVT takedown, Dr. Simon Wall, [facility]    ENDOSCOPY N/A 09/27/2016    z line irreg, bilious gastric fluid- fluid aspiration preformed, gastritis.  PATH: Squamous and glandular mucosa with minimal superficial acute inflammation.     ENDOSCOPY N/A 12/12/2017    Z line irregular, gastritis, duodenitis, chronic inflammation    ENDOSCOPY N/A 12/01/2020    Procedure: ESOPHAGOGASTRODUODENOSCOPY WITH BIOPSIES;  Surgeon: Darci Kerns  MD HÉCTOR;  Location: [facility] ENDOSCOPY;  Service: Gastroenterology;  Laterality: N/A;  PRE- REFLUX, DYSPHAGIA  POST- ESOPHAGITIS, GASTRITIS, SMALL HIATAL HERNIA, BILE REFLUX    ENDOSCOPY N/A 3/15/2023    Procedure: ESOPHAGOGASTRODUODENOSCOPY with biopsies;  Surgeon: Darci Kerns MD;  Location: [facility] ENDOSCOPY;  Service: Gastroenterology;  Laterality: N/A;  pre:  reflux with breakthrough symptoms  post;  gastritis, mild esophagitis    EPIDURAL BLOCK      ESOPHAGEAL MANOMETRY N/A 02/02/2021    Procedure: ESOPHAGEAL MANOMETRY;  Surgeon: Tran, Nurse Performed;  Location: [facility] ENDOSCOPY;  Service: Gastroenterology;  Laterality: N/A;  DYSPHAGIA    KNEE SURGERY Left     BROKEN    LUMBAR DISCECTOMY FUSION INSTRUMENTATION N/A 12/03/2018    Procedure: L4-5 laminectomy and fusion with instrumentation;  Surgeon: Austin Keith MD;  Location: [facility] OR;  Service: Orthopedic Spine    MAMMO US BREAST BIOPSY ADDITIONAL W WO DEVICE Left 02/21/2002    2:00 position left breast, Infiltrating Ductal Carcinoma, [facility]    MANDIBLE FRACTURE SURGERY      MASTECTOMY Left 04/11/2002    Left Total Mastectomy and Left Axillary [place] Node Biobsy, Dr. Indu Akins, [facility]    OTHER SURGICAL HISTORY      CARDIAC LOOP DEVICE, LEFT CHEST    TENSION FREE VAGINAL TAPING WITH MINI ARC SLING N/A 10/26/2009    Dr. Gina Castillo, [facility]    TOTAL KNEE ARTHROPLASTY Left 10/21/2019    Procedure: TOTAL KNEE ARTHROPLASTY;  Surgeon: Anurag Serrano MD;  Location: [facility] OR;  Service: Orthopedics    UPPER GASTROINTESTINAL ENDOSCOPY  09/16/2014    z-line irreg, grade a reflux, gastritis                        PT Assessment/Plan       Row Name 03/29/24 1400          PT Assessment    Assessment Comments Ms. Anderson returns to PT this date reporting soreness for ~2 days following last session, little additional HEP completion as she also had epidural on Tuesday and has had some lingering soreness following. Spent additional time stretching this date  with addition of swiss ball roll out and HS stretch, transitioned to supine HS stretch as she had onset of L low back spasm with standing, alleviated quickly with piriformis stretch. Added standing march with emphasis on deep core control as well as progressed supine march to march > straight leg with alt arm tap, all remaining exercises completed with good tolerance. Did not update HEP to ensure adequate tolerance to current program, reviewed daily stretching with strength completion every other day. Will continue to progress as appropriate.  -MO        PT Plan    PT Plan Comments how has head/HA symptoms been since last time? Back okay? Could add resistance to STS, AR press, lateral walking  -MO               User Key  (r) = Recorded By, (t) = Taken By, (c) = Cosigned By      Initials Name Provider Type    Cindy Irizarry, PT Physical Therapist                       OP Exercises       Row Name 03/29/24 1300             Subjective    Subjective Comments Had epidural on Tuesday. Had a reaction to the steroid the following day with face pain and headache. Has been using ice in the morning and OTC pain meds on back. Was sore for about 2 days after last session then didn't do my exercises after the injection  -MO         Total Minutes    71355 - PT Therapeutic Exercise Minutes 40  -MO         Exercise 1    Exercise Name 1 Nustep  -MO      Time 1 5 min  -MO      Additional Comments lvl 5  -MO         Exercise 2    Exercise Name 2 PPT w/ TrA  -MO      Cueing 2 Verbal  -MO      Sets 2 1  -MO      Reps 2 15  -MO      Time 2 3s  -MO         Exercise 3    Exercise Name 3 LTR  -MO      Cueing 3 Verbal  -MO      Sets 3 1B  -MO      Reps 3 10  -MO      Time 3 3s  -MO         Exercise 4    Exercise Name 4 HL adduction  -MO      Cueing 4 Verbal  -MO      Sets 4 1  -MO      Reps 4 20  -MO      Time 4 5s- ball  -MO         Exercise 5    Exercise Name 5 HL clam RTB  -MO      Cueing 5 Verbal  -MO      Sets 5 1  -MO      Reps 5 20   -MO         Exercise 6    Exercise Name 6 Piriformis stretch  -MO      Cueing 6 Verbal  -MO      Reps 6 3 BL  -MO      Time 6 20 seconds  -MO         Exercise 7    Exercise Name 7 PPT + bridge  -MO      Cueing 7 Verbal  -MO      Sets 7 2  -MO      Reps 7 10  -MO         Exercise 8    Exercise Name 8 supine march to straight leg + opposite arm tap  -MO      Cueing 8 Verbal  -MO      Sets 8 2B  -MO      Reps 8 10  -MO         Exercise 9    Exercise Name 9 STS  -MO      Cueing 9 Verbal  -MO      Sets 9 1  -MO      Reps 9 20  -MO      Time 9 cueing for TrA  -MO         Exercise 10    Exercise Name 10 swiss ball roll out  -MO      Cueing 10 Verbal;Demo  -MO      Sets 10 1 fwd  -MO      Reps 10 10  -MO         Exercise 11    Exercise Name 11 standing HS stretch /  supine HS stretch with strap  -MO      Cueing 11 Verbal;Demo  -MO      Sets 11 1B standing, 2B supine  -MO      Time 11 20s  -MO      Additional Comments onset of L back/hhip cramp witih standing HS stretch  -MO         Exercise 12    Exercise Name 12 standing alt march with TrA  -MO      Cueing 12 Verbal;Demo  -MO      Reps 12 2 x20 (alternating, 2x10 BL)  -MO      Time 12 1UE on barre  -MO                User Key  (r) = Recorded By, (t) = Taken By, (c) = Cosigned By      Initials Name Provider Type    Cindy Irizarry, PT Physical Therapist                                  PT OP Goals       Row Name 03/29/24 1400          PT Short Term Goals    STG Date to Achieve 04/19/24  -MO     STG 1 Patient will be independent with initial HEP.  -MO     STG 1 Progress Ongoing  -MO     STG 2 Patient will report 50% improvement in pain with supine<>sit bed transfers.  -MO     STG 2 Progress Ongoing  -MO        Long Term Goals    LTG Date to Achieve 05/19/24  -MO     LTG 1 Patient will be independent with progressive HEP for long term condition management.  -MO     LTG 1 Progress Ongoing  -MO     LTG 2 Patient will score </=36% disability on the modified LIZ to indicate  improved perceived ADL performance.  -MO     LTG 2 Progress Goal Revised  -MO     LTG 3 Patient will resume recreational walking with well controlled pain to reintegrate into the community.  -MO     LTG 3 Progress Ongoing  -MO               User Key  (r) = Recorded By, (t) = Taken By, (c) = Cosigned By      Initials Name Provider Type    Cindy Irizarry, PT Physical Therapist                                   Time Calculation:   Start Time: 1357  Stop Time: 1437  Time Calculation (min): 40 min  Timed Charges  72406 - PT Therapeutic Exercise Minutes: 40  Total Minutes  Timed Charges Total Minutes: 40   Total Minutes: 40  Therapy Charges for Today       Code Description Service Date Service Provider Modifiers Qty    11077587580 HC PT THER PROC EA 15 MIN 3/29/2024 Cindy Hammonds, PT GP 3                      Cindy Hammonds PT  3/29/2024

## 2024-04-03 ENCOUNTER — HOSPITAL ENCOUNTER (OUTPATIENT)
Dept: PHYSICAL THERAPY | Facility: HOSPITAL | Age: 79
Setting detail: THERAPIES SERIES
Discharge: HOME | End: 2024-04-03
Payer: MEDICARE

## 2024-04-03 DIAGNOSIS — M54.41 CHRONIC RIGHT-SIDED LOW BACK PAIN WITH RIGHT-SIDED SCIATICA: Primary | ICD-10-CM

## 2024-04-03 DIAGNOSIS — Z98.1 HISTORY OF FUSION OF LUMBAR SPINE: ICD-10-CM

## 2024-04-03 DIAGNOSIS — G89.29 CHRONIC RIGHT-SIDED LOW BACK PAIN WITH RIGHT-SIDED SCIATICA: Primary | ICD-10-CM

## 2024-04-03 PROCEDURE — 97110 THERAPEUTIC EXERCISES: CPT | Performed by: PHYSICAL THERAPIST

## 2024-04-03 NOTE — THERAPY TREATMENT NOTE
Outpatient Physical Therapy Ortho Treatment Note  [facility]     Patient Name: Davida Anderson  : 1945  MRN: [id number]  Today's Date: 4/3/2024      Visit Date: 2024    Visit Dx:    ICD-10-CM ICD-9-CM   1. Chronic right-sided low back pain with right-sided sciatica  M54.41 724.2    G89.29 724.3     338.29   2. History of fusion of lumbar spine  Z98.1 V45.4       Patient Active Problem List   Diagnosis    BP (high blood pressure)    History of left breast infiltrating ductal cancer 2cm s/p mastectomy with reconstruction  s/p chemo    H/O ETOH abuse    Gtz's esophagus    Colon polyps    Bipolar 1 disorder    Arthritis    Raynaud's disease    Neuropathy    Lumbar spine pain    Spondylolisthesis at L4-L5 level    Right lumbar radiculopathy    Gtz's esophagus without dysplasia    Hx of adenomatous colonic polyps    Dysphagia    Diarrhea    Primary osteoarthritis of left knee    Primary localized osteoarthrosis of the knee, right    Chronic pain of left knee    Spinal stenosis of lumbar region with neurogenic claudication    Spondylolisthesis of lumbar region    Acute pain of left knee    Gastroesophageal reflux disease    Irritable bowel syndrome with constipation    Adenomatous polyp of colon    History of Gtz's esophagus        Past Medical History:   Diagnosis Date    Acid reflux     Arthritis     Atrial fibrillation     Gtz esophagus     Bipolar 2 disorder     Bradycardia     WITH SURGERY    COPD (chronic obstructive pulmonary disease)     MILD, USES AINHALER PRN    DDD (degenerative disc disease), lumbar     Diverticulosis     Dizziness     Drug therapy     Fibrocystic breast     Frequent UTI     SEES DR GOFF FOR FREQUENT UTI'S, ON KEFLEX    H/O Infiltrating ductal carcinoma of left breast     Stage IIA, Grade 3 ER/AK +, HER2 -, treated with 5 years of tamoxifen, 5 years of Femara, completed in     High cholesterol     History of alcoholism     40 YRS AGO    History of  Clostridium difficile colitis 2014    History of gastric ulcer     History of loop recorder     Hypertension     Iron deficiency     Irregular heartbeat     a fib    Low back pain     Lumbar herniated disc     Mass of right breast on mammogram 03/17/2016    10 o'clock position, 4 cm from the nipple,    Neuropathy     PONV (postoperative nausea and vomiting)     Raynaud disease     Sleep apnea     MILD, NO NEED FOR CPAP    Tremors of nervous system     Urinary incontinence         Past Surgical History:   Procedure Laterality Date    ANKLE OPEN REDUCTION INTERNAL FIXATION Right 08/14/2015    Dr. Dandre Camacho, [facility]    BACK SURGERY      LUMBAR    BREAST BIOPSY      BREAST RECONSTRUCTION, BREAST TISSUE EXPANDER INSERTION Left 04/11/2002    [place] Contour Profile expander was placed 550 cc size, filled with 200 cc of saline, Dr. Herbert Napoles, [facility]    COLONOSCOPY N/A 09/16/2014    Dr. Darci Kerns, [facility]; torts, tics, stool, polyp    COLONOSCOPY N/A 09/27/2016    NTEH, diverticulosis, tortuous colon, Two 3-5mm polyps in the descending colon and the transverse colon, IH.  PATH: Tubular adenoma    COLONOSCOPY N/A 12/12/2017    NTEH, tics, torts, IH, TA w/low grade dysplasia    COLONOSCOPY N/A 12/01/2020    Procedure: COLONOSCOPY TO CECUM AND TI ;  Surgeon: Darci Kerns MD;  Location: [facility] ENDOSCOPY;  Service: Gastroenterology;  Laterality: N/A;  PRE- IBS, CONSTIPATION, HX POLYPS  POST- DEMINISHED SPHINCTER TONE, DIVERTICULOSIS, POORLY PREPPED COLON    CYSTOSCOPY N/A 05/07/2010    with TVT takedown, Dr. Simon Wall, [facility]    ENDOSCOPY N/A 09/27/2016    z line irreg, bilious gastric fluid- fluid aspiration preformed, gastritis.  PATH: Squamous and glandular mucosa with minimal superficial acute inflammation.     ENDOSCOPY N/A 12/12/2017    Z line irregular, gastritis, duodenitis, chronic inflammation    ENDOSCOPY N/A 12/01/2020    Procedure: ESOPHAGOGASTRODUODENOSCOPY WITH BIOPSIES;  Surgeon: Darci Kerns  MD;  Location: [facility] ENDOSCOPY;  Service: Gastroenterology;  Laterality: N/A;  PRE- REFLUX, DYSPHAGIA  POST- ESOPHAGITIS, GASTRITIS, SMALL HIATAL HERNIA, BILE REFLUX    ENDOSCOPY N/A 3/15/2023    Procedure: ESOPHAGOGASTRODUODENOSCOPY with biopsies;  Surgeon: Darci Kerns MD;  Location: [facility] ENDOSCOPY;  Service: Gastroenterology;  Laterality: N/A;  pre:  reflux with breakthrough symptoms  post;  gastritis, mild esophagitis    EPIDURAL BLOCK      ESOPHAGEAL MANOMETRY N/A 02/02/2021    Procedure: ESOPHAGEAL MANOMETRY;  Surgeon: Tran, Nurse Performed;  Location: [facility] ENDOSCOPY;  Service: Gastroenterology;  Laterality: N/A;  DYSPHAGIA    KNEE SURGERY Left     BROKEN    LUMBAR DISCECTOMY FUSION INSTRUMENTATION N/A 12/03/2018    Procedure: L4-5 laminectomy and fusion with instrumentation;  Surgeon: Austin Keith MD;  Location: [facility];  Service: Orthopedic Spine    MAMMO US BREAST BIOPSY ADDITIONAL W WO DEVICE Left 02/21/2002    2:00 position left breast, Infiltrating Ductal Carcinoma, [facility]    MANDIBLE FRACTURE SURGERY      MASTECTOMY Left 04/11/2002    Left Total Mastectomy and Left Axillary [place] Node Biobsy, Dr. Indu Akins, [facility]    OTHER SURGICAL HISTORY      CARDIAC LOOP DEVICE, LEFT CHEST    TENSION FREE VAGINAL TAPING WITH MINI ARC SLING N/A 10/26/2009    Dr. Gina Castillo, [facility]    TOTAL KNEE ARTHROPLASTY Left 10/21/2019    Procedure: TOTAL KNEE ARTHROPLASTY;  Surgeon: Anurag Serrano MD;  Location: [facility];  Service: Orthopedics    UPPER GASTROINTESTINAL ENDOSCOPY  09/16/2014    z-line irreg, grade a reflux, gastritis                        PT Assessment/Plan       Row Name 04/03/24 1100          PT Assessment    Assessment Comments Incorporated upper back/shoulder strengthening on this date with good tolerance. Requires ocasional CGA with dynamic balance/core work in standing.  -GR        PT Plan    PT Plan Comments Progress core/balance.  -GR               User Key  (r) = Recorded  "By, (t) = Taken By, (c) = Cosigned By      Initials Name Provider Type    GR Jaswinder Goodwin, PT Physical Therapist                       OP Exercises       Row Name 04/03/24 0900             Subjective    Subjective Comments Thinks she is getting stronger, would like to work some upper body if possible.  -GR         Subjective Pain    Able to rate subjective pain? yes  -GR      Pre-Treatment Pain Level 2  -GR      Post-Treatment Pain Level 2  -GR      Subjective Pain Comment R leg is \"bothering\" me  -GR         Total Minutes    32800 - PT Therapeutic Exercise Minutes 41  -GR         Exercise 1    Exercise Name 1 Nustep  -GR      Time 1 5 min  -GR         Exercise 2    Exercise Name 2 DKTC with TA  -GR      Cueing 2 Demo  -GR      Sets 2 2  -GR      Reps 2 10  -GR      Time 2 swiss ball  -GR         Exercise 3    Exercise Name 3 LTR  -GR      Cueing 3 Verbal  -GR      Sets 3 1B  -GR      Reps 3 10  -GR      Time 3 3s  -GR      Additional Comments swiss ball  -GR         Exercise 4    Exercise Name 4 HL adduction  -GR      Cueing 4 Verbal  -GR      Sets 4 1  -GR      Reps 4 20  -GR      Time 4 5s- ball  -GR         Exercise 5    Exercise Name 5 HL clam RTB  -GR      Cueing 5 Verbal  -GR      Sets 5 1  -GR      Reps 5 20  -GR      Additional Comments B and uni  -GR         Exercise 6    Exercise Name 6 Piriformis stretch  -GR      Cueing 6 Verbal  -GR      Reps 6 3 BL  -GR      Time 6 20 seconds  -GR      Additional Comments ER and IR  -GR         Exercise 7    Exercise Name 7 PPT + bridge  -GR      Cueing 7 Verbal  -GR      Sets 7 2  -GR      Reps 7 10  -GR      Additional Comments swiss ball  -GR         Exercise 8    Exercise Name 8 supine march to straight leg + opposite arm tap  -GR      Cueing 8 Verbal  -GR      Sets 8 2B  -GR      Reps 8 10  -GR         Exercise 9    Exercise Name 9 STS  -GR      Cueing 9 Verbal  -GR      Sets 9 1  -GR      Reps 9 20  -GR      Time 9 cueing for TrA  -GR         Exercise 10    " Exercise Name 10 swiss ball roll out  -GR      Cueing 10 Verbal;Demo  -GR      Sets 10 1 fwd  -GR      Reps 10 10  -GR         Exercise 11    Exercise Name 11 supine HS stretch with stap  -GR      Cueing 11 Verbal;Demo  -GR      Sets 11 --  -GR      Reps 11 3  -GR      Time 11 20s  -GR      Additional Comments eahc side  -GR         Exercise 12    Exercise Name 12 standing alt march with TrA  -GR      Cueing 12 Verbal;Demo  -GR      Reps 12 2 x20 (alternating, 2x10 BL)  -GR      Time 12 with 2lb med ball press  -GR         Exercise 13    Exercise Name 13 Rows  -GR      Cueing 13 Demo  -GR      Sets 13 2  -GR      Reps 13 10  -GR      Time 13 RTB  -GR         Exercise 14    Exercise Name 14 Alt shoulder ext with TA draw in  -GR      Cueing 14 Demo  -GR      Sets 14 2  -GR      Reps 14 10  -GR      Time 14 RTB  -GR                User Key  (r) = Recorded By, (t) = Taken By, (c) = Cosigned By      Initials Name Provider Type    GR Jaswinder Goodwin, PT Physical Therapist                                                    Time Calculation:   Start Time: 0955  Stop Time: 1037  Time Calculation (min): 42 min  Total Timed Code Minutes- PT: 41 minute(s)  Timed Charges  89590 - PT Therapeutic Exercise Minutes: 41  Total Minutes  Timed Charges Total Minutes: 41   Total Minutes: 41  Therapy Charges for Today       Code Description Service Date Service Provider Modifiers Qty    05822106960 HC PT THER PROC EA 15 MIN 4/3/2024 Jaswinder Goodwin, PT GP 3                      Jaswinder Goodwin PT  4/3/2024

## 2024-04-05 ENCOUNTER — HOSPITAL ENCOUNTER (OUTPATIENT)
Dept: PHYSICAL THERAPY | Facility: HOSPITAL | Age: 79
Setting detail: THERAPIES SERIES
Discharge: HOME | End: 2024-04-05
Payer: MEDICARE

## 2024-04-05 DIAGNOSIS — Z98.1 HISTORY OF FUSION OF LUMBAR SPINE: ICD-10-CM

## 2024-04-05 DIAGNOSIS — G89.29 CHRONIC RIGHT-SIDED LOW BACK PAIN WITH RIGHT-SIDED SCIATICA: Primary | ICD-10-CM

## 2024-04-05 DIAGNOSIS — M54.41 CHRONIC RIGHT-SIDED LOW BACK PAIN WITH RIGHT-SIDED SCIATICA: Primary | ICD-10-CM

## 2024-04-05 PROCEDURE — 97110 THERAPEUTIC EXERCISES: CPT | Performed by: PHYSICAL THERAPIST

## 2024-04-05 NOTE — THERAPY TREATMENT NOTE
Outpatient Physical Therapy Ortho Treatment Note  [facility]     Patient Name: Davida Anderson  : 1945  MRN: [id number]  Today's Date: 2024      Visit Date: 2024    Visit Dx:    ICD-10-CM ICD-9-CM   1. Chronic right-sided low back pain with right-sided sciatica  M54.41 724.2    G89.29 724.3     338.29   2. History of fusion of lumbar spine  Z98.1 V45.4       Patient Active Problem List   Diagnosis    BP (high blood pressure)    History of left breast infiltrating ductal cancer 2cm s/p mastectomy with reconstruction  s/p chemo    H/O ETOH abuse    Gtz's esophagus    Colon polyps    Bipolar 1 disorder    Arthritis    Raynaud's disease    Neuropathy    Lumbar spine pain    Spondylolisthesis at L4-L5 level    Right lumbar radiculopathy    Gtz's esophagus without dysplasia    Hx of adenomatous colonic polyps    Dysphagia    Diarrhea    Primary osteoarthritis of left knee    Primary localized osteoarthrosis of the knee, right    Chronic pain of left knee    Spinal stenosis of lumbar region with neurogenic claudication    Spondylolisthesis of lumbar region    Acute pain of left knee    Gastroesophageal reflux disease    Irritable bowel syndrome with constipation    Adenomatous polyp of colon    History of Gtz's esophagus        Past Medical History:   Diagnosis Date    Acid reflux     Arthritis     Atrial fibrillation     Gtz esophagus     Bipolar 2 disorder     Bradycardia     WITH SURGERY    COPD (chronic obstructive pulmonary disease)     MILD, USES AINHALER PRN    DDD (degenerative disc disease), lumbar     Diverticulosis     Dizziness     Drug therapy     Fibrocystic breast     Frequent UTI     SEES DR GOFF FOR FREQUENT UTI'S, ON KEFLEX    H/O Infiltrating ductal carcinoma of left breast     Stage IIA, Grade 3 ER/IL +, HER2 -, treated with 5 years of tamoxifen, 5 years of Femara, completed in     High cholesterol     History of alcoholism     40 YRS AGO    History of  Clostridium difficile colitis 2014    History of gastric ulcer     History of loop recorder     Hypertension     Iron deficiency     Irregular heartbeat     a fib    Low back pain     Lumbar herniated disc     Mass of right breast on mammogram 03/17/2016    10 o'clock position, 4 cm from the nipple,    Neuropathy     PONV (postoperative nausea and vomiting)     Raynaud disease     Sleep apnea     MILD, NO NEED FOR CPAP    Tremors of nervous system     Urinary incontinence         Past Surgical History:   Procedure Laterality Date    ANKLE OPEN REDUCTION INTERNAL FIXATION Right 08/14/2015    Dr. Dandre Camacho, [facility]    BACK SURGERY      LUMBAR    BREAST BIOPSY      BREAST RECONSTRUCTION, BREAST TISSUE EXPANDER INSERTION Left 04/11/2002    [place] Contour Profile expander was placed 550 cc size, filled with 200 cc of saline, Dr. Herbert Napoles, [facility]    COLONOSCOPY N/A 09/16/2014    Dr. Darci Kerns, [facility]; torts, tics, stool, polyp    COLONOSCOPY N/A 09/27/2016    NTEH, diverticulosis, tortuous colon, Two 3-5mm polyps in the descending colon and the transverse colon, IH.  PATH: Tubular adenoma    COLONOSCOPY N/A 12/12/2017    NTEH, tics, torts, IH, TA w/low grade dysplasia    COLONOSCOPY N/A 12/01/2020    Procedure: COLONOSCOPY TO CECUM AND TI ;  Surgeon: Darci Kerns MD;  Location: [facility] ENDOSCOPY;  Service: Gastroenterology;  Laterality: N/A;  PRE- IBS, CONSTIPATION, HX POLYPS  POST- DEMINISHED SPHINCTER TONE, DIVERTICULOSIS, POORLY PREPPED COLON    CYSTOSCOPY N/A 05/07/2010    with TVT takedown, Dr. Simon Wall, [facility]    ENDOSCOPY N/A 09/27/2016    z line irreg, bilious gastric fluid- fluid aspiration preformed, gastritis.  PATH: Squamous and glandular mucosa with minimal superficial acute inflammation.     ENDOSCOPY N/A 12/12/2017    Z line irregular, gastritis, duodenitis, chronic inflammation    ENDOSCOPY N/A 12/01/2020    Procedure: ESOPHAGOGASTRODUODENOSCOPY WITH BIOPSIES;  Surgeon: Darci Kerns  MD;  Location: [facility] ENDOSCOPY;  Service: Gastroenterology;  Laterality: N/A;  PRE- REFLUX, DYSPHAGIA  POST- ESOPHAGITIS, GASTRITIS, SMALL HIATAL HERNIA, BILE REFLUX    ENDOSCOPY N/A 3/15/2023    Procedure: ESOPHAGOGASTRODUODENOSCOPY with biopsies;  Surgeon: Darci Kerns MD;  Location: [facility] ENDOSCOPY;  Service: Gastroenterology;  Laterality: N/A;  pre:  reflux with breakthrough symptoms  post;  gastritis, mild esophagitis    EPIDURAL BLOCK      ESOPHAGEAL MANOMETRY N/A 02/02/2021    Procedure: ESOPHAGEAL MANOMETRY;  Surgeon: Tran, Nurse Performed;  Location: [facility] ENDOSCOPY;  Service: Gastroenterology;  Laterality: N/A;  DYSPHAGIA    KNEE SURGERY Left     BROKEN    LUMBAR DISCECTOMY FUSION INSTRUMENTATION N/A 12/03/2018    Procedure: L4-5 laminectomy and fusion with instrumentation;  Surgeon: Austin Keith MD;  Location: [facility] OR;  Service: Orthopedic Spine    MAMMO US BREAST BIOPSY ADDITIONAL W WO DEVICE Left 02/21/2002    2:00 position left breast, Infiltrating Ductal Carcinoma, [facility]    MANDIBLE FRACTURE SURGERY      MASTECTOMY Left 04/11/2002    Left Total Mastectomy and Left Axillary [place] Node Biobsy, Dr. Indu Akins, [facility]    OTHER SURGICAL HISTORY      CARDIAC LOOP DEVICE, LEFT CHEST    TENSION FREE VAGINAL TAPING WITH MINI ARC SLING N/A 10/26/2009    Dr. Gina Castillo, [facility]    TOTAL KNEE ARTHROPLASTY Left 10/21/2019    Procedure: TOTAL KNEE ARTHROPLASTY;  Surgeon: Anurag Serrano MD;  Location: [facility];  Service: Orthopedics    UPPER GASTROINTESTINAL ENDOSCOPY  09/16/2014    z-line irreg, grade a reflux, gastritis                        PT Assessment/Plan       Row Name 04/05/24 1200          PT Assessment    Assessment Comments 2/2 STGs met. Modified all exercises to seated or standing this per a recent episode with her silvana's esophagus condition that can simulate heart attack symptomst with laying supine.  -GR        PT Plan    PT Plan Comments Continue to progress HEP.  -GR                User Key  (r) = Recorded By, (t) = Taken By, (c) = Cosigned By      Initials Name Provider Type    GR Jaswinder Goodwin, PT Physical Therapist                       OP Exercises       Row Name 04/05/24 1100             Subjective    Subjective Comments Has Avinash's esophagus and had an episode this morning that mimics a heart attack; states she takes marshmallows to help this subside so she brought them today just in case.  -GR         Subjective Pain    Able to rate subjective pain? yes  -GR      Pre-Treatment Pain Level 4  -GR      Post-Treatment Pain Level 4  -GR         Total Minutes    85156 - PT Therapeutic Exercise Minutes 40  -GR         Exercise 1    Exercise Name 1 Nustep  -GR      Time 1 5 min  -GR      Additional Comments L5  -GR         Exercise 2    Exercise Name 2 Standing PPT - back to swiss ball on wall  -GR      Cueing 2 Demo  -GR      Sets 2 2  -GR      Reps 2 10  -GR         Exercise 3    Exercise Name 3 Standing squats- back to wall and ball  -GR      Cueing 3 Demo  -GR      Sets 3 2  -GR      Reps 3 10  -GR         Exercise 4    Exercise Name 4 Standing march - hands extended on swiss ball at wall  -GR      Cueing 4 Demo  -GR      Sets 4 2  -GR      Reps 4 10  -GR      Time 4 alternating  -GR         Exercise 5    Exercise Name 5 Sidestepping RTB  -GR      Cueing 5 Demo  -GR      Reps 5 3 laps barre  -GR      Time 5 above knee  -GR         Exercise 6    Exercise Name 6 Piriformis stretch  -GR      Cueing 6 Verbal  -GR      Reps 6 3 BL  -GR      Time 6 20 seconds  -GR      Additional Comments seated ER/IR  -GR         Exercise 7    Exercise Name 7 LAQ seated  -GR      Cueing 7 Demo  -GR      Sets 7 2  -GR      Reps 7 10  -GR      Time 7 3# each side  -GR      Additional Comments engage TA, proper posture  -GR         Exercise 9    Exercise Name 9 STS  -GR      Cueing 9 Verbal  -GR      Sets 9 1  -GR      Reps 9 20  -GR      Time 9 cueing for TrA  -GR      Additional Comments 2lb  med ball  -GR         Exercise 11    Exercise Name 11 seated HS stretch  -GR      Cueing 11 Demo  -GR      Sets 11 1  -GR      Reps 11 3  -GR      Time 11 20 seconds  -GR         Exercise 13    Exercise Name 13 Rows  -GR      Cueing 13 Demo  -GR      Sets 13 2  -GR      Reps 13 10  -GR      Time 13 RTB  -GR         Exercise 14    Exercise Name 14 Alt shoulder ext with TA draw in  -GR      Cueing 14 Demo  -GR      Sets 14 2  -GR      Reps 14 10  -GR      Time 14 RTB  -GR         Exercise 15    Exercise Name 15 Anti-rotation RTB  -GR      Cueing 15 Demo  -GR      Sets 15 2  -GR      Reps 15 10  -GR      Time 15 each side  -GR         Exercise 16    Exercise Name 16 Wall wash shoudler flexion  -GR      Cueing 16 Demo  -GR      Sets 16 1  -GR      Reps 16 10  -GR      Time 16 step forward alternating lead leg  -GR                User Key  (r) = Recorded By, (t) = Taken By, (c) = Cosigned By      Initials Name Provider Type    Jaswinder Hinson, PT Physical Therapist                                  PT OP Goals       Row Name 04/05/24 1100          PT Short Term Goals    STG Date to Achieve 04/19/24  -GR     STG 1 Patient will be independent with initial HEP.  -GR     STG 1 Progress Met  -GR     STG 2 Patient will report 50% improvement in pain with supine<>sit bed transfers.  -GR     STG 2 Progress Met  -GR        Long Term Goals    LTG Date to Achieve 05/19/24  -GR     LTG 1 Patient will be independent with progressive HEP for long term condition management.  -GR     LTG 1 Progress Ongoing  -GR     LTG 2 Patient will score </=36% disability on the modified LIZ to indicate improved perceived ADL performance.  -GR     LTG 2 Progress Goal Revised  -GR     LTG 3 Patient will resume recreational walking with well controlled pain to reintegrate into the community.  -GR     LTG 3 Progress Ongoing  -GR               User Key  (r) = Recorded By, (t) = Taken By, (c) = Cosigned By      Initials Name Provider Type    ABHIJEET Goodwin  Jaswinder BLACK, PT Physical Therapist                                   Time Calculation:   Start Time: 1130  Stop Time: 1215  Time Calculation (min): 45 min  Total Timed Code Minutes- PT: 40 minute(s)  Timed Charges  33980 - PT Therapeutic Exercise Minutes: 40  Total Minutes  Timed Charges Total Minutes: 40   Total Minutes: 40  Therapy Charges for Today       Code Description Service Date Service Provider Modifiers Qty    62667200427 HC PT THER PROC EA 15 MIN 4/5/2024 Jaswinder Goodwin, PT GP 3                      Jaswinder Goodwin, PT  4/5/2024

## 2024-04-12 ENCOUNTER — HOSPITAL ENCOUNTER (OUTPATIENT)
Dept: PHYSICAL THERAPY | Facility: HOSPITAL | Age: 79
Setting detail: THERAPIES SERIES
Discharge: HOME | End: 2024-04-12
Payer: MEDICARE

## 2024-04-12 DIAGNOSIS — G89.29 CHRONIC RIGHT-SIDED LOW BACK PAIN WITH RIGHT-SIDED SCIATICA: Primary | ICD-10-CM

## 2024-04-12 DIAGNOSIS — M54.41 CHRONIC RIGHT-SIDED LOW BACK PAIN WITH RIGHT-SIDED SCIATICA: Primary | ICD-10-CM

## 2024-04-12 DIAGNOSIS — Z98.1 HISTORY OF FUSION OF LUMBAR SPINE: ICD-10-CM

## 2024-04-12 PROCEDURE — 97110 THERAPEUTIC EXERCISES: CPT

## 2024-04-12 NOTE — THERAPY TREATMENT NOTE
Outpatient Physical Therapy Ortho Treatment Note  [facility]     Patient Name: Davida Anderson  : 1945  MRN: [id number]  Today's Date: 2024      Visit Date: 2024    Visit Dx:    ICD-10-CM ICD-9-CM   1. Chronic right-sided low back pain with right-sided sciatica  M54.41 724.2    G89.29 724.3     338.29   2. History of fusion of lumbar spine  Z98.1 V45.4       Patient Active Problem List   Diagnosis    BP (high blood pressure)    History of left breast infiltrating ductal cancer 2cm s/p mastectomy with reconstruction  s/p chemo    H/O ETOH abuse    Gtz's esophagus    Colon polyps    Bipolar 1 disorder    Arthritis    Raynaud's disease    Neuropathy    Lumbar spine pain    Spondylolisthesis at L4-L5 level    Right lumbar radiculopathy    Gtz's esophagus without dysplasia    Hx of adenomatous colonic polyps    Dysphagia    Diarrhea    Primary osteoarthritis of left knee    Primary localized osteoarthrosis of the knee, right    Chronic pain of left knee    Spinal stenosis of lumbar region with neurogenic claudication    Spondylolisthesis of lumbar region    Acute pain of left knee    Gastroesophageal reflux disease    Irritable bowel syndrome with constipation    Adenomatous polyp of colon    History of Gtz's esophagus        Past Medical History:   Diagnosis Date    Acid reflux     Arthritis     Atrial fibrillation     Gtz esophagus     Bipolar 2 disorder     Bradycardia     WITH SURGERY    COPD (chronic obstructive pulmonary disease)     MILD, USES AINHALER PRN    DDD (degenerative disc disease), lumbar     Diverticulosis     Dizziness     Drug therapy     Fibrocystic breast     Frequent UTI     SEES DR GOFF FOR FREQUENT UTI'S, ON KEFLEX    H/O Infiltrating ductal carcinoma of left breast     Stage IIA, Grade 3 ER/AL +, HER2 -, treated with 5 years of tamoxifen, 5 years of Femara, completed in     High cholesterol     History of alcoholism     40 YRS AGO    History  of Clostridium difficile colitis 2014    History of gastric ulcer     History of loop recorder     Hypertension     Iron deficiency     Irregular heartbeat     a fib    Low back pain     Lumbar herniated disc     Mass of right breast on mammogram 03/17/2016    10 o'clock position, 4 cm from the nipple,    Neuropathy     PONV (postoperative nausea and vomiting)     Raynaud disease     Sleep apnea     MILD, NO NEED FOR CPAP    Tremors of nervous system     Urinary incontinence         Past Surgical History:   Procedure Laterality Date    ANKLE OPEN REDUCTION INTERNAL FIXATION Right 08/14/2015    Dr. Dandre Camacho, [facility]    BACK SURGERY      LUMBAR    BREAST BIOPSY      BREAST RECONSTRUCTION, BREAST TISSUE EXPANDER INSERTION Left 04/11/2002    [place] Contour Profile expander was placed 550 cc size, filled with 200 cc of saline, Dr. Herbert Napoles, [facility]    COLONOSCOPY N/A 09/16/2014    Dr. Darci Kerns, [facility]; torts, tics, stool, polyp    COLONOSCOPY N/A 09/27/2016    NTEH, diverticulosis, tortuous colon, Two 3-5mm polyps in the descending colon and the transverse colon, IH.  PATH: Tubular adenoma    COLONOSCOPY N/A 12/12/2017    NTEH, tics, torts, IH, TA w/low grade dysplasia    COLONOSCOPY N/A 12/01/2020    Procedure: COLONOSCOPY TO CECUM AND TI ;  Surgeon: Darci Kerns MD;  Location: [facility] ENDOSCOPY;  Service: Gastroenterology;  Laterality: N/A;  PRE- IBS, CONSTIPATION, HX POLYPS  POST- DEMINISHED SPHINCTER TONE, DIVERTICULOSIS, POORLY PREPPED COLON    CYSTOSCOPY N/A 05/07/2010    with TVT takedown, Dr. Simon Wall, [facility]    ENDOSCOPY N/A 09/27/2016    z line irreg, bilious gastric fluid- fluid aspiration preformed, gastritis.  PATH: Squamous and glandular mucosa with minimal superficial acute inflammation.     ENDOSCOPY N/A 12/12/2017    Z line irregular, gastritis, duodenitis, chronic inflammation    ENDOSCOPY N/A 12/01/2020    Procedure: ESOPHAGOGASTRODUODENOSCOPY WITH BIOPSIES;  Surgeon: Darci Kerns  MD HÉCTOR;  Location: [facility] ENDOSCOPY;  Service: Gastroenterology;  Laterality: N/A;  PRE- REFLUX, DYSPHAGIA  POST- ESOPHAGITIS, GASTRITIS, SMALL HIATAL HERNIA, BILE REFLUX    ENDOSCOPY N/A 3/15/2023    Procedure: ESOPHAGOGASTRODUODENOSCOPY with biopsies;  Surgeon: Darci Kerns MD;  Location: [facility] ENDOSCOPY;  Service: Gastroenterology;  Laterality: N/A;  pre:  reflux with breakthrough symptoms  post;  gastritis, mild esophagitis    EPIDURAL BLOCK      ESOPHAGEAL MANOMETRY N/A 02/02/2021    Procedure: ESOPHAGEAL MANOMETRY;  Surgeon: Endo, Nurse Performed;  Location: [facility] ENDOSCOPY;  Service: Gastroenterology;  Laterality: N/A;  DYSPHAGIA    KNEE SURGERY Left     BROKEN    LUMBAR DISCECTOMY FUSION INSTRUMENTATION N/A 12/03/2018    Procedure: L4-5 laminectomy and fusion with instrumentation;  Surgeon: Austin Keith MD;  Location: [facility] OR;  Service: Orthopedic Spine    MAMMO US BREAST BIOPSY ADDITIONAL W WO DEVICE Left 02/21/2002    2:00 position left breast, Infiltrating Ductal Carcinoma, [facility]    MANDIBLE FRACTURE SURGERY      MASTECTOMY Left 04/11/2002    Left Total Mastectomy and Left Axillary [place] Node Biobsy, Dr. Indu Akins, [facility]    OTHER SURGICAL HISTORY      CARDIAC LOOP DEVICE, LEFT CHEST    TENSION FREE VAGINAL TAPING WITH MINI ARC SLING N/A 10/26/2009    Dr. Gina Castillo, [facility]    TOTAL KNEE ARTHROPLASTY Left 10/21/2019    Procedure: TOTAL KNEE ARTHROPLASTY;  Surgeon: Anurag Serrano MD;  Location: [facility] OR;  Service: Orthopedics    UPPER GASTROINTESTINAL ENDOSCOPY  09/16/2014    z-line irreg, grade a reflux, gastritis                        PT Assessment/Plan       Row Name 04/12/24 1400          PT Assessment    Assessment Comments Added calf stretch off the steps today to addres continued R calf tightness as well as discussed appropriate hold times at home to maximize tissue lengthening. Additionally added forward step with outward reach and airex to all standing arm exercises  to futher incorporate balance into treatment.  -MO        PT Plan    PT Plan Comments update HEP. swiss ball push ups  -MO               User Key  (r) = Recorded By, (t) = Taken By, (c) = Cosigned By      Initials Name Provider Type    Cindy Irizarry, PT Physical Therapist                       OP Exercises       Row Name 04/12/24 1300             Subjective    Subjective Comments My R calf still cramps 2-3 times per night. My back is feeling a little better  -MO         Total Minutes    00451 - PT Therapeutic Exercise Minutes 45  -MO         Exercise 1    Exercise Name 1 Nustep  -MO      Time 1 5 min, lvl 5  -MO         Exercise 2    Exercise Name 2 Standing PPT - back to swiss ball on wall  -MO      Cueing 2 Verbal  -MO      Sets 2 1  -MO      Reps 2 20  -MO         Exercise 3    Exercise Name 3 Standing squats- back to wall and ball  -MO      Cueing 3 Verbal  -MO      Sets 3 1  -MO      Reps 3 20  -MO         Exercise 4    Exercise Name 4 Standing march - hands extended on swiss ball at wall  -MO      Cueing 4 Verbal  -MO      Sets 4 2  -MO      Reps 4 10  -MO      Time 4 alternating  -MO         Exercise 5    Exercise Name 5 Sidestepping RTB  -MO      Cueing 5 Verbal  -MO      Reps 5 3 laps barre  -MO      Time 5 above knee  -MO         Exercise 6    Exercise Name 6 seated Piriformis stretch  -MO      Cueing 6 Verbal  -MO      Reps 6 3 BL  -MO      Time 6 20 seconds  -MO         Exercise 9    Exercise Name 9 STS w/ forward step and reach  -MO      Cueing 9 Verbal  -MO      Sets 9 2  -MO      Reps 9 8 (4e leg)  -MO      Time 9 holding 2# DB  -MO      Additional Comments forward step with forward reaach  -MO         Exercise 13    Exercise Name 13 Rows  -MO      Cueing 13 Verbal  -MO      Sets 13 1  -MO      Reps 13 20  -MO      Time 13 RTB- airex  -MO         Exercise 14    Exercise Name 14 Alt shoulder ext with TA draw in  -MO      Cueing 14 Verbal  -MO      Sets 14 2  -MO      Reps 14 20  -MO      Time 14 RTB-  airex  -MO         Exercise 15    Exercise Name 15 Anti-rotation RTB  -MO      Cueing 15 Verbal  -MO      Sets 15 2B  -MO      Reps 15 10  -MO      Time 15 on airex  -MO         Exercise 17    Exercise Name 17 stair calf stretch  -MO      Cueing 17 Verbal;Demo  -MO      Reps 17 BL  -MO      Time 17 3 mins  -MO                User Key  (r) = Recorded By, (t) = Taken By, (c) = Cosigned By      Initials Name Provider Type    Cindy Irizarry, PT Physical Therapist                                  PT OP Goals       Row Name 04/12/24 1300          PT Short Term Goals    STG Date to Achieve 04/19/24  -MO     STG 1 Patient will be independent with initial HEP.  -MO     STG 1 Progress Met  -MO     STG 2 Patient will report 50% improvement in pain with supine<>sit bed transfers.  -MO     STG 2 Progress Met  -MO        Long Term Goals    LTG Date to Achieve 05/19/24  -MO     LTG 1 Patient will be independent with progressive HEP for long term condition management.  -MO     LTG 1 Progress Ongoing  -MO     LTG 2 Patient will score </=36% disability on the modified LIZ to indicate improved perceived ADL performance.  -MO     LTG 2 Progress Goal Revised  -MO     LTG 3 Patient will resume recreational walking with well controlled pain to reintegrate into the community.  -MO     LTG 3 Progress Ongoing  -MO               User Key  (r) = Recorded By, (t) = Taken By, (c) = Cosigned By      Initials Name Provider Type    Cindy Irizarry PT Physical Therapist                                   Time Calculation:   Start Time: 1315  Stop Time: 1400  Time Calculation (min): 45 min  Timed Charges  10101 - PT Therapeutic Exercise Minutes: 45  Total Minutes  Timed Charges Total Minutes: 45   Total Minutes: 45  Therapy Charges for Today       Code Description Service Date Service Provider Modifiers Qty    [id number]  PT THER PROC EA 15 MIN 4/12/2024 Cindy Hammonds, PT GP 3                      Cindy Hammonds PT  4/12/2024

## 2024-04-16 ENCOUNTER — HOSPITAL ENCOUNTER (OUTPATIENT)
Dept: PHYSICAL THERAPY | Facility: HOSPITAL | Age: 79
Setting detail: THERAPIES SERIES
Discharge: HOME | End: 2024-04-16
Payer: MEDICARE

## 2024-04-16 DIAGNOSIS — M54.41 CHRONIC RIGHT-SIDED LOW BACK PAIN WITH RIGHT-SIDED SCIATICA: Primary | ICD-10-CM

## 2024-04-16 DIAGNOSIS — Z98.1 HISTORY OF FUSION OF LUMBAR SPINE: ICD-10-CM

## 2024-04-16 DIAGNOSIS — G89.29 CHRONIC RIGHT-SIDED LOW BACK PAIN WITH RIGHT-SIDED SCIATICA: Primary | ICD-10-CM

## 2024-04-16 PROCEDURE — 97110 THERAPEUTIC EXERCISES: CPT

## 2024-04-16 NOTE — THERAPY TREATMENT NOTE
Outpatient Physical Therapy Ortho Treatment Note  [facility]     Patient Name: Davida Anderson  : 1945  MRN: [id number]  Today's Date: 2024      Visit Date: 2024    Visit Dx:    ICD-10-CM ICD-9-CM   1. Chronic right-sided low back pain with right-sided sciatica  M54.41 724.2    G89.29 724.3     338.29   2. History of fusion of lumbar spine  Z98.1 V45.4       Patient Active Problem List   Diagnosis    BP (high blood pressure)    History of left breast infiltrating ductal cancer 2cm s/p mastectomy with reconstruction  s/p chemo    H/O ETOH abuse    Gtz's esophagus    Colon polyps    Bipolar 1 disorder    Arthritis    Raynaud's disease    Neuropathy    Lumbar spine pain    Spondylolisthesis at L4-L5 level    Right lumbar radiculopathy    Gtz's esophagus without dysplasia    Hx of adenomatous colonic polyps    Dysphagia    Diarrhea    Primary osteoarthritis of left knee    Primary localized osteoarthrosis of the knee, right    Chronic pain of left knee    Spinal stenosis of lumbar region with neurogenic claudication    Spondylolisthesis of lumbar region    Acute pain of left knee    Gastroesophageal reflux disease    Irritable bowel syndrome with constipation    Adenomatous polyp of colon    History of Gtz's esophagus        Past Medical History:   Diagnosis Date    Acid reflux     Arthritis     Atrial fibrillation     Gtz esophagus     Bipolar 2 disorder     Bradycardia     WITH SURGERY    COPD (chronic obstructive pulmonary disease)     MILD, USES AINHALER PRN    DDD (degenerative disc disease), lumbar     Diverticulosis     Dizziness     Drug therapy     Fibrocystic breast     Frequent UTI     SEES DR GOFF FOR FREQUENT UTI'S, ON KEFLEX    H/O Infiltrating ductal carcinoma of left breast     Stage IIA, Grade 3 ER/MI +, HER2 -, treated with 5 years of tamoxifen, 5 years of Femara, completed in     High cholesterol     History of alcoholism     40 YRS AGO    History  of Clostridium difficile colitis 2014    History of gastric ulcer     History of loop recorder     Hypertension     Iron deficiency     Irregular heartbeat     a fib    Low back pain     Lumbar herniated disc     Mass of right breast on mammogram 03/17/2016    10 o'clock position, 4 cm from the nipple,    Neuropathy     PONV (postoperative nausea and vomiting)     Raynaud disease     Sleep apnea     MILD, NO NEED FOR CPAP    Tremors of nervous system     Urinary incontinence         Past Surgical History:   Procedure Laterality Date    ANKLE OPEN REDUCTION INTERNAL FIXATION Right 08/14/2015    Dr. Dandre Camacho, [facility]    BACK SURGERY      LUMBAR    BREAST BIOPSY      BREAST RECONSTRUCTION, BREAST TISSUE EXPANDER INSERTION Left 04/11/2002    [place] Contour Profile expander was placed 550 cc size, filled with 200 cc of saline, Dr. Herbert Napoles, [facility]    COLONOSCOPY N/A 09/16/2014    Dr. Darci Kerns, [facility]; torts, tics, stool, polyp    COLONOSCOPY N/A 09/27/2016    NTEH, diverticulosis, tortuous colon, Two 3-5mm polyps in the descending colon and the transverse colon, IH.  PATH: Tubular adenoma    COLONOSCOPY N/A 12/12/2017    NTEH, tics, torts, IH, TA w/low grade dysplasia    COLONOSCOPY N/A 12/01/2020    Procedure: COLONOSCOPY TO CECUM AND TI ;  Surgeon: Darci Kerns MD;  Location: [facility] ENDOSCOPY;  Service: Gastroenterology;  Laterality: N/A;  PRE- IBS, CONSTIPATION, HX POLYPS  POST- DEMINISHED SPHINCTER TONE, DIVERTICULOSIS, POORLY PREPPED COLON    CYSTOSCOPY N/A 05/07/2010    with TVT takedown, Dr. Simon Wall, [facility]    ENDOSCOPY N/A 09/27/2016    z line irreg, bilious gastric fluid- fluid aspiration preformed, gastritis.  PATH: Squamous and glandular mucosa with minimal superficial acute inflammation.     ENDOSCOPY N/A 12/12/2017    Z line irregular, gastritis, duodenitis, chronic inflammation    ENDOSCOPY N/A 12/01/2020    Procedure: ESOPHAGOGASTRODUODENOSCOPY WITH BIOPSIES;  Surgeon: Darci Kerns  MD HÉCTOR;  Location: [facility] ENDOSCOPY;  Service: Gastroenterology;  Laterality: N/A;  PRE- REFLUX, DYSPHAGIA  POST- ESOPHAGITIS, GASTRITIS, SMALL HIATAL HERNIA, BILE REFLUX    ENDOSCOPY N/A 3/15/2023    Procedure: ESOPHAGOGASTRODUODENOSCOPY with biopsies;  Surgeon: Darci Kerns MD;  Location: [facility] ENDOSCOPY;  Service: Gastroenterology;  Laterality: N/A;  pre:  reflux with breakthrough symptoms  post;  gastritis, mild esophagitis    EPIDURAL BLOCK      ESOPHAGEAL MANOMETRY N/A 02/02/2021    Procedure: ESOPHAGEAL MANOMETRY;  Surgeon: Tran, Nurse Performed;  Location: [facility] ENDOSCOPY;  Service: Gastroenterology;  Laterality: N/A;  DYSPHAGIA    KNEE SURGERY Left     BROKEN    LUMBAR DISCECTOMY FUSION INSTRUMENTATION N/A 12/03/2018    Procedure: L4-5 laminectomy and fusion with instrumentation;  Surgeon: Austin Keith MD;  Location: [facility] OR;  Service: Orthopedic Spine    MAMMO US BREAST BIOPSY ADDITIONAL W WO DEVICE Left 02/21/2002    2:00 position left breast, Infiltrating Ductal Carcinoma, [facility]    MANDIBLE FRACTURE SURGERY      MASTECTOMY Left 04/11/2002    Left Total Mastectomy and Left Axillary [place] Node Biobsy, Dr. Indu Akins, [facility]    OTHER SURGICAL HISTORY      CARDIAC LOOP DEVICE, LEFT CHEST    TENSION FREE VAGINAL TAPING WITH MINI ARC SLING N/A 10/26/2009    Dr. Gina Castillo, [facility]    TOTAL KNEE ARTHROPLASTY Left 10/21/2019    Procedure: TOTAL KNEE ARTHROPLASTY;  Surgeon: Anurag Serrano MD;  Location: [facility] OR;  Service: Orthopedics    UPPER GASTROINTESTINAL ENDOSCOPY  09/16/2014    z-line irreg, grade a reflux, gastritis                        PT Assessment/Plan       Row Name 04/16/24 1400          PT Assessment    Assessment Comments Ms. Anderson returns reporting noted relief in calf cramping frequency following addition of calf stretches last session. She continues to demo significant valgus collapse with all squatting and STS, added band to knees for resistance into abduction.  Additionally added swiss ball push up, monster walks and mini squats all with great tolerance. Will plan to update HEP at next session and progress as appropriate.  -MO        PT Plan    PT Plan Comments update HEP  -MO               User Key  (r) = Recorded By, (t) = Taken By, (c) = Cosigned By      Initials Name Provider Type    Cindy Irizarry, PT Physical Therapist                       OP Exercises       Row Name 04/16/24 1300             Subjective    Subjective Comments have noticed less cramping in my calves with the stretches. Doing good  -MO         Total Minutes    74200 - PT Therapeutic Exercise Minutes 40  -MO         Exercise 1    Exercise Name 1 Nustep  -MO      Time 1 5 min, lvl 5  -MO         Exercise 2    Exercise Name 2 Standing PPT - back to swiss ball on wall  -MO      Cueing 2 Verbal  -MO      Sets 2 1  -MO      Reps 2 20  -MO         Exercise 3    Exercise Name 3 Standing squats- back to wall and ball  -MO      Cueing 3 Verbal  -MO      Sets 3 1  -MO      Reps 3 20  -MO      Time 3 RTB around knees  -MO         Exercise 4    Exercise Name 4 Standing march - hands extended on swiss ball at wall  -MO      Cueing 4 Verbal  -MO      Sets 4 2  -MO      Reps 4 10  -MO      Time 4 alternating  -MO      Additional Comments moved back for more modified plank position  -MO         Exercise 5    Exercise Name 5 side step  + HR  -MO      Cueing 5 Verbal  -MO      Sets 5 4 laps  -MO      Reps 5 5x 8 HR  -MO         Exercise 7    Exercise Name 7 swiss ball push up  -MO      Cueing 7 Verbal;Demo  -MO      Sets 7 1  -MO      Reps 7 20  -MO         Exercise 9    Exercise Name 9 STS w/ forward step and reach, STS with ball and OH reach  -MO      Cueing 9 Verbal;Tactile  -MO      Sets 9 2  -MO      Reps 9 8e  -MO      Time 9 holding 2# DB  -MO      Additional Comments 1st set with foward step and reach, second with ball inbetween knees to prevent valgus collapse  -MO         Exercise 12    Exercise Name 12  monster walk fwd/bwd  -MO      Cueing 12 Verbal  -MO      Reps 12 4 laps  -MO      Time 12 RTB below knees  -MO         Exercise 13    Exercise Name 13 Rows  -MO      Cueing 13 Verbal  -MO      Sets 13 1  -MO      Reps 13 20  -MO      Time 13 RTB- airex  -MO         Exercise 14    Exercise Name 14 Alt shoulder ext with TA draw in  -MO      Cueing 14 Verbal  -MO      Sets 14 2  -MO      Reps 14 20  -MO      Time 14 RTB- airex  -MO         Exercise 15    Exercise Name 15 Anti-rotation RTB  -MO      Cueing 15 Verbal  -MO      Sets 15 2B  -MO      Reps 15 10  -MO      Time 15 on airex  -MO         Exercise 16    Exercise Name 16 small mini squat  with chop  -MO      Cueing 16 Verbal;Demo  -MO      Sets 16 1B  -MO      Reps 16 10  -MO      Time 16 RTB  -MO                User Key  (r) = Recorded By, (t) = Taken By, (c) = Cosigned By      Initials Name Provider Type    Cindy Irizarry, PT Physical Therapist                                                    Time Calculation:   Start Time: 1315  Stop Time: 1355  Time Calculation (min): 40 min  Timed Charges  12595 - PT Therapeutic Exercise Minutes: 40  Total Minutes  Timed Charges Total Minutes: 40   Total Minutes: 40  Therapy Charges for Today       Code Description Service Date Service Provider Modifiers Qty    77032403221 HC PT THER PROC EA 15 MIN 4/16/2024 Cindy Hammonds, PT GP 3                      Cindy Hammonds PT  4/16/2024

## 2024-04-19 ENCOUNTER — HOSPITAL ENCOUNTER (OUTPATIENT)
Dept: PHYSICAL THERAPY | Facility: HOSPITAL | Age: 79
Setting detail: THERAPIES SERIES
Discharge: HOME | End: 2024-04-19
Payer: MEDICARE

## 2024-04-19 DIAGNOSIS — M54.41 CHRONIC RIGHT-SIDED LOW BACK PAIN WITH RIGHT-SIDED SCIATICA: Primary | ICD-10-CM

## 2024-04-19 DIAGNOSIS — Z98.1 HISTORY OF FUSION OF LUMBAR SPINE: ICD-10-CM

## 2024-04-19 DIAGNOSIS — G89.29 CHRONIC RIGHT-SIDED LOW BACK PAIN WITH RIGHT-SIDED SCIATICA: Primary | ICD-10-CM

## 2024-04-19 PROCEDURE — 97110 THERAPEUTIC EXERCISES: CPT | Performed by: PHYSICAL THERAPIST

## 2024-04-19 NOTE — THERAPY PROGRESS REPORT/RE-CERT
Outpatient Physical Therapy Ortho Treatment Note  [facility]     Patient Name: Davida Anderson  : 1945  MRN: [id number]  Today's Date: 2024      Visit Date: 2024    Visit Dx:    ICD-10-CM ICD-9-CM   1. Chronic right-sided low back pain with right-sided sciatica  M54.41 724.2    G89.29 724.3     338.29   2. History of fusion of lumbar spine  Z98.1 V45.4       Patient Active Problem List   Diagnosis    BP (high blood pressure)    History of left breast infiltrating ductal cancer 2cm s/p mastectomy with reconstruction  s/p chemo    H/O ETOH abuse    Gtz's esophagus    Colon polyps    Bipolar 1 disorder    Arthritis    Raynaud's disease    Neuropathy    Lumbar spine pain    Spondylolisthesis at L4-L5 level    Right lumbar radiculopathy    Gtz's esophagus without dysplasia    Hx of adenomatous colonic polyps    Dysphagia    Diarrhea    Primary osteoarthritis of left knee    Primary localized osteoarthrosis of the knee, right    Chronic pain of left knee    Spinal stenosis of lumbar region with neurogenic claudication    Spondylolisthesis of lumbar region    Acute pain of left knee    Gastroesophageal reflux disease    Irritable bowel syndrome with constipation    Adenomatous polyp of colon    History of Gtz's esophagus        Past Medical History:   Diagnosis Date    Acid reflux     Arthritis     Atrial fibrillation     Gtz esophagus     Bipolar 2 disorder     Bradycardia     WITH SURGERY    COPD (chronic obstructive pulmonary disease)     MILD, USES AINHALER PRN    DDD (degenerative disc disease), lumbar     Diverticulosis     Dizziness     Drug therapy     Fibrocystic breast     Frequent UTI     SEES DR GOFF FOR FREQUENT UTI'S, ON KEFLEX    H/O Infiltrating ductal carcinoma of left breast     Stage IIA, Grade 3 ER/MO +, HER2 -, treated with 5 years of tamoxifen, 5 years of Femara, completed in     High cholesterol     History of alcoholism     40 YRS AGO    History  of Clostridium difficile colitis 2014    History of gastric ulcer     History of loop recorder     Hypertension     Iron deficiency     Irregular heartbeat     a fib    Low back pain     Lumbar herniated disc     Mass of right breast on mammogram 03/17/2016    10 o'clock position, 4 cm from the nipple,    Neuropathy     PONV (postoperative nausea and vomiting)     Raynaud disease     Sleep apnea     MILD, NO NEED FOR CPAP    Tremors of nervous system     Urinary incontinence         Past Surgical History:   Procedure Laterality Date    ANKLE OPEN REDUCTION INTERNAL FIXATION Right 08/14/2015    Dr. Dandre Camacho, [facility]    BACK SURGERY      LUMBAR    BREAST BIOPSY      BREAST RECONSTRUCTION, BREAST TISSUE EXPANDER INSERTION Left 04/11/2002    [place] Contour Profile expander was placed 550 cc size, filled with 200 cc of saline, Dr. Herbert Napoles, [facility]    COLONOSCOPY N/A 09/16/2014    Dr. Darci Kerns, [facility]; torts, tics, stool, polyp    COLONOSCOPY N/A 09/27/2016    NTEH, diverticulosis, tortuous colon, Two 3-5mm polyps in the descending colon and the transverse colon, IH.  PATH: Tubular adenoma    COLONOSCOPY N/A 12/12/2017    NTEH, tics, torts, IH, TA w/low grade dysplasia    COLONOSCOPY N/A 12/01/2020    Procedure: COLONOSCOPY TO CECUM AND TI ;  Surgeon: Darci Kerns MD;  Location: [facility] ENDOSCOPY;  Service: Gastroenterology;  Laterality: N/A;  PRE- IBS, CONSTIPATION, HX POLYPS  POST- DEMINISHED SPHINCTER TONE, DIVERTICULOSIS, POORLY PREPPED COLON    CYSTOSCOPY N/A 05/07/2010    with TVT takedown, Dr. Simon Wall, [facility]    ENDOSCOPY N/A 09/27/2016    z line irreg, bilious gastric fluid- fluid aspiration preformed, gastritis.  PATH: Squamous and glandular mucosa with minimal superficial acute inflammation.     ENDOSCOPY N/A 12/12/2017    Z line irregular, gastritis, duodenitis, chronic inflammation    ENDOSCOPY N/A 12/01/2020    Procedure: ESOPHAGOGASTRODUODENOSCOPY WITH BIOPSIES;  Surgeon: Darci Kerns  MD HÉCTOR;  Location: [facility] ENDOSCOPY;  Service: Gastroenterology;  Laterality: N/A;  PRE- REFLUX, DYSPHAGIA  POST- ESOPHAGITIS, GASTRITIS, SMALL HIATAL HERNIA, BILE REFLUX    ENDOSCOPY N/A 3/15/2023    Procedure: ESOPHAGOGASTRODUODENOSCOPY with biopsies;  Surgeon: Darci Kerns MD;  Location: [facility] ENDOSCOPY;  Service: Gastroenterology;  Laterality: N/A;  pre:  reflux with breakthrough symptoms  post;  gastritis, mild esophagitis    EPIDURAL BLOCK      ESOPHAGEAL MANOMETRY N/A 02/02/2021    Procedure: ESOPHAGEAL MANOMETRY;  Surgeon: Tran, Nurse Performed;  Location: [facility] ENDOSCOPY;  Service: Gastroenterology;  Laterality: N/A;  DYSPHAGIA    KNEE SURGERY Left     BROKEN    LUMBAR DISCECTOMY FUSION INSTRUMENTATION N/A 12/03/2018    Procedure: L4-5 laminectomy and fusion with instrumentation;  Surgeon: Austin Keith MD;  Location: [facility] OR;  Service: Orthopedic Spine    MAMMO US BREAST BIOPSY ADDITIONAL W WO DEVICE Left 02/21/2002    2:00 position left breast, Infiltrating Ductal Carcinoma, [facility]    MANDIBLE FRACTURE SURGERY      MASTECTOMY Left 04/11/2002    Left Total Mastectomy and Left Axillary [place] Node Biobsy, Dr. Indu Akins, [facility]    OTHER SURGICAL HISTORY      CARDIAC LOOP DEVICE, LEFT CHEST    TENSION FREE VAGINAL TAPING WITH MINI ARC SLING N/A 10/26/2009    Dr. Gina Castillo, [facility]    TOTAL KNEE ARTHROPLASTY Left 10/21/2019    Procedure: TOTAL KNEE ARTHROPLASTY;  Surgeon: Anurag Serrano MD;  Location: [facility] OR;  Service: Orthopedics    UPPER GASTROINTESTINAL ENDOSCOPY  09/16/2014    z-line irreg, grade a reflux, gastritis                        PT Assessment/Plan       Row Name 04/19/24 1100          PT Assessment    Assessment Comments Ms. Anderson has attended 9 total sessions of skilled PT for back pain.  She slef rates progress as 80%. She is making nice progress with a HEP. Has had a few set backs intermittently with leg and back cramps.She has achieved 2/2 STGs and 1/3 LTGs.   "Recommend continue skilled PT thru remaining approved visits and then consideer d/c to home mangaement.  -GR        PT Plan    PT Plan Comments Continue x 4 visits with goal to d/c to HEP.  -GR               User Key  (r) = Recorded By, (t) = Taken By, (c) = Cosigned By      Initials Name Provider Type    GR Jaswinder Goodwin, PT Physical Therapist                       OP Exercises       Row Name 04/19/24 1100             Subjective    Subjective Comments \"Double booked\" herself so needs to be out in a slightly shorter time frame.  -GR         Subjective Pain    Able to rate subjective pain? yes  -GR      Pre-Treatment Pain Level 5  -GR      Post-Treatment Pain Level 5  -GR      Subjective Pain Comment back  -GR         Total Minutes    00654 - PT Therapeutic Exercise Minutes 32  -GR         Exercise 1    Exercise Name 1 Nustep  -GR      Time 1 5 min, lvl 5  -GR         Exercise 4    Exercise Name 4 Standing march - hands extended on swiss ball at wall  -GR      Cueing 4 Verbal  -GR      Sets 4 2  -GR      Reps 4 10  -GR      Time 4 alternating  -GR         Exercise 5    Exercise Name 5 Traveling squat + heel raise  -GR      Cueing 5 Verbal  -GR      Sets 5 4 laps  -GR         Exercise 6    Exercise Name 6 seated Piriformis stretch  -GR      Cueing 6 Verbal  -GR      Reps 6 3 BL  -GR      Time 6 20 seconds  -GR         Exercise 7    Exercise Name 7 swiss ball push up  -GR      Cueing 7 Verbal;Demo  -GR      Sets 7 1  -GR      Reps 7 20  -GR         Exercise 8    Exercise Name 8 seated HS stretch  -GR      Cueing 8 Demo  -GR      Sets 8 1  -GR      Reps 8 3  -GR      Time 8 20 seconds  -GR         Exercise 11    Exercise Name 11 sidestepping RTB  -GR      Cueing 11 Demo  -GR      Reps 11 3 laps  -GR         Exercise 12    Exercise Name 12 monster walk fwd/bwd  -GR      Cueing 12 Verbal  -GR      Reps 12 4 laps  -GR      Time 12 RTB  -GR         Exercise 13    Exercise Name 13 Rows  -GR      Cueing 13 Verbal  -GR   "    Sets 13 1  -GR      Reps 13 20  -GR      Time 13 RTB- airex  -GR         Exercise 14    Exercise Name 14 Alt shoulder ext with TA draw in  -GR      Cueing 14 Verbal  -GR      Sets 14 2  -GR      Reps 14 20  -GR      Time 14 RTB- airex  -GR         Exercise 15    Exercise Name 15 Anti-rotation RTB  -GR      Cueing 15 Verbal  -GR      Sets 15 2B  -GR      Reps 15 10  -GR      Time 15 on airex  -GR                User Key  (r) = Recorded By, (t) = Taken By, (c) = Cosigned By      Initials Name Provider Type    Jaswinder Hinson, PT Physical Therapist                                  PT OP Goals       Row Name 04/19/24 1100          PT Short Term Goals    STG Date to Achieve 04/19/24  -GR     STG 1 Patient will be independent with initial HEP.  -GR     STG 1 Progress Met  -GR     STG 2 Patient will report 50% improvement in pain with supine<>sit bed transfers.  -GR     STG 2 Progress Met  -GR        Long Term Goals    LTG Date to Achieve 05/19/24  -GR     LTG 1 Patient will be independent with progressive HEP for long term condition management.  -GR     LTG 1 Progress Ongoing  -GR     LTG 2 Patient will score </=36% disability on the modified LIZ to indicate improved perceived ADL performance.  -GR     LTG 2 Progress Goal Revised  -GR     LTG 2 Progress Comments 38%  -GR     LTG 3 Patient will resume recreational walking with well controlled pain to reintegrate into the community.  -GR     LTG 3 Progress Met  -GR               User Key  (r) = Recorded By, (t) = Taken By, (c) = Cosigned By      Initials Name Provider Type    Jaswinder Hinsno, PT Physical Therapist                    Therapy Education  [facility]    Outcome Measure Options: Modified Oswestry  Modified Oswestry  Modified Oswestry Score/Comments: 38% disability      Time Calculation:   Start Time: 1140  Stop Time: 1212  Time Calculation (min): 32 min  Total Timed Code Minutes- PT: 32 minute(s)  Timed Charges  05286 -  PT Therapeutic Exercise Minutes: 32  Total Minutes  Timed Charges Total Minutes: 32   Total Minutes: 32  Therapy Charges for Today       Code Description Service Date Service Provider Modifiers Qty    11638944629 HC PT THER PROC EA 15 MIN 4/19/2024 Jaswinder Goodwin, PT GP 2            PT G-Codes  Outcome Measure Options: Modified Oswestry  Modified Oswestry Score/Comments: 38% disability         Jaswinder Goodwin, PT  4/19/2024

## 2024-04-23 ENCOUNTER — HOSPITAL ENCOUNTER (OUTPATIENT)
Dept: PHYSICAL THERAPY | Facility: HOSPITAL | Age: 79
Setting detail: THERAPIES SERIES
Discharge: HOME | End: 2024-04-23
Payer: MEDICARE

## 2024-04-23 DIAGNOSIS — M54.41 CHRONIC RIGHT-SIDED LOW BACK PAIN WITH RIGHT-SIDED SCIATICA: Primary | ICD-10-CM

## 2024-04-23 DIAGNOSIS — G89.29 CHRONIC RIGHT-SIDED LOW BACK PAIN WITH RIGHT-SIDED SCIATICA: Primary | ICD-10-CM

## 2024-04-23 DIAGNOSIS — Z98.1 HISTORY OF FUSION OF LUMBAR SPINE: ICD-10-CM

## 2024-04-23 PROCEDURE — 97110 THERAPEUTIC EXERCISES: CPT | Performed by: PHYSICAL THERAPIST

## 2024-04-23 NOTE — THERAPY TREATMENT NOTE
Outpatient Physical Therapy Ortho Treatment Note  [facility]     Patient Name: Davida Anderson  : 1945  MRN: [id number]  Today's Date: 2024      Visit Date: 2024    Visit Dx:    ICD-10-CM ICD-9-CM   1. Chronic right-sided low back pain with right-sided sciatica  M54.41 724.2    G89.29 724.3     338.29   2. History of fusion of lumbar spine  Z98.1 V45.4       Patient Active Problem List   Diagnosis    BP (high blood pressure)    History of left breast infiltrating ductal cancer 2cm s/p mastectomy with reconstruction  s/p chemo    H/O ETOH abuse    Gtz's esophagus    Colon polyps    Bipolar 1 disorder    Arthritis    Raynaud's disease    Neuropathy    Lumbar spine pain    Spondylolisthesis at L4-L5 level    Right lumbar radiculopathy    Gtz's esophagus without dysplasia    Hx of adenomatous colonic polyps    Dysphagia    Diarrhea    Primary osteoarthritis of left knee    Primary localized osteoarthrosis of the knee, right    Chronic pain of left knee    Spinal stenosis of lumbar region with neurogenic claudication    Spondylolisthesis of lumbar region    Acute pain of left knee    Gastroesophageal reflux disease    Irritable bowel syndrome with constipation    Adenomatous polyp of colon    History of Gtz's esophagus        Past Medical History:   Diagnosis Date    Acid reflux     Arthritis     Atrial fibrillation     Gtz esophagus     Bipolar 2 disorder     Bradycardia     WITH SURGERY    COPD (chronic obstructive pulmonary disease)     MILD, USES AINHALER PRN    DDD (degenerative disc disease), lumbar     Diverticulosis     Dizziness     Drug therapy     Fibrocystic breast     Frequent UTI     SEES DR GOFF FOR FREQUENT UTI'S, ON KEFLEX    H/O Infiltrating ductal carcinoma of left breast     Stage IIA, Grade 3 ER/[place] +, HER2 -, treated with 5 years of tamoxifen, 5 years of Femara, completed in     High cholesterol     History of alcoholism     40 YRS AGO    History  of Clostridium difficile colitis 2014    History of gastric ulcer     History of loop recorder     Hypertension     Iron deficiency     Irregular heartbeat     a fib    Low back pain     Lumbar herniated disc     Mass of right breast on mammogram 03/17/2016    10 o'clock position, 4 cm from the nipple,    Neuropathy     PONV (postoperative nausea and vomiting)     Raynaud disease     Sleep apnea     MILD, NO NEED FOR CPAP    Tremors of nervous system     Urinary incontinence         Past Surgical History:   Procedure Laterality Date    ANKLE OPEN REDUCTION INTERNAL FIXATION Right 08/14/2015    Dr. Dandre Camacho, [facility]    BACK SURGERY      LUMBAR    BREAST BIOPSY      BREAST RECONSTRUCTION, BREAST TISSUE EXPANDER INSERTION Left 04/11/2002    [place] Contour Profile expander was placed 550 cc size, filled with 200 cc of saline, Dr. Herbert Napoles, [facility]    COLONOSCOPY N/A 09/16/2014    Dr. Darci Kerns, [facility]; torts, tics, stool, polyp    COLONOSCOPY N/A 09/27/2016    NTEH, diverticulosis, tortuous colon, Two 3-5mm polyps in the descending colon and the transverse colon, IH.  PATH: Tubular adenoma    COLONOSCOPY N/A 12/12/2017    NTEH, tics, torts, IH, TA w/low grade dysplasia    COLONOSCOPY N/A 12/01/2020    Procedure: COLONOSCOPY TO CECUM AND TI ;  Surgeon: Darci Kerns MD;  Location: [facility] ENDOSCOPY;  Service: Gastroenterology;  Laterality: N/A;  PRE- IBS, CONSTIPATION, HX POLYPS  POST- DEMINISHED SPHINCTER TONE, DIVERTICULOSIS, POORLY PREPPED COLON    CYSTOSCOPY N/A 05/07/2010    with TVT takedown, Dr. Simon Wall, [facility]    ENDOSCOPY N/A 09/27/2016    z line irreg, bilious gastric fluid- fluid aspiration preformed, gastritis.  PATH: Squamous and glandular mucosa with minimal superficial acute inflammation.     ENDOSCOPY N/A 12/12/2017    Z line irregular, gastritis, duodenitis, chronic inflammation    ENDOSCOPY N/A 12/01/2020    Procedure: ESOPHAGOGASTRODUODENOSCOPY WITH BIOPSIES;  Surgeon: Darci Kerns  MD HÉCTOR;  Location: [facility] ENDOSCOPY;  Service: Gastroenterology;  Laterality: N/A;  PRE- REFLUX, DYSPHAGIA  POST- ESOPHAGITIS, GASTRITIS, SMALL HIATAL HERNIA, BILE REFLUX    ENDOSCOPY N/A 3/15/2023    Procedure: ESOPHAGOGASTRODUODENOSCOPY with biopsies;  Surgeon: Darci Kerns MD;  Location: [facility] ENDOSCOPY;  Service: Gastroenterology;  Laterality: N/A;  pre:  reflux with breakthrough symptoms  post;  gastritis, mild esophagitis    EPIDURAL BLOCK      ESOPHAGEAL MANOMETRY N/A 02/02/2021    Procedure: ESOPHAGEAL MANOMETRY;  Surgeon: Tran, Nurse Performed;  Location: [facility] ENDOSCOPY;  Service: Gastroenterology;  Laterality: N/A;  DYSPHAGIA    KNEE SURGERY Left     BROKEN    LUMBAR DISCECTOMY FUSION INSTRUMENTATION N/A 12/03/2018    Procedure: L4-5 laminectomy and fusion with instrumentation;  Surgeon: Austin Keith MD;  Location: [facility] OR;  Service: Orthopedic Spine    MAMMO US BREAST BIOPSY ADDITIONAL W WO DEVICE Left 02/21/2002    2:00 position left breast, Infiltrating Ductal Carcinoma, [facility]    MANDIBLE FRACTURE SURGERY      MASTECTOMY Left 04/11/2002    Left Total Mastectomy and Left Axillary [place] Node Biobsy, Dr. Indu Akins, [facility]    OTHER SURGICAL HISTORY      CARDIAC LOOP DEVICE, LEFT CHEST    TENSION FREE VAGINAL TAPING WITH MINI ARC SLING N/A 10/26/2009    Dr. Gina Castillo, [facility]    TOTAL KNEE ARTHROPLASTY Left 10/21/2019    Procedure: TOTAL KNEE ARTHROPLASTY;  Surgeon: Anurag Serrano MD;  Location: [facility] OR;  Service: Orthopedics    UPPER GASTROINTESTINAL ENDOSCOPY  09/16/2014    z-line irreg, grade a reflux, gastritis                        PT Assessment/Plan       Row Name 04/23/24 1000          PT Assessment    Assessment Comments Resumed additional core strengthening exercises on this visit. She is making steady gains generally, although with intermittent c/o random uninvolved joint pain.  -GR        PT Plan    PT Plan Comments Continue POC; trial standing hip ext vs weight.   -GR               User Key  (r) = Recorded By, (t) = Taken By, (c) = Cosigned By      Initials Name Provider Type    GR Jaswinder Goodwin, PT Physical Therapist                       OP Exercises       Row Name 04/23/24 1000             Subjective    Subjective Comments States her R shoulder has been bothering her some when using the bands. Her back feels pretty good today.  -GR         Subjective Pain    Able to rate subjective pain? yes  -GR      Pre-Treatment Pain Level 0  -GR      Post-Treatment Pain Level 0  -GR         Total Minutes    08165 - PT Therapeutic Exercise Minutes 38  -GR         Exercise 1    Exercise Name 1 Nustep  -GR      Time 1 5 min, lvl 5  -GR         Exercise 3    Exercise Name 3 Standing squats- back to wall and ball  -GR      Cueing 3 Verbal  -GR      Sets 3 1  -GR      Reps 3 20  -GR      Time 3 RTB around knees  -GR         Exercise 4    Exercise Name 4 Standing march -on airex  -GR      Cueing 4 Verbal  -GR      Sets 4 2  -GR      Reps 4 10  -GR      Time 4 alternating  -GR         Exercise 5    Exercise Name 5 Traveling squat + heel raise  -GR      Cueing 5 Verbal  -GR      Reps 5 3 laps of counter  -GR         Exercise 6    Exercise Name 6 seated Piriformis stretch  -GR      Cueing 6 Verbal  -GR      Reps 6 3 BL  -GR      Time 6 20 seconds  -GR         Exercise 7    Exercise Name 7 swiss ball push up  -GR      Cueing 7 Verbal;Demo  -GR      Sets 7 1  -GR      Reps 7 20  -GR         Exercise 8    Exercise Name 8 seated HS stretch  -GR      Cueing 8 Demo  -GR      Sets 8 1  -GR      Reps 8 3  -GR      Time 8 20 seconds  -GR         Exercise 9    Exercise Name 9 LTR swiss ball  -GR      Cueing 9 Demo  -GR      Sets 9 2  -GR      Reps 9 10  -GR         Exercise 10    Exercise Name 10 Swiss ball DKTC  -GR      Cueing 10 Demo  -GR      Sets 10 2  -GR      Reps 10 10  -GR         Exercise 11    Exercise Name 11 supine IR piriformis stretch  -GR      Cueing 11 Demo  -GR      Sets 11 1  -GR       Reps 11 3  -GR      Time 11 20 seconds  -GR         Exercise 12    Exercise Name 12 monster walk fwd/bwd  -GR      Cueing 12 Verbal  -GR      Time 12 3 laps of counter  -GR         Exercise 13    Exercise Name 13 Rows  -GR      Cueing 13 Verbal  -GR      Sets 13 1  -GR      Reps 13 20  -GR      Time 13 RTB  -GR         Exercise 14    Exercise Name 14 Alt shoulder ext with TA draw in  -GR      Time 14 held per c/o shoulder pain last visit  -GR         Exercise 15    Exercise Name 15 Anti-rotation RTB  -GR      Cueing 15 Verbal  -GR      Sets 15 2B  -GR      Reps 15 10  -GR                User Key  (r) = Recorded By, (t) = Taken By, (c) = Cosigned By      Initials Name Provider Type    GR Jaswinder Goodwin, PT Physical Therapist                                                    Time Calculation:   Start Time: 0955  Stop Time: 1035  Time Calculation (min): 40 min  Total Timed Code Minutes- PT: 38 minute(s)  Timed Charges  77676 - PT Therapeutic Exercise Minutes: 38  Total Minutes  Timed Charges Total Minutes: 38   Total Minutes: 38  Therapy Charges for Today       Code Description Service Date Service Provider Modifiers Qty    [id number] HC PT THER PROC EA 15 MIN 4/23/2024 Jaswinder Goodwin, PT GP 3                      Jaswinder Goodwin PT  4/23/2024

## 2024-05-01 ENCOUNTER — HOSPITAL ENCOUNTER (OUTPATIENT)
Dept: PHYSICAL THERAPY | Facility: HOSPITAL | Age: 79
Discharge: HOME | End: 2024-05-01
Admitting: NURSE PRACTITIONER
Payer: MEDICARE

## 2024-05-01 DIAGNOSIS — M54.41 CHRONIC RIGHT-SIDED LOW BACK PAIN WITH RIGHT-SIDED SCIATICA: Primary | ICD-10-CM

## 2024-05-01 DIAGNOSIS — Z98.1 HISTORY OF FUSION OF LUMBAR SPINE: ICD-10-CM

## 2024-05-01 DIAGNOSIS — G89.29 CHRONIC RIGHT-SIDED LOW BACK PAIN WITH RIGHT-SIDED SCIATICA: Primary | ICD-10-CM

## 2024-05-01 PROCEDURE — 97530 THERAPEUTIC ACTIVITIES: CPT | Performed by: PHYSICAL THERAPIST

## 2024-05-01 NOTE — THERAPY TREATMENT NOTE
Outpatient Physical Therapy Ortho Treatment Note  [facility]     Patient Name: Davida Anderson  : 1945  MRN: [id number]  Today's Date: 2024      Visit Date: 2024    Visit Dx:    ICD-10-CM ICD-9-CM   1. Chronic right-sided low back pain with right-sided sciatica  M54.41 724.2    G89.29 724.3     338.29   2. History of fusion of lumbar spine  Z98.1 V45.4       Patient Active Problem List   Diagnosis    BP (high blood pressure)    History of left breast infiltrating ductal cancer 2cm s/p mastectomy with reconstruction  s/p chemo    H/O ETOH abuse    Gtz's esophagus    Colon polyps    Bipolar 1 disorder    Arthritis    Raynaud's disease    Neuropathy    Lumbar spine pain    Spondylolisthesis at L4-L5 level    Right lumbar radiculopathy    Gtz's esophagus without dysplasia    Hx of adenomatous colonic polyps    Dysphagia    Diarrhea    Primary osteoarthritis of left knee    Primary localized osteoarthrosis of the knee, right    Chronic pain of left knee    Spinal stenosis of lumbar region with neurogenic claudication    Spondylolisthesis of lumbar region    Acute pain of left knee    Gastroesophageal reflux disease    Irritable bowel syndrome with constipation    Adenomatous polyp of colon    History of Gtz's esophagus        Past Medical History:   Diagnosis Date    Acid reflux     Arthritis     Atrial fibrillation     Gtz esophagus     Bipolar 2 disorder     Bradycardia     WITH SURGERY    COPD (chronic obstructive pulmonary disease)     MILD, USES AINHALER PRN    DDD (degenerative disc disease), lumbar     Diverticulosis     Dizziness     Drug therapy     Fibrocystic breast     Frequent UTI     SEES DR GOFF FOR FREQUENT UTI'S, ON KEFLEX    H/O Infiltrating ductal carcinoma of left breast     Stage IIA, Grade 3 ER/[place] +, HER2 -, treated with 5 years of tamoxifen, 5 years of Femara, completed in     High cholesterol     History of alcoholism     40 YRS AGO    History of  Clostridium difficile colitis 2014    History of gastric ulcer     History of loop recorder     Hypertension     Iron deficiency     Irregular heartbeat     a fib    Low back pain     Lumbar herniated disc     Mass of right breast on mammogram 03/17/2016    10 o'clock position, 4 cm from the nipple,    Neuropathy     PONV (postoperative nausea and vomiting)     Raynaud disease     Sleep apnea     MILD, NO NEED FOR CPAP    Tremors of nervous system     Urinary incontinence         Past Surgical History:   Procedure Laterality Date    ANKLE OPEN REDUCTION INTERNAL FIXATION Right 08/14/2015    Dr. Dandre Camacho, [facility]    BACK SURGERY      LUMBAR    BREAST BIOPSY      BREAST RECONSTRUCTION, BREAST TISSUE EXPANDER INSERTION Left 04/11/2002    [place] Contour Profile expander was placed 550 cc size, filled with 200 cc of saline, Dr. Herbert Napoles, [facility]    COLONOSCOPY N/A 09/16/2014    Dr. Darci Kerns, [facility]; torts, tics, stool, polyp    COLONOSCOPY N/A 09/27/2016    NTEH, diverticulosis, tortuous colon, Two 3-5mm polyps in the descending colon and the transverse colon, IH.  PATH: Tubular adenoma    COLONOSCOPY N/A 12/12/2017    NTEH, tics, torts, IH, TA w/low grade dysplasia    COLONOSCOPY N/A 12/01/2020    Procedure: COLONOSCOPY TO CECUM AND TI ;  Surgeon: Darci Kerns MD;  Location: [facility] ENDOSCOPY;  Service: Gastroenterology;  Laterality: N/A;  PRE- IBS, CONSTIPATION, HX POLYPS  POST- DEMINISHED SPHINCTER TONE, DIVERTICULOSIS, POORLY PREPPED COLON    CYSTOSCOPY N/A 05/07/2010    with TVT takedown, Dr. Simon Wall, [facility]    ENDOSCOPY N/A 09/27/2016    z line irreg, bilious gastric fluid- fluid aspiration preformed, gastritis.  PATH: Squamous and glandular mucosa with minimal superficial acute inflammation.     ENDOSCOPY N/A 12/12/2017    Z line irregular, gastritis, duodenitis, chronic inflammation    ENDOSCOPY N/A 12/01/2020    Procedure: ESOPHAGOGASTRODUODENOSCOPY WITH BIOPSIES;  Surgeon: Darci Kerns  "MD;  Location: [facility] ENDOSCOPY;  Service: Gastroenterology;  Laterality: N/A;  PRE- REFLUX, DYSPHAGIA  POST- ESOPHAGITIS, GASTRITIS, SMALL HIATAL HERNIA, BILE REFLUX    ENDOSCOPY N/A 3/15/2023    Procedure: ESOPHAGOGASTRODUODENOSCOPY with biopsies;  Surgeon: Darci Kerns MD;  Location: [facility] ENDOSCOPY;  Service: Gastroenterology;  Laterality: N/A;  pre:  reflux with breakthrough symptoms  post;  gastritis, mild esophagitis    EPIDURAL BLOCK      ESOPHAGEAL MANOMETRY N/A 02/02/2021    Procedure: ESOPHAGEAL MANOMETRY;  Surgeon: Tran, Nurse Performed;  Location: [facility] ENDOSCOPY;  Service: Gastroenterology;  Laterality: N/A;  DYSPHAGIA    KNEE SURGERY Left     BROKEN    LUMBAR DISCECTOMY FUSION INSTRUMENTATION N/A 12/03/2018    Procedure: L4-5 laminectomy and fusion with instrumentation;  Surgeon: Austin Keith MD;  Location: [facility] OR;  Service: Orthopedic Spine    MAMMO US BREAST BIOPSY ADDITIONAL W WO DEVICE Left 02/21/2002    2:00 position left breast, Infiltrating Ductal Carcinoma, [facility]    MANDIBLE FRACTURE SURGERY      MASTECTOMY Left 04/11/2002    Left Total Mastectomy and Left Axillary [place] Node Biobsy, Dr. Indu Akins, [facility]    OTHER SURGICAL HISTORY      CARDIAC LOOP DEVICE, LEFT CHEST    TENSION FREE VAGINAL TAPING WITH MINI ARC SLING N/A 10/26/2009    Dr. Gina Castillo, [facility]    TOTAL KNEE ARTHROPLASTY Left 10/21/2019    Procedure: TOTAL KNEE ARTHROPLASTY;  Surgeon: Anurag Serrano MD;  Location: [facility] OR;  Service: Orthopedics    UPPER GASTROINTESTINAL ENDOSCOPY  09/16/2014    z-line irreg, grade a reflux, gastritis                        PT Assessment/Plan       Row Name 05/01/24 1452          PT Assessment    Assessment Comments Ms Anderson returns to the clinic reporting that she \"is doing pretty good\". She reports adherence to HEP. We continued to work on hip girdle/core strengthening activities with intermittent cuing. We did add staniding 3 way hip to challenge hip girdle/hip " extension strengthening. Encouraged pt to pull her strengthening exercises back to 3 x's per week vs. 5 days a week, in order to allow for appropriate muscle accomodation.  Ms. Anderson continues to be a candidate for skilled physical therapy.  -GJ        PT Plan    PT Plan Comments assess response to 3 way hip, may add second set to 3 way hip, ? STS,  -GJ               User Key  (r) = Recorded By, (t) = Taken By, (c) = Cosigned By      Initials Name Provider Type     Hank Guevara, PT Physical Therapist                       OP Exercises       Row Name 05/01/24 1447 05/01/24 1400          Subjective    Subjective Comments -- I'm doing well  -GJ        Total Minutes    25111 - PT Therapeutic Activity Minutes 40  -GJ --        Exercise 1    Exercise Name 1 -- Nustep  -GJ     Time 1 -- 5 min, lvl 5  -GJ     Additional Comments -- cues to avoid valgus collapse  -GJ        Exercise 2    Exercise Name 2 -- standing 3 way hip, B  -GJ     Cueing 2 -- Verbal;Demo  -GJ     Reps 2 -- 10  -GJ     Time 2 -- RTB  -GJ     Additional Comments -- B, gentle BUE asssit  -GJ        Exercise 3    Exercise Name 3 -- Standing squats- back to wall and ball  -GJ     Cueing 3 -- Verbal  -GJ     Sets 3 -- 1  -GJ     Reps 3 -- 20  -GJ     Time 3 -- RTB around knees  -GJ     Additional Comments -- cues to avoid valgus collapse  -GJ        Exercise 4    Exercise Name 4 -- Standing march -on airex  -GJ     Cueing 4 -- Verbal  -GJ     Sets 4 -- 2  -GJ     Reps 4 -- 10  -GJ     Time 4 -- alternating  -GJ     Additional Comments -- 0-1 UE  -GJ        Exercise 6    Exercise Name 6 -- seated Piriformis stretch  -GJ     Cueing 6 -- Verbal  -GJ     Reps 6 -- 3 BL  -GJ     Time 6 -- 20 seconds  -GJ        Exercise 7    Exercise Name 7 -- swiss ball push up  -GJ     Cueing 7 -- Verbal;Demo  -GJ     Sets 7 -- 1  -GJ     Reps 7 -- 20  -GJ     Time 7 -- standing position at wall  -GJ        Exercise 8    Exercise Name 8 -- seated HS stretch  -GJ     Cueing  8 -- Demo  -GJ     Sets 8 -- 1  -GJ     Reps 8 -- 3  -GJ     Time 8 -- 20 seconds  -GJ        Exercise 9    Exercise Name 9 -- LTR swiss ball  -GJ     Cueing 9 -- Demo  -GJ     Sets 9 -- 2  -GJ     Reps 9 -- 10  -GJ        Exercise 10    Exercise Name 10 -- Swiss ball DKTC  -GJ     Cueing 10 -- Demo  -GJ     Sets 10 -- 2  -GJ     Reps 10 -- 10  -GJ     Time 10 -- 3s  -GJ        Exercise 11    Exercise Name 11 -- supine IR piriformis stretch  -GJ     Cueing 11 -- Demo  -GJ     Sets 11 -- 1  -GJ     Reps 11 -- 3  -GJ     Time 11 -- 20 seconds  -GJ        Exercise 12    Exercise Name 12 -- monster walk fwd/bwd  -GJ     Cueing 12 -- Verbal  -GJ     Time 12 -- 3 laps  -GJ     Additional Comments -- RTB  -GJ        Exercise 13    Exercise Name 13 -- Rows  -GJ     Cueing 13 -- Verbal  -GJ     Sets 13 -- 1  -GJ     Reps 13 -- 20  -GJ     Time 13 -- RTB  -GJ        Exercise 14    Exercise Name 14 -- Alt shoulder ext with TA draw in  -GJ     Cueing 14 -- Verbal  -GJ     Sets 14 -- 2 each UE  -GJ     Reps 14 -- 10  -GJ     Time 14 -- RTB  -GJ        Exercise 15    Exercise Name 15 -- Anti-rotation RTB  -GJ     Cueing 15 -- Verbal  -GJ     Sets 15 -- 2B  -GJ     Reps 15 -- 10  -GJ     Time 15 -- RTB  -GJ               User Key  (r) = Recorded By, (t) = Taken By, (c) = Cosigned By      Initials Name Provider Type    GJ Hank Guevara, PT Physical Therapist                                  PT OP Goals       Row Name 05/01/24 1400          PT Short Term Goals    STG Date to Achieve 04/19/24  -GJ     STG 1 Patient will be independent with initial HEP.  -GJ     STG 1 Progress Met  -GJ     STG 2 Patient will report 50% improvement in pain with supine<>sit bed transfers.  -GJ     STG 2 Progress Met  -GJ        Long Term Goals    LTG Date to Achieve 05/19/24  -GJ     LTG 1 Patient will be independent with progressive HEP for long term condition management.  -GJ     LTG 1 Progress Ongoing  -GJ     LTG 2 Patient will score </=36%  disability on the modified LIZ to indicate improved perceived ADL performance.  -     LTG 2 Progress Goal Revised  -     LTG 3 Patient will resume recreational walking with well controlled pain to reintegrate into the community.  -     LTG 3 Progress Met  -               User Key  (r) = Recorded By, (t) = Taken By, (c) = Cosigned By      Initials Name Provider Type     Hank Guevara, PT Physical Therapist                    Therapy Education  Education Details: reviewed activity modifications,, only perform strengthening 3 x's per week vs. 5  Given: HEP, Symptoms/condition management, Pain management, Posture/body mechanics, Fall prevention and home safety, Mobility training  Program: Reinforced, New, Progressed  How Provided: Verbal, Demonstration  Provided to: Patient  Level of Understanding: Teach back education performed, Verbalized, Demonstrated              Time Calculation:   Start Time: 1447  Stop Time: 1530  Time Calculation (min): 43 min  Timed Charges  65850 - PT Therapeutic Activity Minutes: 40  Total Minutes  Timed Charges Total Minutes: 40   Total Minutes: 40  Therapy Charges for Today       Code Description Service Date Service Provider Modifiers Qty    14445969991  PT THERAPEUTIC ACT EA 15 MIN 5/1/2024 Hank Guevara, PT GP 3                      Hank Guevara, PT  5/1/2024

## 2024-05-06 ENCOUNTER — HOSPITAL ENCOUNTER (OUTPATIENT)
Dept: PHYSICAL THERAPY | Facility: HOSPITAL | Age: 79
Discharge: HOME | End: 2024-05-06
Admitting: NURSE PRACTITIONER
Payer: MEDICARE

## 2024-05-06 DIAGNOSIS — M54.41 CHRONIC RIGHT-SIDED LOW BACK PAIN WITH RIGHT-SIDED SCIATICA: Primary | ICD-10-CM

## 2024-05-06 DIAGNOSIS — G89.29 CHRONIC RIGHT-SIDED LOW BACK PAIN WITH RIGHT-SIDED SCIATICA: Primary | ICD-10-CM

## 2024-05-06 DIAGNOSIS — Z98.1 HISTORY OF FUSION OF LUMBAR SPINE: ICD-10-CM

## 2024-05-06 PROCEDURE — 97530 THERAPEUTIC ACTIVITIES: CPT

## 2024-06-03 ENCOUNTER — HOSPITAL ENCOUNTER (OUTPATIENT)
Dept: PHYSICAL THERAPY | Facility: HOSPITAL | Age: 79
Setting detail: THERAPIES SERIES
Discharge: HOME | End: 2024-06-03
Payer: MEDICARE

## 2024-06-03 DIAGNOSIS — R26.89 BALANCE PROBLEMS: ICD-10-CM

## 2024-06-03 DIAGNOSIS — R53.1 WEAKNESS GENERALIZED: Primary | ICD-10-CM

## 2024-06-03 DIAGNOSIS — R29.6 FREQUENT FALLS: ICD-10-CM

## 2024-06-03 PROCEDURE — 97530 THERAPEUTIC ACTIVITIES: CPT

## 2024-06-03 PROCEDURE — 97164 PT RE-EVAL EST PLAN CARE: CPT

## 2024-06-03 NOTE — THERAPY PROGRESS REPORT/RE-CERT
Outpatient Physical Therapy Ortho Re-Evaluation  [facility]     Patient Name: Davida Anderson  : 1945  MRN: [id number]  Today's Date: 6/3/2024      Visit Date: 2024    Patient Active Problem List   Diagnosis    BP (high blood pressure)    History of left breast infiltrating ductal cancer 2cm s/p mastectomy with reconstruction  s/p chemo    H/O ETOH abuse    Gtz's esophagus    Colon polyps    Bipolar 1 disorder    Arthritis    Raynaud's disease    Neuropathy    Lumbar spine pain    Spondylolisthesis at L4-L5 level    Right lumbar radiculopathy    Gtz's esophagus without dysplasia    Hx of adenomatous colonic polyps    Dysphagia    Diarrhea    Primary osteoarthritis of left knee    Primary localized osteoarthrosis of the knee, right    Chronic pain of left knee    Spinal stenosis of lumbar region with neurogenic claudication    Spondylolisthesis of lumbar region    Acute pain of left knee    Gastroesophageal reflux disease    Irritable bowel syndrome with constipation    Adenomatous polyp of colon    History of Gtz's esophagus        Past Medical History:   Diagnosis Date    Acid reflux     Arthritis     Atrial fibrillation     Gtz esophagus     Bipolar 2 disorder     Bradycardia     WITH SURGERY    COPD (chronic obstructive pulmonary disease)     MILD, USES AINHALER PRN    DDD (degenerative disc disease), lumbar     Diverticulosis     Dizziness     Drug therapy     Fibrocystic breast     Frequent UTI     SEES DR GOFF FOR FREQUENT UTI'S, ON KEFLEX    H/O Infiltrating ductal carcinoma of left breast     Stage IIA, Grade 3 ER/IL +, HER2 -, treated with 5 years of tamoxifen, 5 years of Femara, completed in     High cholesterol     History of alcoholism     40 YRS AGO    History of Clostridium difficile colitis     History of gastric ulcer     History of loop recorder     Hypertension     Iron deficiency     Irregular heartbeat     a fib    Low back pain     Lumbar  herniated disc     Mass of right breast on mammogram 03/17/2016    10 o'clock position, 4 cm from the nipple,    Neuropathy     PONV (postoperative nausea and vomiting)     Raynaud disease     Sleep apnea     MILD, NO NEED FOR CPAP    Tremors of nervous system     Urinary incontinence         Past Surgical History:   Procedure Laterality Date    ANKLE OPEN REDUCTION INTERNAL FIXATION Right 08/14/2015    Dr. Dandre Camacho, [facility]    BACK SURGERY      LUMBAR    BREAST BIOPSY      BREAST RECONSTRUCTION, BREAST TISSUE EXPANDER INSERTION Left 04/11/2002    [place] Contour Profile expander was placed 550 cc size, filled with 200 cc of saline, Dr. Herbert Napoles, [facility]    COLONOSCOPY N/A 09/16/2014    Dr. Darci Kerns, [facility]; torts, tics, stool, polyp    COLONOSCOPY N/A 09/27/2016    NTEH, diverticulosis, tortuous colon, Two 3-5mm polyps in the descending colon and the transverse colon, IH.  PATH: Tubular adenoma    COLONOSCOPY N/A 12/12/2017    NTEH, tics, torts, IH, TA w/low grade dysplasia    COLONOSCOPY N/A 12/01/2020    Procedure: COLONOSCOPY TO CECUM AND TI ;  Surgeon: Darci Kerns MD;  Location: [facility] ENDOSCOPY;  Service: Gastroenterology;  Laterality: N/A;  PRE- IBS, CONSTIPATION, HX POLYPS  POST- DEMINISHED SPHINCTER TONE, DIVERTICULOSIS, POORLY PREPPED COLON    CYSTOSCOPY N/A 05/07/2010    with TVT takedown, Dr. Simon Wall, [facility]    ENDOSCOPY N/A 09/27/2016    z line irreg, bilious gastric fluid- fluid aspiration preformed, gastritis.  PATH: Squamous and glandular mucosa with minimal superficial acute inflammation.     ENDOSCOPY N/A 12/12/2017    Z line irregular, gastritis, duodenitis, chronic inflammation    ENDOSCOPY N/A 12/01/2020    Procedure: ESOPHAGOGASTRODUODENOSCOPY WITH BIOPSIES;  Surgeon: Darci Kerns MD;  Location:  [facility];  Service: Gastroenterology;  Laterality: N/A;  PRE- REFLUX, DYSPHAGIA  POST- ESOPHAGITIS, GASTRITIS, SMALL HIATAL HERNIA, BILE REFLUX    ENDOSCOPY N/A  3/15/2023    Procedure: ESOPHAGOGASTRODUODENOSCOPY with biopsies;  Surgeon: Darci Kerns MD;  Location:  [facility];  Service: Gastroenterology;  Laterality: N/A;  pre:  reflux with breakthrough symptoms  post;  gastritis, mild esophagitis    EPIDURAL BLOCK      ESOPHAGEAL MANOMETRY N/A 02/02/2021    Procedure: ESOPHAGEAL MANOMETRY;  Surgeon: Tran, Nurse Performed;  Location:  [facility];  Service: Gastroenterology;  Laterality: N/A;  DYSPHAGIA    KNEE SURGERY Left     BROKEN    LUMBAR DISCECTOMY FUSION INSTRUMENTATION N/A 12/03/2018    Procedure: L4-5 laminectomy and fusion with instrumentation;  Surgeon: Austin Keith MD;  Location: [facility] MAIN OR;  Service: Orthopedic Spine    MAMMO US BREAST BIOPSY ADDITIONAL W WO DEVICE Left 02/21/2002    2:00 position left breast, Infiltrating Ductal Carcinoma, [facility]    MANDIBLE FRACTURE SURGERY      MASTECTOMY Left 04/11/2002    Left Total Mastectomy and Left Axillary [place] Node Biobsy, Dr. Indu Akins, [facility]    OTHER SURGICAL HISTORY      CARDIAC LOOP DEVICE, LEFT CHEST    TENSION FREE VAGINAL TAPING WITH MINI ARC SLING N/A 10/26/2009    Dr. Gina Castillo, [facility]    TOTAL KNEE ARTHROPLASTY Left 10/21/2019    Procedure: TOTAL KNEE ARTHROPLASTY;  Surgeon: Anurag Serrano MD;  Location: [facility] MAIN OR;  Service: Orthopedics    UPPER GASTROINTESTINAL ENDOSCOPY  09/16/2014    z-line irreg, grade a reflux, gastritis       Visit Dx:     ICD-10-CM ICD-9-CM   1. Weakness generalized  R53.1 780.79   2. Balance problems  R26.89 781.99   3. Frequent falls  R29.6 V15.88              PT Ortho       Row Name 06/03/24 1500       Balance Skills Training    SLS <2s BL  -MO    Balance Comments tandem: L fwd 10s, Rfwd <3s  -MO              User Key  (r) = Recorded By, (t) = Taken By, (c) = Cosigned By      Initials Name Provider Type    Cindy Irizarry, PT Physical Therapist                                       PT OP Goals       Row Name 06/03/24 1500          PT Short Term  Goals    STG Date to Achieve 07/03/24  -MO     STG 1 Pt will be independent with initial HEP to improve strength and static/dynamic balance.  -MO     STG 1 Progress New  -MO     STG 2 Patient will maintain TUG time of </= 10 seconds however will demo improved gait with zero episodes of scissoring gait pattern and with mild deviation from straight patient to show improved gait speed and decrease risk for falls.  -MO     STG 2 Progress New  -MO     STG 3 Pt will deny any new falls in 4 weeks demonstrating improved balance and decreased risk of falling.  -MO     STG 3 Progress New  -MO     STG 4 Patient able to tandem stance bilaterally  >/= 30 seconds 1 1 fingertip support without LOB for improved core stabilization and decreased fall risk.  -MO     STG 4 Progress New  -MO        Long Term Goals    LTG Date to Achieve 08/02/24  -MO     LTG 1 Pt will be independent with advance HEP to improve strength and static/dynamic balance.  -MO     LTG 1 Progress New  -MO     LTG 2 Pt performs 30 seconds sit to stand having complete at least 3 more repetitions that was performed upon initial evaluation for progression of ease with functional transfers, LE strength, and safety in the home.  -MO     LTG 2 Progress New  -MO     LTG 3 Pt demonstrates NBOS on foam > 30 sec without hand held assistance for improvements in balance with standing and walking on unstable surfaces with yard, house hold, or community tasks.  -MO     LTG 3 Progress New  -MO     LTG 4 The pt will resume reciprocal stair navigation and or single step navigation for improved safety during community and household navigation with appropriate assistive device if needed.  -MO     LTG 4 Progress New  -MO     LTG 5 Pt demonstrates ability to maintain SLS on stable surface without loss of balance for >8 seconds BL for improvements in gait with greater ability to weight shift, longer stride.  -MO     LTG 5 Progress New  -MO     LTG 6 Pt will improve 2min walk test by  >/=50ft without any LOB to increase activity tolerance in order to walk to leisure and improve safety in community.  -MO     LTG 6 Progress New  -MO     LTG 7 Patient able to tandem stance bilaterally  >/= 20 w/o UE support seconds without LOB for improved core stabilization and decreased fall risk  -MO     LTG 7 Progress New  -MO        Time Calculation    PT Goal Re-Cert Due Date 09/01/24  -MO               User Key  (r) = Recorded By, (t) = Taken By, (c) = Cosigned By      Initials Name Provider Type    Cindy Irizarry, PT Physical Therapist                     PT Assessment/Plan       Row Name 06/03/24 1800          PT Assessment    Functional Limitations Decreased safety during functional activities;Impaired gait;Performance in self-care ADL;Performance in leisure activities;Limitations in community activities;Limitation in home management  -MO     Impairments Balance;Coordination;Gait;Poor body mechanics;Muscle strength;Impaired muscle power;Impaired flexibility  -MO     Assessment Comments Davida Anderson has been seen for 12 physical therapy sessions for LBP/R hip pain over the last several months. However after several week lapse in sessions, pt returns to PT this date with change/worsening of condition. Since last session, pt has had 2 falls in the last 5 weeks, one resulting in head trauma, requiring 4 stitches above her eyebrow. Today, session modified to be re-evaluation and full assessment of balance at this time. 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 Today at re-evaluation, she demos gross  strength impairments, balance deficits, and overall functional mobility deficits. She demos intermittent scissoring gait pattern, several instances of significant sway throughout amb with near LOB laterally, and intermittent toe catching, all significantly increasing her risk for falls daily. Her previous impairments limit her ability to safely complete daily ADLs, participate safely in miesha walking, and overall participate in recreational activities. She will benefit from continued skilled physical therapy to address remaining impairments and functional limitations.  -MO     Please refer to paper survey for additional self-reported information No  -MO     Rehab Potential Good  -MO     Patient/caregiver participated in establishment of treatment plan and goals Yes  -MO     Patient would benefit from skilled therapy intervention Yes  -MO        PT Plan    PT Frequency 2x/week;1x/week  -MO     Predicted Duration of Therapy Intervention (PT) 12 visits  -MO     Planned CPT's? PT EVAL LOW COMPLEXITY: 34493;PT RE-EVAL: 85462;PT THER PROC EA 15 MIN: 25042;PT THER ACT EA 15 MIN: 84013;PT MANUAL THERAPY EA 15 MIN: 22843;PT NEUROMUSC RE-EDUCATION EA 15 MIN: 62525;PT GAIT TRAINING EA 15 MIN: 02459;PT SELF CARE/HOME MGMT/TRAIN EA 15: 05629  -MO     PT Plan Comments begin balance program. Resume lateral walking, monster walks, step up w/ knee  from previous program. Add tandem walk f/b, tandem stance, star taps, functional forward and lateral reaching. Update HEP as appropriate  -MO               User Key  (r) = Recorded By, (t) = Taken By, (c) = Cosigned By      Initials Name Provider Type    Cindy Irizarry, PT Physical Therapist                       OP Exercises       Row Name 06/03/24 1500             Subjective    Subjective Comments Had 2 falls in the last 5 weeks. First fall I got stitches in my head. I just fell yesterday but I am okay. I drift when I walk, I find myself swaying to the side. ~5 falls in the  last 6 months but I have close fall incounters daily.  -MO         Total Minutes    66704 - PT Therapeutic Activity Minutes 23  -MO         Exercise 1    Exercise Name 1 Nustep  -MO      Time 1 5 min, lvl 5  -MO         Exercise 2    Exercise Name 2 Re-evaluation  -MO                User Key  (r) = Recorded By, (t) = Taken By, (c) = Cosigned By      Initials Name Provider Type    Cindy Iriazrry, PT Physical Therapist                                  Outcome Measure Options: Martinez Balance, 30 Second Chair Stand Test, Timed Up and Go (TUG), 2 Minute Walk Test  2 Minute Walk Test  Distance Ambulated in 2 Minutes: 294 (intermittent scissoring steps, shuffling, sway BL)  30 Second Chair Stand Test  30 Second Chair Stand Test: 9 (BL UE support on knees. Clinic chair)  Martinez Balance Scale  Sitting to Standing: able to stand independently using hands  Standing Unsupported: able to stand 2 minutes with supervision  Sitting with Back Unsupported but Feet Supported on Floor or on Stool: able to sit safely and securely for 2 minutes  Standing to Sitting: controls descent by using hands  Transfers: able to transfer safely definite need of hands  Standing Unsupported with Eyes Closed: able to stand 10 seconds with supervision  Standing Unsupported with Feet Together: able to place feet together independently and stand 1 minute with supervision  Reaching Forward with Outstretched Arm While Standing: can reach forward 12 cm (5 inches) (reaches 13in however needs sup)   Object From the Floor From a Standing Position: able to  object safely and easily  Turning to Look Behind Over Left and Right Shoulders While Standing: looks behind one side only other side shows less weight shift  Turn 360 Degrees: able to turn 360 degrees safely in 4 seconds or less  Place Alternate Foot on Step or Stool While Standing Unsupported: able to complete 4 steps without aid with supervision (6 steps, intermittent)  Standing Unsupported with  One Foot in Front: loses balance while stepping or standing  Standing on One Leg: tries to lift leg unable to hold 3 seconds but remains standing independently  Martinez Total Score: 39  Timed Up and Go (TUG)  TUG Test 1: 9.3 seconds      Time Calculation:     Start Time: 1100  Stop Time: 1140  Time Calculation (min): 40 min  Timed Charges  76278 - PT Therapeutic Activity Minutes: 23  Untimed Charges  PT Eval/Re-eval Minutes: 18  Total Minutes  Timed Charges Total Minutes: 23  Untimed Charges Total Minutes: 18   Total Minutes: 41     Therapy Charges for Today       Code Description Service Date Service Provider Modifiers Qty    88935149811 HC PT RE-EVAL ESTABLISHED PLAN 2 6/3/2024 Cindy Hammonds, PT GP 1    89706286582 HC PT THERAPEUTIC ACT EA 15 MIN 6/3/2024 Cindy Hammonds, PT GP 2            PT G-Codes  Outcome Measure Options: Martinez Balance, 30 Second Chair Stand Test, Timed Up and Go (TUG), 2 Minute Walk Test  Martinez Total Score: 39  TUG Test 1: 9.3 seconds         Cindy Hammonds PT  6/3/2024

## 2024-06-06 ENCOUNTER — HOSPITAL ENCOUNTER (OUTPATIENT)
Dept: PHYSICAL THERAPY | Facility: HOSPITAL | Age: 79
Setting detail: THERAPIES SERIES
Discharge: HOME | End: 2024-06-06
Payer: MEDICARE

## 2024-06-06 DIAGNOSIS — R26.89 BALANCE PROBLEMS: ICD-10-CM

## 2024-06-06 DIAGNOSIS — R53.1 WEAKNESS GENERALIZED: Primary | ICD-10-CM

## 2024-06-06 DIAGNOSIS — R29.6 FREQUENT FALLS: ICD-10-CM

## 2024-06-06 PROCEDURE — 97530 THERAPEUTIC ACTIVITIES: CPT

## 2024-06-06 PROCEDURE — 97112 NEUROMUSCULAR REEDUCATION: CPT

## 2024-06-06 PROCEDURE — 97110 THERAPEUTIC EXERCISES: CPT

## 2024-06-06 NOTE — THERAPY TREATMENT NOTE
Outpatient Physical Therapy Ortho Treatment Note  [facility]     Patient Name: Davida Anderson  : 1945  MRN: [id number]  Today's Date: 2024      Visit Date: 2024    Visit Dx:    ICD-10-CM ICD-9-CM   1. Weakness generalized  R53.1 780.79   2. Balance problems  R26.89 781.99   3. Frequent falls  R29.6 V15.88       Patient Active Problem List   Diagnosis    BP (high blood pressure)    History of left breast infiltrating ductal cancer 2cm s/p mastectomy with reconstruction  s/p chemo    H/O ETOH abuse    Gtz's esophagus    Colon polyps    Bipolar 1 disorder    Arthritis    Raynaud's disease    Neuropathy    Lumbar spine pain    Spondylolisthesis at L4-L5 level    Right lumbar radiculopathy    Gtz's esophagus without dysplasia    Hx of adenomatous colonic polyps    Dysphagia    Diarrhea    Primary osteoarthritis of left knee    Primary localized osteoarthrosis of the knee, right    Chronic pain of left knee    Spinal stenosis of lumbar region with neurogenic claudication    Spondylolisthesis of lumbar region    Acute pain of left knee    Gastroesophageal reflux disease    Irritable bowel syndrome with constipation    Adenomatous polyp of colon    History of Gtz's esophagus        Past Medical History:   Diagnosis Date    Acid reflux     Arthritis     Atrial fibrillation     Gtz esophagus     Bipolar 2 disorder     Bradycardia     WITH SURGERY    COPD (chronic obstructive pulmonary disease)     MILD, USES AINHALER PRN    DDD (degenerative disc disease), lumbar     Diverticulosis     Dizziness     Drug therapy     Fibrocystic breast     Frequent UTI     SEES DR GOFF FOR FREQUENT UTI'S, ON KEFLEX    H/O Infiltrating ductal carcinoma of left breast     Stage IIA, Grade 3 ER/OK +, HER2 -, treated with 5 years of tamoxifen, 5 years of Femara, completed in     High cholesterol     History of alcoholism     40 YRS AGO    History of Clostridium difficile colitis 2014    History  of gastric ulcer     History of loop recorder     Hypertension     Iron deficiency     Irregular heartbeat     a fib    Low back pain     Lumbar herniated disc     Mass of right breast on mammogram 03/17/2016    10 o'clock position, 4 cm from the nipple,    Neuropathy     PONV (postoperative nausea and vomiting)     Raynaud disease     Sleep apnea     MILD, NO NEED FOR CPAP    Tremors of nervous system     Urinary incontinence         Past Surgical History:   Procedure Laterality Date    ANKLE OPEN REDUCTION INTERNAL FIXATION Right 08/14/2015    Dr. Dandre Camacho, [facility]    BACK SURGERY      LUMBAR    BREAST BIOPSY      BREAST RECONSTRUCTION, BREAST TISSUE EXPANDER INSERTION Left 04/11/2002    [facility] Contour Profile expander was placed 550 cc size, filled with 200 cc of saline, Dr. Herbert Napoles, [facility]    COLONOSCOPY N/A 09/16/2014    Dr. Darci Kerns, [facility]; torts, tics, stool, polyp    COLONOSCOPY N/A 09/27/2016    NTEH, diverticulosis, tortuous colon, Two 3-5mm polyps in the descending colon and the transverse colon, IH.  PATH: Tubular adenoma    COLONOSCOPY N/A 12/12/2017    NTEH, tics, torts, IH, TA w/low grade dysplasia    COLONOSCOPY N/A 12/01/2020    Procedure: COLONOSCOPY TO CECUM AND TI ;  Surgeon: Darci Kerns MD;  Location: [facility] ENDOSCOPY;  Service: Gastroenterology;  Laterality: N/A;  PRE- IBS, CONSTIPATION, HX POLYPS  POST- DEMINISHED SPHINCTER TONE, DIVERTICULOSIS, POORLY PREPPED COLON    CYSTOSCOPY N/A 05/07/2010    with TVT takedown, Dr. Simon Wall, [facility]    ENDOSCOPY N/A 09/27/2016    z line irreg, bilious gastric fluid- fluid aspiration preformed, gastritis.  PATH: Squamous and glandular mucosa with minimal superficial acute inflammation.     ENDOSCOPY N/A 12/12/2017    Z line irregular, gastritis, duodenitis, chronic inflammation    ENDOSCOPY N/A 12/01/2020    Procedure: ESOPHAGOGASTRODUODENOSCOPY WITH BIOPSIES;  Surgeon: Darci Kerns MD;  [facility];  Service:  Gastroenterology;  Laterality: N/A;  PRE- REFLUX, DYSPHAGIA  POST- ESOPHAGITIS, GASTRITIS, SMALL HIATAL HERNIA, BILE REFLUX    ENDOSCOPY N/A 3/15/2023    Procedure: ESOPHAGOGASTRODUODENOSCOPY with biopsies;  Surgeon: Darci Kerns MD;  Location: [facility] ENDOSCOPY;  Service: Gastroenterology;  Laterality: N/A;  pre:  reflux with breakthrough symptoms  post;  gastritis, mild esophagitis    EPIDURAL BLOCK      ESOPHAGEAL MANOMETRY N/A 02/02/2021    Procedure: ESOPHAGEAL MANOMETRY;  Surgeon: Tran, Nurse Performed;  Location: [facility] ENDOSCOPY;  Service: Gastroenterology;  Laterality: N/A;  DYSPHAGIA    KNEE SURGERY Left     BROKEN    LUMBAR DISCECTOMY FUSION INSTRUMENTATION N/A 12/03/2018    Procedure: L4-5 laminectomy and fusion with instrumentation;  Surgeon: Austin Keith MD;  Location: [facility] OR;  Service: Orthopedic Spine    MAMMO US BREAST BIOPSY ADDITIONAL W WO DEVICE Left 02/21/2002    2:00 position left breast, Infiltrating Ductal Carcinoma, [facility]    MANDIBLE FRACTURE SURGERY      MASTECTOMY Left 04/11/2002    Left Total Mastectomy and Left Axillary [place] Node Biobsy, Dr. Indu Akins, [facility]    OTHER SURGICAL HISTORY      CARDIAC LOOP DEVICE, LEFT CHEST    TENSION FREE VAGINAL TAPING WITH MINI ARC SLING N/A 10/26/2009    Dr. Gina Castillo, [facility]    TOTAL KNEE ARTHROPLASTY Left 10/21/2019    Procedure: TOTAL KNEE ARTHROPLASTY;  Surgeon: Anurag Serrano MD;  Location: [facility];  Service: Orthopedics    UPPER GASTROINTESTINAL ENDOSCOPY  09/16/2014    z-line irreg, grade a reflux, gastritis                        PT Assessment/Plan       Row Name 06/06/24 1100          PT Assessment    Assessment Comments Pt returns for first follow up after re-eval reporting one fall since last session when she tripped over an open . She denies LOC or hitting head, does report soreness in her L knee and hands from impact. Pt with good tolerance for weightbearing on BLE with no reports of pain throughout  session. Initiated therex/act/NMR this date focused on improved balance and LE functional strength with good tolerance. She requires supervision for safety but maintains balance with intermittent UE assist on bars.   Provided updated schedule to pt this date.  -RS        PT Plan    PT Plan Comments Update HEP as appropriate, consider cliff sstep over, ball toss  -RS               User Key  (r) = Recorded By, (t) = Taken By, (c) = Cosigned By      Initials Name Provider Type    RS Tara Arroyo, PT Physical Therapist                       OP Exercises       Row Name 06/06/24 1100             Subjective    Subjective Comments pt reports 1 fall since re-eval,, she tripped on an open . She reports soreness in L knee and hands, denies hitting her head or LOC  -RS         Subjective Pain    Able to rate subjective pain? yes  -RS      Subjective Pain Comment sore in knee  -RS         Total Minutes    72668 - PT Therapeutic Exercise Minutes 10  -RS      89619 -  PT Neuromuscular Reeducation Minutes 15  -RS      08435 - PT Therapeutic Activity Minutes 13  -RS         Exercise 1    Exercise Name 1 nustep  -RS      Cueing 1 Verbal  -RS      Time 1 5 min L5  -RS         Exercise 2    Exercise Name 2 STS with ball  -RS      Cueing 2 Verbal;Demo  -RS      Sets 2 2  -RS      Reps 2 10  -RS      Time 2 shoulder flex, trunk rotation  -RS         Exercise 3    Exercise Name 3 side steps  -RS      Cueing 3 Verbal;Demo  -RS      Reps 3 3 laps  -RS      Time 3 RTB  -RS         Exercise 4    Exercise Name 4 monster walk  -RS      Cueing 4 Verbal;Demo  -RS      Reps 4 3 laps  -RS      Time 4 RTB  -RS      Additional Comments fwd/back  -RS         Exercise 5    Exercise Name 5 tandem walking fwd  -RS      Cueing 5 Verbal;Demo  -RS      Reps 5 3 laps  -RS      Time 5 intermitten UE assist  -RS         Exercise 6    Exercise Name 6 step up with knee   -RS      Cueing 6 Verbal;Demo  -RS      Sets 6 2  -RS      Reps 6 10  ea  -RS      Time 6 6 inch  -RS         Exercise 7    Exercise Name 7 marching with head turns/nods  -RS      Cueing 7 Verbal;Demo  -RS      Reps 7 4 laps total  -RS         Exercise 8    Exercise Name 8 heel/toe raise on foam  -RS      Cueing 8 Verbal;Demo  -RS      Sets 8 2  -RS      Reps 8 15  -RS         Exercise 9    Exercise Name 9 NBOS foam EC  -RS      Cueing 9 Verbal;Demo  -RS      Reps 9 2  -RS      Time 9 20  -RS         Exercise 10    Exercise Name 10 tandem foam EO  -RS      Cueing 10 Verbal;Demo  -RS      Sets 10 2  -RS      Reps 10 30s ea  -RS                User Key  (r) = Recorded By, (t) = Taken By, (c) = Cosigned By      Initials Name Provider Type    RS Tara Arroyo, PT Physical Therapist                                  PT OP Goals       Row Name 06/06/24 1000          PT Short Term Goals    STG Date to Achieve 07/03/24  -RS     STG 1 Pt will be independent with initial HEP to improve strength and static/dynamic balance.  -RS     STG 1 Progress Ongoing  -RS     STG 2 Patient will maintain TUG time of </= 10 seconds however will demo improved gait with zero episodes of scissoring gait pattern and with mild deviation from straight patient to show improved gait speed and decrease risk for falls.  -RS     STG 2 Progress Ongoing  -RS     STG 3 Pt will deny any new falls in 4 weeks demonstrating improved balance and decreased risk of falling.  -RS     STG 3 Progress Ongoing  -RS     STG 4 Patient able to tandem stance bilaterally  >/= 30 seconds 1 1 fingertip support without LOB for improved core stabilization and decreased fall risk.  -RS     STG 4 Progress Ongoing  -RS        Long Term Goals    LTG Date to Achieve 08/02/24  -RS     LTG 1 Pt will be independent with advance HEP to improve strength and static/dynamic balance.  -RS     LTG 1 Progress Ongoing  -RS     LTG 2 Pt performs 30 seconds sit to stand having complete at least 3 more repetitions that was performed upon initial evaluation for  progression of ease with functional transfers, LE strength, and safety in the home.  -RS     LTG 2 Progress Ongoing  -RS     LTG 3 Pt demonstrates NBOS on foam > 30 sec without hand held assistance for improvements in balance with standing and walking on unstable surfaces with yard, house hold, or community tasks.  -RS     LTG 3 Progress Ongoing  -RS     LTG 4 The pt will resume reciprocal stair navigation and or single step navigation for improved safety during community and household navigation with appropriate assistive device if needed.  -RS     LTG 4 Progress Ongoing  -RS     LTG 5 Pt demonstrates ability to maintain SLS on stable surface without loss of balance for >8 seconds BL for improvements in gait with greater ability to weight shift, longer stride.  -RS     LTG 5 Progress Ongoing  -RS     LTG 6 Pt will improve 2min walk test by >/=50ft without any LOB to increase activity tolerance in order to walk to leisure and improve safety in community.  -RS     LTG 6 Progress Ongoing  -RS     LTG 7 Patient able to tandem stance bilaterally  >/= 20 w/o UE support seconds without LOB for improved core stabilization and decreased fall risk  -RS     LTG 7 Progress Ongoing  -RS               User Key  (r) = Recorded By, (t) = Taken By, (c) = Cosigned By      Initials Name Provider Type    Tara Riojas PT Physical Therapist                    Therapy Education  Given: HEP  Program: Reinforced  How Provided: Verbal, Demonstration  Provided to: Patient  Level of Understanding: Verbalized, Demonstrated              Time Calculation:   Start Time: 1049  Stop Time: 1129  Time Calculation (min): 40 min  Timed Charges  01016 - PT Therapeutic Exercise Minutes: 10  71740 -  PT Neuromuscular Reeducation Minutes: 15  01783 - PT Therapeutic Activity Minutes: 13  Total Minutes  Timed Charges Total Minutes: 38   Total Minutes: 38  Therapy Charges for Today       Code Description Service Date Service Provider Modifiers Qty     73036563119  PT NEUROMUSC RE EDUCATION EA 15 MIN 6/6/2024 Tara Arroyo, PT GP 1    92406937010 HC PT THER PROC EA 15 MIN 6/6/2024 Tara Arroyo, PT GP 1    [id number]  PT THERAPEUTIC ACT EA 15 MIN 6/6/2024 Tara Arroyo, PT GP 1                      Tara Arroyo, PT  6/6/2024

## 2024-06-11 ENCOUNTER — HOSPITAL ENCOUNTER (OUTPATIENT)
Dept: PHYSICAL THERAPY | Facility: HOSPITAL | Age: 79
Setting detail: THERAPIES SERIES
Discharge: HOME | End: 2024-06-11
Payer: MEDICARE

## 2024-06-11 DIAGNOSIS — R29.6 FREQUENT FALLS: ICD-10-CM

## 2024-06-11 DIAGNOSIS — R26.89 BALANCE PROBLEMS: ICD-10-CM

## 2024-06-11 DIAGNOSIS — R53.1 WEAKNESS GENERALIZED: Primary | ICD-10-CM

## 2024-06-11 PROCEDURE — 97110 THERAPEUTIC EXERCISES: CPT

## 2024-06-11 PROCEDURE — 97112 NEUROMUSCULAR REEDUCATION: CPT

## 2024-06-11 NOTE — THERAPY TREATMENT NOTE
Outpatient Physical Therapy Ortho Treatment Note  [facility]     Patient Name: Davida Anderson  : 1945  MRN: [id number]  Today's Date: 2024      Visit Date: 2024    Visit Dx:    ICD-10-CM ICD-9-CM   1. Weakness generalized  R53.1 780.79   2. Balance problems  R26.89 781.99   3. Frequent falls  R29.6 V15.88       Patient Active Problem List   Diagnosis    BP (high blood pressure)    History of left breast infiltrating ductal cancer 2cm s/p mastectomy with reconstruction  s/p chemo    H/O ETOH abuse    Gtz's esophagus    Colon polyps    Bipolar 1 disorder    Arthritis    Raynaud's disease    Neuropathy    Lumbar spine pain    Spondylolisthesis at L4-L5 level    Right lumbar radiculopathy    Gtz's esophagus without dysplasia    Hx of adenomatous colonic polyps    Dysphagia    Diarrhea    Primary osteoarthritis of left knee    Primary localized osteoarthrosis of the knee, right    Chronic pain of left knee    Spinal stenosis of lumbar region with neurogenic claudication    Spondylolisthesis of lumbar region    Acute pain of left knee    Gastroesophageal reflux disease    Irritable bowel syndrome with constipation    Adenomatous polyp of colon    History of Gtz's esophagus        Past Medical History:   Diagnosis Date    Acid reflux     Arthritis     Atrial fibrillation     Gtz esophagus     Bipolar 2 disorder     Bradycardia     WITH SURGERY    COPD (chronic obstructive pulmonary disease)     MILD, USES AINHALER PRN    DDD (degenerative disc disease), lumbar     Diverticulosis     Dizziness     Drug therapy     Fibrocystic breast     Frequent UTI     SEES DR GOFF FOR FREQUENT UTI'S, ON KEFLEX    H/O Infiltrating ductal carcinoma of left breast     Stage IIA, Grade 3 ER/MD +, HER2 -, treated with 5 years of tamoxifen, 5 years of Femara, completed in     High cholesterol     History of alcoholism     40 YRS AGO    History of Clostridium difficile colitis 2014    History  of gastric ulcer     History of loop recorder     Hypertension     Iron deficiency     Irregular heartbeat     a fib    Low back pain     Lumbar herniated disc     Mass of right breast on mammogram 03/17/2016    10 o'clock position, 4 cm from the nipple,    Neuropathy     PONV (postoperative nausea and vomiting)     Raynaud disease     Sleep apnea     MILD, NO NEED FOR CPAP    Tremors of nervous system     Urinary incontinence         Past Surgical History:   Procedure Laterality Date    ANKLE OPEN REDUCTION INTERNAL FIXATION Right 08/14/2015    Dr. Dandre Camacho, [facility]    BACK SURGERY      LUMBAR    BREAST BIOPSY      BREAST RECONSTRUCTION, BREAST TISSUE EXPANDER INSERTION Left 04/11/2002    [facility] Contour Profile expander was placed 550 cc size, filled with 200 cc of saline, Dr. Herbert Napoles, [facility]    COLONOSCOPY N/A 09/16/2014    Dr. Darci Kerns, [facility]; torts, tics, stool, polyp    COLONOSCOPY N/A 09/27/2016    NTEH, diverticulosis, tortuous colon, Two 3-5mm polyps in the descending colon and the transverse colon, IH.  PATH: Tubular adenoma    COLONOSCOPY N/A 12/12/2017    NTEH, tics, torts, IH, TA w/low grade dysplasia    COLONOSCOPY N/A 12/01/2020    Procedure: COLONOSCOPY TO CECUM AND TI ;  Surgeon: Darci Kerns MD;  Location: [facility] ENDOSCOPY;  Service: Gastroenterology;  Laterality: N/A;  PRE- IBS, CONSTIPATION, HX POLYPS  POST- DEMINISHED SPHINCTER TONE, DIVERTICULOSIS, POORLY PREPPED COLON    CYSTOSCOPY N/A 05/07/2010    with TVT takedown, Dr. Simon Wall, [facility]    ENDOSCOPY N/A 09/27/2016    z line irreg, bilious gastric fluid- fluid aspiration preformed, gastritis.  PATH: Squamous and glandular mucosa with minimal superficial acute inflammation.     ENDOSCOPY N/A 12/12/2017    Z line irregular, gastritis, duodenitis, chronic inflammation    ENDOSCOPY N/A 12/01/2020    Procedure: ESOPHAGOGASTRODUODENOSCOPY WITH BIOPSIES;  Surgeon: Darci Kerns MD;  [facility];  Service:  Gastroenterology;  Laterality: N/A;  PRE- REFLUX, DYSPHAGIA  POST- ESOPHAGITIS, GASTRITIS, SMALL HIATAL HERNIA, BILE REFLUX    ENDOSCOPY N/A 3/15/2023    Procedure: ESOPHAGOGASTRODUODENOSCOPY with biopsies;  Surgeon: Darci Kerns MD;  Location: [facility] ENDOSCOPY;  Service: Gastroenterology;  Laterality: N/A;  pre:  reflux with breakthrough symptoms  post;  gastritis, mild esophagitis    EPIDURAL BLOCK      ESOPHAGEAL MANOMETRY N/A 02/02/2021    Procedure: ESOPHAGEAL MANOMETRY;  Surgeon: Tran, Nurse Performed;  Location: [facility] ENDOSCOPY;  Service: Gastroenterology;  Laterality: N/A;  DYSPHAGIA    KNEE SURGERY Left     BROKEN    LUMBAR DISCECTOMY FUSION INSTRUMENTATION N/A 12/03/2018    Procedure: L4-5 laminectomy and fusion with instrumentation;  Surgeon: Austin Keith MD;  Location: [facility];  Service: Orthopedic Spine    MAMMO US BREAST BIOPSY ADDITIONAL W WO DEVICE Left 02/21/2002    2:00 position left breast, Infiltrating Ductal Carcinoma, [facility]    MANDIBLE FRACTURE SURGERY      MASTECTOMY Left 04/11/2002    Left Total Mastectomy and Left Axillary [place] Node Biobsy, Dr. Indu Akins, [facility]    OTHER SURGICAL HISTORY      CARDIAC LOOP DEVICE, LEFT CHEST    TENSION FREE VAGINAL TAPING WITH MINI ARC SLING N/A 10/26/2009    Dr. Gina Castillo, [facility]    TOTAL KNEE ARTHROPLASTY Left 10/21/2019    Procedure: TOTAL KNEE ARTHROPLASTY;  Surgeon: Anurag Serrano MD;  Location: [facility];  Service: Orthopedics    UPPER GASTROINTESTINAL ENDOSCOPY  09/16/2014    z-line irreg, grade a reflux, gastritis                        PT Assessment/Plan       Row Name 06/11/24 1500          PT Assessment    Assessment Comments Pt returns with reports of ongoing balance impairments. Continued with progression of lateral hip strength and balance challenges. Continues to demo unsteadiness with ambulation. Remains appropriate for PT.  -CC        PT Plan    PT Plan Comments Update HEP as appropriate, consider cliff sstep  over, ball toss  -CC               User Key  (r) = Recorded By, (t) = Taken By, (c) = Cosigned By      Initials Name Provider Type    Helene Anderson, PT Physical Therapist                       OP Exercises       Row Name 06/11/24 1300             Subjective    Subjective Comments Reports doing well today  -CC         Total Minutes    73069 - PT Therapeutic Exercise Minutes 20  -CC      63556 -  PT Neuromuscular Reeducation Minutes 20  -CC         Exercise 1    Exercise Name 1 nustep  -CC      Cueing 1 Verbal  -CC      Time 1 5 min L5  -CC         Exercise 2    Exercise Name 2 STS with ball  -CC      Cueing 2 Verbal;Demo  -CC      Sets 2 2  -CC      Reps 2 10  -CC      Time 2 shoulder flex, trunk rotation  -CC         Exercise 3    Exercise Name 3 side steps  -CC      Cueing 3 Verbal;Demo  -CC      Reps 3 4 laps  -CC      Time 3 RTB  -CC         Exercise 4    Exercise Name 4 monster walk  -CC      Cueing 4 Verbal;Demo  -CC      Reps 4 4 laps  -CC      Time 4 RTB  -CC      Additional Comments fwd/back  -CC         Exercise 5    Exercise Name 5 tandem walking fwd  -CC      Cueing 5 Verbal;Demo  -CC      Reps 5 3 laps  -CC      Time 5 intermitten UE assist  -CC         Exercise 6    Exercise Name 6 step up with knee   -CC      Cueing 6 Verbal;Demo  -CC      Sets 6 2  -CC      Reps 6 10 ea  -CC      Time 6 6 inch  -CC         Exercise 7    Exercise Name 7 marching with head turns/nods  -CC      Cueing 7 Verbal;Demo  -CC      Reps 7 4 laps total  -CC         Exercise 8    Exercise Name 8 heel/toe raise on foam  -CC      Cueing 8 Verbal;Demo  -CC      Sets 8 2  -CC      Reps 8 15  -CC         Exercise 9    Exercise Name 9 NBOS foam EC  -CC      Cueing 9 Verbal;Demo  -CC      Reps 9 2  -CC      Time 9 20  -CC         Exercise 10    Exercise Name 10 tandem foam EO  -CC      Cueing 10 Verbal;Demo  -CC      Sets 10 2  -CC      Reps 10 30s ea  -CC         Exercise 11    Exercise Name 11 3 way hip on airex  -CC    "   Cueing 11 Demo  -CC      Reps 11 10 ea leg  -CC         Exercise 12    Exercise Name 12 lateral step up  -CC      Cueing 12 Verbal  -CC      Reps 12 2x10 ea leg  -CC      Time 12 6\"  -CC                User Key  (r) = Recorded By, (t) = Taken By, (c) = Cosigned By      Initials Name Provider Type    CC Helene Jackson, PT Physical Therapist                                                    Time Calculation:   Start Time: 1315  Stop Time: 1355  Time Calculation (min): 40 min  Total Timed Code Minutes- PT: 40 minute(s)  Timed Charges  50641 - PT Therapeutic Exercise Minutes: 20  19927 -  PT Neuromuscular Reeducation Minutes: 20  Total Minutes  Timed Charges Total Minutes: 40   Total Minutes: 40  Therapy Charges for Today       Code Description Service Date Service Provider Modifiers Qty    18527283616 HC PT NEUROMUSC RE EDUCATION EA 15 MIN 6/11/2024 Helene Jackson, PT GP 2    28486374841 HC PT THER PROC EA 15 MIN 6/11/2024 Helene Jackson, PT GP 1                      Helene Jackson PT  6/11/2024     "

## 2024-06-18 ENCOUNTER — TRANSCRIBE ORDERS (OUTPATIENT)
Dept: PHYSICAL THERAPY | Facility: HOSPITAL | Age: 79
End: 2024-06-18
Payer: MEDICARE

## 2024-06-18 DIAGNOSIS — R26.89 IMBALANCE: Primary | ICD-10-CM

## 2024-06-19 ENCOUNTER — HOSPITAL ENCOUNTER (OUTPATIENT)
Dept: PHYSICAL THERAPY | Facility: HOSPITAL | Age: 79
Setting detail: THERAPIES SERIES
Discharge: HOME | End: 2024-06-19
Payer: MEDICARE

## 2024-06-19 DIAGNOSIS — R26.89 BALANCE PROBLEMS: ICD-10-CM

## 2024-06-19 DIAGNOSIS — R29.6 FREQUENT FALLS: ICD-10-CM

## 2024-06-19 DIAGNOSIS — R53.1 WEAKNESS GENERALIZED: Primary | ICD-10-CM

## 2024-06-19 PROCEDURE — 97112 NEUROMUSCULAR REEDUCATION: CPT

## 2024-06-19 PROCEDURE — 97110 THERAPEUTIC EXERCISES: CPT

## 2024-06-19 NOTE — THERAPY TREATMENT NOTE
Outpatient Physical Therapy Ortho Treatment Note  [facility]     Patient Name: Davida Anderson  : 1945  MRN: [id number]  Today's Date: 2024      Visit Date: 2024    Visit Dx:    ICD-10-CM ICD-9-CM   1. Weakness generalized  R53.1 780.79   2. Balance problems  R26.89 781.99   3. Frequent falls  R29.6 V15.88       Patient Active Problem List   Diagnosis    BP (high blood pressure)    History of left breast infiltrating ductal cancer 2cm s/p mastectomy with reconstruction  s/p chemo    H/O ETOH abuse    Gtz's esophagus    Colon polyps    Bipolar 1 disorder    Arthritis    Raynaud's disease    Neuropathy    Lumbar spine pain    Spondylolisthesis at L4-L5 level    Right lumbar radiculopathy    Gtz's esophagus without dysplasia    Hx of adenomatous colonic polyps    Dysphagia    Diarrhea    Primary osteoarthritis of left knee    Primary localized osteoarthrosis of the knee, right    Chronic pain of left knee    Spinal stenosis of lumbar region with neurogenic claudication    Spondylolisthesis of lumbar region    Acute pain of left knee    Gastroesophageal reflux disease    Irritable bowel syndrome with constipation    Adenomatous polyp of colon    History of Gtz's esophagus        Past Medical History:   Diagnosis Date    Acid reflux     Arthritis     Atrial fibrillation     Gtz esophagus     Bipolar 2 disorder     Bradycardia     WITH SURGERY    COPD (chronic obstructive pulmonary disease)     MILD, USES AINHALER PRN    DDD (degenerative disc disease), lumbar     Diverticulosis     Dizziness     Drug therapy     Fibrocystic breast     Frequent UTI     SEES DR GOFF FOR FREQUENT UTI'S, ON KEFLEX    H/O Infiltrating ductal carcinoma of left breast     Stage IIA, Grade 3 ER/ND +, HER2 -, treated with 5 years of tamoxifen, 5 years of Femara, completed in     High cholesterol     History of alcoholism     40 YRS AGO    History of Clostridium difficile colitis 2014    History  of gastric ulcer     History of loop recorder     Hypertension     Iron deficiency     Irregular heartbeat     a fib    Low back pain     Lumbar herniated disc     Mass of right breast on mammogram 03/17/2016    10 o'clock position, 4 cm from the nipple,    Neuropathy     PONV (postoperative nausea and vomiting)     Raynaud disease     Sleep apnea     MILD, NO NEED FOR CPAP    Tremors of nervous system     Urinary incontinence         Past Surgical History:   Procedure Laterality Date    ANKLE OPEN REDUCTION INTERNAL FIXATION Right 08/14/2015    Dr. Dandre Camacho, [facility]    BACK SURGERY      LUMBAR    BREAST BIOPSY      BREAST RECONSTRUCTION, BREAST TISSUE EXPANDER INSERTION Left 04/11/2002    [place] Contour Profile expander was placed 550 cc size, filled with 200 cc of saline, Dr. Herbert Napoles, [facility]    COLONOSCOPY N/A 09/16/2014    Dr. Darci Kerns, [facility]; torts, tics, stool, polyp    COLONOSCOPY N/A 09/27/2016    NTEH, diverticulosis, tortuous colon, Two 3-5mm polyps in the descending colon and the transverse colon, IH.  PATH: Tubular adenoma    COLONOSCOPY N/A 12/12/2017    NTEH, tics, torts, IH, TA w/low grade dysplasia    COLONOSCOPY N/A 12/01/2020    Procedure: COLONOSCOPY TO CECUM AND TI ;  Surgeon: Darci Kerns MD;  Location: [facility] ENDOSCOPY;  Service: Gastroenterology;  Laterality: N/A;  PRE- IBS, CONSTIPATION, HX POLYPS  POST- DEMINISHED SPHINCTER TONE, DIVERTICULOSIS, POORLY PREPPED COLON    CYSTOSCOPY N/A 05/07/2010    with TVT takedown, Dr. Simon Wall, [facility]    ENDOSCOPY N/A 09/27/2016    z line irreg, bilious gastric fluid- fluid aspiration preformed, gastritis.  PATH: Squamous and glandular mucosa with minimal superficial acute inflammation.     ENDOSCOPY N/A 12/12/2017    Z line irregular, gastritis, duodenitis, chronic inflammation    ENDOSCOPY N/A 12/01/2020    Procedure: ESOPHAGOGASTRODUODENOSCOPY WITH BIOPSIES;  Surgeon: Darci Kerns MD;  Location: [facility] ENDOSCOPY;  Service:  Gastroenterology;  Laterality: N/A;  PRE- REFLUX, DYSPHAGIA  POST- ESOPHAGITIS, GASTRITIS, SMALL HIATAL HERNIA, BILE REFLUX    ENDOSCOPY N/A 3/15/2023    Procedure: ESOPHAGOGASTRODUODENOSCOPY with biopsies;  Surgeon: Darci Kerns MD;  Location: [facility] ENDOSCOPY;  Service: Gastroenterology;  Laterality: N/A;  pre:  reflux with breakthrough symptoms  post;  gastritis, mild esophagitis    EPIDURAL BLOCK      ESOPHAGEAL MANOMETRY N/A 02/02/2021    Procedure: ESOPHAGEAL MANOMETRY;  Surgeon: Tran, Nurse Performed;  Location: [facility] ENDOSCOPY;  Service: Gastroenterology;  Laterality: N/A;  DYSPHAGIA    KNEE SURGERY Left     BROKEN    LUMBAR DISCECTOMY FUSION INSTRUMENTATION N/A 12/03/2018    Procedure: L4-5 laminectomy and fusion with instrumentation;  Surgeon: Austin Keith MD;  Location: [facility] OR;  Service: Orthopedic Spine    MAMMO US BREAST BIOPSY ADDITIONAL W WO DEVICE Left 02/21/2002    2:00 position left breast, Infiltrating Ductal Carcinoma, [facility]    MANDIBLE FRACTURE SURGERY      MASTECTOMY Left 04/11/2002    Left Total Mastectomy and Left Axillary [place] Node Biobsy, Dr. Indu Akins, [facility]    OTHER SURGICAL HISTORY      CARDIAC LOOP DEVICE, LEFT CHEST    TENSION FREE VAGINAL TAPING WITH MINI ARC SLING N/A 10/26/2009    Dr. Gina Castillo, [facility]    TOTAL KNEE ARTHROPLASTY Left 10/21/2019    Procedure: TOTAL KNEE ARTHROPLASTY;  Surgeon: Anurag Serrano MD;  Location: [facility];  Service: Orthopedics    UPPER GASTROINTESTINAL ENDOSCOPY  09/16/2014    z-line irreg, grade a reflux, gastritis                        PT Assessment/Plan       Row Name 06/19/24 1400          PT Assessment    Assessment Comments Pt returns to continueing to demo intermitent scissoring gait in clinic and occasional trendelenberg, with occassional gait deviations. Continued with hip girdle strengthening and balance challenges. Pt continues to require intermittent UE support for balance challenges.  -CC        PT Plan    PT  "Plan Comments Update HEP as appropriate, ball toss  -CC               User Key  (r) = Recorded By, (t) = Taken By, (c) = Cosigned By      Initials Name Provider Type    Helene Anderson, PT Physical Therapist                       OP Exercises       Row Name 06/19/24 1400             Subjective    Subjective Comments Doing well, can't tell if the exercises are helping yet.  -CC         Total Minutes    72722 - PT Therapeutic Exercise Minutes 20  -CC      38430 -  PT Neuromuscular Reeducation Minutes 20  -CC         Exercise 1    Exercise Name 1 nustep  -CC      Cueing 1 Verbal  -CC      Time 1 5 min L5  -CC         Exercise 2    Exercise Name 2 STS with ball  -CC      Cueing 2 Verbal;Demo  -CC      Sets 2 2  -CC      Reps 2 10  -CC      Time 2 shoulder flex, trunk rotation  -CC         Exercise 5    Exercise Name 5 tandem walking fwd  -CC      Cueing 5 Verbal;Demo  -CC      Reps 5 3 laps  -CC      Time 5 intermitten UE assist  -CC         Exercise 7    Exercise Name 7 marching with head turns/nods  -CC      Cueing 7 Verbal;Demo  -CC      Reps 7 4 laps total  -CC         Exercise 8    Exercise Name 8 heel/toe raise on foam  -CC      Cueing 8 Verbal;Demo  -CC      Sets 8 2  -CC      Reps 8 15  -CC         Exercise 9    Exercise Name 9 cliff step over  -CC      Cueing 9 Verbal  -CC      Sets 9 5 laps fwd  -CC      Reps 9 5 laps lateral  -CC      Time 9 2 hurdles on low setting; 2 finger support B UE  -CC      Additional Comments // bar  -CC         Exercise 10    Exercise Name 10 trunk rotation w L2 plyoball  -CC      Cueing 10 Verbal  -CC      Sets 10 1 ea: narrow KATYA, semi tandem ea way  -CC      Reps 10 10 ea way  -CC         Exercise 11    Exercise Name 11 3 way hip on airex  -CC      Cueing 11 Demo  -CC      Reps 11 10 ea leg  -CC         Exercise 12    Exercise Name 12 lateral step up  -CC      Cueing 12 Verbal  -CC      Reps 12 2x10 ea leg  -CC      Time 12 6\"  -CC                User Key  (r) = Recorded " By, (t) = Taken By, (c) = Cosigned By      Initials Name Provider Type    CC Helene Jackson, PT Physical Therapist                                                    Time Calculation:   Start Time: 1400  Stop Time: 1440  Time Calculation (min): 40 min  Total Timed Code Minutes- PT: 40 minute(s)  Timed Charges  94171 - PT Therapeutic Exercise Minutes: 20  72284 -  PT Neuromuscular Reeducation Minutes: 20  Total Minutes  Timed Charges Total Minutes: 40   Total Minutes: 40  Therapy Charges for Today       Code Description Service Date Service Provider Modifiers Qty    54916512024 HC PT NEUROMUSC RE EDUCATION EA 15 MIN 6/19/2024 Helene Jackson, PT GP 2    25027082095 HC PT THER PROC EA 15 MIN 6/19/2024 Helene Jackson, PT GP 1                      Helene Jackson, STEPHEN  6/19/2024

## 2024-06-24 ENCOUNTER — HOSPITAL ENCOUNTER (OUTPATIENT)
Dept: PHYSICAL THERAPY | Facility: HOSPITAL | Age: 79
Setting detail: THERAPIES SERIES
Discharge: HOME | End: 2024-06-24
Payer: MEDICARE

## 2024-06-24 DIAGNOSIS — R26.89 BALANCE PROBLEMS: ICD-10-CM

## 2024-06-24 DIAGNOSIS — R29.6 FREQUENT FALLS: ICD-10-CM

## 2024-06-24 DIAGNOSIS — R53.1 WEAKNESS GENERALIZED: Primary | ICD-10-CM

## 2024-06-24 PROCEDURE — 97112 NEUROMUSCULAR REEDUCATION: CPT

## 2024-06-24 PROCEDURE — 97110 THERAPEUTIC EXERCISES: CPT

## 2024-06-24 NOTE — THERAPY TREATMENT NOTE
Outpatient Physical Therapy Ortho Treatment Note  [facility]     Patient Name: Davida Anderson  : 1945  MRN: [id number]  Today's Date: 2024      Visit Date: 2024    Visit Dx:    ICD-10-CM ICD-9-CM   1. Weakness generalized  R53.1 780.79   2. Balance problems  R26.89 781.99   3. Frequent falls  R29.6 V15.88       Patient Active Problem List   Diagnosis    BP (high blood pressure)    History of left breast infiltrating ductal cancer 2cm s/p mastectomy with reconstruction  s/p chemo    H/O ETOH abuse    Gtz's esophagus    Colon polyps    Bipolar 1 disorder    Arthritis    Raynaud's disease    Neuropathy    Lumbar spine pain    Spondylolisthesis at L4-L5 level    Right lumbar radiculopathy    Gtz's esophagus without dysplasia    Hx of adenomatous colonic polyps    Dysphagia    Diarrhea    Primary osteoarthritis of left knee    Primary localized osteoarthrosis of the knee, right    Chronic pain of left knee    Spinal stenosis of lumbar region with neurogenic claudication    Spondylolisthesis of lumbar region    Acute pain of left knee    Gastroesophageal reflux disease    Irritable bowel syndrome with constipation    Adenomatous polyp of colon    History of Gtz's esophagus        Past Medical History:   Diagnosis Date    Acid reflux     Arthritis     Atrial fibrillation     Gtz esophagus     Bipolar 2 disorder     Bradycardia     WITH SURGERY    COPD (chronic obstructive pulmonary disease)     MILD, USES AINHALER PRN    DDD (degenerative disc disease), lumbar     Diverticulosis     Dizziness     Drug therapy     Fibrocystic breast     Frequent UTI     SEES DR GOFF FOR FREQUENT UTI'S, ON KEFLEX    H/O Infiltrating ductal carcinoma of left breast     Stage IIA, Grade 3 ER/AL +, HER2 -, treated with 5 years of tamoxifen, 5 years of Femara, completed in     High cholesterol     History of alcoholism     40 YRS AGO    History of Clostridium difficile colitis 2014    History  of gastric ulcer     History of loop recorder     Hypertension     Iron deficiency     Irregular heartbeat     a fib    Low back pain     Lumbar herniated disc     Mass of right breast on mammogram 03/17/2016    10 o'clock position, 4 cm from the nipple,    Neuropathy     PONV (postoperative nausea and vomiting)     Raynaud disease     Sleep apnea     MILD, NO NEED FOR CPAP    Tremors of nervous system     Urinary incontinence         Past Surgical History:   Procedure Laterality Date    ANKLE OPEN REDUCTION INTERNAL FIXATION Right 08/14/2015    Dr. Dandre Camacho, [facility]    BACK SURGERY      LUMBAR    BREAST BIOPSY      BREAST RECONSTRUCTION, BREAST TISSUE EXPANDER INSERTION Left 04/11/2002    [place] Contour Profile expander was placed 550 cc size, filled with 200 cc of saline, Dr. Herbert Napoles, [facility]    COLONOSCOPY N/A 09/16/2014    Dr. Darci Kerns, [facility]; torts, tics, stool, polyp    COLONOSCOPY N/A 09/27/2016    NTEH, diverticulosis, tortuous colon, Two 3-5mm polyps in the descending colon and the transverse colon, IH.  PATH: Tubular adenoma    COLONOSCOPY N/A 12/12/2017    NTEH, tics, torts, IH, TA w/low grade dysplasia    COLONOSCOPY N/A 12/01/2020    Procedure: COLONOSCOPY TO CECUM AND TI ;  Surgeon: Darci Kerns MD;  Location: [facility] ENDOSCOPY;  Service: Gastroenterology;  Laterality: N/A;  PRE- IBS, CONSTIPATION, HX POLYPS  POST- DEMINISHED SPHINCTER TONE, DIVERTICULOSIS, POORLY PREPPED COLON    CYSTOSCOPY N/A 05/07/2010    with TVT takedown, Dr. Simon Wall, [facility]    ENDOSCOPY N/A 09/27/2016    z line irreg, bilious gastric fluid- fluid aspiration preformed, gastritis.  PATH: Squamous and glandular mucosa with minimal superficial acute inflammation.     ENDOSCOPY N/A 12/12/2017    Z line irregular, gastritis, duodenitis, chronic inflammation    ENDOSCOPY N/A 12/01/2020    Procedure: ESOPHAGOGASTRODUODENOSCOPY WITH BIOPSIES;  Surgeon: Darci Kerns MD;  [facility];  Service:  Gastroenterology;  Laterality: N/A;  PRE- REFLUX, DYSPHAGIA  POST- ESOPHAGITIS, GASTRITIS, SMALL HIATAL HERNIA, BILE REFLUX    ENDOSCOPY N/A 3/15/2023    Procedure: ESOPHAGOGASTRODUODENOSCOPY with biopsies;  Surgeon: Darci Kerns MD;  Location: [facility] ENDOSCOPY;  Service: Gastroenterology;  Laterality: N/A;  pre:  reflux with breakthrough symptoms  post;  gastritis, mild esophagitis    EPIDURAL BLOCK      ESOPHAGEAL MANOMETRY N/A 02/02/2021    Procedure: ESOPHAGEAL MANOMETRY;  Surgeon: Tran, Nurse Performed;  Location: [facility] ENDOSCOPY;  Service: Gastroenterology;  Laterality: N/A;  DYSPHAGIA    KNEE SURGERY Left     BROKEN    LUMBAR DISCECTOMY FUSION INSTRUMENTATION N/A 12/03/2018    Procedure: L4-5 laminectomy and fusion with instrumentation;  Surgeon: Austin Keith MD;  Location: [facility];  Service: Orthopedic Spine    MAMMO US BREAST BIOPSY ADDITIONAL W WO DEVICE Left 02/21/2002    2:00 position left breast, Infiltrating Ductal Carcinoma, [facility]    MANDIBLE FRACTURE SURGERY      MASTECTOMY Left 04/11/2002    Left Total Mastectomy and Left Axillary [place] Node Biobsy, Dr. Indu Akins, [facility]    OTHER SURGICAL HISTORY      CARDIAC LOOP DEVICE, LEFT CHEST    TENSION FREE VAGINAL TAPING WITH MINI ARC SLING N/A 10/26/2009    Dr. Gina Castillo, [facility]    TOTAL KNEE ARTHROPLASTY Left 10/21/2019    Procedure: TOTAL KNEE ARTHROPLASTY;  Surgeon: Anurag Serrano MD;  Location: [facility];  Service: Orthopedics    UPPER GASTROINTESTINAL ENDOSCOPY  09/16/2014    z-line irreg, grade a reflux, gastritis                        PT Assessment/Plan       Row Name 06/24/24 1500          PT Assessment    Assessment Comments Pt returns reporting no noted changes, however upon further reflection, [place] and no falls since last time, so does feel some benefit from PT. Continued with progresion of LE strengthening and dynamic balance challenges. Pt remains candidate for skilled PT.  -CC        PT  Plan    PT Plan Comments Next visit: SLS with kickstand?  -CC               User Key  (r) = Recorded By, (t) = Taken By, (c) = Cosigned By      Initials Name Provider Type    Helene Anderson, PT Physical Therapist                       OP Exercises       Row Name 06/24/24 1400             Subjective    Subjective Comments Doing well, can't tell if anything is different, [place] and didnt have any issues  -CC         Total Minutes    92504 - PT Therapeutic Exercise Minutes 20  -CC      45913 -  PT Neuromuscular Reeducation Minutes 18  -CC         Exercise 1    Exercise Name 1 nustep  -CC      Cueing 1 Verbal  -CC      Time 1 5 min L5  -CC         Exercise 2    Exercise Name 2 STS with ball  -CC      Cueing 2 Verbal;Demo  -CC      Sets 2 2  -CC      Reps 2 10  -CC      Time 2 shoulder flex, trunk rotation  -CC         Exercise 3    Exercise Name 3 side steps  -CC      Cueing 3 Verbal;Demo  -CC      Reps 3 4 laps  -CC      Time 3 RTB  -CC         Exercise 5    Exercise Name 5 tandem walking fwd  -CC      Cueing 5 Verbal;Demo  -CC      Reps 5 3 laps  -CC      Time 5 intermitten UE assist  -CC         Exercise 6    Exercise Name 6 low to high chop  -CC      Cueing 6 Verbal  -CC      Reps 6 10 ea side  -CC      Time 6 single RTB  -CC      Additional Comments CGA  -CC         Exercise 7    Exercise Name 7 marching with head turns/nods  -CC      Cueing 7 Verbal;Demo  -CC      Reps 7 4 laps total  -CC         Exercise 8    Exercise Name 8 heel/toe raise on foam  -CC      Cueing 8 Verbal;Demo  -CC      Sets 8 2  -CC      Reps 8 15  -CC         Exercise 10    Exercise Name 10 trunk rotation w L2 plyoball  -CC      Cueing 10 Verbal  -CC      Sets 10 1 ea: narrow KATYA, semi tandem ea way  -CC      Reps 10 10 ea way  -CC         Exercise 11    Exercise Name 11 3 way hip on airex  -CC      Cueing 11 Demo  -CC      Reps 11 12 ea leg  -CC         Exercise 12    Exercise Name 12 lateral step up   "-CC      Cueing 12 Verbal  -CC      Reps 12 2x10 ea leg  -CC      Time 12 6\"  -CC         Exercise 13    Exercise Name 13 shldr ext, semi tandem stance  -CC      Cueing 13 Verbal  -CC      Sets 13 2  -CC      Reps 13 10 ea way  -CC      Time 13 RTB  -CC                User Key  (r) = Recorded By, (t) = Taken By, (c) = Cosigned By      Initials Name Provider Type    CC Helene Jackson, PT Physical Therapist                                     Therapy Education  Education Details: [email or web address]  Given: HEP  Program: Reinforced  How Provided: Verbal, Demonstration, Written  Provided to: Patient  Level of Understanding: Verbalized, Demonstrated              Time Calculation:   Start Time: 1439  Stop Time: 1517  Time Calculation (min): 38 min  Total Timed Code Minutes- PT: 38 minute(s)  Timed Charges  05343 - PT Therapeutic Exercise Minutes: 20  94013 -  PT Neuromuscular Reeducation Minutes: 18  Total Minutes  Timed Charges Total Minutes: 38   Total Minutes: 38  Therapy Charges for Today       Code Description Service Date Service Provider Modifiers Qty    97103458374 HC PT NEUROMUSC RE EDUCATION EA 15 MIN 6/24/2024 Helene Jackson, PT GP 1    38715100150 HC PT THER PROC EA 15 MIN 6/24/2024 Helene Jackson, PT GP 2                      Helene Jackson PT  6/24/2024     "

## 2024-06-26 ENCOUNTER — HOSPITAL ENCOUNTER (OUTPATIENT)
Dept: PHYSICAL THERAPY | Facility: HOSPITAL | Age: 79
Setting detail: THERAPIES SERIES
Discharge: HOME | End: 2024-06-26
Payer: MEDICARE

## 2024-06-26 DIAGNOSIS — R26.89 BALANCE PROBLEMS: ICD-10-CM

## 2024-06-26 DIAGNOSIS — R53.1 WEAKNESS GENERALIZED: Primary | ICD-10-CM

## 2024-06-26 DIAGNOSIS — R29.6 FREQUENT FALLS: ICD-10-CM

## 2024-06-26 PROCEDURE — 97110 THERAPEUTIC EXERCISES: CPT

## 2024-06-26 PROCEDURE — 97530 THERAPEUTIC ACTIVITIES: CPT

## 2024-06-27 NOTE — THERAPY TREATMENT NOTE
Outpatient Physical Therapy Ortho Treatment Note  [facility]     Patient Name: Davida Anderson  : 1945  MRN: [id number]  Today's Date: 2024      Visit Date: 2024    Visit Dx:    ICD-10-CM ICD-9-CM   1. Weakness generalized  R53.1 780.79   2. Balance problems  R26.89 781.99   3. Frequent falls  R29.6 V15.88       Patient Active Problem List   Diagnosis    BP (high blood pressure)    History of left breast infiltrating ductal cancer 2cm s/p mastectomy with reconstruction  s/p chemo    H/O ETOH abuse    Gtz's esophagus    Colon polyps    Bipolar 1 disorder    Arthritis    Raynaud's disease    Neuropathy    Lumbar spine pain    Spondylolisthesis at L4-L5 level    Right lumbar radiculopathy    Gtz's esophagus without dysplasia    Hx of adenomatous colonic polyps    Dysphagia    Diarrhea    Primary osteoarthritis of left knee    Primary localized osteoarthrosis of the knee, right    Chronic pain of left knee    Spinal stenosis of lumbar region with neurogenic claudication    Spondylolisthesis of lumbar region    Acute pain of left knee    Gastroesophageal reflux disease    Irritable bowel syndrome with constipation    Adenomatous polyp of colon    History of Gtz's esophagus        Past Medical History:   Diagnosis Date    Acid reflux     Arthritis     Atrial fibrillation     Gtz esophagus     Bipolar 2 disorder     Bradycardia     WITH SURGERY    COPD (chronic obstructive pulmonary disease)     MILD, USES AINHALER PRN    DDD (degenerative disc disease), lumbar     Diverticulosis     Dizziness     Drug therapy     Fibrocystic breast     Frequent UTI     SEES DR GOFF FOR FREQUENT UTI'S, ON KEFLEX    H/O Infiltrating ductal carcinoma of left breast     Stage IIA, Grade 3 ER/ND +, HER2 -, treated with 5 years of tamoxifen, 5 years of Femara, completed in     High cholesterol     History of alcoholism     40 YRS AGO    History of Clostridium difficile colitis 2014    History  of gastric ulcer     History of loop recorder     Hypertension     Iron deficiency     Irregular heartbeat     a fib    Low back pain     Lumbar herniated disc     Mass of right breast on mammogram 03/17/2016    10 o'clock position, 4 cm from the nipple,    Neuropathy     PONV (postoperative nausea and vomiting)     Raynaud disease     Sleep apnea     MILD, NO NEED FOR CPAP    Tremors of nervous system     Urinary incontinence         Past Surgical History:   Procedure Laterality Date    ANKLE OPEN REDUCTION INTERNAL FIXATION Right 08/14/2015    Dr. Dandre Camacho, [facility]    BACK SURGERY      LUMBAR    BREAST BIOPSY      BREAST RECONSTRUCTION, BREAST TISSUE EXPANDER INSERTION Left 04/11/2002    [place] Contour Profile expander was placed 550 cc size, filled with 200 cc of saline, Dr. Herbert Napoles, [facility]    COLONOSCOPY N/A 09/16/2014    Dr. Darci Kerns, [facility]; torts, tics, stool, polyp    COLONOSCOPY N/A 09/27/2016    NTEH, diverticulosis, tortuous colon, Two 3-5mm polyps in the descending colon and the transverse colon, IH.  PATH: Tubular adenoma    COLONOSCOPY N/A 12/12/2017    NTEH, tics, torts, IH, TA w/low grade dysplasia    COLONOSCOPY N/A 12/01/2020    Procedure: COLONOSCOPY TO CECUM AND TI ;  Surgeon: Darci Kerns MD;  Location: [facility] ENDOSCOPY;  Service: Gastroenterology;  Laterality: N/A;  PRE- IBS, CONSTIPATION, HX POLYPS  POST- DEMINISHED SPHINCTER TONE, DIVERTICULOSIS, POORLY PREPPED COLON    CYSTOSCOPY N/A 05/07/2010    with TVT takedown, Dr. Simon Wall, [facility]    ENDOSCOPY N/A 09/27/2016    z line irreg, bilious gastric fluid- fluid aspiration preformed, gastritis.  PATH: Squamous and glandular mucosa with minimal superficial acute inflammation.     ENDOSCOPY N/A 12/12/2017    Z line irregular, gastritis, duodenitis, chronic inflammation    ENDOSCOPY N/A 12/01/2020    Procedure: ESOPHAGOGASTRODUODENOSCOPY WITH BIOPSIES;  Surgeon: Darci Kerns MD;  Location[facility] END[facility]COPY;  Service:  Gastroenterology;  Laterality: N/A;  PRE- REFLUX, DYSPHAGIA  POST- ESOPHAGITIS, GASTRITIS, SMALL HIATAL HERNIA, BILE REFLUX    ENDOSCOPY N/A 3/15/2023    Procedure: ESOPHAGOGASTRODUODENOSCOPY with biopsies;  Surgeon: Darci Kerns MD;  Location: [facility] ENDOSCOPY;  Service: Gastroenterology;  Laterality: N/A;  pre:  reflux with breakthrough symptoms  post;  gastritis, mild esophagitis    EPIDURAL BLOCK      ESOPHAGEAL MANOMETRY N/A 02/02/2021    Procedure: ESOPHAGEAL MANOMETRY;  Surgeon: Tran, Nurse Performed;  Location: [facility] ENDOSCOPY;  Service: Gastroenterology;  Laterality: N/A;  DYSPHAGIA    KNEE SURGERY Left     BROKEN    LUMBAR DISCECTOMY FUSION INSTRUMENTATION N/A 12/03/2018    Procedure: L4-5 laminectomy and fusion with instrumentation;  Surgeon: Austin Keith MD;  Location: [facility] OR;  Service: Orthopedic Spine    MAMMO US BREAST BIOPSY ADDITIONAL W WO DEVICE Left 02/21/2002    2:00 position left breast, Infiltrating Ductal Carcinoma, [facility]    MANDIBLE FRACTURE SURGERY      MASTECTOMY Left 04/11/2002    Left Total Mastectomy and Left Axillary [place] Node Biobsy, Dr. Indu Akins, [facility]    OTHER SURGICAL HISTORY      CARDIAC LOOP DEVICE, LEFT CHEST    TENSION FREE VAGINAL TAPING WITH MINI ARC SLING N/A 10/26/2009    Dr. Gina Castillo, [facility]    TOTAL KNEE ARTHROPLASTY Left 10/21/2019    Procedure: TOTAL KNEE ARTHROPLASTY;  Surgeon: Anurag Serrano MD;  Location: [facility];  Service: Orthopedics    UPPER GASTROINTESTINAL ENDOSCOPY  09/16/2014    z-line irreg, grade a reflux, gastritis                        PT Assessment/Plan       Row Name 06/27/24 0900          PT Assessment    Assessment Comments Ms. Anderson returns to PT this date with no new concerns, She continues to amb with notable posterior lean and scissoring gait pattern. Initiated gait training around clinic with cueing for slight trunk bend and core activation with immediate improvement in mechanics, improved stability, and little  "to no scissoring. She does require cueing to maintain posture however good improvements overall. Additionally added standing fwd/post weight shifts at counter with good tolerance. Updated HEP this date to include daily balance exercises, will assess tolerance at next session and progress as appropriate.  -MO        PT Plan    PT Plan Comments continue with weight shifting and walking with trunk lean + weight in arms. Stagger stance trunk bending with weight. Star taps.  -MO               User Key  (r) = Recorded By, (t) = Taken By, (c) = Cosigned By      Initials Name Provider Type    Cindy Irizarry, PT Physical Therapist                       OP Exercises       Row Name 06/26/24 1600             Total Minutes    77303 - PT Therapeutic Exercise Minutes 20  -MO      61994 - PT Therapeutic Activity Minutes 23  -MO         Exercise 1    Exercise Name 1 nustep  -MO      Cueing 1 Verbal  -MO      Time 1 5 min L5  -MO         Exercise 2    Exercise Name 2 STS with fwd step + reach  -MO      Cueing 2 Verbal;Tactile  -MO      Sets 2 2  -MO      Reps 2 8  -MO         Exercise 5    Exercise Name 5 tandem walking fwd/bwd  -MO      Cueing 5 Verbal  -MO      Reps 5 4 laps  -MO      Time 5 last set w/ head turns  -MO         Exercise 7    Exercise Name 7 marching with head turns/nods  -MO      Cueing 7 Verbal  -MO      Reps 7 4 laps total  -MO         Exercise 12    Exercise Name 12 lateral step up w/   -MO      Cueing 12 Verbal;Demo  -MO      Reps 12 2x10 ea leg  -MO      Time 12 6\"  -MO         Exercise 13    Exercise Name 13 shldr ext, semi tandem stance  -MO      Cueing 13 Verbal  -MO      Sets 13 2  -MO      Reps 13 10 ea way  -MO      Time 13 RTB  -MO         Exercise 14    Exercise Name 14 walking with head turns  -MO                User Key  (r) = Recorded By, (t) = Taken By, (c) = Cosigned By      Initials Name Provider Type    Cindy Irizarry, PT Physical Therapist                                              "       Time Calculation:   Start Time: 1630  Stop Time: 1713  Time Calculation (min): 43 min  Timed Charges  85207 - PT Therapeutic Exercise Minutes: 20  69039 - PT Therapeutic Activity Minutes: 23  Total Minutes  Timed Charges Total Minutes: 43   Total Minutes: 43  Therapy Charges for Today       Code Description Service Date Service Provider Modifiers Qty    26606541253  PT THER PROC EA 15 MIN 6/26/2024 Cindy Hammonds, PT GP 1    [id number]  PT THERAPEUTIC ACT EA 15 MIN 6/26/2024 Cindy Hammonds PT GP 2                      Cindy Hammonds PT  6/27/2024

## 2024-07-01 ENCOUNTER — HOSPITAL ENCOUNTER (OUTPATIENT)
Dept: PHYSICAL THERAPY | Facility: HOSPITAL | Age: 79
Setting detail: THERAPIES SERIES
Discharge: HOME | End: 2024-07-01
Payer: MEDICARE

## 2024-07-01 DIAGNOSIS — R53.1 WEAKNESS GENERALIZED: Primary | ICD-10-CM

## 2024-07-01 DIAGNOSIS — R29.6 FREQUENT FALLS: ICD-10-CM

## 2024-07-01 DIAGNOSIS — R26.89 BALANCE PROBLEMS: ICD-10-CM

## 2024-07-01 PROCEDURE — 97530 THERAPEUTIC ACTIVITIES: CPT

## 2024-07-01 PROCEDURE — 97110 THERAPEUTIC EXERCISES: CPT

## 2024-07-01 NOTE — THERAPY TREATMENT NOTE
Outpatient Physical Therapy Ortho Treatment Note  [facility]     Patient Name: Davida Anderson  : 1945  MRN: [id number]  Today's Date: 2024      Visit Date: 2024    Visit Dx:    ICD-10-CM ICD-9-CM   1. Weakness generalized  R53.1 780.79   2. Balance problems  R26.89 781.99   3. Frequent falls  R29.6 V15.88       Patient Active Problem List   Diagnosis    BP (high blood pressure)    History of left breast infiltrating ductal cancer 2cm s/p mastectomy with reconstruction  s/p chemo    H/O ETOH abuse    Gtz's esophagus    Colon polyps    Bipolar 1 disorder    Arthritis    Raynaud's disease    Neuropathy    Lumbar spine pain    Spondylolisthesis at L4-L5 level    Right lumbar radiculopathy    Gtz's esophagus without dysplasia    Hx of adenomatous colonic polyps    Dysphagia    Diarrhea    Primary osteoarthritis of left knee    Primary localized osteoarthrosis of the knee, right    Chronic pain of left knee    Spinal stenosis of lumbar region with neurogenic claudication    Spondylolisthesis of lumbar region    Acute pain of left knee    Gastroesophageal reflux disease    Irritable bowel syndrome with constipation    Adenomatous polyp of colon    History of Gtz's esophagus        Past Medical History:   Diagnosis Date    Acid reflux     Arthritis     Atrial fibrillation     Gtz esophagus     Bipolar 2 disorder     Bradycardia     WITH SURGERY    COPD (chronic obstructive pulmonary disease)     MILD, USES AINHALER PRN    DDD (degenerative disc disease), lumbar     Diverticulosis     Dizziness     Drug therapy     Fibrocystic breast     Frequent UTI     SEES DR GOFF FOR FREQUENT UTI'S, ON KEFLEX    H/O Infiltrating ductal carcinoma of left breast     Stage IIA, Grade 3 ER/GA +, HER2 -, treated with 5 years of tamoxifen, 5 years of Femara, completed in     High cholesterol     History of alcoholism     40 YRS AGO    History of Clostridium difficile colitis 2014    History  of gastric ulcer     History of loop recorder     Hypertension     Iron deficiency     Irregular heartbeat     a fib    Low back pain     Lumbar herniated disc     Mass of right breast on mammogram 03/17/2016    10 o'clock position, 4 cm from the nipple,    Neuropathy     PONV (postoperative nausea and vomiting)     Raynaud disease     Sleep apnea     MILD, NO NEED FOR CPAP    Tremors of nervous system     Urinary incontinence         Past Surgical History:   Procedure Laterality Date    ANKLE OPEN REDUCTION INTERNAL FIXATION Right 08/14/2015    Dr. Dandre Camacho, [facility]    BACK SURGERY      LUMBAR    BREAST BIOPSY      BREAST RECONSTRUCTION, BREAST TISSUE EXPANDER INSERTION Left 04/11/2002    [place] Contour Profile expander was placed 550 cc size, filled with 200 cc of saline, Dr. Herbert Napoles, [facility]    COLONOSCOPY N/A 09/16/2014    Dr. Darci Kerns, [facility]; torts, tics, stool, polyp    COLONOSCOPY N/A 09/27/2016    NTEH, diverticulosis, tortuous colon, Two 3-5mm polyps in the descending colon and the transverse colon, IH.  PATH: Tubular adenoma    COLONOSCOPY N/A 12/12/2017    NTEH, tics, torts, IH, TA w/low grade dysplasia    COLONOSCOPY N/A 12/01/2020    Procedure: COLONOSCOPY TO CECUM AND TI ;  Surgeon: Darci Kerns MD;  Location: [facility] ENDOSCOPY;  Service: Gastroenterology;  Laterality: N/A;  PRE- IBS, CONSTIPATION, HX POLYPS  POST- DEMINISHED SPHINCTER TONE, DIVERTICULOSIS, POORLY PREPPED COLON    CYSTOSCOPY N/A 05/07/2010    with TVT takedown, Dr. Simon Wall, [facility]    ENDOSCOPY N/A 09/27/2016    z line irreg, bilious gastric fluid- fluid aspiration preformed, gastritis.  PATH: Squamous and glandular mucosa with minimal superficial acute inflammation.     ENDOSCOPY N/A 12/12/2017    Z line irregular, gastritis, duodenitis, chronic inflammation    ENDOSCOPY N/A 12/01/2020    Procedure: ESOPHAGOGASTRODUODENOSCOPY WITH BIOPSIES;  Surgeon: Darci Kerns MD;  Location: [facility] END[facility];  Service:  Gastroenterology;  Laterality: N/A;  PRE- REFLUX, DYSPHAGIA  POST- ESOPHAGITIS, GASTRITIS, SMALL HIATAL HERNIA, BILE REFLUX    ENDOSCOPY N/A 3/15/2023    Procedure: ESOPHAGOGASTRODUODENOSCOPY with biopsies;  Surgeon: Darci Kerns MD;  Location: [facility] ENDOSCOPY;  Service: Gastroenterology;  Laterality: N/A;  pre:  reflux with breakthrough symptoms  post;  gastritis, mild esophagitis    EPIDURAL BLOCK      ESOPHAGEAL MANOMETRY N/A 02/02/2021    Procedure: ESOPHAGEAL MANOMETRY;  Surgeon: Tran, Nurse Performed;  Location: [facility] ENDOSCOPY;  Service: Gastroenterology;  Laterality: N/A;  DYSPHAGIA    KNEE SURGERY Left     BROKEN    LUMBAR DISCECTOMY FUSION INSTRUMENTATION N/A 12/03/2018    Procedure: L4-5 laminectomy and fusion with instrumentation;  Surgeon: Austin Keith MD;  Location: [facility] OR;  Service: Orthopedic Spine    MAMMO US BREAST BIOPSY ADDITIONAL W WO DEVICE Left 02/21/2002    2:00 position left breast, Infiltrating Ductal Carcinoma, [facility]    MANDIBLE FRACTURE SURGERY      MASTECTOMY Left 04/11/2002    Left Total Mastectomy and Left Axillary [place] Node Biobsy, Dr. Indu Akins, [facility]    OTHER SURGICAL HISTORY      CARDIAC LOOP DEVICE, LEFT CHEST    TENSION FREE VAGINAL TAPING WITH MINI ARC SLING N/A 10/26/2009    Dr. Gina Castillo, [facility]    TOTAL KNEE ARTHROPLASTY Left 10/21/2019    Procedure: TOTAL KNEE ARTHROPLASTY;  Surgeon: Anurag Serrano MD;  Location: [facility];  Service: Orthopedics    UPPER GASTROINTESTINAL ENDOSCOPY  09/16/2014    z-line irreg, grade a reflux, gastritis                        PT Assessment/Plan       Row Name 07/01/24 1500          PT Assessment    Assessment Comments Ms. Anderson returns to PT this date denying any concerns following last session. She continues to demo increased posterior lean with all amb as well as with static standing, frequent cueing throughout session for slight PPT w/ core activation and slight trunk bend to improve to more neutral  posture. She continues to have notable improvement in gait mechanics with cueing for slight lean, no scissoring of gait noted as well as increased stability with turns. She additionally demos improved forward weight shifting ability with exercise.  Added stagger stance forward trunk bending this date to work on balance with dynamic bending tasks as well as stopping upright trunk at neutral vs posterior lean, as well as added standing 3 way star tap, requiring intermittent UE assist. Overall good improvement, will continue to progress as tolerable and appropriate.  -MO        PT Plan    PT Plan Comments Continue with gait training for neutral alignment. SLS w/ kickstand. work on 360 turns with wide steps  -MO               User Key  (r) = Recorded By, (t) = Taken By, (c) = Cosigned By      Initials Name Provider Type    Cindy Irizarry, PT Physical Therapist                       OP Exercises       Row Name 07/01/24 1300             Subjective    Subjective Comments A little tired today from medicine and my R ankle is acting up some. Did my exercises at home  -MO         Total Minutes    55407 - PT Therapeutic Exercise Minutes 15  -MO      41502 - PT Therapeutic Activity Minutes 23  -MO         Exercise 1    Exercise Name 1 nustep  -MO      Time 1 5 min L5  -MO         Exercise 2    Exercise Name 2 STS with fwd step + reach  -MO      Cueing 2 Verbal  -MO      Sets 2 2  -MO      Reps 2 8  -MO      Time 2 lvl 2 ball  -MO         Exercise 4    Exercise Name 4 star taps  -MO      Cueing 4 Verbal;Demo  -MO      Sets 4 1B  -MO      Reps 4 10  -MO      Time 4 fwd, lat, bwd  -MO      Additional Comments intermittent UE use  -MO         Exercise 5    Exercise Name 5 tandem walking fwd/bwd  -MO      Cueing 5 Verbal  -MO      Reps 5 4 laps  -MO      Time 5 last set w/ head turns  -MO      Additional Comments cueing for slight forward trunk  -MO         Exercise 8    Exercise Name 8 stagger stance fwd trunk bend w/ weighted ball   -MO      Cueing 8 Verbal;Demo  -MO      Sets 8 1B  -MO      Reps 8 10  -MO      Time 8 lvl 2 ball  -MO      Additional Comments bend, return to neutral standing, stopping before posterior lean posture  -MO         Exercise 9    Exercise Name 9 weight shifting fwd/bwd  -MO      Cueing 9 Verbal;Demo  -MO      Sets 9 1  -MO      Reps 9 20  -MO      Time 9 max weight shifting forward without using hands  -MO         Exercise 10    Exercise Name 10 trunk rotation w L2 plyoball  -MO      Cueing 10 Verbal  -MO      Sets 10 2e, B  -MO      Reps 10 10  -MO      Time 10 1x seated  -MO      Additional Comments 1x standing  -MO         Exercise 14    Exercise Name 14 walking w/ head turns  -MO      Reps 14 4 laps  -MO      Additional Comments cueing for slight forwaard trunk, TrA activation  -MO                User Key  (r) = Recorded By, (t) = Taken By, (c) = Cosigned By      Initials Name Provider Type    Cindy Irizarry, PT Physical Therapist                                  PT OP Goals       Row Name 07/01/24 1500          PT Short Term Goals    STG Date to Achieve 07/03/24  -MO     STG 1 Pt will be independent with initial HEP to improve strength and static/dynamic balance.  -MO     STG 1 Progress Ongoing  -MO     STG 2 Patient will maintain TUG time of </= 10 seconds however will demo improved gait with zero episodes of scissoring gait pattern and with mild deviation from straight patient to show improved gait speed and decrease risk for falls.  -MO     STG 2 Progress Ongoing  -MO     STG 3 Pt will deny any new falls in 4 weeks demonstrating improved balance and decreased risk of falling.  -MO     STG 3 Progress Ongoing  -MO     STG 4 Patient able to tandem stance bilaterally  >/= 30 seconds 1 1 fingertip support without LOB for improved core stabilization and decreased fall risk.  -MO     STG 4 Progress Ongoing  -MO        Long Term Goals    LTG Date to Achieve 08/02/24  -MO     LTG 1 Pt will be independent with advance  HEP to improve strength and static/dynamic balance.  -MO     LTG 1 Progress Ongoing  -MO     LTG 2 Pt performs 30 seconds sit to stand having complete at least 3 more repetitions that was performed upon initial evaluation for progression of ease with functional transfers, LE strength, and safety in the home.  -MO     LTG 2 Progress Ongoing  -MO     LTG 3 Pt demonstrates NBOS on foam > 30 sec without hand held assistance for improvements in balance with standing and walking on unstable surfaces with yard, house hold, or community tasks.  -MO     LTG 3 Progress Ongoing  -MO     LTG 4 The pt will resume reciprocal stair navigation and or single step navigation for improved safety during community and household navigation with appropriate assistive device if needed.  -MO     LTG 4 Progress Ongoing  -MO     LTG 5 Pt demonstrates ability to maintain SLS on stable surface without loss of balance for >8 seconds BL for improvements in gait with greater ability to weight shift, longer stride.  -MO     LTG 5 Progress Ongoing  -MO     LTG 6 Pt will improve 2min walk test by >/=50ft without any LOB to increase activity tolerance in order to walk to leisure and improve safety in community.  -MO     LTG 6 Progress Ongoing  -MO     LTG 7 Patient able to tandem stance bilaterally  >/= 20 w/o UE support seconds without LOB for improved core stabilization and decreased fall risk  -MO     LTG 7 Progress Ongoing  -MO               User Key  (r) = Recorded By, (t) = Taken By, (c) = Cosigned By      Initials Name Provider Type    Cindy Irizarry, PT Physical Therapist                                   Time Calculation:   Start Time: 1320  Stop Time: 1358  Time Calculation (min): 38 min  Timed Charges  02411 - PT Therapeutic Exercise Minutes: 15  98381 - PT Therapeutic Activity Minutes: 23  Total Minutes  Timed Charges Total Minutes: 38   Total Minutes: 38  Therapy Charges for Today       Code Description Service Date Service Provider  Modifiers Qty    [id number]  PT THER PROC EA 15 MIN 7/1/2024 Cindy Hammonds, PT GP 1    [id number] HC PT THERAPEUTIC ACT EA 15 MIN 7/1/2024 Cindy Hammonds, PT GP 2                      Cindy Hammonds, PT  7/1/2024

## 2024-07-03 ENCOUNTER — HOSPITAL ENCOUNTER (OUTPATIENT)
Dept: PHYSICAL THERAPY | Facility: HOSPITAL | Age: 79
Setting detail: THERAPIES SERIES
Discharge: HOME | End: 2024-07-03
Payer: MEDICARE

## 2024-07-03 DIAGNOSIS — R29.6 FREQUENT FALLS: ICD-10-CM

## 2024-07-03 DIAGNOSIS — R53.1 WEAKNESS GENERALIZED: Primary | ICD-10-CM

## 2024-07-03 DIAGNOSIS — R26.89 BALANCE PROBLEMS: ICD-10-CM

## 2024-07-03 PROCEDURE — 97110 THERAPEUTIC EXERCISES: CPT

## 2024-07-03 PROCEDURE — 97112 NEUROMUSCULAR REEDUCATION: CPT

## 2024-07-03 NOTE — THERAPY PROGRESS REPORT/RE-CERT
Outpatient Physical Therapy Ortho Progress Note  [facility]     Patient Name: Davida Anderson  : 1945  MRN: [id number]  Today's Date: 7/3/2024      Visit Date: 2024    Visit Dx:    ICD-10-CM ICD-9-CM   1. Weakness generalized  R53.1 780.79   2. Balance problems  R26.89 781.99   3. Frequent falls  R29.6 V15.88       Patient Active Problem List   Diagnosis    BP (high blood pressure)    History of left breast infiltrating ductal cancer 2cm s/p mastectomy with reconstruction  s/p chemo    H/O ETOH abuse    Gtz's esophagus    Colon polyps    Bipolar 1 disorder    Arthritis    Raynaud's disease    Neuropathy    Lumbar spine pain    Spondylolisthesis at L4-L5 level    Right lumbar radiculopathy    Gtz's esophagus without dysplasia    Hx of adenomatous colonic polyps    Dysphagia    Diarrhea    Primary osteoarthritis of left knee    Primary localized osteoarthrosis of the knee, right    Chronic pain of left knee    Spinal stenosis of lumbar region with neurogenic claudication    Spondylolisthesis of lumbar region    Acute pain of left knee    Gastroesophageal reflux disease    Irritable bowel syndrome with constipation    Adenomatous polyp of colon    History of Gtz's esophagus        Past Medical History:   Diagnosis Date    Acid reflux     Arthritis     Atrial fibrillation     Gtz esophagus     Bipolar 2 disorder     Bradycardia     WITH SURGERY    COPD (chronic obstructive pulmonary disease)     MILD, USES AINHALER PRN    DDD (degenerative disc disease), lumbar     Diverticulosis     Dizziness     Drug therapy     Fibrocystic breast     Frequent UTI     SEES DR GOFF FOR FREQUENT UTI'S, ON KEFLEX    H/O Infiltrating ductal carcinoma of left breast     Stage IIA, Grade 3 ER/[place] +, HER2 -, treated with 5 years of tamoxifen, 5 years of Femara, completed in     High cholesterol     History of alcoholism     40 YRS AGO    History of Clostridium difficile colitis 2014    History  of gastric ulcer     History of loop recorder     Hypertension     Iron deficiency     Irregular heartbeat     a fib    Low back pain     Lumbar herniated disc     Mass of right breast on mammogram 03/17/2016    10 o'clock position, 4 cm from the nipple,    Neuropathy     PONV (postoperative nausea and vomiting)     Raynaud disease     Sleep apnea     MILD, NO NEED FOR CPAP    Tremors of nervous system     Urinary incontinence         Past Surgical History:   Procedure Laterality Date    ANKLE OPEN REDUCTION INTERNAL FIXATION Right 08/14/2015    Dr. Dandre Camacho, [facility]    BACK SURGERY      LUMBAR    BREAST BIOPSY      BREAST RECONSTRUCTION, BREAST TISSUE EXPANDER INSERTION Left 04/11/2002    [employer] Contour Profile expander was placed 550 cc size, filled with 200 cc of saline, Dr. Herbert Napoles, [facility]    COLONOSCOPY N/A 09/16/2014    Dr. Darci Kerns, [facility]; torts, tics, stool, polyp    COLONOSCOPY N/A 09/27/2016    NTEH, diverticulosis, tortuous colon, Two 3-5mm polyps in the descending colon and the transverse colon, IH.  PATH: Tubular adenoma    COLONOSCOPY N/A 12/12/2017    NTEH, tics, torts, IH, TA w/low grade dysplasia    COLONOSCOPY N/A 12/01/2020    Procedure: COLONOSCOPY TO CECUM AND TI ;  Surgeon: Darci Kerns MD;  Location: [facility] ENDOSCOPY;  Service: Gastroenterology;  Laterality: N/A;  PRE- IBS, CONSTIPATION, HX POLYPS  POST- DEMINISHED SPHINCTER TONE, DIVERTICULOSIS, POORLY PREPPED COLON    CYSTOSCOPY N/A 05/07/2010    with TVT takedown, Dr. Simon Wall, [facility]    ENDOSCOPY N/A 09/27/2016    z line irreg, bilious gastric fluid- fluid aspiration preformed, gastritis.  PATH: Squamous and glandular mucosa with minimal superficial acute inflammation.     ENDOSCOPY N/A 12/12/2017    Z line irregular, gastritis, duodenitis, chronic inflammation    ENDOSCOPY N/A 12/01/2020    Procedure: ESOPHAGOGASTRODUODENOSCOPY WITH BIOPSIES;  Surgeon: Darci Kerns MD;  [facility];  Service:  Gastroenterology;  Laterality: N/A;  PRE- REFLUX, DYSPHAGIA  POST- ESOPHAGITIS, GASTRITIS, SMALL HIATAL HERNIA, BILE REFLUX    ENDOSCOPY N/A 3/15/2023    Procedure: ESOPHAGOGASTRODUODENOSCOPY with biopsies;  Surgeon: Darci Kerns MD;  Location: [facility] ENDOSCOPY;  Service: Gastroenterology;  Laterality: N/A;  pre:  reflux with breakthrough symptoms  post;  gastritis, mild esophagitis    EPIDURAL BLOCK      ESOPHAGEAL MANOMETRY N/A 02/02/2021    Procedure: ESOPHAGEAL MANOMETRY;  Surgeon: Tran, Nurse Performed;  Location: [facility] ENDOSCOPY;  Service: Gastroenterology;  Laterality: N/A;  DYSPHAGIA    KNEE SURGERY Left     BROKEN    LUMBAR DISCECTOMY FUSION INSTRUMENTATION N/A 12/03/2018    Procedure: L4-5 laminectomy and fusion with instrumentation;  Surgeon: Austin Keith MD;  Location: [facility] OR;  Service: Orthopedic Spine    MAMMO US BREAST BIOPSY ADDITIONAL W WO DEVICE Left 02/21/2002    2:00 position left breast, Infiltrating Ductal Carcinoma, [facility]    MANDIBLE FRACTURE SURGERY      MASTECTOMY Left 04/11/2002    Left Total Mastectomy and Left Axillary Natalia Node Biobsy, Dr. Indu Akins, [facility]    OTHER SURGICAL HISTORY      CARDIAC LOOP DEVICE, LEFT CHEST    TENSION FREE VAGINAL TAPING WITH MINI ARC SLING N/A 10/26/2009    Dr. Gina Castillo, [facility]    TOTAL KNEE ARTHROPLASTY Left 10/21/2019    Procedure: TOTAL KNEE ARTHROPLASTY;  Surgeon: Anurag Serrano MD;  Location: [facility];  Service: Orthopedics    UPPER GASTROINTESTINAL ENDOSCOPY  09/16/2014    z-line irreg, grade a reflux, gastritis        PT Ortho       Row Name 07/03/24 1300       Balance Skills Training    Balance Comments tandem: L fwd >30s, R fwd 12.5s  -CC              User Key  (r) = Recorded By, (t) = Taken By, (c) = Cosigned By      Initials Name Provider Type    CC Helene Jackson, PT Physical Therapist                                 PT Assessment/Plan       Row Name 07/03/24 1300          PT Assessment    Functional  Limitations Decreased safety during functional activities;Impaired gait;Performance in self-care ADL;Performance in leisure activities;Limitations in community activities;Limitation in home management  -CC     Impairments Balance;Coordination;Gait;Poor body mechanics;Muscle strength;Impaired muscle power;Impaired flexibility  -CC     Assessment Comments Davida Anderson has been seen for 8 physical therapy sessions for balance.  Treatment has included therapeutic exercise, therapeutic activity, neuro-muscular retraining , and patient education with home exercise program . Progress to physical therapy goals is good. Pt has met 4/4 STG and 2/7 LTG. Pt has shown significant improvements in balance, functional strength, and endurance since initial evaluation. Remains consistent with HEP. Pt continues to demo ongoing balance deficits, including difficulty with R fwd tandem stance (improved from eval), difficulty with SLS, and some difficulty with reciprocal stair navigation. She will benefit from continued skilled physical therapy to address remaining impairments and functional limitations.  -CC     Please refer to paper survey for additional self-reported information No  -CC     Rehab Potential Good  -CC     Patient/caregiver participated in establishment of treatment plan and goals Yes  -CC     Patient would benefit from skilled therapy intervention Yes  -CC        PT Plan    PT Frequency 2x/week;1x/week  -CC     Predicted Duration of Therapy Intervention (PT) 4-6 more visits  -CC     PT Plan Comments Continue with gait training for neutral alignment. SLS w/ kickstand. work on 360 turns with wide steps  -CC               User Key  (r) = Recorded By, (t) = Taken By, (c) = Cosigned By      Initials Name Provider Type    Helene Anderson, PT Physical Therapist                       OP Exercises       Row Name 07/03/24 1400             Subjective    Subjective Comments I need to leave by 1:50 today  -CC         Total  "Minutes    55935 - PT Therapeutic Exercise Minutes 15  -CC      08110 -  PT Neuromuscular Reeducation Minutes 15  -CC         Exercise 3    Exercise Name 3 HR on foam  -CC      Cueing 3 Verbal  -CC      Sets 3 2  -CC      Reps 3 15  -CC         Exercise 5    Exercise Name 5 tandem walking fwd/bwd  -CC      Cueing 5 Verbal  -CC      Reps 5 4 laps  -CC      Time 5 last set w/ head turns  -CC         Exercise 11    Exercise Name 11 fwd step up with   -CC      Cueing 11 Verbal  -CC      Sets 11 2 ea  -CC      Reps 11 10  -CC      Time 11 6\"  -CC         Exercise 12    Exercise Name 12 lateral step up w/   -CC      Cueing 12 Verbal;Demo  -CC      Reps 12 2x10 ea leg  -CC      Time 12 6\"  -CC                User Key  (r) = Recorded By, (t) = Taken By, (c) = Cosigned By      Initials Name Provider Type    Helene Anderson, PT Physical Therapist                                  PT OP Goals       Row Name 07/03/24 1300          PT Short Term Goals    STG Date to Achieve 07/03/24  -     STG 1 Pt will be independent with initial HEP to improve strength and static/dynamic balance.  -     STG 1 Progress Met  -     STG 2 Patient will maintain TUG time of </= 10 seconds however will demo improved gait with zero episodes of scissoring gait pattern and with mild deviation from straight patient to show improved gait speed and decrease risk for falls.  -     STG 2 Progress Met  -     STG 3 Pt will deny any new falls in 4 weeks demonstrating improved balance and decreased risk of falling.  -     STG 3 Progress Met  -     STG 4 Patient able to tandem stance bilaterally  >/= 30 seconds 1 1 fingertip support without LOB for improved core stabilization and decreased fall risk.  -     STG 4 Progress Met  -        Long Term Goals    LTG Date to Achieve 08/02/24  -     LTG 1 Pt will be independent with advance HEP to improve strength and static/dynamic balance.  -     LTG 1 Progress Ongoing  -CC     " "LTG 2 Pt performs 30 seconds sit to stand having complete at least 3 more repetitions that was performed upon initial evaluation for progression of ease with functional transfers, LE strength, and safety in the home.  -     LTG 2 Progress Met  -     LTG 3 Pt demonstrates NBOS on foam > 30 sec without hand held assistance for improvements in balance with standing and walking on unstable surfaces with yard, house hold, or community tasks.  -CC     LTG 3 Progress Ongoing  -     LTG 4 The pt will resume reciprocal stair navigation and or single step navigation for improved safety during community and household navigation with appropriate assistive device if needed.  -CC     LTG 4 Progress Ongoing;Progressing  -     LTG 4 Progress Comments \"a little bit of both\"  -     LTG 5 Pt demonstrates ability to maintain SLS on stable surface without loss of balance for >8 seconds BL for improvements in gait with greater ability to weight shift, longer stride.  -     LTG 5 Progress Ongoing  -     LTG 6 Pt will improve 2min walk test by >/=50ft without any LOB to increase activity tolerance in order to walk to leisure and improve safety in community.  -CC     LTG 6 Progress Met  -     LTG 7 Patient able to tandem stance bilaterally  >/= 20 w/o UE support seconds without LOB for improved core stabilization and decreased fall risk  -CC     LTG 7 Progress Ongoing;Partially Met  -     LTG 7 Progress Comments Met for L fwd, improved to 12s for R fwd  -               User Key  (r) = Recorded By, (t) = Taken By, (c) = Cosigned By      Initials Name Provider Type    Helene Anderson, PT Physical Therapist                         2 Minute Walk Test  Distance Ambulated in 2 Minutes: 472  30 Second Chair Stand Test  30 Second Chair Stand Test: 12 (BL UE support on knees. Clinic chair)  Timed Up and Go (TUG)  TUG Test 1: 9.3 seconds  TUG Test 2: 7.6 seconds  Timed Up and Go Comments: hands on knees to stand      Time " Calculation:   Start Time: 1316  Stop Time: 1346  Time Calculation (min): 30 min  Total Timed Code Minutes- PT: 30 minute(s)  Timed Charges  50123 - PT Therapeutic Exercise Minutes: 15  80704 -  PT Neuromuscular Reeducation Minutes: 15  Total Minutes  Timed Charges Total Minutes: 30   Total Minutes: 30  Therapy Charges for Today       Code Description Service Date Service Provider Modifiers Qty    29926824932 HC PT NEUROMUSC RE EDUCATION EA 15 MIN 7/3/2024 Helene Jackson, PT GP 1    94952054802 HC PT THER PROC EA 15 MIN 7/3/2024 Helene Jackson, PT GP 1            PT G-Codes  TUG Test 1: 9.3 seconds  TUG Test 2: 7.6 seconds         Helene Jackson PT  7/3/2024

## 2024-07-08 ENCOUNTER — APPOINTMENT (OUTPATIENT)
Dept: PHYSICAL THERAPY | Facility: HOSPITAL | Age: 79
End: 2024-07-08
Payer: MEDICARE

## 2024-07-10 ENCOUNTER — HOSPITAL ENCOUNTER (OUTPATIENT)
Dept: PHYSICAL THERAPY | Facility: HOSPITAL | Age: 79
Setting detail: THERAPIES SERIES
Discharge: HOME | End: 2024-07-10
Payer: MEDICARE

## 2024-07-10 DIAGNOSIS — R26.89 BALANCE PROBLEMS: ICD-10-CM

## 2024-07-10 DIAGNOSIS — R53.1 WEAKNESS GENERALIZED: Primary | ICD-10-CM

## 2024-07-10 DIAGNOSIS — R29.6 FREQUENT FALLS: ICD-10-CM

## 2024-07-10 PROCEDURE — 97530 THERAPEUTIC ACTIVITIES: CPT

## 2024-07-10 PROCEDURE — 97110 THERAPEUTIC EXERCISES: CPT

## 2024-07-10 NOTE — THERAPY TREATMENT NOTE
Outpatient Physical Therapy Ortho Treatment Note  [facility]     Patient Name: Davida Anderson  : 1945  MRN: [id number]  Today's Date: 7/10/2024      Visit Date: 07/10/2024    Visit Dx:    ICD-10-CM ICD-9-CM   1. Weakness generalized  R53.1 780.79   2. Balance problems  R26.89 781.99   3. Frequent falls  R29.6 V15.88       Patient Active Problem List   Diagnosis    BP (high blood pressure)    History of left breast infiltrating ductal cancer 2cm s/p mastectomy with reconstruction  s/p chemo    H/O ETOH abuse    Gtz's esophagus    Colon polyps    Bipolar 1 disorder    Arthritis    Raynaud's disease    Neuropathy    Lumbar spine pain    Spondylolisthesis at L4-L5 level    Right lumbar radiculopathy    Gtz's esophagus without dysplasia    Hx of adenomatous colonic polyps    Dysphagia    Diarrhea    Primary osteoarthritis of left knee    Primary localized osteoarthrosis of the knee, right    Chronic pain of left knee    Spinal stenosis of lumbar region with neurogenic claudication    Spondylolisthesis of lumbar region    Acute pain of left knee    Gastroesophageal reflux disease    Irritable bowel syndrome with constipation    Adenomatous polyp of colon    History of Gtz's esophagus        Past Medical History:   Diagnosis Date    Acid reflux     Arthritis     Atrial fibrillation     Gtz esophagus     Bipolar 2 disorder     Bradycardia     WITH SURGERY    COPD (chronic obstructive pulmonary disease)     MILD, USES AINHALER PRN    DDD (degenerative disc disease), lumbar     Diverticulosis     Dizziness     Drug therapy     Fibrocystic breast     Frequent UTI     SEES DR GOFF FOR FREQUENT UTI'S, ON KEFLEX    H/O Infiltrating ductal carcinoma of left breast     Stage IIA, Grade 3 ER/[place] +, HER2 -, treated with 5 years of tamoxifen, 5 years of Femara, completed in     High cholesterol     History of alcoholism     40 YRS AGO    History of Clostridium difficile colitis 2014    History  of gastric ulcer     History of loop recorder     Hypertension     Iron deficiency     Irregular heartbeat     a fib    Low back pain     Lumbar herniated disc     Mass of right breast on mammogram 03/17/2016    10 o'clock position, 4 cm from the nipple,    Neuropathy     PONV (postoperative nausea and vomiting)     Raynaud disease     Sleep apnea     MILD, NO NEED FOR CPAP    Tremors of nervous system     Urinary incontinence         Past Surgical History:   Procedure Laterality Date    ANKLE OPEN REDUCTION INTERNAL FIXATION Right 08/14/2015    Dr. Dandre Camacho, [facility]    BACK SURGERY      LUMBAR    BREAST BIOPSY      BREAST RECONSTRUCTION, BREAST TISSUE EXPANDER INSERTION Left 04/11/2002    [employer] Contour Profile expander was placed 550 cc size, filled with 200 cc of saline, Dr. Herbert Napoles, [facility]    COLONOSCOPY N/A 09/16/2014    Dr. Darci Kerns, [facility]; torts, tics, stool, polyp    COLONOSCOPY N/A 09/27/2016    NTEH, diverticulosis, tortuous colon, Two 3-5mm polyps in the descending colon and the transverse colon, IH.  PATH: Tubular adenoma    COLONOSCOPY N/A 12/12/2017    NTEH, tics, torts, IH, TA w/low grade dysplasia    COLONOSCOPY N/A 12/01/2020    Procedure: COLONOSCOPY TO CECUM AND TI ;  Surgeon: Darci Kerns MD;  Location: [facility] ENDOSCOPY;  Service: Gastroenterology;  Laterality: N/A;  PRE- IBS, CONSTIPATION, HX POLYPS  POST- DEMINISHED SPHINCTER TONE, DIVERTICULOSIS, POORLY PREPPED COLON    CYSTOSCOPY N/A 05/07/2010    with TVT takedown, Dr. Simon Wall, [facility]    ENDOSCOPY N/A 09/27/2016    z line irreg, bilious gastric fluid- fluid aspiration preformed, gastritis.  PATH: Squamous and glandular mucosa with minimal superficial acute inflammation.     ENDOSCOPY N/A 12/12/2017    Z line irregular, gastritis, duodenitis, chronic inflammation    ENDOSCOPY N/A 12/01/2020    Procedure: ESOPHAGOGASTRODUODENOSCOPY WITH BIOPSIES;  Surgeon: Darci Kerns MD;  [facility];  Service:  Gastroenterology;  Laterality: N/A;  PRE- REFLUX, DYSPHAGIA  POST- ESOPHAGITIS, GASTRITIS, SMALL HIATAL HERNIA, BILE REFLUX    ENDOSCOPY N/A 3/15/2023    Procedure: ESOPHAGOGASTRODUODENOSCOPY with biopsies;  Surgeon: Darci Kerns MD;  Location: [facility]COPY;  Service: Gastroenterology;  Laterality: N/A;  pre:  reflux with breakthrough symptoms  post;  gastritis, mild esophagitis    EPIDURAL BLOCK      ESOPHAGEAL MANOMETRY N/A 02/02/2021    Procedure: ESOPHAGEAL MANOMETRY;  Surgeon: Tran, Nurse Performed;  Location: [facility] ENDOSCOPY;  Service: Gastroenterology;  Laterality: N/A;  DYSPHAGIA    KNEE SURGERY Left     BROKEN    LUMBAR DISCECTOMY FUSION INSTRUMENTATION N/A 12/03/2018    Procedure: L4-5 laminectomy and fusion with instrumentation;  Surgeon: Austin Keith MD;  Location: [facility] OR;  Service: Orthopedic Spine    MAMMO US BREAST BIOPSY ADDITIONAL W WO DEVICE Left 02/21/2002    2:00 position left breast, Infiltrating Ductal Carcinoma, [facility]    MANDIBLE FRACTURE SURGERY      MASTECTOMY Left 04/11/2002    Left Total Mastectomy and Left Axillary [place]avares Node Biobsy, Dr. Indu Akins, [facility]    OTHER SURGICAL HISTORY      CARDIAC LOOP DEVICE, LEFT CHEST    TENSION FREE VAGINAL TAPING WITH MINI ARC SLING N/A 10/26/2009    Dr. Gina Castillo, [facility]    TOTAL KNEE ARTHROPLASTY Left 10/21/2019    Procedure: TOTAL KNEE ARTHROPLASTY;  Surgeon: Anurag Serrano MD;  Location: [facility];  Service: Orthopedics    UPPER GASTROINTESTINAL ENDOSCOPY  09/16/2014    z-line irreg, grade a reflux, gastritis                        PT Assessment/Plan       Row Name 07/10/24 1500          PT Assessment    Assessment Comments Ms. Anderson returns to PT this date, continuing to make good improvements with higher level balance activities. Continued to reinforce anterior weight shifting with core activation throughout gait to improve mechanics. Added TUG as an exercise this date to incorporate 360deg turns, requires cueing  to slow speed, maintain large step length, trunk lean forward, and upright head posture. With cueing, she significantly improves safety with turns, no scissoring of gait noted. Completes timed TUG in 10.3s from clinic chair. Additionally added stagger stance D2 chops with great challenge, shoulder ext with kickstand, and mini fwd lunge to BOSU. Requires close CGA throughout all, intermittent min A for balance correction. Reviewed importance of keeping weight over balls of feet vs heels, pt verbalizes understanding. Will continue to progress as tolerable.  -MO        PT Plan    PT Plan Comments lateral mini lunge to BOSU. static timed stagger stance on step  -MO               User Key  (r) = Recorded By, (t) = Taken By, (c) = Cosigned By      Initials Name Provider Type    Cindy Irizarry, PT Physical Therapist                       OP Exercises       Row Name 07/10/24 1300             Subjective    Subjective Comments I did make good progress at last session. [place], I am okay at the beginning but towards the end my legs are so tired and have a hard time  -MO         Total Minutes    22388 - PT Therapeutic Exercise Minutes 15  -MO      09258 - PT Therapeutic Activity Minutes 30  -MO         Exercise 1    Exercise Name 1 nustep  -MO      Time 1 5 min L5  -MO         Exercise 3    Exercise Name 3 HR on foam  -MO      Cueing 3 Verbal  -MO      Sets 3 1  -MO      Reps 3 20  -MO         Exercise 6    Exercise Name 6 TUG  -MO      Cueing 6 Verbal;Tactile  -MO      Sets 6 1  -MO      Reps 6 10  -MO      Time 6 elevated mat table  -MO      Additional Comments working on anterior weight shifts through gait, large steps on turns  -MO         Exercise 7    Exercise Name 7 gait training  -MO      Reps 7 2 laps  -MO      Time 7 holding lvl 2 weighted ball  -MO      Additional Comments anterior weight shifts, core activation, holding head upright  -MO         Exercise 9    Exercise Name 9 weight shifting  fwd/bwd  -MO      Cueing 9 Verbal  -MO      Sets 9 1  -MO      Reps 9 20  -MO      Time 9 max weight shifting forward without using hands  -MO         Exercise 10    Exercise Name 10 stagger stance D2 chop  -MO      Cueing 10 Verbal  -MO      Sets 10 2B  -MO      Reps 10 10  -MO      Time 10 8' step, lvl 2 weighted ball  -MO      Additional Comments close CGA, intermittent Min A  -MO         Exercise 13    Exercise Name 13 shoulder ext w/ kickstand  -MO      Cueing 13 Verbal;Tactile  -MO      Sets 13 2B  -MO      Reps 13 10  -MO      Time 13 RTB  -MO      Additional Comments close CGA, intermittent Min A  -MO         Exercise 15    Exercise Name 15 mini fwd lunge onto BOSU  -MO      Cueing 15 Verbal;Demo  -MO      Sets 15 2B  -MO      Reps 15 10  -MO      Time 15 intermittent UE assist  -MO      Additional Comments cueing for full fwd weight shift to fwd leg  -MO                User Key  (r) = Recorded By, (t) = Taken By, (c) = Cosigned By      Initials Name Provider Type    Cindy Irizarry PT Physical Therapist                                                    Time Calculation:   Start Time: 1315  Stop Time: 1400  Time Calculation (min): 45 min  Timed Charges  20938 - PT Therapeutic Exercise Minutes: 15  26489 - PT Therapeutic Activity Minutes: 30  Total Minutes  Timed Charges Total Minutes: 45   Total Minutes: 45  Therapy Charges for Today       Code Description Service Date Service Provider Modifiers Qty    62500254967 HC PT THER PROC EA 15 MIN 7/10/2024 Cindy Hammonds, PT GP 1    14169740813 HC PT THERAPEUTIC ACT EA 15 MIN 7/10/2024 Cindy Hammonds PT GP 2                      Cindy Hammonds PT  7/10/2024

## 2024-07-15 ENCOUNTER — HOSPITAL ENCOUNTER (OUTPATIENT)
Dept: PHYSICAL THERAPY | Facility: HOSPITAL | Age: 79
Setting detail: THERAPIES SERIES
Discharge: HOME | End: 2024-07-15
Payer: MEDICARE

## 2024-07-15 DIAGNOSIS — R53.1 WEAKNESS GENERALIZED: Primary | ICD-10-CM

## 2024-07-15 DIAGNOSIS — R26.89 BALANCE PROBLEMS: ICD-10-CM

## 2024-07-15 DIAGNOSIS — R29.6 FREQUENT FALLS: ICD-10-CM

## 2024-07-15 PROCEDURE — 97110 THERAPEUTIC EXERCISES: CPT

## 2024-07-15 PROCEDURE — 97530 THERAPEUTIC ACTIVITIES: CPT

## 2024-07-15 NOTE — THERAPY TREATMENT NOTE
Outpatient Physical Therapy Ortho Treatment Note  [facility]     Patient Name: Davida Anderson  : 1945  MRN: [id number]  Today's Date: 7/15/2024      Visit Date: 07/15/2024    Visit Dx:    ICD-10-CM ICD-9-CM   1. Weakness generalized  R53.1 780.79   2. Balance problems  R26.89 781.99   3. Frequent falls  R29.6 V15.88       Patient Active Problem List   Diagnosis    BP (high blood pressure)    History of left breast infiltrating ductal cancer 2cm s/p mastectomy with reconstruction  s/p chemo    H/O ETOH abuse    Gtz's esophagus    Colon polyps    Bipolar 1 disorder    Arthritis    Raynaud's disease    Neuropathy    Lumbar spine pain    Spondylolisthesis at L4-L5 level    Right lumbar radiculopathy    Gtz's esophagus without dysplasia    Hx of adenomatous colonic polyps    Dysphagia    Diarrhea    Primary osteoarthritis of left knee    Primary localized osteoarthrosis of the knee, right    Chronic pain of left knee    Spinal stenosis of lumbar region with neurogenic claudication    Spondylolisthesis of lumbar region    Acute pain of left knee    Gastroesophageal reflux disease    Irritable bowel syndrome with constipation    Adenomatous polyp of colon    History of Gtz's esophagus        Past Medical History:   Diagnosis Date    Acid reflux     Arthritis     Atrial fibrillation     Gtz esophagus     Bipolar 2 disorder     Bradycardia     WITH SURGERY    COPD (chronic obstructive pulmonary disease)     MILD, USES AINHALER PRN    DDD (degenerative disc disease), lumbar     Diverticulosis     Dizziness     Drug therapy     Fibrocystic breast     Frequent UTI     SEES DR GOFF FOR FREQUENT UTI'S, ON KEFLEX    H/O Infiltrating ductal carcinoma of left breast     Stage IIA, Grade 3 ER/ID +, HER2 -, treated with 5 years of tamoxifen, 5 years of Femara, completed in     High cholesterol     History of alcoholism     40 YRS AGO    History of Clostridium difficile colitis 2014    History  of gastric ulcer     History of loop recorder     Hypertension     Iron deficiency     Irregular heartbeat     a fib    Low back pain     Lumbar herniated disc     Mass of right breast on mammogram 03/17/2016    10 o'clock position, 4 cm from the nipple,    Neuropathy     PONV (postoperative nausea and vomiting)     Raynaud disease     Sleep apnea     MILD, NO NEED FOR CPAP    Tremors of nervous system     Urinary incontinence         Past Surgical History:   Procedure Laterality Date    ANKLE OPEN REDUCTION INTERNAL FIXATION Right 08/14/2015    Dr. Dandre Camacho, [facility]    BACK SURGERY      LUMBAR    BREAST BIOPSY      BREAST RECONSTRUCTION, BREAST TISSUE EXPANDER INSERTION Left 04/11/2002    [place] Contour Profile expander was placed 550 cc size, filled with 200 cc of saline, Dr. Herbert Napoles, [facility]    COLONOSCOPY N/A 09/16/2014    Dr. Darci Kerns, [facility]; torts, tics, stool, polyp    COLONOSCOPY N/A 09/27/2016    NTEH, diverticulosis, tortuous colon, Two 3-5mm polyps in the descending colon and the transverse colon, IH.  PATH: Tubular adenoma    COLONOSCOPY N/A 12/12/2017    NTEH, tics, torts, IH, TA w/low grade dysplasia    COLONOSCOPY N/A 12/01/2020    Procedure: COLONOSCOPY TO CECUM AND TI ;  Surgeon: Darci Kerns MD;  Location: [facility] ENDOSCOPY;  Service: Gastroenterology;  Laterality: N/A;  PRE- IBS, CONSTIPATION, HX POLYPS  POST- DEMINISHED SPHINCTER TONE, DIVERTICULOSIS, POORLY PREPPED COLON    CYSTOSCOPY N/A 05/07/2010    with TVT takedown, Dr. Simon Wall, [facility]    ENDOSCOPY N/A 09/27/2016    z line irreg, bilious gastric fluid- fluid aspiration preformed, gastritis.  PATH: Squamous and glandular mucosa with minimal superficial acute inflammation.     ENDOSCOPY N/A 12/12/2017    Z line irregular, gastritis, duodenitis, chronic inflammation    ENDOSCOPY N/A 12/01/2020    Procedure: ESOPHAGOGASTRODUODENOSCOPY WITH BIOPSIES;  Surgeon: Darci Kerns MD;  Location: [facility] ENDOSCOPY;  Service:  Gastroenterology;  Laterality: N/A;  PRE- REFLUX, DYSPHAGIA  POST- ESOPHAGITIS, GASTRITIS, SMALL HIATAL HERNIA, BILE REFLUX    ENDOSCOPY N/A 3/15/2023    Procedure: ESOPHAGOGASTRODUODENOSCOPY with biopsies;  Surgeon: Darci Kerns MD;  Location: [facility] ENDOSCOPY;  Service: Gastroenterology;  Laterality: N/A;  pre:  reflux with breakthrough symptoms  post;  gastritis, mild esophagitis    EPIDURAL BLOCK      ESOPHAGEAL MANOMETRY N/A 02/02/2021    Procedure: ESOPHAGEAL MANOMETRY;  Surgeon: Tran, Nurse Performed;  Location: [facility] ENDOSCOPY;  Service: Gastroenterology;  Laterality: N/A;  DYSPHAGIA    KNEE SURGERY Left     BROKEN    LUMBAR DISCECTOMY FUSION INSTRUMENTATION N/A 12/03/2018    Procedure: L4-5 laminectomy and fusion with instrumentation;  Surgeon: Austin Keith MD;  Location: [facility] OR;  Service: Orthopedic Spine    MAMMO US BREAST BIOPSY ADDITIONAL W WO DEVICE Left 02/21/2002    2:00 position left breast, Infiltrating Ductal Carcinoma, [facility]    MANDIBLE FRACTURE SURGERY      MASTECTOMY Left 04/11/2002    Left Total Mastectomy and Left Axillary [place] Node Biobsy, Dr. Indu Akins, [facility]    OTHER SURGICAL HISTORY      CARDIAC LOOP DEVICE, LEFT CHEST    TENSION FREE VAGINAL TAPING WITH MINI ARC SLING N/A 10/26/2009    Dr. Gina Castillo, [facility]    TOTAL KNEE ARTHROPLASTY Left 10/21/2019    Procedure: TOTAL KNEE ARTHROPLASTY;  Surgeon: Anurag Serrano MD;  Location: [facility];  Service: Orthopedics    UPPER GASTROINTESTINAL ENDOSCOPY  09/16/2014    z-line irreg, grade a reflux, gastritis                        PT Assessment/Plan       Row Name 07/15/24 1600          PT Assessment    Assessment Comments Ms. Anderson returns to PT this date without any complaints. She does report feeling improved stability [place], has not had a fall in several weeks. Today she demos great improvement in overall stability this date, having several less LOB with exercises and able to  independently correct. Added static stagger stance with 1 foot up on a 8in step as well as added lateral mini lunge onto bosu. Additionally Davida has much improved awareness on form this date, continually independently correcting posture and holding head up without cueing. She continues to have improved gait mechanics with emphasis on limiting excessive posterior lean and TrA activation. Will continue to progress as appropriate.  -MO        PT Plan    PT Plan Comments continue with emphasis on forward weight shifting with exercsies, limiting excessive posterior lean. Working on dynamic balance  -MO               User Key  (r) = Recorded By, (t) = Taken By, (c) = Cosigned By      Initials Name Provider Type    Cindy Irizarry, PT Physical Therapist                       OP Exercises       Row Name 07/15/24 1400             Subjective    Subjective Comments I do feel more stable [place]  -MO         Total Minutes    53497 - PT Therapeutic Exercise Minutes 10  -MO      32569 - PT Therapeutic Activity Minutes 30  -MO         Exercise 1    Exercise Name 1 nustep  -MO      Time 1 5 min L5  -MO         Exercise 6    Exercise Name 6 TUG  -MO      Cueing 6 Verbal  -MO      Sets 6 1  -MO      Reps 6 10  -MO      Time 6 low mat + airex  -MO      Additional Comments cueing for large steps on turns, head up  -MO         Exercise 7    Exercise Name 7 gait training  -MO      Reps 7 2 laps  -MO      Time 7 holding lvl 2 weighted ball  -MO      Additional Comments anterior weight shifts, core activation, holding head upright  -MO         Exercise 8    Exercise Name 8 stagger stance 1 foot on step  -MO      Cueing 8 Verbal;Tactile  -MO      Sets 8 2B  -MO      Time 8 30s  -MO      Additional Comments 8in step  -MO         Exercise 10    Exercise Name 10 stagger stance D2 chop  -MO      Cueing 10 Verbal  -MO      Sets 10 2B  -MO      Reps 10 10  -MO      Time 10 8' step, lvl 2 weighted ball  -MO         Exercise 13     Exercise Name 13 shoulder ext w/ kickstand  -MO      Cueing 13 Verbal  -MO      Sets 13 2B  -MO      Reps 13 10  -MO      Time 13 RTB  -MO         Exercise 15    Exercise Name 15 mini fwd/lat lunge onto BOSU  -MO      Cueing 15 Verbal;Demo  -MO      Sets 15 1e, B  -MO      Reps 15 10  -MO      Time 15 w/o UE use  -MO                User Key  (r) = Recorded By, (t) = Taken By, (c) = Cosigned By      Initials Name Provider Type    Cindy Irizarry, PT Physical Therapist                                  PT OP Goals       Row Name 07/15/24 1500          PT Short Term Goals    STG Date to Achieve 07/03/24  -MO     STG 1 Pt will be independent with initial HEP to improve strength and static/dynamic balance.  -MO     STG 1 Progress Met  -MO     STG 2 Patient will maintain TUG time of </= 10 seconds however will demo improved gait with zero episodes of scissoring gait pattern and with mild deviation from straight patient to show improved gait speed and decrease risk for falls.  -MO     STG 2 Progress Met  -MO     STG 3 Pt will deny any new falls in 4 weeks demonstrating improved balance and decreased risk of falling.  -MO     STG 3 Progress Met  -MO     STG 4 Patient able to tandem stance bilaterally  >/= 30 seconds 1 1 fingertip support without LOB for improved core stabilization and decreased fall risk.  -MO     STG 4 Progress Met  -MO        Long Term Goals    LTG Date to Achieve 08/02/24  -MO     LTG 1 Pt will be independent with advance HEP to improve strength and static/dynamic balance.  -MO     LTG 1 Progress Ongoing  -MO     LTG 2 Pt performs 30 seconds sit to stand having complete at least 3 more repetitions that was performed upon initial evaluation for progression of ease with functional transfers, LE strength, and safety in the home.  -MO     LTG 2 Progress Met  -MO     LTG 3 Pt demonstrates NBOS on foam > 30 sec without hand held assistance for improvements in balance with standing and walking on unstable  surfaces with yard, house hold, or community tasks.  -MO     LTG 3 Progress Ongoing  -MO     LTG 4 The pt will resume reciprocal stair navigation and or single step navigation for improved safety during community and household navigation with appropriate assistive device if needed.  -MO     LTG 4 Progress Ongoing;Progressing  -MO     LTG 5 Pt demonstrates ability to maintain SLS on stable surface without loss of balance for >8 seconds BL for improvements in gait with greater ability to weight shift, longer stride.  -MO     LTG 5 Progress Ongoing  -MO     LTG 6 Pt will improve 2min walk test by >/=50ft without any LOB to increase activity tolerance in order to walk to leisure and improve safety in community.  -MO     LTG 6 Progress Met  -MO     LTG 7 Patient able to tandem stance bilaterally  >/= 20 w/o UE support seconds without LOB for improved core stabilization and decreased fall risk  -MO     LTG 7 Progress Ongoing;Partially Met  -MO               User Key  (r) = Recorded By, (t) = Taken By, (c) = Cosigned By      Initials Name Provider Type    Cindy Irizarry PT Physical Therapist                                   Time Calculation:   Start Time: 1400  Stop Time: 1440  Time Calculation (min): 40 min  Timed Charges  59524 - PT Therapeutic Exercise Minutes: 10  98512 - PT Therapeutic Activity Minutes: 30  Total Minutes  Timed Charges Total Minutes: 40   Total Minutes: 40  Therapy Charges for Today       Code Description Service Date Service Provider Modifiers Qty    43365073825 HC PT THER PROC EA 15 MIN 7/15/2024 Cindy Hammonds PT GP 1    51900271986 HC PT THERAPEUTIC ACT EA 15 MIN 7/15/2024 Cindy Hammonds PT GP 2                      Cindy Hammonds PT  7/15/2024

## 2024-07-17 ENCOUNTER — HOSPITAL ENCOUNTER (OUTPATIENT)
Dept: PHYSICAL THERAPY | Facility: HOSPITAL | Age: 79
Setting detail: THERAPIES SERIES
Discharge: HOME | End: 2024-07-17
Payer: MEDICARE

## 2024-07-17 DIAGNOSIS — G89.29 CHRONIC RIGHT-SIDED LOW BACK PAIN WITH RIGHT-SIDED SCIATICA: ICD-10-CM

## 2024-07-17 DIAGNOSIS — M54.41 CHRONIC RIGHT-SIDED LOW BACK PAIN WITH RIGHT-SIDED SCIATICA: ICD-10-CM

## 2024-07-17 DIAGNOSIS — R53.1 WEAKNESS GENERALIZED: Primary | ICD-10-CM

## 2024-07-17 DIAGNOSIS — Z98.1 HISTORY OF FUSION OF LUMBAR SPINE: ICD-10-CM

## 2024-07-17 DIAGNOSIS — R26.89 BALANCE PROBLEMS: ICD-10-CM

## 2024-07-17 DIAGNOSIS — R29.6 FREQUENT FALLS: ICD-10-CM

## 2024-07-17 PROCEDURE — 97530 THERAPEUTIC ACTIVITIES: CPT

## 2024-07-17 PROCEDURE — 97110 THERAPEUTIC EXERCISES: CPT

## 2024-07-17 NOTE — THERAPY TREATMENT NOTE
Outpatient Physical Therapy Ortho Treatment Note  [facility]     Patient Name: Davida Anderson  : 1945  MRN: [id number]  Today's Date: 2024      Visit Date: 2024    Visit Dx:    ICD-10-CM ICD-9-CM   1. Weakness generalized  R53.1 780.79   2. Balance problems  R26.89 781.99   3. Frequent falls  R29.6 V15.88   4. Chronic right-sided low back pain with right-sided sciatica  M54.41 724.2    G89.29 724.3     338.29   5. History of fusion of lumbar spine  Z98.1 V45.4       Patient Active Problem List   Diagnosis    BP (high blood pressure)    History of left breast infiltrating ductal cancer 2cm s/p mastectomy with reconstruction  s/p chemo    H/O ETOH abuse    Gtz's esophagus    Colon polyps    Bipolar 1 disorder    Arthritis    Raynaud's disease    Neuropathy    Lumbar spine pain    Spondylolisthesis at L4-L5 level    Right lumbar radiculopathy    Gtz's esophagus without dysplasia    Hx of adenomatous colonic polyps    Dysphagia    Diarrhea    Primary osteoarthritis of left knee    Primary localized osteoarthrosis of the knee, right    Chronic pain of left knee    Spinal stenosis of lumbar region with neurogenic claudication    Spondylolisthesis of lumbar region    Acute pain of left knee    Gastroesophageal reflux disease    Irritable bowel syndrome with constipation    Adenomatous polyp of colon    History of Gtz's esophagus        Past Medical History:   Diagnosis Date    Acid reflux     Arthritis     Atrial fibrillation     Gtz esophagus     Bipolar 2 disorder     Bradycardia     WITH SURGERY    COPD (chronic obstructive pulmonary disease)     MILD, USES AINHALER PRN    DDD (degenerative disc disease), lumbar     Diverticulosis     Dizziness     Drug therapy     Fibrocystic breast     Frequent UTI     SEES DR GOFF FOR FREQUENT UTI'S, ON KEFLEX    H/O Infiltrating ductal carcinoma of left breast     Stage IIA, Grade 3 ER/MS +, HER2 -, treated with 5 years of tamoxifen,  5 years of Femara, completed in 2012    High cholesterol     History of alcoholism     40 YRS AGO    History of Clostridium difficile colitis 2014    History of gastric ulcer     History of loop recorder     Hypertension     Iron deficiency     Irregular heartbeat     a fib    Low back pain     Lumbar herniated disc     Mass of right breast on mammogram 03/17/2016    10 o'clock position, 4 cm from the nipple,    Neuropathy     PONV (postoperative nausea and vomiting)     Raynaud disease     Sleep apnea     MILD, NO NEED FOR CPAP    Tremors of nervous system     Urinary incontinence         Past Surgical History:   Procedure Laterality Date    ANKLE OPEN REDUCTION INTERNAL FIXATION Right 08/14/2015    Dr. Dandre Camacho, [facility]    BACK SURGERY      LUMBAR    BREAST BIOPSY      BREAST RECONSTRUCTION, BREAST TISSUE EXPANDER INSERTION Left 04/11/2002    [place] Contour Profile expander was placed 550 cc size, filled with 200 cc of saline, Dr. Herbert Napoles, [facility]    COLONOSCOPY N/A 09/16/2014    Dr. Darci Kerns, [facility]; torts, tics, stool, polyp    COLONOSCOPY N/A 09/27/2016    NTEH, diverticulosis, tortuous colon, Two 3-5mm polyps in the descending colon and the transverse colon, IH.  PATH: Tubular adenoma    COLONOSCOPY N/A 12/12/2017    NTEH, tics, torts, IH, TA w/low grade dysplasia    COLONOSCOPY N/A 12/01/2020    Procedure: COLONOSCOPY TO CECUM AND TI ;  Surgeon: Darci Kerns MD;  Location: [facility] ENDOSCOPY;  Service: Gastroenterology;  Laterality: N/A;  PRE- IBS, CONSTIPATION, HX POLYPS  POST- DEMINISHED SPHINCTER TONE, DIVERTICULOSIS, POORLY PREPPED COLON    CYSTOSCOPY N/A 05/07/2010    with TVT takedown, Dr. Simon Wall, [facility]    ENDOSCOPY N/A 09/27/2016    z line irreg, bilious gastric fluid- fluid aspiration preformed, gastritis.  PATH: Squamous and glandular mucosa with minimal superficial acute inflammation.     ENDOSCOPY N/A 12/12/2017    Z line irregular, gastritis, duodenitis, chronic inflammation     ENDOSCOPY N/A 12/01/2020    Procedure: ESOPHAGOGASTRODUODENOSCOPY WITH BIOPSIES;  Surgeon: Darci Kerns MD;  [facility];  Service: Gastroenterology;  Laterality: N/A;  PRE- REFLUX, DYSPHAGIA  POST- ESOPHAGITIS, GASTRITIS, SMALL HIATAL HERNIA, BILE REFLUX    ENDOSCOPY N/A 3/15/2023    Procedure: ESOPHAGOGASTRODUODENOSCOPY with biopsies;  Surgeon: Darci Kerns MD;  [facility];  Service: Gastroenterology;  Laterality: N/A;  pre:  reflux with breakthrough symptoms  post;  gastritis, mild esophagitis    EPIDURAL BLOCK      ESOPHAGEAL MANOMETRY N/A 02/02/2021    Procedure: ESOPHAGEAL MANOMETRY;  Surgeon: Endo, Nurse Performed;  Location: [facility] ENDOSCOPY;  Service: Gastroenterology;  Laterality: N/A;  DYSPHAGIA    KNEE SURGERY Left     BROKEN    LUMBAR DISCECTOMY FUSION INSTRUMENTATION N/A 12/03/2018    Procedure: L4-5 laminectomy and fusion with instrumentation;  Surgeon: Austin Keith MD;  Location: [facility] MAIN OR;  Service: Orthopedic Spine    MAMMO US BREAST BIOPSY ADDITIONAL W WO DEVICE Left 02/21/2002    2:00 position left breast, Infiltrating Ductal Carcinoma, [facility]    MANDIBLE FRACTURE SURGERY      MASTECTOMY Left 04/11/2002    Left Total Mastectomy and Left Axillary [place] Node Biobsy, Dr. Indu Akins, [facility]    OTHER SURGICAL HISTORY      CARDIAC LOOP DEVICE, LEFT CHEST    TENSION FREE VAGINAL TAPING WITH MINI ARC SLING N/A 10/26/2009    Dr. Gina Castillo, [facility]    TOTAL KNEE ARTHROPLASTY Left 10/21/2019    Procedure: TOTAL KNEE ARTHROPLASTY;  Surgeon: Anurag Serrano MD;  Location: [facility] MAIN OR;  Service: Orthopedics    UPPER GASTROINTESTINAL ENDOSCOPY  09/16/2014    z-line irreg, grade a reflux, gastritis                        PT Assessment/Plan       Row Name 07/17/24 1600          PT Assessment    Assessment Comments Davida reports improving confidence with gait. She demonstrates maintaining better posture alignment with functional mobility, much less  posterior leaning observed. She does require SBA and CGA at times particulalry when R foot is posterior during balance work due to weakness of R hip girdle. She will benefit from continuing skilled therpay services.  -JA        PT Plan    PT Plan Comments assess response to last visit; consider 3way hip on step or blue foam?  -JA               User Key  (r) = Recorded By, (t) = Taken By, (c) = Cosigned By      Initials Name Provider Type    Sarah Spears, PT Physical Therapist                       OP Exercises       Row Name 07/17/24 1000             Subjective    Subjective Comments I feel like I am more stable when I walk but I do have trouble with running into corners when turning sometimes.  -JA         Total Minutes    22058 - PT Therapeutic Exercise Minutes 15  -JA      26503 - PT Therapeutic Activity Minutes 30  -JA         Exercise 1    Exercise Name 1 nustep  -JA      Time 1 skipped today because she had walked over an hour about an hour ago  -JA         Exercise 6    Exercise Name 6 TUG  -JA      Cueing 6 Verbal  -JA      Sets 6 1  -JA      Reps 6 10  -JA      Time 6 low mat + airex  -JA      Additional Comments held ball #2; cued ab engagement  -JA         Exercise 7    Exercise Name 7 gait training  -JA      Reps 7 2 laps  -JA      Time 7 holding lvl 2 weighted ball  -JA         Exercise 8    Exercise Name 8 stagger stance 1 foot on step  -JA      Cueing 8 Verbal;Tactile  -JA      Sets 8 2B  -JA      Time 8 30s  -JA      Additional Comments 8in step CGA when R foot is posterior  -JA         Exercise 10    Exercise Name 10 stagger stance D2 chop  -JA      Cueing 10 Verbal  -JA      Sets 10 2B  -JA      Reps 10 10  -JA      Time 10 8' step, lvl 2 weighted ball  -JA      Additional Comments CGA when R foot is posterior  -JA         Exercise 11    Exercise Name 11 educated for variations of HL hip add and abd, do unilaterally to focus more on R  side weakness; also showed BKFO  -JA      Time 11 5  min  -JA      Additional Comments resistance with ball and band  -JA         Exercise 13    Exercise Name 13 shoulder ext w/ kickstand  -JA      Cueing 13 Verbal  -JA      Sets 13 2B  -JA      Reps 13 10  -JA      Time 13 RTB  -JA      Additional Comments CGA/SBA; on blue foam 1/2 tandem  -JA         Exercise 15    Exercise Name 15 mini fwd/lat lunge onto BOSU  -JA      Cueing 15 Verbal;Demo  -JA      Sets 15 1e, B  -JA      Reps 15 10  -JA      Time 15 w/o UE use  -JA      Additional Comments lateral only with R due to irritating pain on L TKA side  -JA                User Key  (r) = Recorded By, (t) = Taken By, (c) = Cosigned By      Initials Name Provider Type    Sarah Spears, PT Physical Therapist                                                    Time Calculation:   Start Time: 1045  Stop Time: 1130  Time Calculation (min): 45 min  Timed Charges  08656 - PT Therapeutic Exercise Minutes: 15  92299 - PT Therapeutic Activity Minutes: 30  Total Minutes  Timed Charges Total Minutes: 45   Total Minutes: 45  Therapy Charges for Today       Code Description Service Date Service Provider Modifiers Qty    61125858185 HC PT THER PROC EA 15 MIN 7/17/2024 Sarah Medina, PT GP 1    59509398795 HC PT THERAPEUTIC ACT EA 15 MIN 7/17/2024 Sarah Medina, PT GP 2                      Sarah Medina PT  7/17/2024

## 2024-07-24 ENCOUNTER — HOSPITAL ENCOUNTER (OUTPATIENT)
Dept: PHYSICAL THERAPY | Facility: HOSPITAL | Age: 79
Setting detail: THERAPIES SERIES
Discharge: HOME | End: 2024-07-24
Payer: MEDICARE

## 2024-07-24 DIAGNOSIS — R29.6 FREQUENT FALLS: ICD-10-CM

## 2024-07-24 DIAGNOSIS — M54.41 CHRONIC RIGHT-SIDED LOW BACK PAIN WITH RIGHT-SIDED SCIATICA: ICD-10-CM

## 2024-07-24 DIAGNOSIS — Z98.1 HISTORY OF FUSION OF LUMBAR SPINE: ICD-10-CM

## 2024-07-24 DIAGNOSIS — G89.29 CHRONIC RIGHT-SIDED LOW BACK PAIN WITH RIGHT-SIDED SCIATICA: ICD-10-CM

## 2024-07-24 DIAGNOSIS — R26.89 BALANCE PROBLEMS: ICD-10-CM

## 2024-07-24 DIAGNOSIS — R53.1 WEAKNESS GENERALIZED: Primary | ICD-10-CM

## 2024-07-24 PROCEDURE — 97530 THERAPEUTIC ACTIVITIES: CPT

## 2024-07-24 PROCEDURE — 97110 THERAPEUTIC EXERCISES: CPT

## 2024-07-25 NOTE — THERAPY TREATMENT NOTE
Outpatient Physical Therapy Ortho Treatment Note  [facility]     Patient Name: Davida Anderson  : 1945  MRN: [id number]  Today's Date: 2024      Visit Date: 2024    Visit Dx:    ICD-10-CM ICD-9-CM   1. Weakness generalized  R53.1 780.79   2. Balance problems  R26.89 781.99   3. Frequent falls  R29.6 V15.88   4. Chronic right-sided low back pain with right-sided sciatica  M54.41 724.2    G89.29 724.3     338.29   5. History of fusion of lumbar spine  Z98.1 V45.4       Patient Active Problem List   Diagnosis    BP (high blood pressure)    History of left breast infiltrating ductal cancer 2cm s/p mastectomy with reconstruction  s/p chemo    H/O ETOH abuse    Gtz's esophagus    Colon polyps    Bipolar 1 disorder    Arthritis    Raynaud's disease    Neuropathy    Lumbar spine pain    Spondylolisthesis at L4-L5 level    Right lumbar radiculopathy    Gtz's esophagus without dysplasia    Hx of adenomatous colonic polyps    Dysphagia    Diarrhea    Primary osteoarthritis of left knee    Primary localized osteoarthrosis of the knee, right    Chronic pain of left knee    Spinal stenosis of lumbar region with neurogenic claudication    Spondylolisthesis of lumbar region    Acute pain of left knee    Gastroesophageal reflux disease    Irritable bowel syndrome with constipation    Adenomatous polyp of colon    History of Gtz's esophagus        Past Medical History:   Diagnosis Date    Acid reflux     Arthritis     Atrial fibrillation     Gtz esophagus     Bipolar 2 disorder     Bradycardia     WITH SURGERY    COPD (chronic obstructive pulmonary disease)     MILD, USES AINHALER PRN    DDD (degenerative disc disease), lumbar     Diverticulosis     Dizziness     Drug therapy     Fibrocystic breast     Frequent UTI     SEES DR GOFF FOR FREQUENT UTI'S, ON KEFLEX    H/O Infiltrating ductal carcinoma of left breast     Stage IIA, Grade 3 ER/[place] +, HER2 -, treated with 5 years of tamoxifen,  5 years of Femara, completed in 2012    High cholesterol     History of alcoholism     40 YRS AGO    History of Clostridium difficile colitis 2014    History of gastric ulcer     History of loop recorder     Hypertension     Iron deficiency     Irregular heartbeat     a fib    Low back pain     Lumbar herniated disc     Mass of right breast on mammogram 03/17/2016    10 o'clock position, 4 cm from the nipple,    Neuropathy     PONV (postoperative nausea and vomiting)     Raynaud disease     Sleep apnea     MILD, NO NEED FOR CPAP    Tremors of nervous system     Urinary incontinence         Past Surgical History:   Procedure Laterality Date    ANKLE OPEN REDUCTION INTERNAL FIXATION Right 08/14/2015    Dr. Dandre Camacho, [facility]    BACK SURGERY      LUMBAR    BREAST BIOPSY      BREAST RECONSTRUCTION, BREAST TISSUE EXPANDER INSERTION Left 04/11/2002    [place] Contour Profile expander was placed 550 cc size, filled with 200 cc of saline, Dr. Herbert Napoles, [facility]    COLONOSCOPY N/A 09/16/2014    Dr. Darci Kerns, [facility]; torts, tics, stool, polyp    COLONOSCOPY N/A 09/27/2016    NTEH, diverticulosis, tortuous colon, Two 3-5mm polyps in the descending colon and the transverse colon, IH.  PATH: Tubular adenoma    COLONOSCOPY N/A 12/12/2017    NTEH, tics, torts, IH, TA w/low grade dysplasia    COLONOSCOPY N/A 12/01/2020    Procedure: COLONOSCOPY TO CECUM AND TI ;  Surgeon: Darci Kerns MD;  Location: [facility] ENDOSCOPY;  Service: Gastroenterology;  Laterality: N/A;  PRE- IBS, CONSTIPATION, HX POLYPS  POST- DEMINISHED SPHINCTER TONE, DIVERTICULOSIS, POORLY PREPPED COLON    CYSTOSCOPY N/A 05/07/2010    with TVT takedown, Dr. Simon Wall, [facility]    ENDOSCOPY N/A 09/27/2016    z line irreg, bilious gastric fluid- fluid aspiration preformed, gastritis.  PATH: Squamous and glandular mucosa with minimal superficial acute inflammation.     ENDOSCOPY N/A 12/12/2017    Z line irregular, gastritis, duodenitis, chronic inflammation     ENDOSCOPY N/A 12/01/2020    Procedure: ESOPHAGOGASTRODUODENOSCOPY WITH BIOPSIES;  Surgeon: Darci Kerns MD;  Location: [facility] ENDOSCOPY;  Service: Gastroenterology;  Laterality: N/A;  PRE- REFLUX, DYSPHAGIA  POST- ESOPHAGITIS, GASTRITIS, SMALL HIATAL HERNIA, BILE REFLUX    ENDOSCOPY N/A 3/15/2023    Procedure: ESOPHAGOGASTRODUODENOSCOPY with biopsies;  Surgeon: Darci Kerns MD;  Location: [facility] ENDOSCOPY;  Service: Gastroenterology;  Laterality: N/A;  pre:  reflux with breakthrough symptoms  post;  gastritis, mild esophagitis    EPIDURAL BLOCK      ESOPHAGEAL MANOMETRY N/A 02/02/2021    Procedure: ESOPHAGEAL MANOMETRY;  Surgeon: Endo, Nurse Performed;  Location: [facility] ENDOSCOPY;  Service: Gastroenterology;  Laterality: N/A;  DYSPHAGIA    KNEE SURGERY Left     BROKEN    LUMBAR DISCECTOMY FUSION INSTRUMENTATION N/A 12/03/2018    Procedure: L4-5 laminectomy and fusion with instrumentation;  Surgeon: Austin Keith MD;  Location: [facility] MAIN OR;  Service: Orthopedic Spine    MAMMO US BREAST BIOPSY ADDITIONAL W WO DEVICE Left 02/21/2002    2:00 position left breast, Infiltrating Ductal Carcinoma, [facility]    MANDIBLE FRACTURE SURGERY      MASTECTOMY Left 04/11/2002    Left Total Mastectomy and Left Axillary [place] Node Biobsy, Dr. Indu Akins, [facility]    OTHER SURGICAL HISTORY      CARDIAC LOOP DEVICE, LEFT CHEST    TENSION FREE VAGINAL TAPING WITH MINI ARC SLING N/A 10/26/2009    Dr. Gina Castillo, [facility]    TOTAL KNEE ARTHROPLASTY Left 10/21/2019    Procedure: TOTAL KNEE ARTHROPLASTY;  Surgeon: Anurag Serrano MD;  Location: [facility] OR;  Service: Orthopedics    UPPER GASTROINTESTINAL ENDOSCOPY  09/16/2014    z-line irreg, grade a reflux, gastritis                        PT Assessment/Plan       Row Name 07/24/24 1200          PT Assessment    Assessment Comments Davida reports no increased soreness after visit last week. She continues to take a walk with her  daily at his pace which is slower  than her typical pace. Some minor cuing for posture correction. During walk around department observed 2 momentary losses of balance with self-correction, both occurred when she had her weight on R LE. Due to onset of neck muscle tension/pain we took time to apply cold pack in supine with LEs on bolster and she felt ready to resume exercise after about 8 minutes. She will benefit from continuing skilled therapy services.  -JA        PT Plan    PT Plan Comments assess response to last visit, consider PNF type slow-reversal for R hip girdle mm, consider star particulalry standing on R  -LIAM               User Key  (r) = Recorded By, (t) = Taken By, (c) = Cosigned By      Initials Name Provider Type    Sarah Spears, PT Physical Therapist                     Modalities       Row Name 07/24/24 1000             Ice    Ice Applied Yes  -JA      Location neck, in supine with bolster under knees  -LIAM      PT Ice Rx Minutes 8  -JA                User Key  (r) = Recorded By, (t) = Taken By, (c) = Cosigned By      Initials Name Provider Type    Sarah Spears, PT Physical Therapist                   OP Exercises       Row Name 07/24/24 1000             Subjective    Subjective Comments Doing okay. Didn't walk yet this morning.  -JA         Total Minutes    98915 - PT Therapeutic Exercise Minutes 15  -JA      68100 - PT Therapeutic Activity Minutes 20  -JA         Exercise 1    Exercise Name 1 nustep  -LIAM      Time 1 5 min  -JA      Additional Comments L5  -JA         Exercise 6    Exercise Name 6 TUG  -JA      Cueing 6 Verbal  -JA      Sets 6 1  -JA      Reps 6 10  -JA      Time 6 low mat + airex  -JA      Additional Comments held ball #2; cued ab engagement; at end of last rep pt sat on chair but not centered and slid off side of chair onto the floor. After checking how she felt she was helped up by myself and another. She asked to continue therapy.  -JA         Exercise 7    Exercise Name 7 gait training  -JA       Reps 7 2 laps  -JA      Time 7 no weight today  -JA      Additional Comments observed 2 momoentary LOB turning corners with weight on R LE  -JA         Exercise 8    Exercise Name 8 stagger stance 1 foot on step  -JA      Cueing 8 Verbal;Tactile  -JA      Sets 8 2B  -JA      Time 8 30s  -JA      Additional Comments 8in step CGA when R foot is posterior  -JA         Exercise 9    Exercise Name 9 FW/BW weight shift in //bars  -JA      Reps 9 10  -JA         Exercise 10    Exercise Name 10 stagger stance D2 chop  -JA      Cueing 10 Verbal  -JA      Sets 10 2B  -JA      Reps 10 10  -JA      Time 10 8' step, lvl 2 weighted ball  -JA         Exercise 11    Exercise Name 11 hip abd w/tband  -JA      Sets 11 --  -JA      Reps 11 resume next visit  -JA      Time 11 --  -JA         Exercise 12    Exercise Name 12 began 3 way hip  -JA      Reps 12 stopped due to neck muscle tension/pain  -JA         Exercise 13    Exercise Name 13 shoulder ext w/ kickstand  -JA      Cueing 13 Verbal  -JA      Sets 13 resume next visit  -JA      Reps 13 --  -JA      Time 13 --  -JA         Exercise 15    Exercise Name 15 mini fwd/lat lunge onto BOSU  -JA      Cueing 15 Verbal;Demo  -JA      Sets 15 --  -JA      Reps 15 resume next visit  -JA      Time 15 --  -JA                User Key  (r) = Recorded By, (t) = Taken By, (c) = Cosigned By      Initials Name Provider Type    Sarah Spears, PT Physical Therapist                                  PT OP Goals       Row Name 07/24/24 1000          PT Short Term Goals    STG Date to Achieve 07/03/24  -JA     STG 1 Pt will be independent with initial HEP to improve strength and static/dynamic balance.  -JA     STG 1 Progress Met  -JA     STG 2 Patient will maintain TUG time of </= 10 seconds however will demo improved gait with zero episodes of scissoring gait pattern and with mild deviation from straight patient to show improved gait speed and decrease risk for falls.  -JA     STG 2  Progress Met  -     STG 3 Pt will deny any new falls in 4 weeks demonstrating improved balance and decreased risk of falling.  -     STG 3 Progress Met  -     STG 4 Patient able to tandem stance bilaterally  >/= 30 seconds 1 1 fingertip support without LOB for improved core stabilization and decreased fall risk.  -     STG 4 Progress Met  -        Long Term Goals    LTG Date to Achieve 08/02/24  -     LTG 1 Pt will be independent with advance HEP to improve strength and static/dynamic balance.  -     LTG 1 Progress Ongoing  -     LTG 2 Pt performs 30 seconds sit to stand having complete at least 3 more repetitions that was performed upon initial evaluation for progression of ease with functional transfers, LE strength, and safety in the home.  -     LTG 2 Progress Met  -     LTG 3 Pt demonstrates NBOS on foam > 30 sec without hand held assistance for improvements in balance with standing and walking on unstable surfaces with yard, house hold, or community tasks.  -     LTG 3 Progress Ongoing  -     LTG 4 The pt will resume reciprocal stair navigation and or single step navigation for improved safety during community and household navigation with appropriate assistive device if needed.  -     LTG 4 Progress Ongoing;Progressing  -     LTG 5 Pt demonstrates ability to maintain SLS on stable surface without loss of balance for >8 seconds BL for improvements in gait with greater ability to weight shift, longer stride.  -     LTG 5 Progress Ongoing  -     LTG 6 Pt will improve 2min walk test by >/=50ft without any LOB to increase activity tolerance in order to walk to leisure and improve safety in community.  -     LTG 6 Progress Met  -     LTG 7 Patient able to tandem stance bilaterally  >/= 20 w/o UE support seconds without LOB for improved core stabilization and decreased fall risk  -     LTG 7 Progress Ongoing;Partially Met  -               User Key  (r) = Recorded By, (t) =  Taken By, (c) = Cosigned By      Initials Name Provider Type    Sarah Spears, PT Physical Therapist                                   Time Calculation:   Start Time: 1000  Stop Time: 1045  Time Calculation (min): 45 min  Timed Charges  03441 - PT Therapeutic Exercise Minutes: 15  10625 - PT Therapeutic Activity Minutes: 20  Untimed Charges  PT Ice Rx Minutes: 8  Total Minutes  Timed Charges Total Minutes: 35  Untimed Charges Total Minutes: 8   Total Minutes: 43  Therapy Charges for Today       Code Description Service Date Service Provider Modifiers Qty    29289861021 HC PT THER PROC EA 15 MIN 7/24/2024 Sarah Medina, PT GP 1    63278997098 HC PT THERAPEUTIC ACT EA 15 MIN 7/24/2024 Sarah Medina, PT GP 1    92285007237 HC PT HOT OR COLD PACK TREAT [place] 7/24/2024 Sarah Medina, PT GP 1                      Sarah Medina PT  7/25/2024

## 2024-07-30 ENCOUNTER — HOSPITAL ENCOUNTER (OUTPATIENT)
Dept: PHYSICAL THERAPY | Facility: HOSPITAL | Age: 79
Setting detail: THERAPIES SERIES
Discharge: HOME | End: 2024-07-30
Payer: MEDICARE

## 2024-07-30 DIAGNOSIS — R53.1 WEAKNESS GENERALIZED: Primary | ICD-10-CM

## 2024-07-30 DIAGNOSIS — R26.89 BALANCE PROBLEMS: ICD-10-CM

## 2024-07-30 DIAGNOSIS — R29.6 FREQUENT FALLS: ICD-10-CM

## 2024-07-30 PROCEDURE — 97110 THERAPEUTIC EXERCISES: CPT

## 2024-07-30 PROCEDURE — 97530 THERAPEUTIC ACTIVITIES: CPT

## 2024-07-30 NOTE — THERAPY TREATMENT NOTE
Outpatient Physical Therapy Ortho Treatment Note  [facility]     Patient Name: Davida Anderson  : 1945  MRN: [id number]  Today's Date: 2024      Visit Date: 2024    Visit Dx:    ICD-10-CM ICD-9-CM   1. Weakness generalized  R53.1 780.79   2. Balance problems  R26.89 781.99   3. Frequent falls  R29.6 V15.88       Patient Active Problem List   Diagnosis    BP (high blood pressure)    History of left breast infiltrating ductal cancer 2cm s/p mastectomy with reconstruction  s/p chemo    H/O ETOH abuse    Gtz's esophagus    Colon polyps    Bipolar 1 disorder    Arthritis    Raynaud's disease    Neuropathy    Lumbar spine pain    Spondylolisthesis at L4-L5 level    Right lumbar radiculopathy    Gtz's esophagus without dysplasia    Hx of adenomatous colonic polyps    Dysphagia    Diarrhea    Primary osteoarthritis of left knee    Primary localized osteoarthrosis of the knee, right    Chronic pain of left knee    Spinal stenosis of lumbar region with neurogenic claudication    Spondylolisthesis of lumbar region    Acute pain of left knee    Gastroesophageal reflux disease    Irritable bowel syndrome with constipation    Adenomatous polyp of colon    History of Gtz's esophagus        Past Medical History:   Diagnosis Date    Acid reflux     Arthritis     Atrial fibrillation     Gtz esophagus     Bipolar 2 disorder     Bradycardia     WITH SURGERY    COPD (chronic obstructive pulmonary disease)     MILD, USES AINHALER PRN    DDD (degenerative disc disease), lumbar     Diverticulosis     Dizziness     Drug therapy     Fibrocystic breast     Frequent UTI     SEES DR GOFF FOR FREQUENT UTI'S, ON KEFLEX    H/O Infiltrating ductal carcinoma of left breast     Stage IIA, Grade 3 ER/[place] +, HER2 -, treated with 5 years of tamoxifen, 5 years of Femara, completed in     High cholesterol     History of alcoholism     40 YRS AGO    History of Clostridium difficile colitis 2014    History  of gastric ulcer     History of loop recorder     Hypertension     Iron deficiency     Irregular heartbeat     a fib    Low back pain     Lumbar herniated disc     Mass of right breast on mammogram 03/17/2016    10 o'clock position, 4 cm from the nipple,    Neuropathy     PONV (postoperative nausea and vomiting)     Raynaud disease     Sleep apnea     MILD, NO NEED FOR CPAP    Tremors of nervous system     Urinary incontinence         Past Surgical History:   Procedure Laterality Date    ANKLE OPEN REDUCTION INTERNAL FIXATION Right 08/14/2015    Dr. Dandre Camacho, [facility]    BACK SURGERY      LUMBAR    BREAST BIOPSY      BREAST RECONSTRUCTION, BREAST TISSUE EXPANDER INSERTION Left 04/11/2002    [place] Contour Profile expander was placed 550 cc size, filled with 200 cc of saline, Dr. Herbert Napoles, [facility]    COLONOSCOPY N/A 09/16/2014    Dr. Darci Kerns, [facility]; torts, tics, stool, polyp    COLONOSCOPY N/A 09/27/2016    NTEH, diverticulosis, tortuous colon, Two 3-5mm polyps in the descending colon and the transverse colon, IH.  PATH: Tubular adenoma    COLONOSCOPY N/A 12/12/2017    NTEH, tics, torts, IH, TA w/low grade dysplasia    COLONOSCOPY N/A 12/01/2020    Procedure: COLONOSCOPY TO CECUM AND TI ;  Surgeon: Darci Kerns MD;  Location: [facility] ENDOSCOPY;  Service: Gastroenterology;  Laterality: N/A;  PRE- IBS, CONSTIPATION, HX POLYPS  POST- DEMINISHED SPHINCTER TONE, DIVERTICULOSIS, POORLY PREPPED COLON    CYSTOSCOPY N/A 05/07/2010    with TVT takedown, Dr. Simon Wall, [facility]    ENDOSCOPY N/A 09/27/2016    z line irreg, bilious gastric fluid- fluid aspiration preformed, gastritis.  PATH: Squamous and glandular mucosa with minimal superficial acute inflammation.     ENDOSCOPY N/A 12/12/2017    Z line irregular, gastritis, duodenitis, chronic inflammation    ENDOSCOPY N/A 12/01/2020    Procedure: ESOPHAGOGASTRODUODENOSCOPY WITH BIOPSIES;  Surgeon: Darci eKrns MD;  Location: [facility] ENDOSCOPY;  Service:  Gastroenterology;  Laterality: N/A;  PRE- REFLUX, DYSPHAGIA  POST- ESOPHAGITIS, GASTRITIS, SMALL HIATAL HERNIA, BILE REFLUX    ENDOSCOPY N/A 3/15/2023    Procedure: ESOPHAGOGASTRODUODENOSCOPY with biopsies;  Surgeon: Darci Kerns MD;  Location: [facility];  Service: Gastroenterology;  Laterality: N/A;  pre:  reflux with breakthrough symptoms  post;  gastritis, mild esophagitis    EPIDURAL BLOCK      ESOPHAGEAL MANOMETRY N/A 02/02/2021    Procedure: ESOPHAGEAL MANOMETRY;  Surgeon: Trna, Nurse Performed;  Location: [facility] ENDOSCOPY;  Service: Gastroenterology;  Laterality: N/A;  DYSPHAGIA    KNEE SURGERY Left     BROKEN    LUMBAR DISCECTOMY FUSION INSTRUMENTATION N/A 12/03/2018    Procedure: L4-5 laminectomy and fusion with instrumentation;  Surgeon: Austin Keith MD;  Location: [facility] OR;  Service: Orthopedic Spine    MAMMO US BREAST BIOPSY ADDITIONAL W WO DEVICE Left 02/21/2002    2:00 position left breast, Infiltrating Ductal Carcinoma, [facility]    MANDIBLE FRACTURE SURGERY      MASTECTOMY Left 04/11/2002    Left Total Mastectomy and Left Axillary [place] Node Biobsy, Dr. Indu Akins, [facility]    OTHER SURGICAL HISTORY      CARDIAC LOOP DEVICE, LEFT CHEST    TENSION FREE VAGINAL TAPING WITH MINI ARC SLING N/A 10/26/2009    Dr. Gina Castillo, [facility]    TOTAL KNEE ARTHROPLASTY Left 10/21/2019    Procedure: TOTAL KNEE ARTHROPLASTY;  Surgeon: Anurag Serrano MD;  Location: [facility];  Service: Orthopedics    UPPER GASTROINTESTINAL ENDOSCOPY  09/16/2014    z-line irreg, grade a reflux, gastritis                        PT Assessment/Plan       Row Name 07/30/24 1500          PT Assessment    Assessment Comments Ms. Higuera returns to PT this date reporting mild lingering R shoulder pain following last session however feels it is nearly resolved. Continued with functional balance training this date with inclusion of several dual tasking activities including cone slides with cog tasks and manual task to the  TUG, as well as added functional forward step + reach. Frequent cueing for positioning prior to sitting, feeling seat on the back of both legs before sitting down for safety. She does she improved LE strength this date with ability to stand from lower surface without onset of knee discomfort. Will continue to progress as appropriate.  -MO        PT Plan    PT Plan Comments 3 way star taps on foam  -MO               User Key  (r) = Recorded By, (t) = Taken By, (c) = Cosigned By      Initials Name Provider Type    Cindy Irizarry, PT Physical Therapist                       OP Exercises       Row Name 07/30/24 1400             Subjective    Subjective Comments My shoulder has been a little sore since last session but overall okay  -MO         Total Minutes    36830 - PT Therapeutic Exercise Minutes 18  -MO      24527 - PT Therapeutic Activity Minutes 20  -MO         Exercise 1    Exercise Name 1 nustep  -MO      Time 1 5 mins, lvl 5  -MO         Exercise 2    Exercise Name 2 fwd functional step + reach  -MO      Cueing 2 Verbal;Demo  -MO      Sets 2 2B  -MO      Reps 2 10  -MO      Time 2 1UE support on counter  -MO         Exercise 6    Exercise Name 6 TUG  -MO      Cueing 6 Verbal  -MO      Sets 6 1  -MO      Reps 6 10  -MO      Time 6 lowest mat table  -MO      Additional Comments holding cone + ball for dual task  -MO         Exercise 10    Exercise Name 10 stagger stance D2 chop  -MO      Cueing 10 Verbal  -MO      Sets 10 2B  -MO      Reps 10 10  -MO      Time 10 8' step, lvl 2 weighted ball  -MO         Exercise 14    Exercise Name 14 step + cone slide  -MO      Cueing 14 Verbal  -MO      Time 14 6 laps  -MO      Additional Comments + cog dual tasking  -MO                User Key  (r) = Recorded By, (t) = Taken By, (c) = Cosigned By      Initials Name Provider Type    Cindy Irizarry PT Physical Therapist                                  PT OP Goals       Row Name 07/30/24 1500          PT Short Term Goals     STG Date to Achieve 07/03/24  -MO     STG 1 Pt will be independent with initial HEP to improve strength and static/dynamic balance.  -MO     STG 1 Progress Met  -MO     STG 2 Patient will maintain TUG time of </= 10 seconds however will demo improved gait with zero episodes of scissoring gait pattern and with mild deviation from straight patient to show improved gait speed and decrease risk for falls.  -MO     STG 2 Progress Met  -MO     STG 3 Pt will deny any new falls in 4 weeks demonstrating improved balance and decreased risk of falling.  -MO     STG 3 Progress Met  -MO     STG 4 Patient able to tandem stance bilaterally  >/= 30 seconds 1 1 fingertip support without LOB for improved core stabilization and decreased fall risk.  -MO     STG 4 Progress Met  -MO        Long Term Goals    LTG Date to Achieve 08/02/24  -MO     LTG 1 Pt will be independent with advance HEP to improve strength and static/dynamic balance.  -MO     LTG 1 Progress Ongoing  -MO     LTG 2 Pt performs 30 seconds sit to stand having complete at least 3 more repetitions that was performed upon initial evaluation for progression of ease with functional transfers, LE strength, and safety in the home.  -MO     LTG 2 Progress Met  -MO     LTG 3 Pt demonstrates NBOS on foam > 30 sec without hand held assistance for improvements in balance with standing and walking on unstable surfaces with yard, house hold, or community tasks.  -MO     LTG 3 Progress Ongoing  -MO     LTG 4 The pt will resume reciprocal stair navigation and or single step navigation for improved safety during community and household navigation with appropriate assistive device if needed.  -MO     LTG 4 Progress Ongoing;Progressing  -MO     LTG 5 Pt demonstrates ability to maintain SLS on stable surface without loss of balance for >8 seconds BL for improvements in gait with greater ability to weight shift, longer stride.  -MO     LTG 5 Progress Ongoing  -MO     LTG 6 Pt will improve  2min walk test by >/=50ft without any LOB to increase activity tolerance in order to walk to leisure and improve safety in community.  -MO     LTG 6 Progress Met  -MO     LTG 7 Patient able to tandem stance bilaterally  >/= 20 w/o UE support seconds without LOB for improved core stabilization and decreased fall risk  -MO     LTG 7 Progress Ongoing;Partially Met  -MO               User Key  (r) = Recorded By, (t) = Taken By, (c) = Cosigned By      Initials Name Provider Type    Cindy Irizarry, PT Physical Therapist                                   Time Calculation:   Start Time: 1315  Stop Time: 1353  Time Calculation (min): 38 min  Timed Charges  34053 - PT Therapeutic Exercise Minutes: 18  97927 - PT Therapeutic Activity Minutes: 20  Total Minutes  Timed Charges Total Minutes: 38   Total Minutes: 38  Therapy Charges for Today       Code Description Service Date Service Provider Modifiers Qty    40362593535  PT THER PROC EA 15 MIN 7/30/2024 Cindy Hammonds, PT GP 1    [id number]  PT THERAPEUTIC ACT EA 15 MIN 7/30/2024 Cindy Hammonds, PT GP 2                      Cindy Hammonds PT  7/30/2024

## 2024-08-01 ENCOUNTER — HOSPITAL ENCOUNTER (OUTPATIENT)
Dept: PHYSICAL THERAPY | Facility: HOSPITAL | Age: 79
Setting detail: THERAPIES SERIES
Discharge: HOME | End: 2024-08-01
Payer: MEDICARE

## 2024-08-01 DIAGNOSIS — R53.1 WEAKNESS GENERALIZED: Primary | ICD-10-CM

## 2024-08-01 DIAGNOSIS — R29.6 FREQUENT FALLS: ICD-10-CM

## 2024-08-01 DIAGNOSIS — R26.89 BALANCE PROBLEMS: ICD-10-CM

## 2024-08-01 PROCEDURE — 97530 THERAPEUTIC ACTIVITIES: CPT

## 2024-08-01 NOTE — THERAPY PROGRESS REPORT/RE-CERT
Outpatient Physical Therapy Ortho Progress Note  [facility]     Patient Name: Davida Anderson  : 1945  MRN: [id number]  Today's Date: 2024      Visit Date: 2024    Visit Dx:    ICD-10-CM ICD-9-CM   1. Weakness generalized  R53.1 780.79   2. Balance problems  R26.89 781.99   3. Frequent falls  R29.6 V15.88       Patient Active Problem List   Diagnosis    BP (high blood pressure)    History of left breast infiltrating ductal cancer 2cm s/p mastectomy with reconstruction  s/p chemo    H/O ETOH abuse    Gtz's esophagus    Colon polyps    Bipolar 1 disorder    Arthritis    Raynaud's disease    Neuropathy    Lumbar spine pain    Spondylolisthesis at L4-L5 level    Right lumbar radiculopathy    Gtz's esophagus without dysplasia    Hx of adenomatous colonic polyps    Dysphagia    Diarrhea    Primary osteoarthritis of left knee    Primary localized osteoarthrosis of the knee, right    Chronic pain of left knee    Spinal stenosis of lumbar region with neurogenic claudication    Spondylolisthesis of lumbar region    Acute pain of left knee    Gastroesophageal reflux disease    Irritable bowel syndrome with constipation    Adenomatous polyp of colon    History of Gtz's esophagus        Past Medical History:   Diagnosis Date    Acid reflux     Arthritis     Atrial fibrillation     Gtz esophagus     Bipolar 2 disorder     Bradycardia     WITH SURGERY    COPD (chronic obstructive pulmonary disease)     MILD, USES AINHALER PRN    DDD (degenerative disc disease), lumbar     Diverticulosis     Dizziness     Drug therapy     Fibrocystic breast     Frequent UTI     SEES DR GOFF FOR FREQUENT UTI'S, ON KEFLEX    H/O Infiltrating ductal carcinoma of left breast     Stage IIA, Grade 3 ER/IL +, HER2 -, treated with 5 years of tamoxifen, 5 years of Femara, completed in     High cholesterol     History of alcoholism     40 YRS AGO    History of Clostridium difficile colitis 2014    History  of gastric ulcer     History of loop recorder     Hypertension     Iron deficiency     Irregular heartbeat     a fib    Low back pain     Lumbar herniated disc     Mass of right breast on mammogram 03/17/2016    10 o'clock position, 4 cm from the nipple,    Neuropathy     PONV (postoperative nausea and vomiting)     Raynaud disease     Sleep apnea     MILD, NO NEED FOR CPAP    Tremors of nervous system     Urinary incontinence         Past Surgical History:   Procedure Laterality Date    ANKLE OPEN REDUCTION INTERNAL FIXATION Right 08/14/2015    Dr. Dandre Camacho, [facility]    BACK SURGERY      LUMBAR    BREAST BIOPSY      BREAST RECONSTRUCTION, BREAST TISSUE EXPANDER INSERTION Left 04/11/2002    [place] Contour Profile expander was placed 550 cc size, filled with 200 cc of saline, Dr. Herbert Napoles, [facility]    COLONOSCOPY N/A 09/16/2014    Dr. Darci Kerns, [facility]; torts, tics, stool, polyp    COLONOSCOPY N/A 09/27/2016    NTEH, diverticulosis, tortuous colon, Two 3-5mm polyps in the descending colon and the transverse colon, IH.  PATH: Tubular adenoma    COLONOSCOPY N/A 12/12/2017    NTEH, tics, torts, IH, TA w/low grade dysplasia    COLONOSCOPY N/A 12/01/2020    Procedure: COLONOSCOPY TO CECUM AND TI ;  Surgeon: Darci Kerns MD;  Location: [facility] ENDOSCOPY;  Service: Gastroenterology;  Laterality: N/A;  PRE- IBS, CONSTIPATION, HX POLYPS  POST- DEMINISHED SPHINCTER TONE, DIVERTICULOSIS, POORLY PREPPED COLON    CYSTOSCOPY N/A 05/07/2010    with TVT takedown, Dr. Simon Wall, [facility]    ENDOSCOPY N/A 09/27/2016    z line irreg, bilious gastric fluid- fluid aspiration preformed, gastritis.  PATH: Squamous and glandular mucosa with minimal superficial acute inflammation.     ENDOSCOPY N/A 12/12/2017    Z line irregular, gastritis, duodenitis, chronic inflammation    ENDOSCOPY N/A 12/01/2020    Procedure: ESOPHAGOGASTRODUODENOSCOPY WITH BIOPSIES;  Surgeon: Darci Kerns MD;  Location[facility] ENDOSCOPY;  Service:  Gastroenterology;  Laterality: N/A;  PRE- REFLUX, DYSPHAGIA  POST- ESOPHAGITIS, GASTRITIS, SMALL HIATAL HERNIA, BILE REFLUX    ENDOSCOPY N/A 3/15/2023    Procedure: ESOPHAGOGASTRODUODENOSCOPY with biopsies;  Surgeon: Darci Kerns MD;  Location: [facility] ENDOSCOPY;  Service: Gastroenterology;  Laterality: N/A;  pre:  reflux with breakthrough symptoms  post;  gastritis, mild esophagitis    EPIDURAL BLOCK      ESOPHAGEAL MANOMETRY N/A 02/02/2021    Procedure: ESOPHAGEAL MANOMETRY;  Surgeon: Tran, Nurse Performed;  Location: [facility] ENDOSCOPY;  Service: Gastroenterology;  Laterality: N/A;  DYSPHAGIA    KNEE SURGERY Left     BROKEN    LUMBAR DISCECTOMY FUSION INSTRUMENTATION N/A 12/03/2018    Procedure: L4-5 laminectomy and fusion with instrumentation;  Surgeon: Austin Keith MD;  Location: [facility] OR;  Service: Orthopedic Spine    MAMMO US BREAST BIOPSY ADDITIONAL W WO DEVICE Left 02/21/2002    2:00 position left breast, Infiltrating Ductal Carcinoma, [facility]    MANDIBLE FRACTURE SURGERY      MASTECTOMY Left 04/11/2002    Left Total Mastectomy and Left Axillary Clyde Node Biobsy, Dr. Indu Akins, [facility]    OTHER SURGICAL HISTORY      CARDIAC LOOP DEVICE, LEFT CHEST    TENSION FREE VAGINAL TAPING WITH MINI ARC SLING N/A 10/26/2009    Dr. Gina Castillo, [facility]    TOTAL KNEE ARTHROPLASTY Left 10/21/2019    Procedure: TOTAL KNEE ARTHROPLASTY;  Surgeon: Anurag Serrano MD;  Location: [facility];  Service: Orthopedics    UPPER GASTROINTESTINAL ENDOSCOPY  09/16/2014    z-line irreg, grade a reflux, gastritis        PT Ortho       Row Name 08/01/24 1300       Balance Skills Training    Balance Comments tandem BL >30s  -MO              User Key  (r) = Recorded By, (t) = Taken By, (c) = Cosigned By      Initials Name Provider Type    Cindy Irizarry, PT Physical Therapist                                 PT Assessment/Plan       Row Name 08/01/24 1400          PT Assessment    Functional Limitations Decreased safety  during functional activities;Impaired gait;Performance in self-care ADL;Performance in leisure activities;Limitations in community activities;Limitation in home management  -MO     Impairments Balance;Coordination;Gait;Poor body mechanics;Muscle strength;Impaired muscle power;Impaired flexibility  -MO     Assessment Comments Davida Anderson has been seen for 14 physical therapy sessions for isolated balance exercises.  Treatment has included therapeutic exercise, manual therapy, gait training, and patient education with home exercise program . Progress to physical therapy goals is excellent. Pt has met 4/4 STG and 7/7 LTG. She continues to make great improvements in overall balance and functional mobility. Since initial re-revaluation she improved 2 min walk test from 294' with noted gait deviation to ambulation 517' this date with very mild deviations. Additionally improves RUCKER balance score from 39/56 to 52/56, most notable improvement in SLS time from <5s BL to >10s BL and BL tandem stance >30s. Spent time discussing improvements shown this date as well as functionality of each skill. Spent additional time reviewing importance of slowing movement for safety, increasing awareness on steps and in general throughout daily activity. She does continue to have intermittent LOB and gait deviations however she is able to independently correct and recognize body positioning. At this time she is appropriate to d/c to independent management of condition at end of scheduled sessions.  -MO     Please refer to paper survey for additional self-reported information No  -MO     Rehab Potential Good  -MO     Patient/caregiver participated in establishment of treatment plan and goals Yes  -MO     Patient would benefit from skilled therapy intervention Yes  -MO        PT Plan    PT Frequency 2x/week;1x/week  -MO     Predicted Duration of Therapy Intervention (PT) 2-3 visits  -MO     Planned CPT's? PT RE-EVAL: 05831;PT THER PROC EA 15  MIN: 88872;PT THER ACT EA 15 MIN: 10772;PT MANUAL THERAPY EA 15 MIN: 20576;PT NEUROMUSC RE-EDUCATION EA 15 MIN: 11550;PT GAIT TRAINING EA 15 MIN: 00524;PT SELF CARE/HOME MGMT/TRAIN EA 15: 92696  -MO     PT Plan Comments 3 way star tap, 3d hip, tandem with head turns  -MO               User Key  (r) = Recorded By, (t) = Taken By, (c) = Cosigned By      Initials Name Provider Type    Cindy Irizarry PT Physical Therapist                       OP Exercises       Row Name 08/01/24 1300             Subjective    Subjective Comments No changes  -MO         Total Minutes    08425 - PT Therapeutic Activity Minutes 45  -MO         Exercise 1    Exercise Name 1 nustep  -MO      Time 1 5 mins, lvl 5  -MO         Exercise 3    Exercise Name 3 Progress note  -MO                User Key  (r) = Recorded By, (t) = Taken By, (c) = Cosigned By      Initials Name Provider Type    Cindy Irizarry, PT Physical Therapist                                  PT OP Goals       Row Name 08/01/24 1300          PT Short Term Goals    STG Date to Achieve 07/03/24  -MO     STG 1 Pt will be independent with initial HEP to improve strength and static/dynamic balance.  -MO     STG 1 Progress Met  -MO     STG 2 Patient will maintain TUG time of </= 10 seconds however will demo improved gait with zero episodes of scissoring gait pattern and with mild deviation from straight patient to show improved gait speed and decrease risk for falls.  -MO     STG 2 Progress Met  -MO     STG 3 Pt will deny any new falls in 4 weeks demonstrating improved balance and decreased risk of falling.  -MO     STG 3 Progress Met  -MO     STG 4 Patient able to tandem stance bilaterally  >/= 30 seconds 1 1 fingertip support without LOB for improved core stabilization and decreased fall risk.  -MO     STG 4 Progress Met  -MO        Long Term Goals    LTG Date to Achieve 08/02/24  -MO     LTG 1 Pt will be independent with advance HEP to improve strength and static/dynamic  balance.  -MO     LTG 1 Progress Met  -MO     LTG 2 Pt performs 30 seconds sit to stand having complete at least 3 more repetitions that was performed upon initial evaluation for progression of ease with functional transfers, LE strength, and safety in the home.  -MO     LTG 2 Progress Met  -MO     LTG 3 Pt demonstrates NBOS on foam > 30 sec without hand held assistance for improvements in balance with standing and walking on unstable surfaces with yard, house hold, or community tasks.  -MO     LTG 3 Progress Met  -MO     LTG 4 The pt will resume reciprocal stair navigation and or single step navigation for improved safety during community and household navigation with appropriate assistive device if needed.  -MO     LTG 4 Progress Met  -MO     LTG 5 Pt demonstrates ability to maintain SLS on stable surface without loss of balance for >8 seconds BL for improvements in gait with greater ability to weight shift, longer stride.  -MO     LTG 5 Progress Met  -MO     LTG 6 Pt will improve 2min walk test by >/=50ft without any LOB to increase activity tolerance in order to walk to leisure and improve safety in community.  -MO     LTG 6 Progress Met  -MO     LTG 7 Patient able to tandem stance bilaterally  >/= 20 w/o UE support seconds without LOB for improved core stabilization and decreased fall risk  -MO     LTG 7 Progress Met  -MO               User Key  (r) = Recorded By, (t) = Taken By, (c) = Cosigned By      Initials Name Provider Type    Cindy Irizarry, PT Physical Therapist                         2 Minute Walk Test  Distance Ambulated in 2 Minutes: 517  30 Second Chair Stand Test  30 Second Chair Stand Test: 12 (w/o UE)  Martinez Balance Scale  Sitting to Standing: able to stand without using hands and stabilize independently  Standing Unsupported: able to stand safely for 2 minutes  Sitting with Back Unsupported but Feet Supported on Floor or on Stool: able to sit safely and securely for 2 minutes  Standing to  Sitting: sits safely with minimal use of hands  Transfers: able to transfer safely with minor use of hands  Standing Unsupported with Eyes Closed: able to stand 10 seconds safely  Standing Unsupported with Feet Together: able to place feet together independently and stand 1 minute safely  Reaching Forward with Outstretched Arm While Standing: can reach forward 12 cm (5 inches)   Object From the Floor From a Standing Position: able to  object safely and easily  Turning to Look Behind Over Left and Right Shoulders While Standing: looks behind one side only other side shows less weight shift  Turn 360 Degrees: able to turn 360 degrees safely in 4 seconds or less  Place Alternate Foot on Step or Stool While Standing Unsupported: able to stand independently and safely and complete 8 steps in 20 seconds (18 taps, 1 foot catch but able to correct independently)  Standing Unsupported with One Foot in Front: able to place foot ahead independently and hold 30 seconds  Standing on One Leg: able to lift leg independently and hold 5-10 seconds  Martinez Total Score: 52  Timed Up and Go (TUG)  TUG Test 1: 8.4 seconds  TUG Test 2: 7.4 seconds      Time Calculation:   Start Time: 1315  Stop Time: 1400  Time Calculation (min): 45 min  Timed Charges  92368 - PT Therapeutic Activity Minutes: 45  Total Minutes  Timed Charges Total Minutes: 45   Total Minutes: 45  Therapy Charges for Today       Code Description Service Date Service Provider Modifiers Qty    [id number] HC PT THERAPEUTIC ACT EA 15 MIN 8/1/2024 Cindy Hammonds, PT GP 3            PT Rosa  Martinez Total Score: 52  TUG Test 1: 8.4 seconds  TUG Test 2: 7.4 seconds         Cindy Hammonds PT  8/1/2024

## 2024-08-05 ENCOUNTER — HOSPITAL ENCOUNTER (OUTPATIENT)
Dept: PHYSICAL THERAPY | Facility: HOSPITAL | Age: 79
Setting detail: THERAPIES SERIES
Discharge: HOME | End: 2024-08-05
Payer: MEDICARE

## 2024-08-05 DIAGNOSIS — R53.1 WEAKNESS GENERALIZED: Primary | ICD-10-CM

## 2024-08-05 DIAGNOSIS — R29.6 FREQUENT FALLS: ICD-10-CM

## 2024-08-05 DIAGNOSIS — R26.89 BALANCE PROBLEMS: ICD-10-CM

## 2024-08-05 PROCEDURE — 97530 THERAPEUTIC ACTIVITIES: CPT

## 2024-08-05 NOTE — THERAPY TREATMENT NOTE
Outpatient Physical Therapy Ortho Treatment Note  [facility]     Patient Name: Davida Anderson  : 1945  MRN: [id number]  Today's Date: 2024      Visit Date: 2024    Visit Dx:    ICD-10-CM ICD-9-CM   1. Weakness generalized  R53.1 780.79   2. Balance problems  R26.89 781.99   3. Frequent falls  R29.6 V15.88       Patient Active Problem List   Diagnosis    BP (high blood pressure)    History of left breast infiltrating ductal cancer 2cm s/p mastectomy with reconstruction  s/p chemo    H/O ETOH abuse    Gtz's esophagus    Colon polyps    Bipolar 1 disorder    Arthritis    Raynaud's disease    Neuropathy    Lumbar spine pain    Spondylolisthesis at L4-L5 level    Right lumbar radiculopathy    Gtz's esophagus without dysplasia    Hx of adenomatous colonic polyps    Dysphagia    Diarrhea    Primary osteoarthritis of left knee    Primary localized osteoarthrosis of the knee, right    Chronic pain of left knee    Spinal stenosis of lumbar region with neurogenic claudication    Spondylolisthesis of lumbar region    Acute pain of left knee    Gastroesophageal reflux disease    Irritable bowel syndrome with constipation    Adenomatous polyp of colon    History of Gtz's esophagus        Past Medical History:   Diagnosis Date    Acid reflux     Arthritis     Atrial fibrillation     Gtz esophagus     Bipolar 2 disorder     Bradycardia     WITH SURGERY    COPD (chronic obstructive pulmonary disease)     MILD, USES AINHALER PRN    DDD (degenerative disc disease), lumbar     Diverticulosis     Dizziness     Drug therapy     Fibrocystic breast     Frequent UTI     SEES DR GOFF FOR FREQUENT UTI'S, ON KEFLEX    H/O Infiltrating ductal carcinoma of left breast     Stage IIA, Grade 3 ER/[place] +, HER2 -, treated with 5 years of tamoxifen, 5 years of Femara, completed in     High cholesterol     History of alcoholism     40 YRS AGO    History of Clostridium difficile colitis 2014    History  of gastric ulcer     History of loop recorder     Hypertension     Iron deficiency     Irregular heartbeat     a fib    Low back pain     Lumbar herniated disc     Mass of right breast on mammogram 03/17/2016    10 o'clock position, 4 cm from the nipple,    Neuropathy     PONV (postoperative nausea and vomiting)     Raynaud disease     Sleep apnea     MILD, NO NEED FOR CPAP    Tremors of nervous system     Urinary incontinence         Past Surgical History:   Procedure Laterality Date    ANKLE OPEN REDUCTION INTERNAL FIXATION Right 08/14/2015    Dr. Dandre Camacho, [facility]    BACK SURGERY      LUMBAR    BREAST BIOPSY      BREAST RECONSTRUCTION, BREAST TISSUE EXPANDER INSERTION Left 04/11/2002    [place] Contour Profile expander was placed 550 cc size, filled with 200 cc of saline, Dr. Herbert Napoles, [facility]    COLONOSCOPY N/A 09/16/2014    Dr. Darci Kerns, [facility]; torts, tics, stool, polyp    COLONOSCOPY N/A 09/27/2016    NTEH, diverticulosis, tortuous colon, Two 3-5mm polyps in the descending colon and the transverse colon, IH.  PATH: Tubular adenoma    COLONOSCOPY N/A 12/12/2017    NTEH, tics, torts, IH, TA w/low grade dysplasia    COLONOSCOPY N/A 12/01/2020    Procedure: COLONOSCOPY TO CECUM AND TI ;  Surgeon: Darci Kerns MD;  Location: [facility] ENDOSCOPY;  Service: Gastroenterology;  Laterality: N/A;  PRE- IBS, CONSTIPATION, HX POLYPS  POST- DEMINISHED SPHINCTER TONE, DIVERTICULOSIS, POORLY PREPPED COLON    CYSTOSCOPY N/A 05/07/2010    with TVT takedown, Dr. Simon Wall, [facility]    ENDOSCOPY N/A 09/27/2016    z line irreg, bilious gastric fluid- fluid aspiration preformed, gastritis.  PATH: Squamous and glandular mucosa with minimal superficial acute inflammation.     ENDOSCOPY N/A 12/12/2017    Z line irregular, gastritis, duodenitis, chronic inflammation    ENDOSCOPY N/A 12/01/2020    Procedure: ESOPHAGOGASTRODUODENOSCOPY WITH BIOPSIES;  Surgeon: Darci Kerns MD;  Location: [facility] ENDOSCOPY;  Service:  Gastroenterology;  Laterality: N/A;  PRE- REFLUX, DYSPHAGIA  POST- ESOPHAGITIS, GASTRITIS, SMALL HIATAL HERNIA, BILE REFLUX    ENDOSCOPY N/A 3/15/2023    Procedure: ESOPHAGOGASTRODUODENOSCOPY with biopsies;  Surgeon: Darci Kerns MD;  Location: [facility] ENDOSCOPY;  Service: Gastroenterology;  Laterality: N/A;  pre:  reflux with breakthrough symptoms  post;  gastritis, mild esophagitis    EPIDURAL BLOCK      ESOPHAGEAL MANOMETRY N/A 02/02/2021    Procedure: ESOPHAGEAL MANOMETRY;  Surgeon: Tran, Nurse Performed;  Location: [facility] ENDOSCOPY;  Service: Gastroenterology;  Laterality: N/A;  DYSPHAGIA    KNEE SURGERY Left     BROKEN    LUMBAR DISCECTOMY FUSION INSTRUMENTATION N/A 12/03/2018    Procedure: L4-5 laminectomy and fusion with instrumentation;  Surgeon: Austin Keith MD;  Location: [facility] OR;  Service: Orthopedic Spine    MAMMO US BREAST BIOPSY ADDITIONAL W WO DEVICE Left 02/21/2002    2:00 position left breast, Infiltrating Ductal Carcinoma, [facility]    MANDIBLE FRACTURE SURGERY      MASTECTOMY Left 04/11/2002    Left Total Mastectomy and Left Axillary [place] Node Biobsy, Dr. Indu Akins, [facility]    OTHER SURGICAL HISTORY      CARDIAC LOOP DEVICE, LEFT CHEST    TENSION FREE VAGINAL TAPING WITH MINI ARC SLING N/A 10/26/2009    Dr. Gina Castillo, [facility]    TOTAL KNEE ARTHROPLASTY Left 10/21/2019    Procedure: TOTAL KNEE ARTHROPLASTY;  Surgeon: Anurag Serrano MD;  Location: [facility];  Service: Orthopedics    UPPER GASTROINTESTINAL ENDOSCOPY  09/16/2014    z-line irreg, grade a reflux, gastritis                        PT Assessment/Plan       Row Name 08/05/24 1400          PT Assessment    Assessment Comments Added star taps this date as well as progressed tandem stance to include head turns and nods with noted increase in difficulty however overall great tolerance. She is able to significantly reduce UE utilization throughout all exercises with good maintenance of balance. Has x3 LOB intermittently  throughout session but is able to independently correct with // bars or counter top. At this time, she is appropriate for d/c to independent management at next session. Will plan for full HEP review and answer all remaining questions.  -MO        PT Plan    PT Plan Comments full HEP review and printout  -MO               User Key  (r) = Recorded By, (t) = Taken By, (c) = Cosigned By      Initials Name Provider Type    Cindy Irizarry, PT Physical Therapist                       OP Exercises       Row Name 08/05/24 1300             Total Minutes    58940 - PT Therapeutic Activity Minutes 40  -MO         Exercise 1    Exercise Name 1 nustep  -MO      Time 1 5 mins, lvl 5  -MO         Exercise 2    Exercise Name 2 fwd functional step + reach  -MO      Cueing 2 Verbal  -MO      Sets 2 2B  -MO      Reps 2 10  -MO      Time 2 1UE support on counter  -MO         Exercise 3    Exercise Name 3 star taps  -MO      Cueing 3 Verbal;Demo;Tactile  -MO      Sets 3 2B  -MO      Reps 3 10 taps  -MO      Time 3 intermittent UE support  -MO         Exercise 4    Exercise Name 4 tandem stance with head turns  -MO      Cueing 4 Verbal  -MO      Sets 4 2B  -MO      Reps 4 30s  -MO      Time 4 w. nods  -MO         Exercise 5    Exercise Name 5 tandem walk f/b  -MO      Cueing 5 Verbal  -MO      Reps 5 3 laps  -MO      Additional Comments cueing for core and fwd trunk lean  -MO         Exercise 7    Exercise Name 7 lateral walking  -MO      Reps 7 RTB below knees  -MO      Time 7 4 laps  -MO                User Key  (r) = Recorded By, (t) = Taken By, (c) = Cosigned By      Initials Name Provider Type    Cindy Irizarry, PT Physical Therapist                                  PT OP Goals       Row Name 08/05/24 1400          PT Short Term Goals    STG Date to Achieve 07/03/24  -MO     STG 1 Pt will be independent with initial HEP to improve strength and static/dynamic balance.  -MO     STG 1 Progress Met  -MO     STG 2 Patient will maintain  TUG time of </= 10 seconds however will demo improved gait with zero episodes of scissoring gait pattern and with mild deviation from straight patient to show improved gait speed and decrease risk for falls.  -MO     STG 2 Progress Met  -MO     STG 3 Pt will deny any new falls in 4 weeks demonstrating improved balance and decreased risk of falling.  -MO     STG 3 Progress Met  -MO     STG 4 Patient able to tandem stance bilaterally  >/= 30 seconds 1 1 fingertip support without LOB for improved core stabilization and decreased fall risk.  -MO     STG 4 Progress Met  -MO        Long Term Goals    LTG Date to Achieve 08/02/24  -MO     LTG 1 Pt will be independent with advance HEP to improve strength and static/dynamic balance.  -MO     LTG 1 Progress Met  -MO     LTG 2 Pt performs 30 seconds sit to stand having complete at least 3 more repetitions that was performed upon initial evaluation for progression of ease with functional transfers, LE strength, and safety in the home.  -MO     LTG 2 Progress Met  -MO     LTG 3 Pt demonstrates NBOS on foam > 30 sec without hand held assistance for improvements in balance with standing and walking on unstable surfaces with yard, house hold, or community tasks.  -MO     LTG 3 Progress Met  -MO     LTG 4 The pt will resume reciprocal stair navigation and or single step navigation for improved safety during community and household navigation with appropriate assistive device if needed.  -MO     LTG 4 Progress Met  -MO     LTG 5 Pt demonstrates ability to maintain SLS on stable surface without loss of balance for >8 seconds BL for improvements in gait with greater ability to weight shift, longer stride.  -MO     LTG 5 Progress Met  -MO     LTG 6 Pt will improve 2min walk test by >/=50ft without any LOB to increase activity tolerance in order to walk to leisure and improve safety in community.  -MO     LTG 6 Progress Met  -MO     LTG 7 Patient able to tandem stance bilaterally  >/= 20  w/o UE support seconds without LOB for improved core stabilization and decreased fall risk  -MO     LTG 7 Progress Met  -MO               User Key  (r) = Recorded By, (t) = Taken By, (c) = Cosigned By      Initials Name Provider Type    Cindy Irizarry, PT Physical Therapist                                   Time Calculation:   Start Time: 1320  Stop Time: 1400  Time Calculation (min): 40 min  Timed Charges  97345 - PT Therapeutic Activity Minutes: 40  Total Minutes  Timed Charges Total Minutes: 40   Total Minutes: 40  Therapy Charges for Today       Code Description Service Date Service Provider Modifiers Qty    08556604622 HC PT THERAPEUTIC ACT EA 15 MIN 8/5/2024 Cindy Hammonds, PT GP 3                      Cindy Hammonds PT  8/5/2024

## 2024-08-07 ENCOUNTER — HOSPITAL ENCOUNTER (OUTPATIENT)
Dept: PHYSICAL THERAPY | Facility: HOSPITAL | Age: 79
Setting detail: THERAPIES SERIES
Discharge: HOME | End: 2024-08-07
Payer: MEDICARE

## 2024-08-07 DIAGNOSIS — R53.1 WEAKNESS GENERALIZED: Primary | ICD-10-CM

## 2024-08-07 DIAGNOSIS — R29.6 FREQUENT FALLS: ICD-10-CM

## 2024-08-07 DIAGNOSIS — R26.89 BALANCE PROBLEMS: ICD-10-CM

## 2024-08-07 PROCEDURE — 97530 THERAPEUTIC ACTIVITIES: CPT

## 2024-08-07 NOTE — THERAPY DISCHARGE NOTE
Outpatient Physical Therapy Ortho Treatment Note/Discharge Summary  [facility]     Patient Name: Davida Anderson  : 1945  MRN: [id number]  Today's Date: 2024      Visit Date: 2024    Visit Dx:    ICD-10-CM ICD-9-CM   1. Weakness generalized  R53.1 780.79   2. Balance problems  R26.89 781.99   3. Frequent falls  R29.6 V15.88       Patient Active Problem List   Diagnosis    BP (high blood pressure)    History of left breast infiltrating ductal cancer 2cm s/p mastectomy with reconstruction  s/p chemo    H/O ETOH abuse    Gtz's esophagus    Colon polyps    Bipolar 1 disorder    Arthritis    Raynaud's disease    Neuropathy    Lumbar spine pain    Spondylolisthesis at L4-L5 level    Right lumbar radiculopathy    Gtz's esophagus without dysplasia    Hx of adenomatous colonic polyps    Dysphagia    Diarrhea    Primary osteoarthritis of left knee    Primary localized osteoarthrosis of the knee, right    Chronic pain of left knee    Spinal stenosis of lumbar region with neurogenic claudication    Spondylolisthesis of lumbar region    Acute pain of left knee    Gastroesophageal reflux disease    Irritable bowel syndrome with constipation    Adenomatous polyp of colon    History of Gtz's esophagus        Past Medical History:   Diagnosis Date    Acid reflux     Arthritis     Atrial fibrillation     Gtz esophagus     Bipolar 2 disorder     Bradycardia     WITH SURGERY    COPD (chronic obstructive pulmonary disease)     MILD, USES AINHALER PRN    DDD (degenerative disc disease), lumbar     Diverticulosis     Dizziness     Drug therapy     Fibrocystic breast     Frequent UTI     SEES DR GOFF FOR FREQUENT UTI'S, ON KEFLEX    H/O Infiltrating ductal carcinoma of left breast     Stage IIA, Grade 3 [place] +, HER2 -, treated with 5 years of tamoxifen, 5 years of Femara, completed in     High cholesterol     History of alcoholism     40 YRS AGO    History of Clostridium difficile  colitis 2014    History of gastric ulcer     History of loop recorder     Hypertension     Iron deficiency     Irregular heartbeat     a fib    Low back pain     Lumbar herniated disc     Mass of right breast on mammogram 03/17/2016    10 o'clock position, 4 cm from the nipple,    Neuropathy     PONV (postoperative nausea and vomiting)     Raynaud disease     Sleep apnea     MILD, NO NEED FOR CPAP    Tremors of nervous system     Urinary incontinence         Past Surgical History:   Procedure Laterality Date    ANKLE OPEN REDUCTION INTERNAL FIXATION Right 08/14/2015    Dr. Dandre Camacho, [facility]    BACK SURGERY      LUMBAR    BREAST BIOPSY      BREAST RECONSTRUCTION, BREAST TISSUE EXPANDER INSERTION Left 04/11/2002    [place] Contour Profile expander was placed 550 cc size, filled with 200 cc of saline, Dr. Herbert Napoles, [facility]    COLONOSCOPY N/A 09/16/2014    Dr. Darci Kerns, [facility]; torts, tics, stool, polyp    COLONOSCOPY N/A 09/27/2016    NTEH, diverticulosis, tortuous colon, Two 3-5mm polyps in the descending colon and the transverse colon, IH.  PATH: Tubular adenoma    COLONOSCOPY N/A 12/12/2017    NTEH, tics, torts, IH, TA w/low grade dysplasia    COLONOSCOPY N/A 12/01/2020    Procedure: COLONOSCOPY TO CECUM AND TI ;  Surgeon: Darci Kerns MD;  Location: [facility] ENDOSCOPY;  Service: Gastroenterology;  Laterality: N/A;  PRE- IBS, CONSTIPATION, HX POLYPS  POST- DEMINISHED SPHINCTER TONE, DIVERTICULOSIS, POORLY PREPPED COLON    CYSTOSCOPY N/A 05/07/2010    with TVT takedown, Dr. Simon Wall, [facility]    ENDOSCOPY N/A 09/27/2016    z line irreg, bilious gastric fluid- fluid aspiration preformed, gastritis.  PATH: Squamous and glandular mucosa with minimal superficial acute inflammation.     ENDOSCOPY N/A 12/12/2017    Z line irregular, gastritis, duodenitis, chronic inflammation    ENDOSCOPY N/A 12/01/2020    Procedure: ESOPHAGOGASTRODUODENOSCOPY WITH BIOPSIES;  Surgeon: Darci Kerns MD;  Location: [facility]  ENDOSCOPY;  Service: Gastroenterology;  Laterality: N/A;  PRE- REFLUX, DYSPHAGIA  POST- ESOPHAGITIS, GASTRITIS, SMALL HIATAL HERNIA, BILE REFLUX    ENDOSCOPY N/A 3/15/2023    Procedure: ESOPHAGOGASTRODUODENOSCOPY with biopsies;  Surgeon: Darci Kerns MD;  Location: [facility] ENDOSCOPY;  Service: Gastroenterology;  Laterality: N/A;  pre:  reflux with breakthrough symptoms  post;  gastritis, mild esophagitis    EPIDURAL BLOCK      ESOPHAGEAL MANOMETRY N/A 02/02/2021    Procedure: ESOPHAGEAL MANOMETRY;  Surgeon: Tran, Nurse Performed;  Location: [facility] ENDOSCOPY;  Service: Gastroenterology;  Laterality: N/A;  DYSPHAGIA    KNEE SURGERY Left     BROKEN    LUMBAR DISCECTOMY FUSION INSTRUMENTATION N/A 12/03/2018    Procedure: L4-5 laminectomy and fusion with instrumentation;  Surgeon: Austin Keith MD;  Location: [facility] OR;  Service: Orthopedic Spine    MAMMO US BREAST BIOPSY ADDITIONAL W WO DEVICE Left 02/21/2002    2:00 position left breast, Infiltrating Ductal Carcinoma, [facility]    MANDIBLE FRACTURE SURGERY      MASTECTOMY Left 04/11/2002    Left Total Mastectomy and Left Axillary [place] Node Biobsy, Dr. Indu Akins, [facility]    OTHER SURGICAL HISTORY      CARDIAC LOOP DEVICE, LEFT CHEST    TENSION FREE VAGINAL TAPING WITH MINI ARC SLING N/A 10/26/2009    Dr. Gina Castillo, [facility]    TOTAL KNEE ARTHROPLASTY Left 10/21/2019    Procedure: TOTAL KNEE ARTHROPLASTY;  Surgeon: Anurag Serrano MD;  Location: [facility] OR;  Service: Orthopedics    UPPER GASTROINTESTINAL ENDOSCOPY  09/16/2014    z-line irreg, grade a reflux, gastritis                        PT Assessment/Plan       Row Name 08/07/24 1500          PT Assessment    Assessment Comments Davida Anderson was seen for 16 physical therapy sessions for balance impairment.  Treatment included therapeutic exercise, therapeutic activity, gait training, and patient education with home exercise program.  Progress to physical therapy goals was good. Pt met 4/4 STG and  7/7 LTG. Progress note recently resubmitted, demoing great improvement in stability. Additionally pt reports this date she has less frequent leg cramping at night. She continues to demo improved gait mechanics when cognoscente of posture, limiting sway back posture and activating core, notably improving balance. She continues to require intermittent cueing for overall posture control however generally has much improved awareness throughout session. Added several exercises this date to include maintenance exercises. Time spent discussing advanced HEP and appropriate progressions/modifications to make based on response following discharge and optimal frequency/duration to complete HEP over next several weeks-months. Patient verbalized understanding. She was discharged to an independent HEP and provided patient education to self-manage condition. Encouraged her to return should status change.  -MO        PT Plan    PT Plan Comments d/c  -MO               User Key  (r) = Recorded By, (t) = Taken By, (c) = Cosigned By      Initials Name Provider Type    Cindy Irizarry, PT Physical Therapist                         OP Exercises       Row Name 08/07/24 1100             Total Minutes    91435 - PT Therapeutic Activity Minutes 40  includes extensive HEP discussion  -MO         Exercise 3    Exercise Name 3 star taps  -MO      Cueing 3 Verbal  -MO      Sets 3 2B  -MO      Reps 3 10 taps  -MO         Exercise 6    Exercise Name 6 warm up  -MO      Sets 6 1e  -MO      Reps 6 30s  -MO      Time 6 fwd stepping, lateral stepping  -MO         Exercise 7    Exercise Name 7 lateral walking  -MO      Reps 7 RTB below knees  -MO      Time 7 4 laps  -MO         Exercise 8    Exercise Name 8 standing HR/ TR  -MO      Cueing 8 Verbal  -MO      Sets 8 1  -MO      Reps 8 20  -MO      Time 8 3s  -MO         Exercise 9    Exercise Name 9 monster walks  -MO      Cueing 9 Verbal;Demo  -MO      Reps 9 4 laps  -MO      Time 9 RTB below knees   -MO         Exercise 11    Exercise Name 11 STS  -MO      Cueing 11 Verbal  -MO      Sets 11 1  -MO      Reps 11 10  -MO                User Key  (r) = Recorded By, (t) = Taken By, (c) = Cosigned By      Initials Name Provider Type    Cindy Irizarry, PT Physical Therapist                                    PT OP Goals       Row Name 08/07/24 1500          PT Short Term Goals    STG Date to Achieve 07/03/24  -MO     STG 1 Pt will be independent with initial HEP to improve strength and static/dynamic balance.  -MO     STG 1 Progress Met  -MO     STG 2 Patient will maintain TUG time of </= 10 seconds however will demo improved gait with zero episodes of scissoring gait pattern and with mild deviation from straight patient to show improved gait speed and decrease risk for falls.  -MO     STG 2 Progress Met  -MO     STG 3 Pt will deny any new falls in 4 weeks demonstrating improved balance and decreased risk of falling.  -MO     STG 3 Progress Met  -MO     STG 4 Patient able to tandem stance bilaterally  >/= 30 seconds 1 1 fingertip support without LOB for improved core stabilization and decreased fall risk.  -MO     STG 4 Progress Met  -MO        Long Term Goals    LTG Date to Achieve 08/02/24  -MO     LTG 1 Pt will be independent with advance HEP to improve strength and static/dynamic balance.  -MO     LTG 1 Progress Met  -MO     LTG 2 Pt performs 30 seconds sit to stand having complete at least 3 more repetitions that was performed upon initial evaluation for progression of ease with functional transfers, LE strength, and safety in the home.  -MO     LTG 2 Progress Met  -MO     LTG 3 Pt demonstrates NBOS on foam > 30 sec without hand held assistance for improvements in balance with standing and walking on unstable surfaces with yard, house hold, or community tasks.  -MO     LTG 3 Progress Met  -MO     LTG 4 The pt will resume reciprocal stair navigation and or single step navigation for improved safety during  community and household navigation with appropriate assistive device if needed.  -MO     LTG 4 Progress Met  -MO     LTG 5 Pt demonstrates ability to maintain SLS on stable surface without loss of balance for >8 seconds BL for improvements in gait with greater ability to weight shift, longer stride.  -MO     LTG 5 Progress Met  -MO     LTG 6 Pt will improve 2min walk test by >/=50ft without any LOB to increase activity tolerance in order to walk to leisure and improve safety in community.  -MO     LTG 6 Progress Met  -MO     LTG 7 Patient able to tandem stance bilaterally  >/= 20 w/o UE support seconds without LOB for improved core stabilization and decreased fall risk  -MO     LTG 7 Progress Met  -MO               User Key  (r) = Recorded By, (t) = Taken By, (c) = Cosigned By      Initials Name Provider Type    Cindy Irizarry PT Physical Therapist                    Therapy Education  Education Details: Full review of HEP. Educated pt on strength training guidelines,l ways to progress and modify exercises, appropriate frequency of strength 3-4x's per week with stretching daily, and ensuring safe positioning with all activity.  Given: HEP  Program: New, Reinforced  How Provided: Verbal, Written  Provided to: Patient  Level of Understanding: Verbalized              Time Calculation:   Start Time: 1145  Stop Time: 1225  Time Calculation (min): 40 min  Timed Charges  45607 - PT Therapeutic Activity Minutes: 40 (includes extensive HEP discussion)  Total Minutes  Timed Charges Total Minutes: 40   Total Minutes: 40  Therapy Charges for Today       Code Description Service Date Service Provider Modifiers Qty    97266058704 HC PT THERAPEUTIC ACT EA 15 MIN 8/7/2024 Cindy Hammonds, PT GP 3                         Cindy Hammonds PT  8/7/2024

## 2024-08-30 ENCOUNTER — TRANSCRIBE ORDERS (OUTPATIENT)
Dept: ADMINISTRATIVE | Facility: HOSPITAL | Age: 79
End: 2024-08-30
Payer: MEDICARE

## 2024-08-30 DIAGNOSIS — Z12.31 VISIT FOR SCREENING MAMMOGRAM: Primary | ICD-10-CM

## 2024-10-24 ENCOUNTER — OFFICE VISIT (OUTPATIENT)
Dept: ORTHOPEDIC SURGERY | Facility: CLINIC | Age: 79
End: 2024-10-24
Payer: MEDICARE

## 2024-10-24 VITALS — HEIGHT: 66 IN | TEMPERATURE: 98 F | BODY MASS INDEX: 20.52 KG/M2 | WEIGHT: 127.7 LBS

## 2024-10-24 DIAGNOSIS — R52 PAIN: ICD-10-CM

## 2024-10-24 DIAGNOSIS — M19.071 ARTHRITIS OF RIGHT ANKLE: Primary | ICD-10-CM

## 2024-10-24 PROCEDURE — 99214 OFFICE O/P EST MOD 30 MIN: CPT | Performed by: ORTHOPAEDIC SURGERY

## 2024-10-24 RX ORDER — DILTIAZEM HYDROCHLORIDE 180 MG/1
180 CAPSULE, COATED, EXTENDED RELEASE ORAL DAILY
COMMUNITY
Start: 2024-06-05

## 2024-10-24 RX ORDER — DOCUSATE CALCIUM 240 MG
1 CAPSULE ORAL
COMMUNITY

## 2024-10-24 RX ORDER — SULFAMETHOXAZOLE/TRIMETHOPRIM 800-160 MG
1 TABLET ORAL 2 TIMES DAILY
COMMUNITY

## 2024-10-24 RX ORDER — B-COMPLEX WITH VITAMIN C
2 TABLET ORAL DAILY
COMMUNITY

## 2024-10-24 NOTE — PROGRESS NOTES
General Exam    Patient: Davida Anderson    YOB: 1945    Medical Record Number: [id number]    Chief Complaints: Right ankle pain    History of Present Illness:     78 y.o. female patient who presents for relation of right ankle pain.  Patient states she had surgery done about 10 years ago she believes by Dr. Camacho here in our office.  Overall she has been doing okay however the past few years she has noticed increased discomfort in the ankle particularly with increased activity.  She is able to walk about a mile and then states her ankle pain become somewhat limiting.  She has seen a podiatrist recently who recommended possible screw removal and debridement/decompression of arthritis.  Patient states most of her symptoms are anteriorly based across the joint and medially.  Does not have pain at rest but just with increased activity as noted above.  She tried over-the-counter brace which helps some.  Patient states she did get recent steroid injection which she did not feel that was overly helpful.    Denies any numbness or tingling.  Denies any fevers, cough or shortness of breath.    Allergies:   Allergies   Allergen Reactions    Morphine Hives, Itching and Rash       Home Medications:      Current Outpatient Medications:     ALBUTEROL IN, Inhale 2 puffs Every 4 (Four) Hours As Needed., Disp: , Rfl:     apixaban (ELIQUIS) 5 MG tablet tablet, TAKE 1 TABLET TWICE DAILY, Disp: , Rfl:     ARIPiprazole (ABILIFY) 10 MG tablet, Take 1 tablet by mouth Every Morning., Disp: , Rfl:     atorvastatin (LIPITOR) 20 MG tablet, Take 1 tablet by mouth Every Morning., Disp: , Rfl:     B COMPLEX VITAMINS ER PO, Take 1 tablet by mouth Daily., Disp: , Rfl:     BIOTIN PO, Take 5,000 mcg by mouth Daily., Disp: , Rfl:     Calcium Carb-Cholecalciferol 600-200 MG-UNIT tablet, Take 2 tablets by mouth Every Morning., Disp: , Rfl:     Calcium Carbonate-Vitamin D 600-5 MG-MCG tablet, Take 2 tablets by mouth Daily., Disp: , Rfl:      coenzyme Q10 100 MG capsule, Take 1 capsule by mouth Daily., Disp: , Rfl:     DIGESTIVE ENZYMES PO, Take 2 tablets by mouth., Disp: , Rfl:     dilTIAZem CD (CARDIZEM CD) 180 MG 24 hr capsule, Take 1 capsule by mouth Daily., Disp: , Rfl:     diphenhydrAMINE (BENADRYL) 25 mg capsule, Take 1 capsule by mouth Every 6 (Six) Hours As Needed for Itching., Disp: , Rfl:     docusate calcium (SURFAK) 240 MG capsule, Take 1 capsule by mouth., Disp: , Rfl:     hydrALAZINE (APRESOLINE) 25 MG tablet, Take 1 tablet by mouth 2 (Two) Times a Day., Disp: , Rfl:     HYDROcodone-acetaminophen (NORCO) 7.5-325 MG per tablet, TAKE 1 TO 2 TABLETS BY MOUTH DAILY AS NEEDED, Disp: , Rfl:     hyoscyamine (LEVSIN) 0.125 MG SL tablet, Take 1 tablet by mouth 3 (Three) Times a Day Before Meals., Disp: 50 tablet, Rfl: 5    lamoTRIgine (LaMICtal) 100 MG tablet, Take 1 tablet by mouth 2 (Two) Times a Day., Disp: , Rfl:     lithium carbonate 300 MG capsule, Take 1 capsule by mouth Every Morning., Disp: , Rfl:     MAGNESIUM PO, Take 2 tablets by mouth Daily., Disp: , Rfl:     melatonin 5 MG tablet tablet, Take 1 tablet by mouth At Night As Needed., Disp: , Rfl:     Multiple Vitamins-Minerals (ZINC PO), Take  by mouth., Disp: , Rfl:     omeprazole (priLOSEC) 20 MG capsule, Take 1 capsule by mouth 2 (Two) Times a Day., Disp: , Rfl:     pregabalin (LYRICA) 50 MG capsule, Take 3 capsules by mouth Every Night., Disp: , Rfl:     Probiotic Product (SOLUBLE FIBER/PROBIOTICS PO), Take 1 tablet by mouth Every Morning., Disp: , Rfl:     solifenacin (VESICARE) 10 MG tablet, Take 1 tablet by mouth Daily., Disp: , Rfl:     sulfamethoxazole-trimethoprim (BACTRIM DS,SEPTRA DS) 800-160 MG per tablet, Take 1 tablet by mouth 2 (Two) Times a Day., Disp: , Rfl:     Turmeric 500 MG capsule, Take  by mouth., Disp: , Rfl:     vitamin C (ASCORBIC ACID) 250 MG tablet, Take 2 tablets by mouth Daily., Disp: , Rfl:     dilTIAZem (TIAZAC) 180 MG 24 hr capsule, Take 1 capsule by  mouth Daily., Disp: , Rfl:   No current facility-administered medications for this visit.    Facility-Administered Medications Ordered in Other Visits:     mupirocin (BACTROBAN) 2 % nasal ointment, , Nasal, BID, Anurag Serrano MD    Past Medical History:   Diagnosis Date    Acid reflux     Arthritis     Atrial fibrillation     Gtz esophagus     Bipolar 2 disorder     Bradycardia     WITH SURGERY    COPD (chronic obstructive pulmonary disease)     MILD, USES AINHALER PRN    DDD (degenerative disc disease), lumbar     Diverticulosis     Dizziness     Drug therapy     Fibrocystic breast     Frequent UTI     SEES DR GOFF FOR FREQUENT UTI'S, ON KEFLEX    H/O Infiltrating ductal carcinoma of left breast 2002    Stage IIA, Grade 3 ER/NC +, HER2 -, treated with 5 years of tamoxifen, 5 years of Femara, completed in 2012    High cholesterol     History of alcoholism     40 YRS AGO    History of Clostridium difficile colitis 2014    History of gastric ulcer     History of loop recorder     Hypertension     Iron deficiency     Irregular heartbeat     a fib    Low back pain     Lumbar herniated disc     Mass of right breast on mammogram 03/17/2016    10 o'clock position, 4 cm from the nipple,    Neuropathy     PONV (postoperative nausea and vomiting)     Raynaud disease     Sleep apnea     MILD, NO NEED FOR CPAP    Tremors of nervous system     Urinary incontinence        Past Surgical History:   Procedure Laterality Date    ANKLE OPEN REDUCTION INTERNAL FIXATION Right 08/14/2015    Dr. Dandre Camacho, formerly [facility]    BACK SURGERY      LUMBAR    BREAST BIOPSY      BREAST RECONSTRUCTION, BREAST TISSUE EXPANDER INSERTION Left 04/11/2002    [place] Contour Profile expander was placed 550 cc size, filled with 200 cc of saline, Dr. Herbert Napoles, formerly [facility]    COLONOSCOPY N/A 09/16/2014    Dr. Darci Kerns, formerly [facility]; torts, tics, stool, polyp    COLONOSCOPY N/A 09/27/2016    NTEH, diverticulosis, tortuous colon, Two 3-5mm polyps in the descending  colon and the transverse colon, IH.  PATH: Tubular adenoma    COLONOSCOPY N/A 12/12/2017    NTEH, tics, torts, IH, TA w/low grade dysplasia    COLONOSCOPY N/A 12/01/2020    Procedure: COLONOSCOPY TO CECUM AND TI ;  Surgeon: Darci Kerns MD;  Location: [facility] ENDOSCOPY;  Service: Gastroenterology;  Laterality: N/A;  PRE- IBS, CONSTIPATION, HX POLYPS  POST- DEMINISHED SPHINCTER TONE, DIVERTICULOSIS, POORLY PREPPED COLON    CYSTOSCOPY N/A 05/07/2010    with TVT takedown, Dr. Simon Wall, [facility]    ENDOSCOPY N/A 09/27/2016    z line irreg, bilious gastric fluid- fluid aspiration preformed, gastritis.  PATH: Squamous and glandular mucosa with minimal superficial acute inflammation.     ENDOSCOPY N/A 12/12/2017    Z line irregular, gastritis, duodenitis, chronic inflammation    ENDOSCOPY N/A 12/01/2020    Procedure: ESOPHAGOGASTRODUODENOSCOPY WITH BIOPSIES;  Surgeon: Darci Kerns MD;  Location: [facility] ENDOSCOPY;  Service: Gastroenterology;  Laterality: N/A;  PRE- REFLUX, DYSPHAGIA  POST- ESOPHAGITIS, GASTRITIS, SMALL HIATAL HERNIA, BILE REFLUX    ENDOSCOPY N/A 3/15/2023    Procedure: ESOPHAGOGASTRODUODENOSCOPY with biopsies;  Surgeon: Darci Kerns MD;  Location: [facility] ENDOSCOPY;  Service: Gastroenterology;  Laterality: N/A;  pre:  reflux with breakthrough symptoms  post;  gastritis, mild esophagitis    EPIDURAL BLOCK      ESOPHAGEAL MANOMETRY N/A 02/02/2021    Procedure: ESOPHAGEAL MANOMETRY;  Surgeon: Tran, Nurse Performed;  Location: [facility] ENDOSCOPY;  Service: Gastroenterology;  Laterality: N/A;  DYSPHAGIA    KNEE SURGERY Left     BROKEN    LUMBAR DISCECTOMY FUSION INSTRUMENTATION N/A 12/03/2018    Procedure: L4-5 laminectomy and fusion with instrumentation;  Surgeon: Austin Keith MD;  Location: [facility] MAIN OR;  Service: Orthopedic Spine    MAMMO US BREAST BIOPSY ADDITIONAL W WO DEVICE Left 02/21/2002    2:00 position left breast, Infiltrating Ductal Carcinoma, [facility]    MANDIBLE FRACTURE  "SURGERY      MASTECTOMY Left 2002    Left Total Mastectomy and Left Axillary [place] Node Biobsy, Dr. Indu Akins, [facility]    OTHER SURGICAL HISTORY      CARDIAC LOOP DEVICE, LEFT CHEST    TENSION FREE VAGINAL TAPING WITH MINI ARC SLING N/A 10/26/2009    Dr. Gina Castillo, [facility]    TOTAL KNEE ARTHROPLASTY Left 10/21/2019    Procedure: TOTAL KNEE ARTHROPLASTY;  Surgeon: Anurag Serrano MD;  Location: [facility];  Service: Orthopedics    UPPER GASTROINTESTINAL ENDOSCOPY  2014    z-line irreg, grade a reflux, gastritis       Social History     Occupational History    Not on file   Tobacco Use    Smoking status: Former     Current packs/day: 0.00     Average packs/day: 3.0 packs/day for 25.0 years (75.0 ttl pk-yrs)     Types: Cigarettes     Start date: 1958     Quit date: 1983     Years since quittin.5    Smokeless tobacco: Never   Vaping Use    Vaping status: Never Used   Substance and Sexual Activity    Alcohol use: No     Comment: RECOVERYING ,  LAST DRINK 40 YRS AGO    Drug use: No    Sexual activity: Defer      Social History     Social History Narrative    Not on file       Family History   Problem Relation Age of Onset    Breast cancer Mother 35    Colon cancer Mother 64    Heart disease Father     Cancer Father         throat and lung    Colon polyps Father     Malig Hyperthermia Neg Hx        Review of Systems:      Constitutional: Denies fever, shaking or chills         All other pertinent positives and negatives as noted above in HPI.    Physical Exam: 78 y.o. female    Vitals:    10/24/24 0903   Temp: 98 °F (36.7 °C)   TempSrc: Temporal   Weight: 57.9 kg (127 lb 11.2 oz)   Height: 167.6 cm (66\")       General:  Patient is awake and alert.  Appears in no acute distress or discomfort.      Musculoskeletal/Extremities:    Right ankle examined previous incisions healed.  Overall range of motion full without pain regards to dorsiflexion, plantarflexion, inversion eversion.  Overall heel " alignment slight varus noted.  No tenderness to palpation.         Radiology:       3 views right ankle AP lateral and oblique taken and reviewed to evaluate the patient's complaint/s.    Imaging shows previous screw fixation of the medial malleolus as well as likely distal tibia and plate and screw fixation of the fibula.  There appears to be some varus tilt which there is some decreased space in the medial gutter.  The screw medially in the malleolus appears to not have changed position compared to x-rays in 2015 but there is increased degenerative changes in that area.      Assessment: Right ankle arthritis with possible symptomatic hardware      Plan:      I discussed the findings with the patient I think more of her issue is the arthritic changes however the medial screw could be somewhat contributory.  I cannot guarantee that removal would alleviate all of her symptoms or would be of full benefit that she is looking.  I did discuss this case briefly with my foot and ankle partner who is in agreement that trying some anti-inflammatory gel and possible ASO brace could be beneficial he would be happy to see her to discuss continued treatment options with the patient if she wishes.  I told her we make a referral to him to see her in the next 4 to 6 weeks if she wanted.           We will plan for follow up as above.    All questions were answered.  Patient understands and agrees with the plan.    Herbert Braun MD    10/24/2024    CC to Ivania Vergara MD

## 2024-12-02 ENCOUNTER — OFFICE VISIT (OUTPATIENT)
Dept: ORTHOPEDIC SURGERY | Facility: CLINIC | Age: 79
End: 2024-12-02
Payer: MEDICARE

## 2024-12-02 VITALS — WEIGHT: 132.4 LBS | TEMPERATURE: 98.2 F | BODY MASS INDEX: 21.28 KG/M2 | HEIGHT: 66 IN

## 2024-12-02 DIAGNOSIS — S82.891S CLOSED FRACTURE OF RIGHT ANKLE, SEQUELA: ICD-10-CM

## 2024-12-02 DIAGNOSIS — M25.871 IMPINGEMENT SYNDROME OF RIGHT ANKLE: ICD-10-CM

## 2024-12-02 DIAGNOSIS — M19.071 ARTHRITIS OF RIGHT ANKLE: ICD-10-CM

## 2024-12-02 DIAGNOSIS — S86.301A INJURY OF PERONEAL TENDON OF RIGHT FOOT, INITIAL ENCOUNTER: Primary | ICD-10-CM

## 2024-12-02 DIAGNOSIS — T85.848A PAIN FROM IMPLANTED HARDWARE, INITIAL ENCOUNTER: ICD-10-CM

## 2024-12-02 PROCEDURE — 99214 OFFICE O/P EST MOD 30 MIN: CPT | Performed by: ORTHOPAEDIC SURGERY

## 2024-12-02 NOTE — PROGRESS NOTES
Start   new Patient Complaint      Patient: Davida Anderson  YOB: 1945 79 y.o. female  Medical Record Number: [id number]    Chief Complaints: My ankle hurts    History of Present Illness:   Patient sustained a severe left trimalleolar ankle fracture dislocation in 2015.  She was treated operatively by Dr. Camacho.    Patient now reports an approximate 1 year history of pain mainly over the inferomedial anteromedial aspect of the ankle as well as some anterolaterally and posterolaterally.  She has seen a podiatrist who had recommended what sounds like hardware removal and anterior decompression.  She subsequently saw Dr. Braun on 10/24/2024 for further evaluation.  She was fitted with an ASO brace at that time.    Patient is seen back today reporting that she feels like that brace a bit too big to fit in her shoe to be using another Velcro type brace which helps.  She does get some pain mainly inframedially when her shoe occasionally rubs in that area but is not any skin breakdown.  She is very active and likes to walk for exercise.  They were somewhat tentative about proceeding with surgery as recommended and discussed by the podiatrist and wanted another evaluation for this.  Pain is rated at 2 out of 10    She is on Eliquis for atrial fibrillation.       HPI    Allergies:   Allergies   Allergen Reactions    Morphine Hives, Itching and Rash       Medications:   Current Outpatient Medications on File Prior to Visit   Medication Sig    ALBUTEROL IN Inhale 2 puffs Every 4 (Four) Hours As Needed.    apixaban (ELIQUIS) 5 MG tablet tablet TAKE 1 TABLET TWICE DAILY    ARIPiprazole (ABILIFY) 10 MG tablet Take 1 tablet by mouth Every Morning.    atorvastatin (LIPITOR) 20 MG tablet Take 1 tablet by mouth Every Morning.    B COMPLEX VITAMINS ER PO Take 1 tablet by mouth Daily.    BIOTIN PO Take 5,000 mcg by mouth Daily.    Calcium Carb-Cholecalciferol 600-200 MG-UNIT tablet Take 2 tablets by mouth Every  Morning.    Calcium Carbonate-Vitamin D 600-5 MG-MCG tablet Take 2 tablets by mouth Daily.    coenzyme Q10 100 MG capsule Take 1 capsule by mouth Daily.    DIGESTIVE ENZYMES PO Take 2 tablets by mouth.    dilTIAZem CD (CARDIZEM CD) 180 MG 24 hr capsule Take 1 capsule by mouth Daily.    diphenhydrAMINE (BENADRYL) 25 mg capsule Take 1 capsule by mouth Every 6 (Six) Hours As Needed for Itching.    docusate calcium (SURFAK) 240 MG capsule Take 1 capsule by mouth.    hydrALAZINE (APRESOLINE) 25 MG tablet Take 1 tablet by mouth 2 (Two) Times a Day.    HYDROcodone-acetaminophen (NORCO) 7.5-325 MG per tablet TAKE 1 TO 2 TABLETS BY MOUTH DAILY AS NEEDED    hyoscyamine (LEVSIN) 0.125 MG SL tablet Take 1 tablet by mouth 3 (Three) Times a Day Before Meals.    lamoTRIgine (LaMICtal) 100 MG tablet Take 1 tablet by mouth 3 (Three) Times a Day.    lithium carbonate 300 MG capsule Take 1 capsule by mouth Every Morning.    MAGNESIUM PO Take 2 tablets by mouth Daily.    melatonin 5 MG tablet tablet Take 1 tablet by mouth At Night As Needed.    Multiple Vitamins-Minerals (ZINC PO) Take  by mouth.    omeprazole (priLOSEC) 20 MG capsule Take 1 capsule by mouth 2 (Two) Times a Day.    pregabalin (LYRICA) 50 MG capsule Take 3 capsules by mouth Every Night.    Probiotic Product (SOLUBLE FIBER/PROBIOTICS PO) Take 1 tablet by mouth Every Morning.    solifenacin (VESICARE) 10 MG tablet Take 1 tablet by mouth Daily.    Turmeric 500 MG capsule Take  by mouth.    vitamin C (ASCORBIC ACID) 250 MG tablet Take 2 tablets by mouth Daily.    dilTIAZem (TIAZAC) 180 MG 24 hr capsule Take 1 capsule by mouth Daily.    sulfamethoxazole-trimethoprim (BACTRIM DS,SEPTRA DS) 800-160 MG per tablet Take 1 tablet by mouth 2 (Two) Times a Day. (Patient not taking: Reported on 12/2/2024)     Current Facility-Administered Medications on File Prior to Visit   Medication    mupirocin (BACTROBAN) 2 % nasal ointment       Past Medical History:   Diagnosis Date    Acid  reflux     Arthritis     Atrial fibrillation     Gtz esophagus     Bipolar 2 disorder     Bradycardia     WITH SURGERY    COPD (chronic obstructive pulmonary disease)     MILD, USES AINHALER PRN    DDD (degenerative disc disease), lumbar     Diverticulosis     Dizziness     Drug therapy     Fibrocystic breast     Frequent UTI     SEES DR GOFF FOR FREQUENT UTI'S, ON KEFLEX    H/O Infiltrating ductal carcinoma of left breast 2002    Stage IIA, Grade 3 ER/MI +, HER2 -, treated with 5 years of tamoxifen, 5 years of Femara, completed in 2012    High cholesterol     History of alcoholism     40 YRS AGO    History of Clostridium difficile colitis 2014    History of gastric ulcer     History of loop recorder     Hypertension     Iron deficiency     Irregular heartbeat     a fib    Low back pain     Lumbar herniated disc     Mass of right breast on mammogram 03/17/2016    10 o'clock position, 4 cm from the nipple,    Neuropathy     PONV (postoperative nausea and vomiting)     Raynaud disease     Sleep apnea     MILD, NO NEED FOR CPAP    Tremors of nervous system     Urinary incontinence      Past Surgical History:   Procedure Laterality Date    ANKLE OPEN REDUCTION INTERNAL FIXATION Right 08/14/2015    Dr. Dandre Camacho, [facility]    BACK SURGERY      LUMBAR    BREAST BIOPSY      BREAST RECONSTRUCTION, BREAST TISSUE EXPANDER INSERTION Left 04/11/2002    [place] Contour Profile expander was placed 550 cc size, filled with 200 cc of saline, Dr. Herbert Napoles, [facility]    COLONOSCOPY N/A 09/16/2014    Dr. Darci Kerns, [facility]; torts, tics, stool, polyp    COLONOSCOPY N/A 09/27/2016    NTEH, diverticulosis, tortuous colon, Two 3-5mm polyps in the descending colon and the transverse colon, IH.  PATH: Tubular adenoma    COLONOSCOPY N/A 12/12/2017    NTEH, tics, torts, IH, TA w/low grade dysplasia    COLONOSCOPY N/A 12/01/2020    Procedure: COLONOSCOPY TO CECUM AND TI ;  Surgeon: Darci Kerns MD;  Location: [facility] ENDOSCOPY;   Service: Gastroenterology;  Laterality: N/A;  PRE- IBS, CONSTIPATION, HX POLYPS  POST- DEMINISHED SPHINCTER TONE, DIVERTICULOSIS, POORLY PREPPED COLON    CYSTOSCOPY N/A 05/07/2010    with TVT takedown, Dr. Simon Wall, [facility]    ENDOSCOPY N/A 09/27/2016    z line irreg, bilious gastric fluid- fluid aspiration preformed, gastritis.  PATH: Squamous and glandular mucosa with minimal superficial acute inflammation.     ENDOSCOPY N/A 12/12/2017    Z line irregular, gastritis, duodenitis, chronic inflammation    ENDOSCOPY N/A 12/01/2020    Procedure: ESOPHAGOGASTRODUODENOSCOPY WITH BIOPSIES;  Surgeon: Darci Kerns MD;  Location: [facility] ENDOSCOPY;  Service: Gastroenterology;  Laterality: N/A;  PRE- REFLUX, DYSPHAGIA  POST- ESOPHAGITIS, GASTRITIS, SMALL HIATAL HERNIA, BILE REFLUX    ENDOSCOPY N/A 3/15/2023    Procedure: ESOPHAGOGASTRODUODENOSCOPY with biopsies;  Surgeon: Darci Kerns MD;  [facility] ENDOSCOPY;  Service: Gastroenterology;  Laterality: N/A;  pre:  reflux with breakthrough symptoms  post;  gastritis, mild esophagitis    EPIDURAL BLOCK      ESOPHAGEAL MANOMETRY N/A 02/02/2021    Procedure: ESOPHAGEAL MANOMETRY;  Surgeon: Tran, Nurse Performed;  Location: [facility] ENDOSCOPY;  Service: Gastroenterology;  Laterality: N/A;  DYSPHAGIA    KNEE SURGERY Left     BROKEN    LUMBAR DISCECTOMY FUSION INSTRUMENTATION N/A 12/03/2018    Procedure: L4-5 laminectomy and fusion with instrumentation;  Surgeon: Austin Keith MD;  Location: [facility] MAIN OR;  Service: Orthopedic Spine    MAMMO US BREAST BIOPSY ADDITIONAL W WO DEVICE Left 02/21/2002    2:00 position left breast, Infiltrating Ductal Carcinoma, [facility]    MANDIBLE FRACTURE SURGERY      MASTECTOMY Left 04/11/2002    Left Total Mastectomy and Left Axillary [place] Node Biobsy, Dr. Indu Akins, [facility]    OTHER SURGICAL HISTORY      CARDIAC LOOP DEVICE, LEFT CHEST    TENSION FREE VAGINAL TAPING WITH MINI ARC SLING N/A 10/26/2009    Dr. Gina Castillo, [facility]  "   TOTAL KNEE ARTHROPLASTY Left 10/21/2019    Procedure: TOTAL KNEE ARTHROPLASTY;  Surgeon: Anurag Serrano MD;  Location: [facility] OR;  Service: Orthopedics    UPPER GASTROINTESTINAL ENDOSCOPY  2014    z-line irreg, grade a reflux, gastritis     Social History     Occupational History    Not on file   Tobacco Use    Smoking status: Former     Current packs/day: 0.00     Average packs/day: 3.0 packs/day for 25.0 years (75.0 ttl pk-yrs)     Types: Cigarettes     Start date: 1958     Quit date: 1983     Years since quittin.6    Smokeless tobacco: Never   Vaping Use    Vaping status: Never Used   Substance and Sexual Activity    Alcohol use: No     Comment: RECOVERYING ,  LAST DRINK 40 YRS AGO    Drug use: No    Sexual activity: Defer      Social History     Social History Narrative    Not on file     Family History   Problem Relation Age of Onset    Breast cancer Mother 35    Colon cancer Mother 64    Heart disease Father     Cancer Father         throat and lung    Colon polyps Father     Malig Hyperthermia Neg Hx        Review of Systems: 14 point review of systems performed, positive pertinent findings identified in HPI. All remaining systems negative     Review of Systems      Physical Exam:   Vitals:    24 1000   Temp: 98.2 °F (36.8 °C)   Weight: 60.1 kg (132 lb 6.4 oz)   Height: 167.6 cm (66\")   PainSc:   2     Physical Exam   Constitutional: pleasant, well developed   Eyes: sclera non icteric  Hearing : adequate for exam  Cardiovascular: palpable pulses in right foot, right calf/ thigh NT without sign of DVT  Respiratoy: breathing unlabored   Neurological: grossly sensate to LT throughout right LE  Psychiatric: oriented with normal mood and affect.   Lymphatic: No palpable popliteal lymphadenopathy right LE  Skin: intact throughout right leg/foot  Musculoskeletal: Right ankle shows mild swelling no sign of infection wounds are well-healed.  She had some mild tenderness over the " anteromedial aspect of the ankle as well as some inframedially with bony prominence.  She had limited discomfort over the anterior ankle mild swelling anterolaterally with grossly stable ligamentous exam with some guarding.  Lateral hardware was prominent with some pain along the peroneal tendons.  Physical Exam  Ortho Exam    Radiology: 3 views of the right ankle reviewed on our system from 10/24/2024 and compared with x-rays from 12/22/2015.  There remains screw fixation of the medial malleolus with a vertical screw through the medial malleolus with transverse screw apparently from anteromedial to lateral.  There is also plate fixation of the fibula.  There is spurring over the anterior aspect of the tibiotalar joint and dorsum of the talus.  There is ossification across the syndesmosis and a well-rounded ossicle over the medial aspect of the distal medial malleolus.    Assessment/Plan: 1.  Status post ORIF right trimalleolar ankle fracture with posttraumatic arthritis with anterior impingement  2.  Right ankle symptomatic medial and lateral hardware with possible peroneal tendon and/or chronic anterolateral ligamentous injury    We discussed treatment going forward and she will continue with use of the Velcro brace that she has as the ASO was too big for her shoe.    We need to get a CT scan as I want to evaluate the alignment of her hardware and ankle and posttraumatic arthritis for possible preoperative planning and also need to get an MRI for evaluation of peroneal tendon tear that may require surgical treatment.    Will see her back in about 4 to 6 weeks to review MRI and CT scan and determine treatment course going forward.

## 2024-12-17 ENCOUNTER — HOSPITAL ENCOUNTER (OUTPATIENT)
Dept: CT IMAGING | Facility: HOSPITAL | Age: 79
Discharge: HOME | End: 2024-12-17
Admitting: ORTHOPAEDIC SURGERY
Payer: MEDICARE

## 2024-12-17 DIAGNOSIS — S82.891S CLOSED FRACTURE OF RIGHT ANKLE, SEQUELA: ICD-10-CM

## 2024-12-17 DIAGNOSIS — T85.848A PAIN FROM IMPLANTED HARDWARE, INITIAL ENCOUNTER: ICD-10-CM

## 2024-12-17 PROCEDURE — 73700 CT LOWER EXTREMITY W/O DYE: CPT

## 2025-01-08 ENCOUNTER — HOSPITAL ENCOUNTER (OUTPATIENT)
Dept: MRI IMAGING | Facility: HOSPITAL | Age: 80
Discharge: HOME | End: 2025-01-08
Admitting: ORTHOPAEDIC SURGERY
Payer: MEDICARE

## 2025-01-08 DIAGNOSIS — M19.071 ARTHRITIS OF RIGHT ANKLE: ICD-10-CM

## 2025-01-08 DIAGNOSIS — S82.891S CLOSED FRACTURE OF RIGHT ANKLE, SEQUELA: ICD-10-CM

## 2025-01-08 DIAGNOSIS — M25.871 IMPINGEMENT SYNDROME OF RIGHT ANKLE: ICD-10-CM

## 2025-01-08 DIAGNOSIS — S86.301A INJURY OF PERONEAL TENDON OF RIGHT FOOT, INITIAL ENCOUNTER: ICD-10-CM

## 2025-01-08 PROCEDURE — 73721 MRI JNT OF LWR EXTRE W/O DYE: CPT

## 2025-01-15 ENCOUNTER — OFFICE VISIT (OUTPATIENT)
Dept: ORTHOPEDIC SURGERY | Facility: CLINIC | Age: 80
End: 2025-01-15
Payer: MEDICARE

## 2025-01-15 VITALS — WEIGHT: 132 LBS | HEIGHT: 66 IN | BODY MASS INDEX: 21.21 KG/M2 | TEMPERATURE: 98.2 F

## 2025-01-15 DIAGNOSIS — M25.371 INSTABILITY OF RIGHT ANKLE JOINT: ICD-10-CM

## 2025-01-15 DIAGNOSIS — T85.848A PAIN FROM IMPLANTED HARDWARE, INITIAL ENCOUNTER: ICD-10-CM

## 2025-01-15 DIAGNOSIS — M25.871 IMPINGEMENT SYNDROME OF RIGHT ANKLE: ICD-10-CM

## 2025-01-15 DIAGNOSIS — M19.071 ARTHRITIS OF RIGHT ANKLE: Primary | ICD-10-CM

## 2025-01-15 RX ORDER — GABAPENTIN 100 MG/1
300 CAPSULE ORAL
COMMUNITY
Start: 2025-01-13

## 2025-01-15 RX ORDER — KETOCONAZOLE 20 MG/ML
SHAMPOO, SUSPENSION TOPICAL
COMMUNITY
Start: 2025-01-01

## 2025-01-15 NOTE — PROGRESS NOTES
"Ankle Follow Up      Patient: Davida Anderson    YOB: 1945 79 y.o. female    Chief Complaints: Ankle gets sore    History of Present Illness:Patient sustained a severe left trimalleolar ankle fracture dislocation in 2015.  She was treated operatively by Dr. Camacho.     Tadeo was seen on 12/2/24 reporting an approximate 1 year history of pain mainly over the inferomedial anteromedial aspect of the ankle as well as some anterolaterally and posterolaterally.  She had seen a podiatrist who had recommended what sounded like hardware removal and anterior decompression.  She had subsequently saw Dr. Braun on 10/24/2024 for further evaluation.  She had been fitted with an ASO brace at that time.     When seen on 12/2/2024 she reported that she feels like that brace a bit too big to fit in her shoe and had been using another Velcro type brace which.  She did report some pain mainly inframedially when her shoe occasionally rubbed in that area but had not any skin breakdown.  She reports that she was very active and likes to walk for exercise.  They had been somewhat tentative about proceeding with surgery as recommended as discussed by the podiatrist and wanted another evaluation for this.  Pain was rated at 2 out of 10.    She was going to continue with the Velcro brace as the ASO was too big for her.  She was sent for CT scan to evaluate alignment of her hardware and posttraumatic arthritis and also MRI to evaluate for peroneal tendon tear    Patient is seen back today reporting that she still does get some pain anterolaterally and some inferomedially.  Her ankle bothers her especially if she tries to walk more than three quarters of a mile.  She has not been using the ASO brace at the did not fit in her shoe but has been using the Velcro brace.    Does report that the ankle does occasionally feel like it wants to \"give out\" on her.  Pain is rated 4 out of 10     She is on Eliquis for atrial fibrillation.  " "   HPI    ROS: ankle pain  Past Medical History:   Diagnosis Date    Acid reflux     Arthritis     Atrial fibrillation     Gtz esophagus     Bipolar 2 disorder     Bradycardia     WITH SURGERY    COPD (chronic obstructive pulmonary disease)     MILD, USES AINHALER PRN    DDD (degenerative disc disease), lumbar     Diverticulosis     Dizziness     Drug therapy     Fibrocystic breast     Frequent UTI     SEES DR GOFF FOR FREQUENT UTI'S, ON KEFLEX    H/O Infiltrating ductal carcinoma of left breast 2002    Stage IIA, Grade 3 ER/AK +, HER2 -, treated with 5 years of tamoxifen, 5 years of Femara, completed in 2012    High cholesterol     History of alcoholism     40 YRS AGO    History of Clostridium difficile colitis 2014    History of gastric ulcer     History of loop recorder     Hypertension     Iron deficiency     Irregular heartbeat     a fib    Low back pain     Lumbar herniated disc     Mass of right breast on mammogram 03/17/2016    10 o'clock position, 4 cm from the nipple,    Neuropathy     PONV (postoperative nausea and vomiting)     Raynaud disease     Sleep apnea     MILD, NO NEED FOR CPAP    Tremors of nervous system     Urinary incontinence        Physical Exam:   Vitals:    01/15/25 1007   Temp: 98.2 °F (36.8 °C)   TempSrc: Temporal   Weight: 59.9 kg (132 lb)   Height: 166.4 cm (65.5\")   PainSc:   4   PainLoc: Ankle     Well developed with normal mood.  She is with her .  There is mild swelling to the right ankle but no sign of infection.  She had mild fullness over the anterolateral ankle and some slight tenderness inframedially with questionable stability on anterior drawer with some guarding and minimal tenderness along the posterior lateral ankle.      Radiology: CT scan films and report of the right ankle dated 12/17/2024 reviewed which showed old healed fracture deformity of the distal fibula with screw fixation hardware in place.  The head of the vertically oriented screw to the medial " malleolus extends up to approximately 7 mm distal to the cortex of the distal tibia resulting in chronic scalloping at the medial talar neck body junction.  There is partial ossification of the syndesmosis and small well-corticated ossifications at the tip of the medial and lateral malleolar    There was stable sclerotic foci in the anterior talar dome and anterior process calcaneus thought to be small bone island.  There was posterior and plantar calcaneal spurs    There was diffuse soft tissue edema greatest at the anterolateral ankle and chronic thickening and partial calcification of the proximal plantar fascia.    There is mild to moderate posttraumatic tibiotalar joint arthritis with moderate joint effusion and well-corticated 1.8 cm ossification on the anterior tibiotalar joint line with intra-articular body or adjacent soft tissue ossification.  Mild to moderate subtalar degenerative changes with mild multifocal degenerative change at the midfoot.    MRI films and report of the right ankle reviewed which show soft tissue edema and small plantar calcaneal spur but otherwise unremarkable plantar fascia    There is fixation hardware identified and partially visualized old healed fracture deformities of the distal tibia and fibula with subchondral cystic changes and mild adjacent marrow signal in the posterior lateral tibial plafond and partially obscured patchy bone edema-like signal of the medial talar body thought to be degenerative or stress marrow edema    There is osteophytes of the anteromedial distal tibial plafond and anteromedial talar neck.  There is mild edema-like signal on both sides of the anterior and middle facets of the subtalar joint with degenerative subchondral cystic change    There is mild to moderate diffuse chondromalacia of the tibiotalar joint with regions of full-thickness chondral loss on the anterolateral tibial plafond and talar dome and moderate degenerative change of the subtalar  joint and midfoot.  Deep and superficial close the deltoid ligament were secured and lateral ligaments appear to be intact.  Peroneal tendons were obscured by artifact but appear to be grossly normal.      Assessment/Plan: 1.  Right ankle posttraumatic arthritis with painful impinging medial hardware  2.  Right ankle anterolateral osteochondral loose body  3.  Right subtalar arthritis  4.  Right ankle instability without MRI evidence of anterolateral ligamentous pathology  5.  Right midfoot arthritis    We discussed treatment going forward and reviewed with her we could take out the medial screw and remove the anterolateral loose body but this would not definitively treat the arthritis in her ankle or feelings of instability.    She also has arthritis of the subtalar joint and may eventually require fusion thereof.    She is fairly active at think would be a good candidate for ankle replacement for definitive treatment of her pathology    Will have her continue with her brace for now and was prescribed compounding cream    Given the complexity of her case and then send her to see Dr. Medina for his evaluation regarding treatment going forward and possible ankle replacement.    She was given Dr. Medina's name and number as well as copies of her x-rays and MRI and CT reports and instructed on obtaining copies of the discs of those.  She was given Dr. Medina's name and number and referral was placed in [facility].    She will let me know how she is proceeding when she sees him.

## 2025-02-06 ENCOUNTER — TRANSCRIBE ORDERS (OUTPATIENT)
Dept: PHYSICAL THERAPY | Facility: CLINIC | Age: 80
End: 2025-02-06
Payer: MEDICARE

## 2025-02-06 DIAGNOSIS — M19.171 TRAUMATIC OSTEOARTHRITIS OF RIGHT ANKLE: ICD-10-CM

## 2025-02-06 DIAGNOSIS — M19.171 POST-TRAUMATIC OSTEOARTHRITIS OF RIGHT FOOT: Primary | ICD-10-CM

## 2025-02-25 ENCOUNTER — HOSPITAL ENCOUNTER (OUTPATIENT)
Dept: PHYSICAL THERAPY | Facility: HOSPITAL | Age: 80
Setting detail: THERAPIES SERIES
Discharge: HOME | End: 2025-02-25
Payer: MEDICARE

## 2025-02-25 DIAGNOSIS — R26.9 IMPAIRED GAIT: ICD-10-CM

## 2025-02-25 DIAGNOSIS — M79.671 RIGHT FOOT PAIN: ICD-10-CM

## 2025-02-25 DIAGNOSIS — M19.071 ARTHRITIS OF RIGHT FOOT: Primary | ICD-10-CM

## 2025-02-25 PROCEDURE — 97110 THERAPEUTIC EXERCISES: CPT

## 2025-02-25 PROCEDURE — 97162 PT EVAL MOD COMPLEX 30 MIN: CPT

## 2025-02-25 NOTE — THERAPY EVALUATION
Outpatient Physical Therapy Ortho Initial Evaluation  [facility]     Patient Name: Davida Anderson  : 1945  MRN: [id number]  Today's Date: 2025      Visit Date: 2025    Patient Active Problem List   Diagnosis    BP (high blood pressure)    History of left breast infiltrating ductal cancer 2cm s/p mastectomy with reconstruction  s/p chemo    H/O ETOH abuse    Gtz's esophagus    Colon polyps    Bipolar 1 disorder    Arthritis    Raynaud's disease    Neuropathy    Lumbar spine pain    Spondylolisthesis at L4-L5 level    Right lumbar radiculopathy    Gtz's esophagus without dysplasia    Hx of adenomatous colonic polyps    Dysphagia    Diarrhea    Primary osteoarthritis of left knee    Primary localized osteoarthrosis of the knee, right    Chronic pain of left knee    Spinal stenosis of lumbar region with neurogenic claudication    Spondylolisthesis of lumbar region    Acute pain of left knee    Gastroesophageal reflux disease    Irritable bowel syndrome with constipation    Adenomatous polyp of colon    History of Gtz's esophagus    Pain from implanted hardware    Impingement syndrome of right ankle    Arthritis of right ankle        Past Medical History:   Diagnosis Date    Acid reflux     Arthritis     Atrial fibrillation     Gtz esophagus     Bipolar 2 disorder     Bradycardia     WITH SURGERY    COPD (chronic obstructive pulmonary disease)     MILD, USES AINHALER PRN    DDD (degenerative disc disease), lumbar     Diverticulosis     Dizziness     Drug therapy     Fibrocystic breast     Frequent UTI     SEES DR GOFF FOR FREQUENT UTI'S, ON KEFLEX    H/O Infiltrating ductal carcinoma of left breast     Stage IIA, Grade 3 ER/NH +, HER2 -, treated with 5 years of tamoxifen, 5 years of Femara, completed in     High cholesterol     History of alcoholism     40 YRS AGO    History of Clostridium difficile colitis     History of gastric ulcer     History of loop recorder      Hypertension     Iron deficiency     Irregular heartbeat     a fib    Low back pain     Lumbar herniated disc     Mass of right breast on mammogram 03/17/2016    10 o'clock position, 4 cm from the nipple,    Neuropathy     PONV (postoperative nausea and vomiting)     Raynaud disease     Sleep apnea     MILD, NO NEED FOR CPAP    Tremors of nervous system     Urinary incontinence         Past Surgical History:   Procedure Laterality Date    ANKLE OPEN REDUCTION INTERNAL FIXATION Right 08/14/2015    Dr. Dandre Camacho, [facility]    BACK SURGERY      LUMBAR    BREAST BIOPSY      BREAST RECONSTRUCTION, BREAST TISSUE EXPANDER INSERTION Left 04/11/2002    Smiley Contour Profile expander was placed 550 cc size, filled with 200 cc of saline, Dr. Herbert Napoles, [facility]    COLONOSCOPY N/A 09/16/2014    Dr. Darci Kerns, [facility]; torts, tics, stool, polyp    COLONOSCOPY N/A 09/27/2016    NTEH, diverticulosis, tortuous colon, Two 3-5mm polyps in the descending colon and the transverse colon, IH.  PATH: Tubular adenoma    COLONOSCOPY N/A 12/12/2017    NTEH, tics, torts, IH, TA w/low grade dysplasia    COLONOSCOPY N/A 12/01/2020    Procedure: COLONOSCOPY TO CECUM AND TI ;  Surgeon: Darci Kerns MD;  Location:  [facility];  Service: Gastroenterology;  Laterality: N/A;  PRE- IBS, CONSTIPATION, HX POLYPS  POST- DEMINISHED SPHINCTER TONE, DIVERTICULOSIS, POORLY PREPPED COLON    CYSTOSCOPY N/A 05/07/2010    with TVT takedown, Dr. Simon Wall, [facility]    ENDOSCOPY N/A 09/27/2016    z line irreg, bilious gastric fluid- fluid aspiration preformed, gastritis.  PATH: Squamous and glandular mucosa with minimal superficial acute inflammation.     ENDOSCOPY N/A 12/12/2017    Z line irregular, gastritis, duodenitis, chronic inflammation    ENDOSCOPY N/A 12/01/2020    Procedure: ESOPHAGOGASTRODUODENOSCOPY WITH BIOPSIES;  Surgeon: Darci Kerns MD;  Location:  [facility];  Service: Gastroenterology;  Laterality: N/A;  PRE- REFLUX,  DYSPHAGIA  POST- ESOPHAGITIS, GASTRITIS, SMALL HIATAL HERNIA, BILE REFLUX    ENDOSCOPY N/A 3/15/2023    Procedure: ESOPHAGOGASTRODUODENOSCOPY with biopsies;  Surgeon: Darci Kerns MD;  Location: [facility] ENDOSCOPY;  Service: Gastroenterology;  Laterality: N/A;  pre:  reflux with breakthrough symptoms  post;  gastritis, mild esophagitis    EPIDURAL BLOCK      ESOPHAGEAL MANOMETRY N/A 02/02/2021    Procedure: ESOPHAGEAL MANOMETRY;  Surgeon: Tran, Nurse Performed;  Location: [facility];  Service: Gastroenterology;  Laterality: N/A;  DYSPHAGIA    KNEE SURGERY Left     BROKEN    LUMBAR DISCECTOMY FUSION INSTRUMENTATION N/A 12/03/2018    Procedure: L4-5 laminectomy and fusion with instrumentation;  Surgeon: Austin Keith MD;  Location: [facility] OR;  Service: Orthopedic Spine    MAMMO US BREAST BIOPSY ADDITIONAL W WO DEVICE Left 02/21/2002    2:00 position left breast, Infiltrating Ductal Carcinoma, [facility]    MANDIBLE FRACTURE SURGERY      MASTECTOMY Left 04/11/2002    Left Total Mastectomy and Left Axillary [place] Node Biobsy, Dr. Indu Akins, [facility]    OTHER SURGICAL HISTORY      CARDIAC LOOP DEVICE, LEFT CHEST    TENSION FREE VAGINAL TAPING WITH MINI ARC SLING N/A 10/26/2009    Dr. Gina Castillo, [facility]    TOTAL KNEE ARTHROPLASTY Left 10/21/2019    Procedure: TOTAL KNEE ARTHROPLASTY;  Surgeon: Anurag Serrano MD;  Location: [facility];  Service: Orthopedics    UPPER GASTROINTESTINAL ENDOSCOPY  09/16/2014    z-line irreg, grade a reflux, gastritis       Visit Dx:     ICD-10-CM ICD-9-CM   1. Arthritis of right foot  M19.071 716.97   2. Right foot pain  M79.671 729.5   3. Impaired gait  R26.9 781.2          Patient History       Row Name 02/25/25 0900             History    Chief Complaint Pain;Joint stiffness;Difficulty Walking  -DR      Type of Pain Ankle pain  -DR      Date Current Problem(s) Began --  2015  -      Brief Description of Current Complaint Davida Anderson is a 79 y.o. female who presents  to physical therapy today with primary compliant of traumatic OA of R foot. Pt sustained a severe left trimalleolar ankle fracture dislocation in 2015, treated operatively. Reports 1 year history of pain mainly over the inferomedial anteromedial aspect of the ankle. She was fitted for an ASO brace 10/2024, however, it was too big, so refitted for Velcro brace which has helped relieve some pain. Pt previously completed PT for balance. Davida Anderson reports difficulty/increased pain with walking without brace, putting on socks/shoes. Pain relieving factors include wearing Velcro brace, sitting, first steps/walk in the AM. Pt enjoys leisurely walking 1 mile a day, wearing her brace to avoid pain. PMH includes HTN, hx of breast CA, bipolar 1 disorder, chronic LBP, A-fib. Davida Anderson primary goal for attending skilled physical therapy is to improve balance and relieve pain.  -      Previous treatment for THIS PROBLEM Surgery  -DR      Surgery Date: --  2015  -DR      Patient/Caregiver Goals Relieve pain;Return to prior level of function;Improve mobility;Improve strength;Know what to do to help the symptoms  -      Hand Dominance right-handed  -DR      Occupation/sports/leisure activities Pt enjoys gardening, walking.  -DR      What clinical tests have you had for this problem? CT scan;MRI  -DR      Results of Clinical Tests see epic  -DR         Pain     Pain Location Ankle  -DR      Pain at Present 5  -DR      Pain at Best 0  -DR      Pain at Worst 8  -DR      Pain Frequency Several days a week  -DR      Pain Description Aching;Sharp  -DR      What Performance Factors Make the Current Problem(s) WORSE? walking without brace, putting on socks/shoes  -DR      What Performance Factors Make the Current Problem(s) BETTER? wearing Velcro brace, sitting, first steps/walk in the AM  -DR      Tolerance Time- Walking 5 minutes  -DR      Is your sleep disturbed? --  sometimes- 4x/wk  -         Fall Risk Assessment    Any  "falls in the past year: Yes  -DR      Number of falls reported in the last 12 months \"a bunch, maybe 5\"  -DR      Factors that contributed to the fall: Other (comment);Tripped;Lost balance  walking too fast, ankle giving way  -DR         Services    Prior Rehab/Home Health Experiences No  -DR         Daily Activities    Primary Language English  -DR      How does patient learn best? Demonstration;Pictures/Video;Listening;Reading  -DR      Barriers to learning None  -DR      Pt Participated in POC and Goals Yes  -DR         Safety    Are you being hurt, hit, or frightened by anyone at home or in your life? No  -DR      Are you being neglected by a caregiver No  -DR      Have you had any of the following issues with N/A  -DR                User Key  (r) = Recorded By, (t) = Taken By, (c) = Cosigned By      Initials Name Provider Type    Leonid Alarcon, PT Physical Therapist                     PT Ortho       Row Name 02/25/25 0900       Subjective    Subjective Comments eval  -DR       Posture/Observations    Observations Edema  -DR    Posture/Observations Comments mild swelling to anteromedial R ankle where screw inserted from 2015 surgery  -DR       Quarter Clearing    Quarter Clearing Lower Quarter Clearing  -DR       Myotomal Screen- Lower Quarter Clearing    Hip flexion (L2) Bilateral:;4 (Good)  -DR    Knee extension (L3) Bilateral:;4+ (Good +)  -DR    Knee flexion (S2) Left:;4+ (Good +);Right:;4 (Good)  -DR       Special Tests/Palpation    Special Tests/Palpation Ankle/Foot  -DR       Foot/Ankle Palpation    Foot/Ankle Palpation? Yes  -DR       Ankle Accessory Motions    Ankle Accessory Motions Tested? Yes  -DR    Talocrural (talotibial) distraction Right:;Hypomobile  -DR    Talocrural (talotibial) A-P glide Right:;Hypomobile  -DR    Subtalar distraction Right:;Hypomobile  -DR    Subtalar medial/lateral tilt Right:;Hypomobile  -DR       General ROM    RT Lower Ext Rt Ankle Dorsiflexion;Rt Ankle " Plantarflexion;Rt Ankle Inversion;Rt Ankle Eversion  -DR    LT Lower Ext Lt Ankle Dorsiflexion;Lt Ankle Plantarflexion;Lt Ankle Inversion;Lt Ankle Eversion  -DR       Right Lower Ext    Rt Ankle Dorsiflexion AROM -5 deg to neutral  -DR    Rt Ankle Plantarflexion AROM 40 deg  -DR    Rt Ankle Inversion AROM 25 deg  -DR    Rt Ankle Eversion AROM 10 deg  -DR       Left Lower Ext    LT Lower Extremity Comments All L ankle AROM WNL  -DR       MMT (Manual Muscle Testing)    Rt Lower Ext Rt Ankle Plantarflexion;Rt Ankle Dorsiflexion;Rt Ankle Subtalar Inversion;Rt Ankle Subtalar Eversion  -DR    Lt Lower Ext Lt Ankle Plantarflexion;Lt Ankle Dorsiflexion;Lt Ankle Subtalar Inversion;Lt Ankle Subtalar Eversion  -DR       MMT Right Lower Ext    Rt Ankle Plantarflexion MMT, Gross Movement (5/5) normal  -DR    Rt Ankle Dorsiflexion MMT, Gross Movement (4+/5) good plus  -DR    Rt Ankle Subtalar Inversion MT, Gross Movement (4+/5) good plus  -DR    Rt Ankle Subtalar Eversion MMT, Gross Movement (4+/5) good plus  -DR       MMT Left Lower Ext    Lt Ankle Plantarflexion MMT, Gross Movement (5/5) normal  -DR    Lt Ankle Dorsiflexion MMT, Gross Movement (5/5) normal  -DR    Lt Ankle Subtalar Inversion MMT, Gross Movement (5/5) normal  -DR    Lt Ankle Subtalar Eversion MMT, Gross Movement (5/5) normal  -DR       Sensation    Sensation WNL? WNL  -DR       Flexibility    Flexibility Tested? Lower Extremity  -DR       Lower Extremity Flexibility    Hamstrings Bilateral:;Mildly limited  -DR       Balance Skills Training    Balance Comments NBOS 15 second with moderate sway, no LOB; 1/2 tandem stance increased sway with R foot behind but no LOB for 15 seconds; tandem stance <10s bilaterally before requiring support.  -DR              User Key  (r) = Recorded By, (t) = Taken By, (c) = Cosigned By      Initials Name Provider Type    Leonid Alarcon, PT Physical Therapist                                Therapy Education  Education Details:  Educated on PT role and POC; discussed expectations/timeframe for healing. Provided HEP, Access Code: [email or web address]  Given: HEP, Symptoms/condition management  Program: New  How Provided: Verbal, Demonstration, Written  Provided to: Patient  Level of Understanding: Teach back education performed, Verbalized, Demonstrated      PT OP Goals       Row Name 02/25/25 0900          PT Short Term Goals    STG Date to Achieve 03/27/25  -     STG 1 Pt will be independent and verbalized good understanding of initial HEP to improve ability to manage pain, as well as improve ankle strength and mobility.  -     STG 1 Progress New  -     STG 2 The pt will ambulate with near normal gait pattern over even ground with pain no greater than 2/10 to facilitate improved household navigation.  -     STG 2 Progress New  -     STG 3 The pt will demonstrate R ankle DF AROM to at least 5 deg past neutral to facilitate improved gait pattern.  -     STG 3 Progress New  -     STG 4 --  -     STG 4 Progress --  -DR        Long Term Goals    LTG Date to Achieve 04/26/25  -     LTG 1 The pt will demonstrate IND and compliant with progressive HEP focused on IND condition management and return to PLOF.  -     LTG 1 Progress New  -     LTG 2 Patient able to tandem stance bilaterally with 1 finger hold > 30 seconds without LOB for improved core stabilization and decreased fall risk  -     LTG 2 Progress New  -     LTG 3 The pt will demonstrate R ankle strength to 5/5 for improved functional strength and balance.  -     LTG 3 Progress New  -     LTG 4 The pt will score greater than or equal to 75% ability on the FAAM to indicate improved perceived performance of ADLs.  -     LTG 4 Progress New  BLANKA        Time Calculation    PT Goal Re-Cert Due Date 05/26/25  -               User Key  (r) = Recorded By, (t) = Taken By, (c) = Cosigned By      Initials Name Provider Type    Leonid Alarcon, PT Physical Therapist                      PT Assessment/Plan       Row Name 02/25/25 0900          PT Assessment    Functional Limitations Decreased safety during functional activities;Impaired gait;Impaired locomotion;Limitations in community activities;Performance in leisure activities;Performance in self-care ADL;Limitation in home management;Limitations in functional capacity and performance  -DR     Impairments Range of motion;Pain;Muscle strength;Joint mobility;Gait;Endurance;Balance;Joint integrity;Impaired flexibility;Poor body mechanics;Posture  -DR     Assessment Comments Davida Anderson is a 79 y.o. year-old female referred to physical therapy for traumatic OA of R foot. Pt sustained a severe left trimalleolar ankle fracture dislocation in 2015, treated operatively. Currently wears Velcro brace to assist with relieving pain. Pt previously completed PT for balance. She presents with a stable clinical presentation. Pertinent comorbidities and personal factors that may affect progress in the plan of care include, but are not limited to, HTN, hx of breast CA, bipolar 1 disorder, chronic LBP, A-fib, chronicity of symptoms with learned compensatory habits, imaging, active lifestyle. Pt enjoys leisurely walking 1 mile a day with her . Self scored disability measure of FAAM was a 45/84, where 84 is no perceived disability. She demonstrated impaired balance with Rhomberg testing, hypomobile R ankle accessory motions, mild edema to R anterior-medial ankle, impaired AROM of R ankle, very mild strength impairments to R ankle and B LE, and impaired BLE flexibility. Signs and symptoms are consistent with referring diagnosis. She is appropriate for skilled therapy services at this time to address deficits and improve ease with leisure activities.  -DR     Please refer to paper survey for additional self-reported information No  -DR     Rehab Potential Good  -DR     Patient/caregiver participated in establishment of treatment plan and goals Yes   -     Patient would benefit from skilled therapy intervention Yes  -DR        PT Plan    PT Frequency 2x/week  -     Predicted Duration of Therapy Intervention (PT) 8-10 visits  -     Planned CPT's? PT EVAL MOD COMPLELITY: 13780;PT RE-EVAL: 45752;PT THER PROC EA 15 MIN: 96623;PT THER ACT EA 15 MIN: 52690;PT MANUAL THERAPY EA 15 MIN: 94775;PT NEUROMUSC RE-EDUCATION EA 15 MIN: 53272;PT GAIT TRAINING EA 15 MIN: 04050;PT SELF CARE/HOME MGMT/TRAIN EA 15: 78716;PT HOT OR COLD PACK TREAT RADHA  -     PT Plan Comments assess response after evaluation; print schedule. begin on nustep, review HEP. consider adding 4 way ankle with TB, standing calf stretches, tandem walking, 1/2 tandem stance, retro walking?  -               User Key  (r) = Recorded By, (t) = Taken By, (c) = Cosigned By      Initials Name Provider Type    Leonid Alarcon, PT Physical Therapist                       OP Exercises       Row Name 02/25/25 0900             Subjective    Subjective Comments eval  -DR         Total Minutes    92710 - PT Therapeutic Exercise Minutes 8  -DR         Exercise 1    Exercise Name 1 nustep  -DR      Additional Comments next visit  -DR         Exercise 2    Exercise Name 2 seated calf stretch  -DR      Cueing 2 Verbal;Demo  -DR      Reps 2 3  -DR      Time 2 20s  -DR         Exercise 3    Exercise Name 3 seated HR/TR  -DR      Cueing 3 Verbal;Demo  -DR      Sets 3 2  -DR      Reps 3 10  -DR         Exercise 4    Exercise Name 4 towel scrunches  -DR      Cueing 4 Demo  -DR      Sets 4 2  -DR      Reps 4 10  -DR      Time 4 5s  -DR         Exercise 5    Exercise Name 5 standing NBOS  -DR      Cueing 5 Demo  -DR      Reps 5 3  -DR      Time 5 20s  -DR      Additional Comments with support at home  -                User Key  (r) = Recorded By, (t) = Taken By, (c) = Cosigned By      Initials Name Provider Type    Leonid Alarcon, PT Physical Therapist                                  Outcome Measure Options:  FAAM  FAAM  FAAM: 45/84= 54%      Time Calculation:     Start Time: 0930  Stop Time: 1008  Time Calculation (min): 38 min  Timed Charges  99878 - PT Therapeutic Exercise Minutes: 8  Total Minutes  Timed Charges Total Minutes: 8   Total Minutes: 8     Therapy Charges for Today       Code Description Service Date Service Provider Modifiers Qty    09275449191 HC PT THER PROC EA 15 MIN 2/25/2025 Leonid Lopez, PT GP 1    [id number] HC PT EVAL MOD COMPLEXITY 2 2/25/2025 Leonid Lopez, PT GP 1            PT G-Codes  Outcome Measure Options: TRISTAN Lopez, PT  2/25/2025

## 2025-03-18 ENCOUNTER — HOSPITAL ENCOUNTER (OUTPATIENT)
Dept: PHYSICAL THERAPY | Facility: HOSPITAL | Age: 80
Setting detail: THERAPIES SERIES
Discharge: HOME | End: 2025-03-18
Payer: MEDICARE

## 2025-03-18 DIAGNOSIS — M19.071 ARTHRITIS OF RIGHT FOOT: Primary | ICD-10-CM

## 2025-03-18 DIAGNOSIS — R26.9 IMPAIRED GAIT: ICD-10-CM

## 2025-03-18 DIAGNOSIS — R53.1 WEAKNESS GENERALIZED: ICD-10-CM

## 2025-03-18 DIAGNOSIS — M79.671 RIGHT FOOT PAIN: ICD-10-CM

## 2025-03-18 PROCEDURE — 97110 THERAPEUTIC EXERCISES: CPT

## 2025-03-18 PROCEDURE — 97140 MANUAL THERAPY 1/> REGIONS: CPT

## 2025-03-18 NOTE — THERAPY TREATMENT NOTE
Outpatient Physical Therapy Ortho Treatment Note  [facility]     Patient Name: Davida Anderson  : 1945  MRN: [id number]  Today's Date: 3/18/2025      Visit Date: 2025    Visit Dx:    ICD-10-CM ICD-9-CM   1. Arthritis of right foot  M19.071 716.97   2. Right foot pain  M79.671 729.5   3. Impaired gait  R26.9 781.2   4. Weakness generalized  R53.1 780.79       Patient Active Problem List   Diagnosis    BP (high blood pressure)    History of left breast infiltrating ductal cancer 2cm s/p mastectomy with reconstruction  s/p chemo    H/O ETOH abuse    Gtz's esophagus    Colon polyps    Bipolar 1 disorder    Arthritis    Raynaud's disease    Neuropathy    Lumbar spine pain    Spondylolisthesis at L4-L5 level    Right lumbar radiculopathy    Gtz's esophagus without dysplasia    Hx of adenomatous colonic polyps    Dysphagia    Diarrhea    Primary osteoarthritis of left knee    Primary localized osteoarthrosis of the knee, right    Chronic pain of left knee    Spinal stenosis of lumbar region with neurogenic claudication    Spondylolisthesis of lumbar region    Acute pain of left knee    Gastroesophageal reflux disease    Irritable bowel syndrome with constipation    Adenomatous polyp of colon    History of Gtz's esophagus    Pain from implanted hardware    Impingement syndrome of right ankle    Arthritis of right ankle        Past Medical History:   Diagnosis Date    Acid reflux     Arthritis     Atrial fibrillation     Gtz esophagus     Bipolar 2 disorder     Bradycardia     WITH SURGERY    COPD (chronic obstructive pulmonary disease)     MILD, USES AINHALER PRN    DDD (degenerative disc disease), lumbar     Diverticulosis     Dizziness     Drug therapy     Fibrocystic breast     Frequent UTI     SEES DR GOFF FOR FREQUENT UTI'S, ON KEFLEX    H/O Infiltrating ductal carcinoma of left breast     Stage IIA, Grade 3 ER/IN +, HER2 -, treated with 5 years of tamoxifen, 5 years of  Femara, completed in 2012    High cholesterol     History of alcoholism     40 YRS AGO    History of Clostridium difficile colitis 2014    History of gastric ulcer     History of loop recorder     Hypertension     Iron deficiency     Irregular heartbeat     a fib    Low back pain     Lumbar herniated disc     Mass of right breast on mammogram 03/17/2016    10 o'clock position, 4 cm from the nipple,    Neuropathy     PONV (postoperative nausea and vomiting)     Raynaud disease     Sleep apnea     MILD, NO NEED FOR CPAP    Tremors of nervous system     Urinary incontinence         Past Surgical History:   Procedure Laterality Date    ANKLE OPEN REDUCTION INTERNAL FIXATION Right 08/14/2015    Dr. Dandre Camacho, [facility]    BACK SURGERY      LUMBAR    BREAST BIOPSY      BREAST RECONSTRUCTION, BREAST TISSUE EXPANDER INSERTION Left 04/11/2002    [place] Contour Profile expander was placed 550 cc size, filled with 200 cc of saline, Dr. Herbert Napoles, [facility]    COLONOSCOPY N/A 09/16/2014    Dr. Darci Kerns, [facility]; torts, tics, stool, polyp    COLONOSCOPY N/A 09/27/2016    NTEH, diverticulosis, tortuous colon, Two 3-5mm polyps in the descending colon and the transverse colon, IH.  PATH: Tubular adenoma    COLONOSCOPY N/A 12/12/2017    NTEH, tics, torts, IH, TA w/low grade dysplasia    COLONOSCOPY N/A 12/01/2020    Procedure: COLONOSCOPY TO CECUM AND TI ;  Surgeon: Darci Kerns MD;  Location: [facility] ENDOSCOPY;  Service: Gastroenterology;  Laterality: N/A;  PRE- IBS, CONSTIPATION, HX POLYPS  POST- DEMINISHED SPHINCTER TONE, DIVERTICULOSIS, POORLY PREPPED COLON    CYSTOSCOPY N/A 05/07/2010    with TVT takedown, Dr. Simon Wall, [facility]    ENDOSCOPY N/A 09/27/2016    z line irreg, bilious gastric fluid- fluid aspiration preformed, gastritis.  PATH: Squamous and glandular mucosa with minimal superficial acute inflammation.     ENDOSCOPY N/A 12/12/2017    Z line irregular, gastritis, duodenitis, chronic inflammation    ENDOSCOPY N/A  12/01/2020    Procedure: ESOPHAGOGASTRODUODENOSCOPY WITH BIOPSIES;  Surgeon: Darci Kerns MD;  Location:  [facility]OS[facility];  Service: Gastroenterology;  Laterality: N/A;  PRE- REFLUX, DYSPHAGIA  POST- ESOPHAGITIS, GASTRITIS, SMALL HIATAL HERNIA, BILE REFLUX    ENDOSCOPY N/A 3/15/2023    Procedure: ESOPHAGOGASTRODUODENOSCOPY with biopsies;  Surgeon: Darci Kerns MD;  Location: [facility] ENDOSCOPY;  Service: Gastroenterology;  Laterality: N/A;  pre:  reflux with breakthrough symptoms  post;  gastritis, mild esophagitis    EPIDURAL BLOCK      ESOPHAGEAL MANOMETRY N/A 02/02/2021    Procedure: ESOPHAGEAL MANOMETRY;  Surgeon: Endo, Nurse Performed;  Location: [facility] ENDOSCOPY;  Service: Gastroenterology;  Laterality: N/A;  DYSPHAGIA    KNEE SURGERY Left     BROKEN    LUMBAR DISCECTOMY FUSION INSTRUMENTATION N/A 12/03/2018    Procedure: L4-5 laminectomy and fusion with instrumentation;  Surgeon: Austin Keith MD;  Location: [facility] MAIN OR;  Service: Orthopedic Spine    MAMMO US BREAST BIOPSY ADDITIONAL W WO DEVICE Left 02/21/2002    2:00 position left breast, Infiltrating Ductal Carcinoma, [facility]    MANDIBLE FRACTURE SURGERY      MASTECTOMY Left 04/11/2002    Left Total Mastectomy and Left Axillary [place] Node Biobsy, Dr. Indu Akins, [facility]    OTHER SURGICAL HISTORY      CARDIAC LOOP DEVICE, LEFT CHEST    TENSION FREE VAGINAL TAPING WITH MINI ARC SLING N/A 10/26/2009    Dr. Gina Castillo, [facility]    TOTAL KNEE ARTHROPLASTY Left 10/21/2019    Procedure: TOTAL KNEE ARTHROPLASTY;  Surgeon: Anurag Serrano MD;  Location: [facility] MAIN OR;  Service: Orthopedics    UPPER GASTROINTESTINAL ENDOSCOPY  09/16/2014    z-line irreg, grade a reflux, gastritis                        PT Assessment/Plan       Row Name 03/18/25 1500          PT Assessment    Assessment Comments Ms. Higuera returns to PT for her first f/u session secondary to R foot pain following significant surgical and OA history. No significant changes since  evaluation. Spent additional time on stretching and mobility this date with manual interventions with ankle distraction, calcaneal and talus mobilizations, as well as STM to posterior calf with significant taught tissue bands throughout. Additionally initiated runners calf stretch, toe splay, IV/EV towel swipe, resisted 3 way ankle, 3/4 tandem stance, and tandem walk. She does have notable toe crowding, discussed utilizing toe separator throughout the day to help with stiffness and pain, as well as completing toe splay intermittently throughout the day. She has great tolerance to all activity today, reports fatigue at termination  however no pain. Will assess response at next session and progress as tolerable.  -MO        PT Plan    PT Plan Comments how were stretches? BAPS board. retro walk w/ ipsilat arm raise, step up onto foam  -MO               User Key  (r) = Recorded By, (t) = Taken By, (c) = Cosigned By      Initials Name Provider Type    Cindy Irizarry, PT Physical Therapist                       OP Exercises       Row Name 03/18/25 1400             Subjective    Subjective Comments No changes  -MO         Total Minutes    01818 - PT Therapeutic Exercise Minutes 28  -MO      44754 - PT Manual Therapy Minutes 12  -MO         Exercise 1    Exercise Name 1 nustep  -MO      Time 1 5 mins  -MO         Exercise 2    Exercise Name 2 runners calf stretch  -MO      Cueing 2 Verbal  -MO      Reps 2 5  -MO      Time 2 15s  -MO         Exercise 3    Exercise Name 3 standing HR  -MO      Cueing 3 Verbal  -MO      Sets 3 1  -MO      Reps 3 20  -MO         Exercise 4    Exercise Name 4 towel scrunches  -MO      Cueing 4 Verbal  -MO      Sets 4 1  -MO      Reps 4 20  -MO      Time 4 5s  -MO         Exercise 6    Exercise Name 6 toe splay  -MO      Cueing 6 Verbal;Demo  -MO      Sets 6 1  -MO      Reps 6 2o0  -MO      Time 6 5s  -MO         Exercise 7    Exercise Name 7 towel IV/EV slides  -MO      Cueing 7 Verbal;Demo   -MO      Sets 7 1  -MO      Reps 7 20  -MO         Exercise 8    Exercise Name 8 resisted 3 way anl;e  -MO      Cueing 8 Verbal;Tactile;Demo  -MO      Sets 8 1e  -MO      Reps 8 10  -MO      Time 8 YTB  -MO         Exercise 9    Exercise Name 9 3/4 tandem  -MO      Cueing 9 Verbal;Demo;Tactile  -MO      Sets 9 3B  -MO      Reps 9 30s  -MO         Exercise 10    Exercise Name 10 tandem walk  -MO      Cueing 10 Verbal;Demo  -MO      Reps 10 2 laps  -MO                User Key  (r) = Recorded By, (t) = Taken By, (c) = Cosigned By      Initials Name Provider Type    Cindy Irizarry PT Physical Therapist                             Manual Rx (Last 36 Hours)       Manual Treatments       Row Name 03/18/25 1500 03/18/25 1400          Total Minutes    41548 - PT Manual Therapy Minutes -- 12  -MO        Manual Rx 1    Manual Rx 1 Location R foot  -MO --     Manual Rx 1 Type ankle distraction, calcaneal dorsal glides  -MO --     Manual Rx 1 Grade STM R posterior calf  -MO --               User Key  (r) = Recorded By, (t) = Taken By, (c) = Cosigned By      Initials Name Provider Type    Cindy Irizarry PT Physical Therapist                                       Time Calculation:   Start Time: 1400  Stop Time: 1440  Time Calculation (min): 40 min  Timed Charges  58563 - PT Therapeutic Exercise Minutes: 28  96717 - PT Manual Therapy Minutes: 12  Total Minutes  Timed Charges Total Minutes: 40   Total Minutes: 40  Therapy Charges for Today       Code Description Service Date Service Provider Modifiers Qty    75988479284 HC PT THER PROC EA 15 MIN 3/18/2025 Cindy Hammonds, PT GP 2    71868117105 HC PT MANUAL THERAPY EA 15 MIN 3/18/2025 Cindy Hammonds PT GP 1                      Cindy Hammonds PT  3/18/2025

## 2025-03-20 ENCOUNTER — HOSPITAL ENCOUNTER (OUTPATIENT)
Dept: PHYSICAL THERAPY | Facility: HOSPITAL | Age: 80
Setting detail: THERAPIES SERIES
Discharge: HOME | End: 2025-03-20
Payer: MEDICARE

## 2025-03-20 DIAGNOSIS — M79.671 RIGHT FOOT PAIN: ICD-10-CM

## 2025-03-20 DIAGNOSIS — M19.071 ARTHRITIS OF RIGHT FOOT: Primary | ICD-10-CM

## 2025-03-20 DIAGNOSIS — R26.9 IMPAIRED GAIT: ICD-10-CM

## 2025-03-20 PROCEDURE — 97110 THERAPEUTIC EXERCISES: CPT

## 2025-03-20 PROCEDURE — 97140 MANUAL THERAPY 1/> REGIONS: CPT

## 2025-03-20 NOTE — THERAPY TREATMENT NOTE
Outpatient Physical Therapy Ortho Treatment Note  [facility]     Patient Name: Davida Anderson  : 1945  MRN: [id number]  Today's Date: 3/20/2025      Visit Date: 2025    Visit Dx:    ICD-10-CM ICD-9-CM   1. Arthritis of right foot  M19.071 716.97   2. Right foot pain  M79.671 729.5   3. Impaired gait  R26.9 781.2       Patient Active Problem List   Diagnosis    BP (high blood pressure)    History of left breast infiltrating ductal cancer 2cm s/p mastectomy with reconstruction  s/p chemo    H/O ETOH abuse    Gtz's esophagus    Colon polyps    Bipolar 1 disorder    Arthritis    Raynaud's disease    Neuropathy    Lumbar spine pain    Spondylolisthesis at L4-L5 level    Right lumbar radiculopathy    Gtz's esophagus without dysplasia    Hx of adenomatous colonic polyps    Dysphagia    Diarrhea    Primary osteoarthritis of left knee    Primary localized osteoarthrosis of the knee, right    Chronic pain of left knee    Spinal stenosis of lumbar region with neurogenic claudication    Spondylolisthesis of lumbar region    Acute pain of left knee    Gastroesophageal reflux disease    Irritable bowel syndrome with constipation    Adenomatous polyp of colon    History of Gtz's esophagus    Pain from implanted hardware    Impingement syndrome of right ankle    Arthritis of right ankle        Past Medical History:   Diagnosis Date    Acid reflux     Arthritis     Atrial fibrillation     Gtz esophagus     Bipolar 2 disorder     Bradycardia     WITH SURGERY    COPD (chronic obstructive pulmonary disease)     MILD, USES AINHALER PRN    DDD (degenerative disc disease), lumbar     Diverticulosis     Dizziness     Drug therapy     Fibrocystic breast     Frequent UTI     SEES DR GOFF FOR FREQUENT UTI'S, ON KEFLEX    H/O Infiltrating ductal carcinoma of left breast     Stage IIA, Grade 3 ER/MD +, HER2 -, treated with 5 years of tamoxifen, 5 years of Femara, completed in     High cholesterol      History of alcoholism     40 YRS AGO    History of Clostridium difficile colitis 2014    History of gastric ulcer     History of loop recorder     Hypertension     Iron deficiency     Irregular heartbeat     a fib    Low back pain     Lumbar herniated disc     Mass of right breast on mammogram 03/17/2016    10 o'clock position, 4 cm from the nipple,    Neuropathy     PONV (postoperative nausea and vomiting)     Raynaud disease     Sleep apnea     MILD, NO NEED FOR CPAP    Tremors of nervous system     Urinary incontinence         Past Surgical History:   Procedure Laterality Date    ANKLE OPEN REDUCTION INTERNAL FIXATION Right 08/14/2015    Dr. Dandre Camacho, [facility]    BACK SURGERY      LUMBAR    BREAST BIOPSY      BREAST RECONSTRUCTION, BREAST TISSUE EXPANDER INSERTION Left 04/11/2002    [place] Contour Profile expander was placed 550 cc size, filled with 200 cc of saline, Dr. Herbert Napoles, [facility]    COLONOSCOPY N/A 09/16/2014    Dr. Darci Kerns, [facility]; torts, tics, stool, polyp    COLONOSCOPY N/A 09/27/2016    NTEH, diverticulosis, tortuous colon, Two 3-5mm polyps in the descending colon and the transverse colon, IH.  PATH: Tubular adenoma    COLONOSCOPY N/A 12/12/2017    NTEH, tics, torts, IH, TA w/low grade dysplasia    COLONOSCOPY N/A 12/01/2020    Procedure: COLONOSCOPY TO CECUM AND TI ;  Surgeon: Darci Kerns MD;  Location: [facility] ENDOSCOPY;  Service: Gastroenterology;  Laterality: N/A;  PRE- IBS, CONSTIPATION, HX POLYPS  POST- DEMINISHED SPHINCTER TONE, DIVERTICULOSIS, POORLY PREPPED COLON    CYSTOSCOPY N/A 05/07/2010    with TVT takedown, Dr. Simon Wall, [facility]    ENDOSCOPY N/A 09/27/2016    z line irreg, bilious gastric fluid- fluid aspiration preformed, gastritis.  PATH: Squamous and glandular mucosa with minimal superficial acute inflammation.     ENDOSCOPY N/A 12/12/2017    Z line irregular, gastritis, duodenitis, chronic inflammation    ENDOSCOPY N/A 12/01/2020    Procedure:  ESOPHAGOGASTRODUODENOSCOPY WITH BIOPSIES;  Surgeon: Darci Kerns MD;  [facility];  Service: Gastroenterology;  Laterality: N/A;  PRE- REFLUX, DYSPHAGIA  POST- ESOPHAGITIS, GASTRITIS, SMALL HIATAL HERNIA, BILE REFLUX    ENDOSCOPY N/A 3/15/2023    Procedure: ESOPHAGOGASTRODUODENOSCOPY with biopsies;  Surgeon: Darci Kerns MD;  [facility];  Service: Gastroenterology;  Laterality: N/A;  pre:  reflux with breakthrough symptoms  post;  gastritis, mild esophagitis    EPIDURAL BLOCK      ESOPHAGEAL MANOMETRY N/A 02/02/2021    Procedure: ESOPHAGEAL MANOMETRY;  Surgeon: Tran, Nurse Performed;  [facility];  Service: Gastroenterology;  Laterality: N/A;  DYSPHAGIA    KNEE SURGERY Left     BROKEN    LUMBAR DISCECTOMY FUSION INSTRUMENTATION N/A 12/03/2018    Procedure: L4-5 laminectomy and fusion with instrumentation;  Surgeon: Austin Keith MD;  Location: [facility] MAIN OR;  Service: Orthopedic Spine    MAMMO US BREAST BIOPSY ADDITIONAL W WO DEVICE Left 02/21/2002    2:00 position left breast, Infiltrating Ductal Carcinoma, [facility]    MANDIBLE FRACTURE SURGERY      MASTECTOMY Left 04/11/2002    Left Total Mastectomy and Left Axillary [place] Node Biobsy, Dr. Indu Akins, [facility]    OTHER SURGICAL HISTORY      CARDIAC LOOP DEVICE, LEFT CHEST    TENSION FREE VAGINAL TAPING WITH MINI ARC SLING N/A 10/26/2009    Dr. Gina Castillo, [facility]    TOTAL KNEE ARTHROPLASTY Left 10/21/2019    Procedure: TOTAL KNEE ARTHROPLASTY;  Surgeon: Anurag Serrano MD;  Location: [facility] OR;  Service: Orthopedics    UPPER GASTROINTESTINAL ENDOSCOPY  09/16/2014    z-line irreg, grade a reflux, gastritis                        PT Assessment/Plan       Row Name 03/20/25 1000          PT Assessment    Assessment Comments Ms. Higuera returns to PT this date denying any concerns following last session, did report feeling some notable relief. Continued today with heavy emphasis on manual with STM to  calf and resumed balance exercises. Added head turns and nods to 3/4 tandem stance with notable difficulty, as well as pt reporting some mild lingering dizziness following. Anticipate that she has some vestibular hypofunction that likely contribute to ongoing dizziness and balance dysfunction, she will likely benefit from inclusion from VOR exercises in future. Will assess response to session, continuing to progress as tolerable and appropriate.  -MO        PT Plan    PT Plan Comments how was dizziness after last session?  -MO               User Key  (r) = Recorded By, (t) = Taken By, (c) = Cosigned By      Initials Name Provider Type    Cindy Irizarry, PT Physical Therapist                       OP Exercises       Row Name 03/20/25 0900             Subjective    Subjective Comments Felt good after last time  -MO         Subjective Pain    Able to rate subjective pain? yes  -MO      Pre-Treatment Pain Level 0  -MO      Post-Treatment Pain Level 0  -MO         Total Minutes    07462 - PT Therapeutic Exercise Minutes 25  -MO      41710 - PT Manual Therapy Minutes 15  -MO         Exercise 1    Exercise Name 1 nustep  -MO      Time 1 5 mins  -MO         Exercise 2    Exercise Name 2 runners calf stretch  -MO      Cueing 2 Verbal  -MO      Reps 2 5  -MO      Time 2 15s  -MO         Exercise 5    Exercise Name 5 BAPS  -MO      Sets 5 1e, A/P, M/L, CW, CCW  -MO      Reps 5 10  -MO      Time 5 lvl 2  -MO      Additional Comments trial lvl 1 at next session  -MO         Exercise 6    Exercise Name 6 toe splay  -MO      Cueing 6 Verbal  -MO      Sets 6 1  -MO      Reps 6 20  -MO      Time 6 5s  -MO         Exercise 8    Exercise Name 8 resisted 3 way anl;e  -MO      Cueing 8 Verbal  -MO      Sets 8 1e  -MO      Reps 8 15  -MO      Time 8 YTB  -MO         Exercise 9    Exercise Name 9 3/4 tandem w/ EC  -MO      Cueing 9 Verbal  -MO      Sets 9 3B  -MO      Reps 9 30s  -MO         Exercise 10    Exercise Name 10 tandem walk   -MO      Cueing 10 Verbal  -MO      Reps 10 3 laps  -MO                User Key  (r) = Recorded By, (t) = Taken By, (c) = Cosigned By      Initials Name Provider Type    Cindy Irizarry PT Physical Therapist                             Manual Rx (Last 36 Hours)       Manual Treatments       Row Name 03/20/25 0900             Total Minutes    59837 - PT Manual Therapy Minutes 15  -MO         Manual Rx 1    Manual Rx 1 Location R foot  -MO      Manual Rx 1 Type ankle distraction, calcaneal dorsal glides  -MO      Manual Rx 1 Grade STM R posterior calf  -MO                User Key  (r) = Recorded By, (t) = Taken By, (c) = Cosigned By      Initials Name Provider Type    Cindy Irizarry PT Physical Therapist                                       Time Calculation:   Start Time: 0900  Stop Time: 0940  Time Calculation (min): 40 min  Timed Charges  86096 - PT Therapeutic Exercise Minutes: 25  48890 - PT Manual Therapy Minutes: 15  Total Minutes  Timed Charges Total Minutes: 40   Total Minutes: 40  Therapy Charges for Today       Code Description Service Date Service Provider Modifiers Qty    12712284379 HC PT MANUAL THERAPY EA 15 MIN 3/20/2025 Cindy Hammonds, PT GP 1    57483117822 HC PT THER PROC EA 15 MIN 3/20/2025 Cindy Hammonds, PT GP 2                      Cindy Hammonds PT  3/20/2025

## 2025-03-25 ENCOUNTER — HOSPITAL ENCOUNTER (OUTPATIENT)
Dept: PHYSICAL THERAPY | Facility: HOSPITAL | Age: 80
Setting detail: THERAPIES SERIES
Discharge: HOME | End: 2025-03-25
Payer: MEDICARE

## 2025-03-25 DIAGNOSIS — R26.9 IMPAIRED GAIT: ICD-10-CM

## 2025-03-25 DIAGNOSIS — R53.1 WEAKNESS GENERALIZED: ICD-10-CM

## 2025-03-25 DIAGNOSIS — M79.671 RIGHT FOOT PAIN: ICD-10-CM

## 2025-03-25 DIAGNOSIS — M19.071 ARTHRITIS OF RIGHT FOOT: Primary | ICD-10-CM

## 2025-03-25 PROCEDURE — 97110 THERAPEUTIC EXERCISES: CPT

## 2025-03-25 PROCEDURE — 97140 MANUAL THERAPY 1/> REGIONS: CPT

## 2025-03-25 PROCEDURE — 97112 NEUROMUSCULAR REEDUCATION: CPT

## 2025-03-25 NOTE — THERAPY TREATMENT NOTE
Outpatient Physical Therapy Ortho Treatment Note  [facility]     Patient Name: Davida Anderson  : 1945  MRN: [id number]  Today's Date: 3/25/2025      Visit Date: 2025    Visit Dx:    ICD-10-CM ICD-9-CM   1. Arthritis of right foot  M19.071 716.97   2. Right foot pain  M79.671 729.5   3. Impaired gait  R26.9 781.2   4. Weakness generalized  R53.1 780.79       Patient Active Problem List   Diagnosis    BP (high blood pressure)    History of left breast infiltrating ductal cancer 2cm s/p mastectomy with reconstruction  s/p chemo    H/O ETOH abuse    Gtz's esophagus    Colon polyps    Bipolar 1 disorder    Arthritis    Raynaud's disease    Neuropathy    Lumbar spine pain    Spondylolisthesis at L4-L5 level    Right lumbar radiculopathy    Gtz's esophagus without dysplasia    Hx of adenomatous colonic polyps    Dysphagia    Diarrhea    Primary osteoarthritis of left knee    Primary localized osteoarthrosis of the knee, right    Chronic pain of left knee    Spinal stenosis of lumbar region with neurogenic claudication    Spondylolisthesis of lumbar region    Acute pain of left knee    Gastroesophageal reflux disease    Irritable bowel syndrome with constipation    Adenomatous polyp of colon    History of Gtz's esophagus    Pain from implanted hardware    Impingement syndrome of right ankle    Arthritis of right ankle        Past Medical History:   Diagnosis Date    Acid reflux     Arthritis     Atrial fibrillation     Gtz esophagus     Bipolar 2 disorder     Bradycardia     WITH SURGERY    COPD (chronic obstructive pulmonary disease)     MILD, USES AINHALER PRN    DDD (degenerative disc disease), lumbar     Diverticulosis     Dizziness     Drug therapy     Fibrocystic breast     Frequent UTI     SEES DR GOFF FOR FREQUENT UTI'S, ON KEFLEX    H/O Infiltrating ductal carcinoma of left breast     Stage IIA, Grade 3 ER/[place] +, HER2 -, treated with 5 years of tamoxifen, 5 years of  Femara, completed in 2012    High cholesterol     History of alcoholism     40 YRS AGO    History of Clostridium difficile colitis 2014    History of gastric ulcer     History of loop recorder     Hypertension     Iron deficiency     Irregular heartbeat     a fib    Low back pain     Lumbar herniated disc     Mass of right breast on mammogram 03/17/2016    10 o'clock position, 4 cm from the nipple,    Neuropathy     PONV (postoperative nausea and vomiting)     Raynaud disease     Sleep apnea     MILD, NO NEED FOR CPAP    Tremors of nervous system     Urinary incontinence         Past Surgical History:   Procedure Laterality Date    ANKLE OPEN REDUCTION INTERNAL FIXATION Right 08/14/2015    Dr. Dandre Camacho, [facility]    BACK SURGERY      LUMBAR    BREAST BIOPSY      BREAST RECONSTRUCTION, BREAST TISSUE EXPANDER INSERTION Left 04/11/2002    [place] Contour Profile expander was placed 550 cc size, filled with 200 cc of saline, Dr. Herbert Napoles, [facility]    COLONOSCOPY N/A 09/16/2014    Dr. Darci Kerns, [facility]; torts, tics, stool, polyp    COLONOSCOPY N/A 09/27/2016    NTEH, diverticulosis, tortuous colon, Two 3-5mm polyps in the descending colon and the transverse colon, IH.  PATH: Tubular adenoma    COLONOSCOPY N/A 12/12/2017    NTEH, tics, torts, IH, TA w/low grade dysplasia    COLONOSCOPY N/A 12/01/2020    Procedure: COLONOSCOPY TO CECUM AND TI ;  Surgeon: Darci Kerns MD;  Location: [facility] ENDOSCOPY;  Service: Gastroenterology;  Laterality: N/A;  PRE- IBS, CONSTIPATION, HX POLYPS  POST- DEMINISHED SPHINCTER TONE, DIVERTICULOSIS, POORLY PREPPED COLON    CYSTOSCOPY N/A 05/07/2010    with TVT takedown, Dr. Simon Wall, [facility]    ENDOSCOPY N/A 09/27/2016    z line irreg, bilious gastric fluid- fluid aspiration preformed, gastritis.  PATH: Squamous and glandular mucosa with minimal superficial acute inflammation.     ENDOSCOPY N/A 12/12/2017    Z line irregular, gastritis, duodenitis, chronic inflammation    ENDOSCOPY N/A  12/01/2020    Procedure: ESOPHAGOGASTRODUODENOSCOPY WITH BIOPSIES;  Surgeon: Darci Kerns MD;  Location:  [facility];  Service: Gastroenterology;  Laterality: N/A;  PRE- REFLUX, DYSPHAGIA  POST- ESOPHAGITIS, GASTRITIS, SMALL HIATAL HERNIA, BILE REFLUX    ENDOSCOPY N/A 3/15/2023    Procedure: ESOPHAGOGASTRODUODENOSCOPY with biopsies;  Surgeon: Darci Kerns MD;  Location: [facility] ENDOSCOPY;  Service: Gastroenterology;  Laterality: N/A;  pre:  reflux with breakthrough symptoms  post;  gastritis, mild esophagitis    EPIDURAL BLOCK      ESOPHAGEAL MANOMETRY N/A 02/02/2021    Procedure: ESOPHAGEAL MANOMETRY;  Surgeon: Endo, Nurse Performed;  Location: [facility] ENDOSCOPY;  Service: Gastroenterology;  Laterality: N/A;  DYSPHAGIA    KNEE SURGERY Left     BROKEN    LUMBAR DISCECTOMY FUSION INSTRUMENTATION N/A 12/03/2018    Procedure: L4-5 laminectomy and fusion with instrumentation;  Surgeon: Austin Keith MD;  Location: [facility] MAIN OR;  Service: Orthopedic Spine    MAMMO US BREAST BIOPSY ADDITIONAL W WO DEVICE Left 02/21/2002    2:00 position left breast, Infiltrating Ductal Carcinoma, [facility]    MANDIBLE FRACTURE SURGERY      MASTECTOMY Left 04/11/2002    Left Total Mastectomy and Left Axillary [place] Node Biobsy, Dr. Indu Akins, [facility]    OTHER SURGICAL HISTORY      CARDIAC LOOP DEVICE, LEFT CHEST    TENSION FREE VAGINAL TAPING WITH MINI ARC SLING N/A 10/26/2009    Dr. Gina Castillo, [facility]    TOTAL KNEE ARTHROPLASTY Left 10/21/2019    Procedure: TOTAL KNEE ARTHROPLASTY;  Surgeon: Anurag Serrano MD;  Location: [facility] MAIN OR;  Service: Orthopedics    UPPER GASTROINTESTINAL ENDOSCOPY  09/16/2014    z-line irreg, grade a reflux, gastritis                        PT Assessment/Plan       Row Name 03/25/25 1400          PT Assessment    Assessment Comments Ms. Higuera returns to PT this date reporting having 1 fall right before coming in today. She states coming around a corner and tripping over a bag on the  ground, fell onto hands and knees, immediately iced, denies feeling like there is any acute injury. Assessment of BL knees, very small isolated area of redness to central knee, no swelling apparent, ROM is fluid. Spent additional time on stretching and manual interventions, did initiate seated VOR with minimal symptom provocation as well as well as stagger stance on BOSU with good tolerance. No lingering symptoms at termination this date, denies any increase in pain in BL LE. Encouraged continued rest and ice too BL knees for the next 1-2 days, continue to assess symptoms. Of note, she does continue to demo scissoring gait with intermittent lateral LOB while ambulating in clinic. Will continue to work on and address as appropriate.  -MO        PT Plan    PT Plan Comments how are knees? standing VOR. Continue with ankle exercises. step up to 4in + foam if tolerable on knees. tandem on airex without head turns  -MO               User Key  (r) = Recorded By, (t) = Taken By, (c) = Cosigned By      Initials Name Provider Type    Cindy Irizarry, PT Physical Therapist                       OP Exercises       Row Name 03/25/25 1300             Subjective    Subjective Comments Had a fall right before coming in. Tripped over something at home, fell onto my hands and knees. I iced and am a little sore but overall I think I am okay. The massage from last time feels really good. ankle feels good  -MO         Total Minutes    29135 - PT Therapeutic Exercise Minutes 23  -MO      54556 -  PT Neuromuscular Reeducation Minutes 5  -MO      67031 - PT Manual Therapy Minutes 17  -MO         Exercise 1    Exercise Name 1 nustep  -MO      Time 1 5 mins, lv 3  -MO         Exercise 2    Exercise Name 2 runners calf stretch  -MO      Reps 2 3  -MO      Time 2 20s  -MO         Exercise 9    Exercise Name 9 3/4 tandem w/ head turns  -MO      Cueing 9 Verbal  -MO      Sets 9 1B  -MO      Reps 9 30s  -MO         Exercise 10    Exercise Name 10  tandem walk  -MO      Cueing 10 Verbal  -MO      Reps 10 3 laps  -MO         Exercise 11    Exercise Name 11 seated VOR  -MO      Sets 11 3e  -MO      Reps 11 30s  -MO         Exercise 12    Exercise Name 12 stagger stance on BOSU  -MO                User Key  (r) = Recorded By, (t) = Taken By, (c) = Cosigned By      Initials Name Provider Type    Cindy Irizarry PT Physical Therapist                             Manual Rx (Last 36 Hours)       Manual Treatments       Row Name 03/25/25 1300             Total Minutes    19814 - PT Manual Therapy Minutes 17  -MO         Manual Rx 1    Manual Rx 1 Location R foot  -MO      Manual Rx 1 Type ankle distraction, calcaneal dorsal glides  -MO      Manual Rx 1 Grade STM R posterior calf  -MO                User Key  (r) = Recorded By, (t) = Taken By, (c) = Cosigned By      Initials Name Provider Type    Cindy Irizarry PT Physical Therapist                                       Time Calculation:   Start Time: 1400  Stop Time: 1445  Time Calculation (min): 45 min  Timed Charges  49584 - PT Therapeutic Exercise Minutes: 23  19776 -  PT Neuromuscular Reeducation Minutes: 5  29981 - PT Manual Therapy Minutes: 17  Total Minutes  Timed Charges Total Minutes: 45   Total Minutes: 45  Therapy Charges for Today       Code Description Service Date Service Provider Modifiers Qty    26661166516 HC PT THER PROC EA 15 MIN 3/25/2025 Cindy Hammonds PT GP 2    [id number] HC PT MANUAL THERAPY EA 15 MIN 3/25/2025 Cindy Hammonds PT GP 1                      Cindy Hammonds PT  3/25/2025

## 2025-03-27 ENCOUNTER — APPOINTMENT (OUTPATIENT)
Dept: PHYSICAL THERAPY | Facility: HOSPITAL | Age: 80
End: 2025-03-27
Payer: MEDICARE

## (undated) DEVICE — BNDG ELAS ELITE V/CLOSE 6IN 5YD LF STRL

## (undated) DEVICE — GLV SURG SENSICARE W/ALOE PF LF 8 STRL

## (undated) DEVICE — BITEBLOCK OMNI BLOC

## (undated) DEVICE — GLV SURG SENSICARE W/ALOE PF LF 7.5 STRL

## (undated) DEVICE — PK KN TOTL 40

## (undated) DEVICE — ANTIBACTERIAL UNDYED BRAIDED (POLYGLACTIN 910), SYNTHETIC ABSORBABLE SUTURE: Brand: COATED VICRYL

## (undated) DEVICE — PREP SOL POVIDONE/IODINE BT 4OZ

## (undated) DEVICE — FRCP BX RADJAW4 NDL 2.8 240CM LG OG BX40

## (undated) DEVICE — MAT FLR ABSORBENT LG 4FT 10 2.5FT

## (undated) DEVICE — ENCORE® LATEX ORTHO SIZE 8, STERILE LATEX POWDER-FREE SURGICAL GLOVE: Brand: ENCORE

## (undated) DEVICE — LN SMPL CO2 SHTRM SD STREAM W/M LUER

## (undated) DEVICE — SHEATH CATH MANOSHIELD ESOPH

## (undated) DEVICE — DUAL CUT SAGITTAL BLADE

## (undated) DEVICE — ADAPT CLN BIOGUARD AIR/H2O DISP

## (undated) DEVICE — KT ORCA ORCAPOD DISP STRL

## (undated) DEVICE — GOWN,ECLIPSE,FABRIC-REINFORCED,X-LARGE: Brand: MEDLINE

## (undated) DEVICE — SPONGE,LAP,12"X12",XR,ST,5/PK,40PK/CS: Brand: MEDLINE

## (undated) DEVICE — CODMAN® SURGICAL PATTIES 1/2" X 3" (1.27CM X 7.62CM): Brand: CODMAN®

## (undated) DEVICE — OCCLUSIVE GAUZE STRIP,3% BISMUTH TRIBROMOPHENATE IN PETROLATUM BLEND: Brand: XEROFORM

## (undated) DEVICE — HANDPIECE SET WITH COAXIAL HIGH FLOW TIP AND SUCTION TUBE: Brand: INTERPULSE

## (undated) DEVICE — KT DRN EVAC WND PVC PCH WTROC RND 10F400

## (undated) DEVICE — CANN NASL CO2 TRULINK W/O2 A/

## (undated) DEVICE — APPL DURAPREP IODOPHOR APL 26ML

## (undated) DEVICE — NDL SPINE 22G 31/2IN BLK

## (undated) DEVICE — GLV SURG SENSICARE PI PF LF 7 GRN STRL

## (undated) DEVICE — SOL NACL 0.9PCT 1000ML

## (undated) DEVICE — TUBING, SUCTION, 1/4" X 10', STRAIGHT: Brand: MEDLINE

## (undated) DEVICE — CANN O2 ETCO2 FITS ALL CONN CO2 SMPL A/ 7IN DISP LF

## (undated) DEVICE — DISPOSABLE BIPOLAR CABLE 12FT. (3.6M): Brand: KIRWAN

## (undated) DEVICE — UNDERCAST PADDING: Brand: DEROYAL

## (undated) DEVICE — DRSNG GZ PETROLTM XEROFORM CURAD 1X8IN STRL

## (undated) DEVICE — 3M™ IOBAN™ 2 ANTIMICROBIAL INCISE DRAPE 6640EZ: Brand: IOBAN™ 2

## (undated) DEVICE — PK NEURO SPINE 40

## (undated) DEVICE — TOTAL TRAY, 16FR 10ML SIL FOLEY, URN: Brand: MEDLINE

## (undated) DEVICE — 6.0MM PRECISION ROUND

## (undated) DEVICE — SOL NACL 0.9PCT 100ML SGL

## (undated) DEVICE — GLV SURG SENSICARE MICRO PF LF 7 STRL

## (undated) DEVICE — MEDI-VAC YANKAUER SUCTION HANDLE W/BULBOUS TIP: Brand: CARDINAL HEALTH

## (undated) DEVICE — SPNG GZ WOVN 4X4IN 12PLY 10/BX STRL

## (undated) DEVICE — THE TORRENT IRRIGATION SCOPE CONNECTOR IS USED WITH THE TORRENT IRRIGATION TUBING TO PROVIDE IRRIGATION FLUIDS SUCH AS STERILE WATER DURING GASTROINTESTINAL ENDOSCOPIC PROCEDURES WHEN USED IN CONJUNCTION WITH AN IRRIGATION PUMP (OR ELECTROSURGICAL UNIT).: Brand: TORRENT

## (undated) DEVICE — SUT VIC 1 OS8 27IN J699H

## (undated) DEVICE — SUT MNCRYL PLS ANTIB UD 4/0 PS2 18IN

## (undated) DEVICE — PREMIUM WET SKIN PREP TRAY: Brand: MEDLINE INDUSTRIES, INC.

## (undated) DEVICE — PK ATS CUST W CARDIOTOMY RESEVOIR

## (undated) DEVICE — SUT VIC 0 CT1 36IN J946H

## (undated) DEVICE — GLV SURG BIOGEL LTX PF 7 1/2

## (undated) DEVICE — BONE MARROW ASPIRATION NEEDLES ASPIRATOR KIT 3-HOLE 4 INCHES NDLE

## (undated) DEVICE — GLV SURG BIOGEL LTX PF 7

## (undated) DEVICE — ADHS SKIN DERMABOND TOP ADVANCED

## (undated) DEVICE — SENSR O2 OXIMAX FNGR A/ 18IN NONSTR

## (undated) DEVICE — DRSNG BURN ACTICOAT FLEX 7 1X24IN

## (undated) DEVICE — SUT VIC 1 CT1 36IN J947H

## (undated) DEVICE — PAD,ABDOMINAL,8"X10",ST,LF: Brand: MEDLINE

## (undated) DEVICE — TBG 02 CRUSH RESIST LF CLR 7FT

## (undated) DEVICE — GLV SURG PREMIERPRO ORTHO LTX PF SZ7.5 BRN

## (undated) DEVICE — Device: Brand: DEFENDO AIR/WATER/SUCTION AND BIOPSY VALVE

## (undated) DEVICE — CUFF SCD HEMOFORCE SEQ CALF STD MD

## (undated) DEVICE — STPLR SKIN VISISTAT WD 35CT